# Patient Record
Sex: FEMALE | Race: WHITE | Employment: PART TIME | URBAN - METROPOLITAN AREA
[De-identification: names, ages, dates, MRNs, and addresses within clinical notes are randomized per-mention and may not be internally consistent; named-entity substitution may affect disease eponyms.]

---

## 2017-07-31 ENCOUNTER — OFFICE VISIT (OUTPATIENT)
Dept: FAMILY MEDICINE CLINIC | Facility: CLINIC | Age: 62
End: 2017-07-31

## 2017-07-31 DIAGNOSIS — Z23 NEED FOR TDAP VACCINATION: Primary | ICD-10-CM

## 2017-07-31 PROCEDURE — 90471 IMMUNIZATION ADMIN: CPT | Performed by: NURSE PRACTITIONER

## 2017-07-31 PROCEDURE — 90715 TDAP VACCINE 7 YRS/> IM: CPT | Performed by: NURSE PRACTITIONER

## 2017-07-31 NOTE — PROGRESS NOTES
Injured finger with nail today. Not sure when last Tdap was. Vaccine questionnaire reviewed, placed in left deltoid.

## 2018-12-06 ENCOUNTER — OFFICE VISIT (OUTPATIENT)
Dept: FAMILY MEDICINE CLINIC | Facility: CLINIC | Age: 63
End: 2018-12-06
Payer: COMMERCIAL

## 2018-12-06 VITALS
RESPIRATION RATE: 16 BRPM | HEART RATE: 77 BPM | OXYGEN SATURATION: 99 % | TEMPERATURE: 98 F | DIASTOLIC BLOOD PRESSURE: 80 MMHG | WEIGHT: 137 LBS | SYSTOLIC BLOOD PRESSURE: 128 MMHG

## 2018-12-06 DIAGNOSIS — H60.391 OTITIS, EXTERNA, INFECTIVE, RIGHT: Primary | ICD-10-CM

## 2018-12-06 PROCEDURE — 99213 OFFICE O/P EST LOW 20 MIN: CPT | Performed by: PHYSICIAN ASSISTANT

## 2018-12-06 RX ORDER — OFLOXACIN 3 MG/ML
5 SOLUTION AURICULAR (OTIC) DAILY
Qty: 1 BOTTLE | Refills: 0 | Status: SHIPPED | OUTPATIENT
Start: 2018-12-06 | End: 2018-12-11

## 2018-12-06 NOTE — PROGRESS NOTES
CHIEF COMPLAINT:   Patient presents with:  Ear Pain: last few days some mild discomfort in right ear, crustiness on outside area occassionally      HPI:   Simba Han is a 59year old female who presents for complaints of right ear pain.  Patient has landmarks intact. NOSE: nostrils patent, no nasal mucous, nasal mucosa pink and moist  THROAT: oral mucosa pink, moist. No visible dental caries.  Posterior pharynx with no erythema,  no exudates, tonsils 0/4  NECK: supple, non-tender  LUNGS: non labored b

## 2019-01-09 ENCOUNTER — OFFICE VISIT (OUTPATIENT)
Dept: HEMATOLOGY/ONCOLOGY | Facility: HOSPITAL | Age: 64
End: 2019-01-09
Attending: INTERNAL MEDICINE
Payer: COMMERCIAL

## 2019-01-09 ENCOUNTER — MED REC SCAN ONLY (OUTPATIENT)
Dept: HEMATOLOGY/ONCOLOGY | Facility: HOSPITAL | Age: 64
End: 2019-01-09

## 2019-01-09 VITALS
RESPIRATION RATE: 18 BRPM | DIASTOLIC BLOOD PRESSURE: 82 MMHG | TEMPERATURE: 98 F | SYSTOLIC BLOOD PRESSURE: 142 MMHG | HEART RATE: 89 BPM | BODY MASS INDEX: 20.73 KG/M2 | WEIGHT: 129 LBS | HEIGHT: 66 IN

## 2019-01-09 DIAGNOSIS — Z17.0 MALIGNANT NEOPLASM OF OVERLAPPING SITES OF RIGHT BREAST IN FEMALE, ESTROGEN RECEPTOR POSITIVE (HCC): Primary | ICD-10-CM

## 2019-01-09 DIAGNOSIS — C50.811 MALIGNANT NEOPLASM OF OVERLAPPING SITES OF RIGHT BREAST IN FEMALE, ESTROGEN RECEPTOR POSITIVE (HCC): Primary | ICD-10-CM

## 2019-01-09 PROCEDURE — 99205 OFFICE O/P NEW HI 60 MIN: CPT | Performed by: INTERNAL MEDICINE

## 2019-01-10 NOTE — CONSULTS
[facility]    PATIENT'S NAME: Dickson Michaels   CONSULTING PHYSICIAN: Kojo Canales MD   PATIENT ACCOUNT #: [de-identified] LOCATION: [address] [place] [address] RECORD #: [id number] YOB: 1955   CONSULTATION DATE: 01/09/2019       [facility] running along the axillary vein were able to be saved at that point. Her pathology from this procedure ended up demonstrating a tumor that was approximately 3 cm in maximal dimension. It was multifocal.  It was invasive lobular and grade 2.   Her margins surgeries. She has had 4 spontaneous vaginal deliveries. She has mild varicose veins. Her prior mammogram was reportedly 14 years before. MEDICATIONS:  She is on no medications. ALLERGIES:  She has no known allergies.      SOCIAL HISTORY:  She is edema.  NEUROLOGIC:  She is intact. BREASTS:  Her right chest wall has a well-healed mastectomy incision. IMPRESSION:  The patient is a 23-year-old female.   She has a T2N3M0 carcinoma of the breast.  She has had a bone scan, but has not had a CT scan need to get a port placed. We talked about the need for chest wall and axillary radiation. We will probably also arrange for her to have an occupational therapy/lymphedema prevention evaluation done as part of this assessment.   She was originally ste[place]

## 2019-01-11 ENCOUNTER — TELEPHONE (OUTPATIENT)
Dept: HEMATOLOGY/ONCOLOGY | Facility: HOSPITAL | Age: 64
End: 2019-01-11

## 2019-01-11 DIAGNOSIS — Z79.899 ENCOUNTER FOR MONITORING CARDIOTOXIC DRUG THERAPY: ICD-10-CM

## 2019-01-11 DIAGNOSIS — C50.811 CARCINOMA OF OVERLAPPING SITES OF RIGHT BREAST IN FEMALE, ESTROGEN RECEPTOR POSITIVE (HCC): Primary | ICD-10-CM

## 2019-01-11 DIAGNOSIS — Z17.0 CARCINOMA OF OVERLAPPING SITES OF RIGHT BREAST IN FEMALE, ESTROGEN RECEPTOR POSITIVE (HCC): Primary | ICD-10-CM

## 2019-01-11 DIAGNOSIS — Z51.81 ENCOUNTER FOR MONITORING CARDIOTOXIC DRUG THERAPY: ICD-10-CM

## 2019-01-15 ENCOUNTER — HOSPITAL ENCOUNTER (OUTPATIENT)
Dept: CV DIAGNOSTICS | Facility: HOSPITAL | Age: 64
Discharge: HOME | End: 2019-01-15
Attending: PHYSICIAN ASSISTANT
Payer: COMMERCIAL

## 2019-01-15 ENCOUNTER — OFFICE VISIT (OUTPATIENT)
Dept: HEMATOLOGY/ONCOLOGY | Facility: HOSPITAL | Age: 64
End: 2019-01-15
Attending: PHYSICIAN ASSISTANT
Payer: COMMERCIAL

## 2019-01-15 ENCOUNTER — LAB ENCOUNTER (OUTPATIENT)
Dept: LAB | Facility: HOSPITAL | Age: 64
End: 2019-01-15
Attending: PHYSICIAN ASSISTANT
Payer: COMMERCIAL

## 2019-01-15 ENCOUNTER — TELEPHONE (OUTPATIENT)
Dept: HEMATOLOGY/ONCOLOGY | Facility: HOSPITAL | Age: 64
End: 2019-01-15

## 2019-01-15 DIAGNOSIS — C50.811 CARCINOMA OF OVERLAPPING SITES OF RIGHT BREAST IN FEMALE, ESTROGEN RECEPTOR POSITIVE (HCC): Primary | ICD-10-CM

## 2019-01-15 DIAGNOSIS — Z17.0 CARCINOMA OF OVERLAPPING SITES OF RIGHT BREAST IN FEMALE, ESTROGEN RECEPTOR POSITIVE (HCC): ICD-10-CM

## 2019-01-15 DIAGNOSIS — Z17.0 ESTROGEN RECEPTOR POSITIVE: ICD-10-CM

## 2019-01-15 DIAGNOSIS — C50.811 CANCER OF OVERLAPPING SITES OF RIGHT BREAST (HCC): Primary | ICD-10-CM

## 2019-01-15 DIAGNOSIS — C50.811 CARCINOMA OF OVERLAPPING SITES OF RIGHT BREAST IN FEMALE, ESTROGEN RECEPTOR POSITIVE (HCC): ICD-10-CM

## 2019-01-15 DIAGNOSIS — Z17.0 CARCINOMA OF OVERLAPPING SITES OF RIGHT BREAST IN FEMALE, ESTROGEN RECEPTOR POSITIVE (HCC): Primary | ICD-10-CM

## 2019-01-15 DIAGNOSIS — Z51.81 ENCOUNTER FOR THERAPEUTIC DRUG MONITORING: ICD-10-CM

## 2019-01-15 DIAGNOSIS — Z51.81 ENCOUNTER FOR MONITORING CARDIOTOXIC DRUG THERAPY: ICD-10-CM

## 2019-01-15 DIAGNOSIS — Z79.899 ENCOUNTER FOR MONITORING CARDIOTOXIC DRUG THERAPY: ICD-10-CM

## 2019-01-15 LAB
ALBUMIN SERPL BCP-MCNC: 4.4 G/DL (ref 3.5–4.8)
ALBUMIN/GLOB SERPL: 1.4 {RATIO} (ref 1–2)
ALP SERPL-CCNC: 63 U/L (ref 32–100)
ALT SERPL-CCNC: 14 U/L (ref 14–54)
ANION GAP SERPL CALC-SCNC: 10 MMOL/L (ref 0–18)
AST SERPL-CCNC: 19 U/L (ref 15–41)
BASOPHILS # BLD: 0.1 K/UL (ref 0–0.2)
BASOPHILS NFR BLD: 1 %
BILIRUB SERPL-MCNC: 0.6 MG/DL (ref 0.3–1.2)
BUN SERPL-MCNC: 9 MG/DL (ref 8–20)
BUN/CREAT SERPL: 12.3 (ref 10–20)
CALCIUM SERPL-MCNC: 9.1 MG/DL (ref 8.5–10.5)
CHLORIDE SERPL-SCNC: 103 MMOL/L (ref 95–110)
CO2 SERPL-SCNC: 26 MMOL/L (ref 22–32)
CREAT SERPL-MCNC: 0.73 MG/DL (ref 0.5–1.5)
EOSINOPHIL # BLD: 0.3 K/UL (ref 0–0.7)
EOSINOPHIL NFR BLD: 5 %
ERYTHROCYTE [DISTWIDTH] IN BLOOD BY AUTOMATED COUNT: 12.8 % (ref 11–15)
GLOBULIN PLAS-MCNC: 3.1 G/DL (ref 2.5–3.7)
GLUCOSE SERPL-MCNC: 83 MG/DL (ref 70–99)
HCT VFR BLD AUTO: 42 % (ref 35–48)
HGB BLD-MCNC: 14.1 G/DL (ref 12–16)
LYMPHOCYTES # BLD: 2.6 K/UL (ref 1–4)
LYMPHOCYTES NFR BLD: 40 %
MCH RBC QN AUTO: 28.3 PG (ref 27–32)
MCHC RBC AUTO-ENTMCNC: 33.5 G/DL (ref 32–37)
MCV RBC AUTO: 84.5 FL (ref 80–100)
MONOCYTES # BLD: 0.7 K/UL (ref 0–1)
MONOCYTES NFR BLD: 11 %
NEUTROPHILS # BLD AUTO: 2.9 K/UL (ref 1.8–7.7)
NEUTROPHILS NFR BLD: 43 %
OSMOLALITY UR CALC.SUM OF ELEC: 286 MOSM/KG (ref 275–295)
PATIENT FASTING: NO
PLATELET # BLD AUTO: 250 K/UL (ref 140–400)
PMV BLD AUTO: 7.7 FL (ref 7.4–10.3)
POTASSIUM SERPL-SCNC: 4.3 MMOL/L (ref 3.3–5.1)
PROT SERPL-MCNC: 7.5 G/DL (ref 5.9–8.4)
RBC # BLD AUTO: 4.97 M/UL (ref 3.7–5.4)
SODIUM SERPL-SCNC: 139 MMOL/L (ref 136–144)
WBC # BLD AUTO: 6.6 K/UL (ref 4–11)

## 2019-01-15 PROCEDURE — 93010 ELECTROCARDIOGRAM REPORT: CPT | Performed by: PHYSICIAN ASSISTANT

## 2019-01-15 PROCEDURE — 99215 OFFICE O/P EST HI 40 MIN: CPT | Performed by: PHYSICIAN ASSISTANT

## 2019-01-15 PROCEDURE — 93306 TTE W/DOPPLER COMPLETE: CPT | Performed by: PHYSICIAN ASSISTANT

## 2019-01-15 PROCEDURE — 93005 ELECTROCARDIOGRAM TRACING: CPT

## 2019-01-15 PROCEDURE — 36415 COLL VENOUS BLD VENIPUNCTURE: CPT

## 2019-01-15 PROCEDURE — 80053 COMPREHEN METABOLIC PANEL: CPT

## 2019-01-15 PROCEDURE — 85025 COMPLETE CBC W/AUTO DIFF WBC: CPT

## 2019-01-15 RX ORDER — PROCHLORPERAZINE MALEATE 10 MG
10 TABLET ORAL EVERY 6 HOURS PRN
Qty: 60 TABLET | Refills: 3 | Status: SHIPPED | OUTPATIENT
Start: 2019-01-15 | End: 2019-07-24

## 2019-01-15 RX ORDER — ONDANSETRON HYDROCHLORIDE 8 MG/1
8 TABLET, FILM COATED ORAL EVERY 8 HOURS PRN
Qty: 30 TABLET | Refills: 2 | Status: SHIPPED | OUTPATIENT
Start: 2019-01-15 | End: 2019-07-24

## 2019-01-15 NOTE — TELEPHONE ENCOUNTER
Yue from cath lab asking if patient needs any labs drawn she is having port insertion tomorrow. Robbi Sacks can be reached at [phone].  may

## 2019-01-15 NOTE — PROGRESS NOTES
Medication Education Record: IV Therapy    Learner:  Patient and Family Member    Barriers / Limitations:  None    Diagnosis:   Breast cancer    IV Cancer Treatment Name(s): Adriamycin/Cyclophosphamide (AC); Paclitaxed  IV Cancer Treatment Frequency AC:  O be taken to prevent and minimize this from occurring.     Recommended Anti-nausea medications (as directed by your provider):  Prochloperazine (Compazine) 10 mg every 6 hours and Ondansetron (Zofran) 8 mg every 8 hours  Take as needed      Helpful hints dur diarrhea or constipation, please contact the triage nurse for further instructions. Skin Care  o Avoid direct sunlight.  o Wear a broad-spectrum sunscreen with an SPF of 30 or higher on any skin exposed to the sun.   Re-apply every 2 hours if in the sun an refer to the following link if you are interested in additional information about chemotherapy for yourself or family members: http://www.roman.info/. html        Safety and Handling of Chemotherapy  While you or your family member is should be used during this time. Effective birth control should be used throughout treatment to prevent pregnancy while on these medications and for several months or years after therapy.   Chemotherapy can have harmful side effects to the fetus, especiall

## 2019-01-16 ENCOUNTER — TELEPHONE (OUTPATIENT)
Dept: HEMATOLOGY/ONCOLOGY | Facility: HOSPITAL | Age: 64
End: 2019-01-16

## 2019-01-16 ENCOUNTER — HOSPITAL ENCOUNTER (OUTPATIENT)
Dept: INTERVENTIONAL RADIOLOGY/VASCULAR | Facility: HOSPITAL | Age: 64
Discharge: HOME | End: 2019-01-16
Attending: INTERNAL MEDICINE | Admitting: INTERNAL MEDICINE
Payer: COMMERCIAL

## 2019-01-16 VITALS
BODY MASS INDEX: 21 KG/M2 | HEART RATE: 81 BPM | WEIGHT: 128 LBS | RESPIRATION RATE: 19 BRPM | SYSTOLIC BLOOD PRESSURE: 116 MMHG | DIASTOLIC BLOOD PRESSURE: 82 MMHG | OXYGEN SATURATION: 99 %

## 2019-01-16 DIAGNOSIS — Z17.0 CARCINOMA OF OVERLAPPING SITES OF RIGHT BREAST IN FEMALE, ESTROGEN RECEPTOR POSITIVE (HCC): ICD-10-CM

## 2019-01-16 DIAGNOSIS — C50.811 CARCINOMA OF OVERLAPPING SITES OF RIGHT BREAST IN FEMALE, ESTROGEN RECEPTOR POSITIVE (HCC): ICD-10-CM

## 2019-01-16 PROCEDURE — 36561 INSERT TUNNELED CV CATH: CPT

## 2019-01-16 PROCEDURE — 02H633Z INSERTION OF INFUSION DEVICE INTO RIGHT ATRIUM, PERCUTANEOUS APPROACH: ICD-10-PCS | Performed by: RADIOLOGY

## 2019-01-16 PROCEDURE — 99153 MOD SED SAME PHYS/QHP EA: CPT

## 2019-01-16 PROCEDURE — 36415 COLL VENOUS BLD VENIPUNCTURE: CPT

## 2019-01-16 PROCEDURE — 96360 HYDRATION IV INFUSION INIT: CPT

## 2019-01-16 PROCEDURE — 77001 FLUOROGUIDE FOR VEIN DEVICE: CPT

## 2019-01-16 PROCEDURE — 0JH60WZ INSERTION OF TOTALLY IMPLANTABLE VASCULAR ACCESS DEVICE INTO CHEST SUBCUTANEOUS TISSUE AND FASCIA, OPEN APPROACH: ICD-10-PCS | Performed by: RADIOLOGY

## 2019-01-16 PROCEDURE — 99152 MOD SED SAME PHYS/QHP 5/>YRS: CPT

## 2019-01-16 RX ORDER — SODIUM CHLORIDE 9 MG/ML
INJECTION, SOLUTION INTRAVENOUS CONTINUOUS
Status: DISCONTINUED | OUTPATIENT
Start: 2019-01-16 | End: 2019-01-16

## 2019-01-16 RX ORDER — HEPARIN SODIUM (PORCINE) LOCK FLUSH IV SOLN 100 UNIT/ML 100 UNIT/ML
SOLUTION INTRAVENOUS
Status: COMPLETED
Start: 2019-01-16 | End: 2019-01-16

## 2019-01-16 RX ORDER — SODIUM CHLORIDE 9 MG/ML
INJECTION, SOLUTION INTRAVENOUS
Status: COMPLETED
Start: 2019-01-16 | End: 2019-01-16

## 2019-01-16 RX ORDER — MIDAZOLAM HYDROCHLORIDE 1 MG/ML
INJECTION INTRAMUSCULAR; INTRAVENOUS
Status: COMPLETED
Start: 2019-01-16 | End: 2019-01-16

## 2019-01-16 RX ORDER — CEFAZOLIN SODIUM/WATER 2 G/20 ML
SYRINGE (ML) INTRAVENOUS
Status: COMPLETED
Start: 2019-01-16 | End: 2019-01-16

## 2019-01-16 NOTE — INTERVAL H&P NOTE
The above referenced H&P was reviewed by Harvinder Beth MD on 1/16/2019, the patient was examined and no significant changes have occurred in the patient's condition since the H&P was performed.   Risks, benefits, alternative treatments and consequences

## 2019-01-16 NOTE — TELEPHONE ENCOUNTER
APHRODITE CAME IN THE OFFICE TO SPEAK WITH IBRAHIMA FOR A PRESCRIPTION FOR A CRANIAL PROSTHESIS. APHRODITE CAN BE REACHED [phone].  PLEASE ADVISE THANKS

## 2019-01-16 NOTE — PRE-SEDATION ASSESSMENT
[facility]  IR Pre-Procedure Sedation Assessment    History of snoring or sleep or apnea?    No    History of previous problems with anesthesia or sedation  No    Physical Findings:  Neck: nl ROM  CV: RRR  PULM: normal respiratory rate/effor

## 2019-01-16 NOTE — PROCEDURES
[facility]  Procedure Note    Nickbarbara Han Patient Status:  Outpatient in a Bed    1955 MRN [id number]   Location [facility] Attending Marisa Wharton, [facility] Day # 0 PCP Molly Harrington MD

## 2019-01-17 ENCOUNTER — TELEPHONE (OUTPATIENT)
Dept: HEMATOLOGY/ONCOLOGY | Facility: HOSPITAL | Age: 64
End: 2019-01-17

## 2019-01-17 NOTE — TELEPHONE ENCOUNTER
Informed patient that PT order placed for right shoulder stiffness. S/p right MRM in 12/18 - 22 LNs removed. (PT will reach out to her to coordinate an evaluation)  Lymphedema education will be included. Tentative start date of chemo will be 1/23/19.

## 2019-01-21 ENCOUNTER — OFFICE VISIT (OUTPATIENT)
Dept: PHYSICAL THERAPY | Facility: HOSPITAL | Age: 64
End: 2019-01-21
Attending: PHYSICIAN ASSISTANT
Payer: COMMERCIAL

## 2019-01-21 DIAGNOSIS — Z17.0 CARCINOMA OF OVERLAPPING SITES OF RIGHT BREAST IN FEMALE, ESTROGEN RECEPTOR POSITIVE (HCC): ICD-10-CM

## 2019-01-21 DIAGNOSIS — M25.611 DECREASED ROM OF RIGHT SHOULDER: ICD-10-CM

## 2019-01-21 DIAGNOSIS — C50.811 CARCINOMA OF OVERLAPPING SITES OF RIGHT BREAST IN FEMALE, ESTROGEN RECEPTOR POSITIVE (HCC): ICD-10-CM

## 2019-01-21 DIAGNOSIS — Z90.11 H/O RIGHT MASTECTOMY: ICD-10-CM

## 2019-01-21 PROCEDURE — 97530 THERAPEUTIC ACTIVITIES: CPT | Performed by: PHYSICAL THERAPIST

## 2019-01-21 PROCEDURE — 97161 PT EVAL LOW COMPLEX 20 MIN: CPT | Performed by: PHYSICAL THERAPIST

## 2019-01-21 PROCEDURE — 97140 MANUAL THERAPY 1/> REGIONS: CPT | Performed by: PHYSICAL THERAPIST

## 2019-01-21 NOTE — PROGRESS NOTES
PHYSICAL THERAPY UE LYMPHEDEMA EVALUATION:   Referring Physician: Dr. Syed Valdes of overlapping sites of right breast in female, estrogen receptor positive ([facility] (C50.811,Z17.0)  H/O right mastectomy (Z90.11)  Decreased ROM of right arti Hand Dominance: Right  AROM/Strength:   Shoulder AROM R/L    Flex: 150/170    Abd 160/168    IR/ER WFL   Strength: 5/5 B   R knee AROM WFL, strength testing deferred due to pain lateral knee   Tenderness right lateral knee (inf ITB)   Pt w/ decreased sta donning/doffing compression bandages/garments to be independent at self management once discharged  2) Increase shoulder R AROM to be symmetrical to L to improve ease of dressing/bathing/and reaching overhead  3) Pt to ambulate without deviation to improve

## 2019-01-22 ENCOUNTER — TELEPHONE (OUTPATIENT)
Dept: HEMATOLOGY/ONCOLOGY | Facility: HOSPITAL | Age: 64
End: 2019-01-22

## 2019-01-22 NOTE — TELEPHONE ENCOUNTER
Patient had MRI w/ contrast @ [facility], she thinks Dr. Isa Ribera was able to see them after he ordered the CT,   Will she need to to the [facility] ?  [phone]

## 2019-01-22 NOTE — TELEPHONE ENCOUNTER
Called Aphrodite per Rudi she does not need CT as previous imaging located and reviewed. Patient verbalizes understanding.

## 2019-01-23 ENCOUNTER — OFFICE VISIT (OUTPATIENT)
Dept: HEMATOLOGY/ONCOLOGY | Facility: HOSPITAL | Age: 64
End: 2019-01-23
Attending: INTERNAL MEDICINE
Payer: COMMERCIAL

## 2019-01-23 VITALS
HEART RATE: 93 BPM | BODY MASS INDEX: 20 KG/M2 | OXYGEN SATURATION: 100 % | DIASTOLIC BLOOD PRESSURE: 79 MMHG | RESPIRATION RATE: 18 BRPM | WEIGHT: 126.63 LBS | SYSTOLIC BLOOD PRESSURE: 123 MMHG | TEMPERATURE: 99 F

## 2019-01-23 DIAGNOSIS — Z45.2 ENCOUNTER FOR CARE RELATED TO PORT-A-CATH: ICD-10-CM

## 2019-01-23 DIAGNOSIS — C50.811 CARCINOMA OF OVERLAPPING SITES OF RIGHT BREAST IN FEMALE, ESTROGEN RECEPTOR POSITIVE (HCC): Primary | ICD-10-CM

## 2019-01-23 DIAGNOSIS — Z17.0 CARCINOMA OF OVERLAPPING SITES OF RIGHT BREAST IN FEMALE, ESTROGEN RECEPTOR POSITIVE (HCC): Primary | ICD-10-CM

## 2019-01-23 PROCEDURE — 96413 CHEMO IV INFUSION 1 HR: CPT

## 2019-01-23 PROCEDURE — 96375 TX/PRO/DX INJ NEW DRUG ADDON: CPT

## 2019-01-23 PROCEDURE — 96411 CHEMO IV PUSH ADDL DRUG: CPT

## 2019-01-23 RX ORDER — HEPARIN SODIUM (PORCINE) LOCK FLUSH IV SOLN 100 UNIT/ML 100 UNIT/ML
5 SOLUTION INTRAVENOUS ONCE
Status: CANCELLED | OUTPATIENT
Start: 2019-01-23

## 2019-01-23 RX ORDER — HEPARIN SODIUM (PORCINE) LOCK FLUSH IV SOLN 100 UNIT/ML 100 UNIT/ML
5 SOLUTION INTRAVENOUS ONCE
Status: COMPLETED | OUTPATIENT
Start: 2019-01-23 | End: 2019-01-23

## 2019-01-23 RX ORDER — HEPARIN SODIUM (PORCINE) LOCK FLUSH IV SOLN 100 UNIT/ML 100 UNIT/ML
SOLUTION INTRAVENOUS
Status: COMPLETED
Start: 2019-01-23 | End: 2019-01-23

## 2019-01-23 RX ORDER — SODIUM CHLORIDE 9 MG/ML
INJECTION, SOLUTION INTRAVENOUS
Status: DISPENSED
Start: 2019-01-23 | End: 2019-01-23

## 2019-01-23 RX ORDER — 0.9 % SODIUM CHLORIDE 0.9 %
VIAL (ML) INJECTION
Status: DISPENSED
Start: 2019-01-23 | End: 2019-01-23

## 2019-01-23 RX ORDER — 0.9 % SODIUM CHLORIDE 0.9 %
10 VIAL (ML) INJECTION ONCE
Status: CANCELLED | OUTPATIENT
Start: 2019-01-23

## 2019-01-23 RX ADMIN — HEPARIN SODIUM (PORCINE) LOCK FLUSH IV SOLN 100 UNIT/ML 500 UNITS: 100 SOLUTION INTRAVENOUS at 11:35:00

## 2019-01-23 NOTE — PROGRESS NOTES
Pt here for C1D1 AC. Arrives Ambulating independently, accompanied by Family member           Modifications in dose or schedule: No  Pt anxious about starting chemotherapy. Explained procedure to her and reviewed potential side effects.  Went over her sche

## 2019-01-23 NOTE — PATIENT INSTRUCTIONS
Recommended Anti-nausea medications (as directed by your provider):  Prochloperazine (Compazine) 10 mg every 6 hours and Ondansetron (Zofran) 8 mg every 8 hours  Take as needed      Helpful hints during cancer treatment:    Diet:  o Avoid greasy or spicy f further instructions. Skin Care  o Avoid direct sunlight.  o Wear a broad-spectrum sunscreen with an SPF of 30 or higher on any skin exposed to the sun. Re-apply every 2 hours if in the sun and after bathing or sweating.   o For dry skin, use an alcohol-f medication. Handling Body Waste:   1. Caregivers must wear gloves if exposed to the patient’s blood, urine, stool or vomit. Dispose of the used gloves after each use and wash hands with soap and water.   2. Any sheets or clothes soiled with the bodily you are at a time in your cycle when you could become pregnant or if you are actually pregnant.

## 2019-01-24 ENCOUNTER — NURSE ONLY (OUTPATIENT)
Dept: HEMATOLOGY/ONCOLOGY | Facility: HOSPITAL | Age: 64
End: 2019-01-24
Attending: INTERNAL MEDICINE
Payer: COMMERCIAL

## 2019-01-24 DIAGNOSIS — C50.811 CARCINOMA OF OVERLAPPING SITES OF RIGHT BREAST IN FEMALE, ESTROGEN RECEPTOR POSITIVE (HCC): Primary | ICD-10-CM

## 2019-01-24 DIAGNOSIS — Z17.0 CARCINOMA OF OVERLAPPING SITES OF RIGHT BREAST IN FEMALE, ESTROGEN RECEPTOR POSITIVE (HCC): Primary | ICD-10-CM

## 2019-01-24 PROCEDURE — 96372 THER/PROPH/DIAG INJ SC/IM: CPT

## 2019-01-24 NOTE — PROGRESS NOTES
Injection given, reviewed possible side effects. All questions answered to the best of my ability. Written information given as well. Discharged stable from lab, ambulating steady.

## 2019-01-25 ENCOUNTER — TELEPHONE (OUTPATIENT)
Dept: HEMATOLOGY/ONCOLOGY | Facility: HOSPITAL | Age: 64
End: 2019-01-25

## 2019-01-25 NOTE — TELEPHONE ENCOUNTER
Pt stts she has to take her anti nausea meds every 6 hours and only took the med one time. She would like to know if she still has to take her anti-nausea meds even though she isnt nauseous. pls call pt.  Thank you

## 2019-01-29 ENCOUNTER — APPOINTMENT (OUTPATIENT)
Dept: PHYSICAL THERAPY | Facility: HOSPITAL | Age: 64
End: 2019-01-29
Attending: PHYSICIAN ASSISTANT
Payer: COMMERCIAL

## 2019-01-31 ENCOUNTER — APPOINTMENT (OUTPATIENT)
Dept: PHYSICAL THERAPY | Facility: HOSPITAL | Age: 64
End: 2019-01-31
Attending: PHYSICIAN ASSISTANT
Payer: COMMERCIAL

## 2019-02-05 ENCOUNTER — OFFICE VISIT (OUTPATIENT)
Dept: PHYSICAL THERAPY | Facility: HOSPITAL | Age: 64
End: 2019-02-05
Attending: PHYSICIAN ASSISTANT
Payer: COMMERCIAL

## 2019-02-05 PROCEDURE — 97110 THERAPEUTIC EXERCISES: CPT | Performed by: PHYSICAL THERAPIST

## 2019-02-05 PROCEDURE — 97140 MANUAL THERAPY 1/> REGIONS: CPT | Performed by: PHYSICAL THERAPIST

## 2019-02-05 NOTE — PROGRESS NOTES
Referring Physician: Dr. Hastings Orf of overlapping sites of right breast in female, estrogen receptor positive ([facility] (C50.811,Z17.0)  H/O right mastectomy (Z90.11)  Decreased ROM of right shoulder (M25.611)  Date of Onset: 12/12/18  I donning/doffing compression bandages/garments to be independent at self management once discharged  2) Increase shoulder R AROM to be symmetrical to L to improve ease of dressing/bathing/and reaching overhead- almost met  3) Pt to ambulate without deviatio

## 2019-02-06 ENCOUNTER — OFFICE VISIT (OUTPATIENT)
Dept: HEMATOLOGY/ONCOLOGY | Facility: HOSPITAL | Age: 64
End: 2019-02-06
Attending: INTERNAL MEDICINE
Payer: COMMERCIAL

## 2019-02-06 ENCOUNTER — DIETICIAN VISIT (OUTPATIENT)
Dept: NUTRITION | Facility: HOSPITAL | Age: 64
End: 2019-02-06

## 2019-02-06 VITALS
TEMPERATURE: 98 F | HEIGHT: 66 IN | OXYGEN SATURATION: 100 % | WEIGHT: 128 LBS | RESPIRATION RATE: 18 BRPM | HEART RATE: 81 BPM | BODY MASS INDEX: 20.57 KG/M2 | SYSTOLIC BLOOD PRESSURE: 132 MMHG | DIASTOLIC BLOOD PRESSURE: 85 MMHG

## 2019-02-06 VITALS
RESPIRATION RATE: 18 BRPM | TEMPERATURE: 98 F | WEIGHT: 128 LBS | BODY MASS INDEX: 21 KG/M2 | SYSTOLIC BLOOD PRESSURE: 132 MMHG | DIASTOLIC BLOOD PRESSURE: 85 MMHG | HEART RATE: 81 BPM | OXYGEN SATURATION: 100 %

## 2019-02-06 DIAGNOSIS — C50.811 CARCINOMA OF OVERLAPPING SITES OF RIGHT BREAST IN FEMALE, ESTROGEN RECEPTOR POSITIVE (HCC): Primary | ICD-10-CM

## 2019-02-06 DIAGNOSIS — Z17.0 CARCINOMA OF OVERLAPPING SITES OF RIGHT BREAST IN FEMALE, ESTROGEN RECEPTOR POSITIVE (HCC): Primary | ICD-10-CM

## 2019-02-06 DIAGNOSIS — Z45.2 ENCOUNTER FOR CARE RELATED TO PORT-A-CATH: ICD-10-CM

## 2019-02-06 DIAGNOSIS — Z51.11 ENCOUNTER FOR ANTINEOPLASTIC CHEMOTHERAPY: ICD-10-CM

## 2019-02-06 LAB
ALBUMIN SERPL BCP-MCNC: 3.9 G/DL (ref 3.5–4.8)
ALBUMIN/GLOB SERPL: 1.2 {RATIO} (ref 1–2)
ALP SERPL-CCNC: 76 U/L (ref 32–100)
ALT SERPL-CCNC: 13 U/L (ref 14–54)
ANION GAP SERPL CALC-SCNC: 9 MMOL/L (ref 0–18)
AST SERPL-CCNC: 17 U/L (ref 15–41)
BASOPHILS # BLD: 0.09 X10(3) UL (ref 0–0.2)
BASOPHILS NFR BLD: 2 %
BILIRUB SERPL-MCNC: 0.4 MG/DL (ref 0.3–1.2)
BUN SERPL-MCNC: 9 MG/DL (ref 8–20)
BUN/CREAT SERPL: 12.5 (ref 10–20)
CALCIUM SERPL-MCNC: 8.9 MG/DL (ref 8.5–10.5)
CHLORIDE SERPL-SCNC: 106 MMOL/L (ref 95–110)
CO2 SERPL-SCNC: 25 MMOL/L (ref 22–32)
CREAT SERPL-MCNC: 0.72 MG/DL (ref 0.5–1.5)
DEPRECATED RDW RBC AUTO: 39 FL (ref 35.1–46.3)
EOSINOPHIL # BLD: 0 X10(3) UL (ref 0–0.7)
EOSINOPHIL NFR BLD: 0 %
ERYTHROCYTE [DISTWIDTH] IN BLOOD BY AUTOMATED COUNT: 12.6 % (ref 11–15)
GLOBULIN PLAS-MCNC: 3.2 G/DL (ref 2.5–3.7)
GLUCOSE SERPL-MCNC: 99 MG/DL (ref 70–99)
HCT VFR BLD AUTO: 40.6 % (ref 35–48)
HGB BLD-MCNC: 13.1 G/DL (ref 12–16)
LYMPHOCYTES NFR BLD: 1.69 X10(3) UL (ref 1–4)
LYMPHOCYTES NFR BLD: 36 %
MCH RBC QN AUTO: 27.9 PG (ref 26–34)
MCHC RBC AUTO-ENTMCNC: 32.3 G/DL (ref 31–37)
MCV RBC AUTO: 86.4 FL (ref 80–100)
MONOCYTES # BLD: 0.33 X10(3) UL (ref 0.1–1)
MONOCYTES NFR BLD: 7 %
MORPHOLOGY: NORMAL
NEUTROPHILS # BLD AUTO: 2.21 X10 (3) UL (ref 1.5–7.7)
NEUTROPHILS NFR BLD: 48 %
NEUTS BAND NFR BLD: 7 %
NEUTS HYPERSEG # BLD: 2.59 X10(3) UL (ref 1.5–7.7)
OSMOLALITY UR CALC.SUM OF ELEC: 289 MOSM/KG (ref 275–295)
PATIENT FASTING: NO
PLATELET # BLD AUTO: 276 10(3)UL (ref 150–450)
PLATELET MORPHOLOGY: NORMAL
POTASSIUM SERPL-SCNC: 4.6 MMOL/L (ref 3.3–5.1)
PROT SERPL-MCNC: 7.1 G/DL (ref 5.9–8.4)
RBC # BLD AUTO: 4.7 X10(6)UL (ref 3.8–5.3)
SODIUM SERPL-SCNC: 140 MMOL/L (ref 136–144)
TOTAL CELLS COUNTED: 100
WBC # BLD AUTO: 4.7 X10(3) UL (ref 4–11)

## 2019-02-06 PROCEDURE — 96413 CHEMO IV INFUSION 1 HR: CPT

## 2019-02-06 PROCEDURE — 85027 COMPLETE CBC AUTOMATED: CPT

## 2019-02-06 PROCEDURE — 96367 TX/PROPH/DG ADDL SEQ IV INF: CPT

## 2019-02-06 PROCEDURE — 85025 COMPLETE CBC W/AUTO DIFF WBC: CPT

## 2019-02-06 PROCEDURE — 96375 TX/PRO/DX INJ NEW DRUG ADDON: CPT

## 2019-02-06 PROCEDURE — 96411 CHEMO IV PUSH ADDL DRUG: CPT

## 2019-02-06 PROCEDURE — 99214 OFFICE O/P EST MOD 30 MIN: CPT | Performed by: INTERNAL MEDICINE

## 2019-02-06 PROCEDURE — 85007 BL SMEAR W/DIFF WBC COUNT: CPT

## 2019-02-06 PROCEDURE — 80053 COMPREHEN METABOLIC PANEL: CPT

## 2019-02-06 RX ORDER — HEPARIN SODIUM (PORCINE) LOCK FLUSH IV SOLN 100 UNIT/ML 100 UNIT/ML
5 SOLUTION INTRAVENOUS ONCE
Status: CANCELLED | OUTPATIENT
Start: 2019-02-06

## 2019-02-06 RX ORDER — LORAZEPAM 2 MG/ML
INJECTION INTRAMUSCULAR EVERY 4 HOURS PRN
Status: CANCELLED | OUTPATIENT
Start: 2019-02-06

## 2019-02-06 RX ORDER — 0.9 % SODIUM CHLORIDE 0.9 %
10 VIAL (ML) INJECTION ONCE
Status: CANCELLED | OUTPATIENT
Start: 2019-02-06

## 2019-02-06 RX ORDER — HEPARIN SODIUM (PORCINE) LOCK FLUSH IV SOLN 100 UNIT/ML 100 UNIT/ML
SOLUTION INTRAVENOUS
Status: COMPLETED
Start: 2019-02-06 | End: 2019-02-06

## 2019-02-06 RX ORDER — SODIUM CHLORIDE 9 MG/ML
INJECTION, SOLUTION INTRAVENOUS
Status: DISCONTINUED
Start: 2019-02-06 | End: 2019-02-06

## 2019-02-06 RX ORDER — ONDANSETRON 2 MG/ML
8 INJECTION INTRAMUSCULAR; INTRAVENOUS EVERY 6 HOURS PRN
Status: CANCELLED | OUTPATIENT
Start: 2019-02-06

## 2019-02-06 RX ORDER — METOCLOPRAMIDE HYDROCHLORIDE 5 MG/ML
10 INJECTION INTRAMUSCULAR; INTRAVENOUS EVERY 6 HOURS PRN
Status: CANCELLED | OUTPATIENT
Start: 2019-02-06

## 2019-02-06 RX ORDER — LORAZEPAM 1 MG/1
TABLET ORAL EVERY 4 HOURS PRN
Status: CANCELLED | OUTPATIENT
Start: 2019-02-06

## 2019-02-06 RX ORDER — PROCHLORPERAZINE MALEATE 10 MG
10 TABLET ORAL EVERY 6 HOURS PRN
Status: CANCELLED | OUTPATIENT
Start: 2019-02-06

## 2019-02-06 RX ORDER — 0.9 % SODIUM CHLORIDE 0.9 %
VIAL (ML) INJECTION
Status: DISCONTINUED
Start: 2019-02-06 | End: 2019-02-06

## 2019-02-06 RX ORDER — HEPARIN SODIUM (PORCINE) LOCK FLUSH IV SOLN 100 UNIT/ML 100 UNIT/ML
5 SOLUTION INTRAVENOUS ONCE
Status: COMPLETED | OUTPATIENT
Start: 2019-02-06 | End: 2019-02-06

## 2019-02-06 RX ADMIN — HEPARIN SODIUM (PORCINE) LOCK FLUSH IV SOLN 100 UNIT/ML 500 UNITS: 100 SOLUTION INTRAVENOUS at 12:49:00

## 2019-02-06 NOTE — PROGRESS NOTES
.Oncology Nutrition Assessment    Seeing pt per pt request.    Ht Readings from Last 1 Encounters:  02/06/19 : 167.6 cm (5' 6\")      Wt Readings from Last 1 Encounters:  02/06/19 : 58.1 kg (128 lb)    BMI Calculated:   There is no height or weight on file

## 2019-02-06 NOTE — PROGRESS NOTES
[facility]    PATIENT'S NAME: Margaret Dago   ATTENDING PHYSICIAN: Alyssa Houston MD   PATIENT ACCOUNT #: [de-identified] LOCATION: [address] [place] [address] RECORD #: [id number] YOB: 1955   DATE OF SERVICE: 02/06/2019       CANCER temperature 98. 1. HEENT:  Pink conjunctivae. Anicteric sclerae. Pharynx without lesions. LYMPHATICS:  No cervical, supraclavicular, or axillary adenopathy. CHEST:  Her right chest wall is unremarkable. Her right arm is unremarkable.   Her port is on t

## 2019-02-06 NOTE — PROGRESS NOTES
Pt here for C2D1 AC. Arrives Ambulating independently, accompanied by Spouse           Modifications in dose or schedule: No    Patient tolerated the first chemotherapy well.  Reports she didn't have any nausea but still took her medication, encouraged pat

## 2019-02-07 ENCOUNTER — NURSE ONLY (OUTPATIENT)
Dept: HEMATOLOGY/ONCOLOGY | Facility: HOSPITAL | Age: 64
End: 2019-02-07
Attending: INTERNAL MEDICINE
Payer: COMMERCIAL

## 2019-02-07 VITALS
RESPIRATION RATE: 18 BRPM | TEMPERATURE: 98 F | SYSTOLIC BLOOD PRESSURE: 121 MMHG | DIASTOLIC BLOOD PRESSURE: 84 MMHG | HEART RATE: 76 BPM

## 2019-02-07 DIAGNOSIS — Z17.0 CARCINOMA OF OVERLAPPING SITES OF RIGHT BREAST IN FEMALE, ESTROGEN RECEPTOR POSITIVE (HCC): Primary | ICD-10-CM

## 2019-02-07 DIAGNOSIS — C50.811 CARCINOMA OF OVERLAPPING SITES OF RIGHT BREAST IN FEMALE, ESTROGEN RECEPTOR POSITIVE (HCC): Primary | ICD-10-CM

## 2019-02-07 PROCEDURE — 96372 THER/PROPH/DIAG INJ SC/IM: CPT

## 2019-02-07 NOTE — PROGRESS NOTES
Injection given, pt states she is feeling well, no complaints, eating and drinking well. Discharged stable from lab, ambulating steady.

## 2019-02-08 ENCOUNTER — APPOINTMENT (OUTPATIENT)
Dept: PHYSICAL THERAPY | Facility: HOSPITAL | Age: 64
End: 2019-02-08
Attending: PHYSICIAN ASSISTANT
Payer: COMMERCIAL

## 2019-02-12 ENCOUNTER — APPOINTMENT (OUTPATIENT)
Dept: PHYSICAL THERAPY | Facility: HOSPITAL | Age: 64
End: 2019-02-12
Attending: PHYSICIAN ASSISTANT
Payer: COMMERCIAL

## 2019-02-19 ENCOUNTER — OFFICE VISIT (OUTPATIENT)
Dept: PHYSICAL THERAPY | Facility: HOSPITAL | Age: 64
End: 2019-02-19
Attending: PHYSICIAN ASSISTANT
Payer: COMMERCIAL

## 2019-02-19 PROCEDURE — 97530 THERAPEUTIC ACTIVITIES: CPT | Performed by: PHYSICAL THERAPIST

## 2019-02-19 PROCEDURE — 97140 MANUAL THERAPY 1/> REGIONS: CPT | Performed by: PHYSICAL THERAPIST

## 2019-02-19 NOTE — PROGRESS NOTES
Referring Physician: Dr. Monisha Leone of overlapping sites of right breast in female, estrogen receptor positive ([facility] (C50.811,Z17.0)  H/O right mastectomy (Z90.11)  Decreased ROM of right shoulder (M25.611)  Date of Onset: 12/12/18  I chest/axilla to maintain mobility and to improve tissue hydration. Discussed compression sleeve (proohylactic)- pt stating she is too overwhelmed to think about it now, but will keep it in mind and will contact me if she decides to get on.  Discussed lymphe

## 2019-02-20 ENCOUNTER — OFFICE VISIT (OUTPATIENT)
Dept: HEMATOLOGY/ONCOLOGY | Facility: HOSPITAL | Age: 64
End: 2019-02-20
Attending: INTERNAL MEDICINE
Payer: COMMERCIAL

## 2019-02-20 VITALS
OXYGEN SATURATION: 100 % | TEMPERATURE: 98 F | HEIGHT: 66 IN | HEART RATE: 79 BPM | SYSTOLIC BLOOD PRESSURE: 130 MMHG | DIASTOLIC BLOOD PRESSURE: 77 MMHG | WEIGHT: 129 LBS | RESPIRATION RATE: 16 BRPM | BODY MASS INDEX: 20.73 KG/M2

## 2019-02-20 VITALS
WEIGHT: 129 LBS | SYSTOLIC BLOOD PRESSURE: 130 MMHG | HEART RATE: 79 BPM | OXYGEN SATURATION: 100 % | RESPIRATION RATE: 16 BRPM | TEMPERATURE: 98 F | DIASTOLIC BLOOD PRESSURE: 77 MMHG | BODY MASS INDEX: 21 KG/M2

## 2019-02-20 DIAGNOSIS — Z45.2 ENCOUNTER FOR CARE RELATED TO PORT-A-CATH: ICD-10-CM

## 2019-02-20 DIAGNOSIS — C50.811 CARCINOMA OF OVERLAPPING SITES OF RIGHT BREAST IN FEMALE, ESTROGEN RECEPTOR POSITIVE (HCC): Primary | ICD-10-CM

## 2019-02-20 DIAGNOSIS — Z51.11 CHEMOTHERAPY MANAGEMENT, ENCOUNTER FOR: ICD-10-CM

## 2019-02-20 DIAGNOSIS — Z17.0 CARCINOMA OF OVERLAPPING SITES OF RIGHT BREAST IN FEMALE, ESTROGEN RECEPTOR POSITIVE (HCC): Primary | ICD-10-CM

## 2019-02-20 LAB
ALBUMIN SERPL-MCNC: 3.7 G/DL (ref 3.4–5)
ALBUMIN/GLOB SERPL: 0.9 {RATIO} (ref 1–2)
ALP LIVER SERPL-CCNC: 95 U/L (ref 50–130)
ALT SERPL-CCNC: 17 U/L (ref 13–56)
ANION GAP SERPL CALC-SCNC: 4 MMOL/L (ref 0–18)
AST SERPL-CCNC: 12 U/L (ref 15–37)
BASOPHILS # BLD AUTO: 0.14 X10(3) UL (ref 0–0.2)
BASOPHILS NFR BLD AUTO: 2.8 %
BILIRUB SERPL-MCNC: 0.2 MG/DL (ref 0.1–2)
BUN BLD-MCNC: 9 MG/DL (ref 7–18)
BUN/CREAT SERPL: 11.3 (ref 10–20)
CALCIUM BLD-MCNC: 9 MG/DL (ref 8.5–10.1)
CHLORIDE SERPL-SCNC: 108 MMOL/L (ref 98–107)
CO2 SERPL-SCNC: 29 MMOL/L (ref 21–32)
CREAT BLD-MCNC: 0.8 MG/DL (ref 0.55–1.02)
DEPRECATED RDW RBC AUTO: 40.3 FL (ref 35.1–46.3)
EOSINOPHIL # BLD AUTO: 0.07 X10(3) UL (ref 0–0.7)
EOSINOPHIL NFR BLD AUTO: 1.4 %
ERYTHROCYTE [DISTWIDTH] IN BLOOD BY AUTOMATED COUNT: 13.5 % (ref 11–15)
GLOBULIN PLAS-MCNC: 3.9 G/DL (ref 2.8–4.4)
GLUCOSE BLD-MCNC: 102 MG/DL (ref 70–99)
HCT VFR BLD AUTO: 38.2 % (ref 35–48)
HGB BLD-MCNC: 12.6 G/DL (ref 12–16)
IMM GRANULOCYTES # BLD AUTO: 0.22 X10(3) UL (ref 0–1)
IMM GRANULOCYTES NFR BLD: 4.4 %
LYMPHOCYTES # BLD AUTO: 1.38 X10(3) UL (ref 1–4)
LYMPHOCYTES NFR BLD AUTO: 27.5 %
M PROTEIN MFR SERPL ELPH: 7.6 G/DL (ref 6.4–8.2)
MCH RBC QN AUTO: 28.4 PG (ref 26–34)
MCHC RBC AUTO-ENTMCNC: 33 G/DL (ref 31–37)
MCV RBC AUTO: 86 FL (ref 80–100)
MONOCYTES # BLD AUTO: 0.74 X10(3) UL (ref 0.1–1)
MONOCYTES NFR BLD AUTO: 14.7 %
NEUTROPHILS # BLD AUTO: 2.47 X10 (3) UL (ref 1.5–7.7)
NEUTROPHILS # BLD AUTO: 2.47 X10(3) UL (ref 1.5–7.7)
NEUTROPHILS NFR BLD AUTO: 49.2 %
OSMOLALITY SERPL CALC.SUM OF ELEC: 291 MOSM/KG (ref 275–295)
PLATELET # BLD AUTO: 260 10(3)UL (ref 150–450)
POTASSIUM SERPL-SCNC: 4.5 MMOL/L (ref 3.5–5.1)
RBC # BLD AUTO: 4.44 X10(6)UL (ref 3.8–5.3)
SODIUM SERPL-SCNC: 141 MMOL/L (ref 136–145)
WBC # BLD AUTO: 5 X10(3) UL (ref 4–11)

## 2019-02-20 PROCEDURE — 85025 COMPLETE CBC W/AUTO DIFF WBC: CPT

## 2019-02-20 PROCEDURE — 80053 COMPREHEN METABOLIC PANEL: CPT

## 2019-02-20 PROCEDURE — 96413 CHEMO IV INFUSION 1 HR: CPT

## 2019-02-20 PROCEDURE — 96367 TX/PROPH/DG ADDL SEQ IV INF: CPT

## 2019-02-20 PROCEDURE — 99214 OFFICE O/P EST MOD 30 MIN: CPT | Performed by: PHYSICIAN ASSISTANT

## 2019-02-20 PROCEDURE — 96375 TX/PRO/DX INJ NEW DRUG ADDON: CPT

## 2019-02-20 PROCEDURE — 96411 CHEMO IV PUSH ADDL DRUG: CPT

## 2019-02-20 RX ORDER — LORAZEPAM 2 MG/ML
INJECTION INTRAMUSCULAR EVERY 4 HOURS PRN
Status: CANCELLED | OUTPATIENT
Start: 2019-02-20

## 2019-02-20 RX ORDER — HEPARIN SODIUM (PORCINE) LOCK FLUSH IV SOLN 100 UNIT/ML 100 UNIT/ML
5 SOLUTION INTRAVENOUS ONCE
Status: CANCELLED | OUTPATIENT
Start: 2019-02-20

## 2019-02-20 RX ORDER — ONDANSETRON 2 MG/ML
8 INJECTION INTRAMUSCULAR; INTRAVENOUS EVERY 6 HOURS PRN
Status: CANCELLED | OUTPATIENT
Start: 2019-02-20

## 2019-02-20 RX ORDER — 0.9 % SODIUM CHLORIDE 0.9 %
10 VIAL (ML) INJECTION ONCE
Status: CANCELLED | OUTPATIENT
Start: 2019-02-20

## 2019-02-20 RX ORDER — METOCLOPRAMIDE HYDROCHLORIDE 5 MG/ML
10 INJECTION INTRAMUSCULAR; INTRAVENOUS EVERY 6 HOURS PRN
Status: CANCELLED | OUTPATIENT
Start: 2019-02-20

## 2019-02-20 RX ORDER — LORAZEPAM 1 MG/1
TABLET ORAL EVERY 4 HOURS PRN
Status: CANCELLED | OUTPATIENT
Start: 2019-02-20

## 2019-02-20 RX ORDER — HEPARIN SODIUM (PORCINE) LOCK FLUSH IV SOLN 100 UNIT/ML 100 UNIT/ML
SOLUTION INTRAVENOUS
Status: COMPLETED
Start: 2019-02-20 | End: 2019-02-20

## 2019-02-20 RX ORDER — 0.9 % SODIUM CHLORIDE 0.9 %
VIAL (ML) INJECTION
Status: DISCONTINUED
Start: 2019-02-20 | End: 2019-02-20

## 2019-02-20 RX ORDER — HEPARIN SODIUM (PORCINE) LOCK FLUSH IV SOLN 100 UNIT/ML 100 UNIT/ML
5 SOLUTION INTRAVENOUS ONCE
Status: COMPLETED | OUTPATIENT
Start: 2019-02-20 | End: 2019-02-20

## 2019-02-20 RX ORDER — PROCHLORPERAZINE MALEATE 10 MG
10 TABLET ORAL EVERY 6 HOURS PRN
Status: CANCELLED | OUTPATIENT
Start: 2019-02-20

## 2019-02-20 RX ORDER — SODIUM CHLORIDE 9 MG/ML
INJECTION, SOLUTION INTRAVENOUS
Status: DISCONTINUED
Start: 2019-02-20 | End: 2019-02-20

## 2019-02-20 RX ADMIN — HEPARIN SODIUM (PORCINE) LOCK FLUSH IV SOLN 100 UNIT/ML 500 UNITS: 100 SOLUTION INTRAVENOUS at 11:15:00

## 2019-02-20 NOTE — PROGRESS NOTES
HPI 18-year-old female presents today for consideration of cycle 3 dose dense AC. She is tolerating chemotherapy exceptionally well and denies any side effects.   She eports adequate oral intake, denies nausea vomiting, fatigue and continues to be active file    Occupational History      Not on file    Social Needs      Financial resource strain: Not on file      Food insecurity:        Worry: Not on file        Inability: Not on file      Transportation needs:        Medical: Not on file        Non-medica Physical Exam   Constitutional: She is oriented to person, place, and time. She appears well-developed and well-nourished. No distress. PS 0   HENT:   Head: Normocephalic and atraumatic.    Mouth/Throat: Oropharynx is clear and moist.   Eyes: Conjunct Creatinine 0.80 0.55 - 1.02 mg/dL    BUN/CREA Ratio 11.3 10.0 - 20.0    Calcium, Total 9.0 8.5 - 10.1 mg/dL    Calculated Osmolality 291 275 - 295 mOsm/kg    GFR, Non- 79 >=60    GFR, -American 91 >=60    ALT 17 13 - 56 U/L    AST 12

## 2019-02-20 NOTE — PATIENT INSTRUCTIONS
1. Proceed with cycle 3    2. Call as needed    3.   Follow-up prior to cycle 4 with Dr. Radha Hanna

## 2019-02-20 NOTE — PROGRESS NOTES
Pt here for C3D1 AC. Arrives Ambulating independently, accompanied by Spouse           Modifications in dose or schedule: No    Patient reports she is feeling well, denies any complaints. States all her hair fell out, but she is coping well.  Denies any di

## 2019-02-21 ENCOUNTER — NURSE ONLY (OUTPATIENT)
Dept: HEMATOLOGY/ONCOLOGY | Facility: HOSPITAL | Age: 64
End: 2019-02-21
Attending: INTERNAL MEDICINE
Payer: COMMERCIAL

## 2019-02-21 DIAGNOSIS — Z17.0 CARCINOMA OF OVERLAPPING SITES OF RIGHT BREAST IN FEMALE, ESTROGEN RECEPTOR POSITIVE (HCC): Primary | ICD-10-CM

## 2019-02-21 DIAGNOSIS — C50.811 CARCINOMA OF OVERLAPPING SITES OF RIGHT BREAST IN FEMALE, ESTROGEN RECEPTOR POSITIVE (HCC): Primary | ICD-10-CM

## 2019-02-21 PROCEDURE — 96372 THER/PROPH/DIAG INJ SC/IM: CPT

## 2019-03-06 ENCOUNTER — OFFICE VISIT (OUTPATIENT)
Dept: HEMATOLOGY/ONCOLOGY | Facility: HOSPITAL | Age: 64
End: 2019-03-06
Attending: INTERNAL MEDICINE
Payer: COMMERCIAL

## 2019-03-06 VITALS
HEIGHT: 66 IN | RESPIRATION RATE: 18 BRPM | HEART RATE: 98 BPM | TEMPERATURE: 97 F | BODY MASS INDEX: 20.25 KG/M2 | WEIGHT: 126 LBS | SYSTOLIC BLOOD PRESSURE: 133 MMHG | DIASTOLIC BLOOD PRESSURE: 85 MMHG

## 2019-03-06 VITALS
TEMPERATURE: 97 F | SYSTOLIC BLOOD PRESSURE: 133 MMHG | DIASTOLIC BLOOD PRESSURE: 85 MMHG | BODY MASS INDEX: 20 KG/M2 | RESPIRATION RATE: 18 BRPM | HEART RATE: 98 BPM | OXYGEN SATURATION: 99 % | WEIGHT: 126 LBS

## 2019-03-06 DIAGNOSIS — Z17.0 CARCINOMA OF OVERLAPPING SITES OF RIGHT BREAST IN FEMALE, ESTROGEN RECEPTOR POSITIVE (HCC): Primary | ICD-10-CM

## 2019-03-06 DIAGNOSIS — C50.811 CARCINOMA OF OVERLAPPING SITES OF RIGHT BREAST IN FEMALE, ESTROGEN RECEPTOR POSITIVE (HCC): Primary | ICD-10-CM

## 2019-03-06 DIAGNOSIS — Z45.2 ENCOUNTER FOR CARE RELATED TO PORT-A-CATH: ICD-10-CM

## 2019-03-06 LAB
ALBUMIN SERPL-MCNC: 3.6 G/DL (ref 3.4–5)
ALBUMIN/GLOB SERPL: 0.9 {RATIO} (ref 1–2)
ALP LIVER SERPL-CCNC: 104 U/L (ref 50–130)
ALT SERPL-CCNC: 22 U/L (ref 13–56)
ANION GAP SERPL CALC-SCNC: 8 MMOL/L (ref 0–18)
AST SERPL-CCNC: 20 U/L (ref 15–37)
BASOPHILS # BLD: 0.18 X10(3) UL (ref 0–0.2)
BASOPHILS NFR BLD: 2 %
BILIRUB SERPL-MCNC: 0.2 MG/DL (ref 0.1–2)
BUN BLD-MCNC: 11 MG/DL (ref 7–18)
BUN/CREAT SERPL: 15.1 (ref 10–20)
CALCIUM BLD-MCNC: 8.8 MG/DL (ref 8.5–10.1)
CHLORIDE SERPL-SCNC: 106 MMOL/L (ref 98–107)
CO2 SERPL-SCNC: 26 MMOL/L (ref 21–32)
CREAT BLD-MCNC: 0.73 MG/DL (ref 0.55–1.02)
DEPRECATED RDW RBC AUTO: 43.7 FL (ref 35.1–46.3)
EOSINOPHIL # BLD: 0.18 X10(3) UL (ref 0–0.7)
EOSINOPHIL NFR BLD: 2 %
ERYTHROCYTE [DISTWIDTH] IN BLOOD BY AUTOMATED COUNT: 14.6 % (ref 11–15)
GLOBULIN PLAS-MCNC: 4 G/DL (ref 2.8–4.4)
GLUCOSE BLD-MCNC: 88 MG/DL (ref 70–99)
HCT VFR BLD AUTO: 36.5 % (ref 35–48)
HGB BLD-MCNC: 12.2 G/DL (ref 12–16)
LYMPHOCYTES NFR BLD: 1.08 X10(3) UL (ref 1–4)
LYMPHOCYTES NFR BLD: 12 %
M PROTEIN MFR SERPL ELPH: 7.6 G/DL (ref 6.4–8.2)
MCH RBC QN AUTO: 28.8 PG (ref 26–34)
MCHC RBC AUTO-ENTMCNC: 33.4 G/DL (ref 31–37)
MCV RBC AUTO: 86.1 FL (ref 80–100)
MONOCYTES # BLD: 1.26 X10(3) UL (ref 0.1–1)
MONOCYTES NFR BLD: 14 %
MORPHOLOGY: NORMAL
NEUTROPHILS # BLD AUTO: 5.69 X10 (3) UL (ref 1.5–7.7)
NEUTROPHILS NFR BLD: 52 %
NEUTS BAND NFR BLD: 18 %
NEUTS HYPERSEG # BLD: 6.3 X10(3) UL (ref 1.5–7.7)
OSMOLALITY SERPL CALC.SUM OF ELEC: 289 MOSM/KG (ref 275–295)
PLATELET # BLD AUTO: 252 10(3)UL (ref 150–450)
PLATELET MORPHOLOGY: NORMAL
POTASSIUM SERPL-SCNC: 3.8 MMOL/L (ref 3.5–5.1)
RBC # BLD AUTO: 4.24 X10(6)UL (ref 3.8–5.3)
SODIUM SERPL-SCNC: 140 MMOL/L (ref 136–145)
TOTAL CELLS COUNTED: 100
WBC # BLD AUTO: 9 X10(3) UL (ref 4–11)

## 2019-03-06 PROCEDURE — 96367 TX/PROPH/DG ADDL SEQ IV INF: CPT

## 2019-03-06 PROCEDURE — 99214 OFFICE O/P EST MOD 30 MIN: CPT | Performed by: INTERNAL MEDICINE

## 2019-03-06 PROCEDURE — 96413 CHEMO IV INFUSION 1 HR: CPT

## 2019-03-06 PROCEDURE — 80053 COMPREHEN METABOLIC PANEL: CPT

## 2019-03-06 PROCEDURE — 85027 COMPLETE CBC AUTOMATED: CPT

## 2019-03-06 PROCEDURE — 85007 BL SMEAR W/DIFF WBC COUNT: CPT

## 2019-03-06 PROCEDURE — 96409 CHEMO IV PUSH SNGL DRUG: CPT

## 2019-03-06 PROCEDURE — 96411 CHEMO IV PUSH ADDL DRUG: CPT

## 2019-03-06 PROCEDURE — 85025 COMPLETE CBC W/AUTO DIFF WBC: CPT

## 2019-03-06 RX ORDER — METOCLOPRAMIDE HYDROCHLORIDE 5 MG/ML
10 INJECTION INTRAMUSCULAR; INTRAVENOUS EVERY 6 HOURS PRN
Status: CANCELLED | OUTPATIENT
Start: 2019-03-06

## 2019-03-06 RX ORDER — 0.9 % SODIUM CHLORIDE 0.9 %
10 VIAL (ML) INJECTION ONCE
Status: CANCELLED | OUTPATIENT
Start: 2019-03-06

## 2019-03-06 RX ORDER — HEPARIN SODIUM (PORCINE) LOCK FLUSH IV SOLN 100 UNIT/ML 100 UNIT/ML
SOLUTION INTRAVENOUS
Status: DISCONTINUED
Start: 2019-03-06 | End: 2019-03-06

## 2019-03-06 RX ORDER — HEPARIN SODIUM (PORCINE) LOCK FLUSH IV SOLN 100 UNIT/ML 100 UNIT/ML
5 SOLUTION INTRAVENOUS ONCE
Status: CANCELLED | OUTPATIENT
Start: 2019-03-06

## 2019-03-06 RX ORDER — HEPARIN SODIUM (PORCINE) LOCK FLUSH IV SOLN 100 UNIT/ML 100 UNIT/ML
5 SOLUTION INTRAVENOUS ONCE
Status: COMPLETED | OUTPATIENT
Start: 2019-03-06 | End: 2019-03-06

## 2019-03-06 RX ORDER — ONDANSETRON 2 MG/ML
8 INJECTION INTRAMUSCULAR; INTRAVENOUS EVERY 6 HOURS PRN
Status: CANCELLED | OUTPATIENT
Start: 2019-03-06

## 2019-03-06 RX ORDER — SODIUM CHLORIDE 9 MG/ML
INJECTION, SOLUTION INTRAVENOUS
Status: DISCONTINUED
Start: 2019-03-06 | End: 2019-03-06

## 2019-03-06 RX ORDER — 0.9 % SODIUM CHLORIDE 0.9 %
10 VIAL (ML) INJECTION ONCE
Status: DISCONTINUED | OUTPATIENT
Start: 2019-03-06 | End: 2019-03-06

## 2019-03-06 RX ORDER — 0.9 % SODIUM CHLORIDE 0.9 %
VIAL (ML) INJECTION
Status: DISCONTINUED
Start: 2019-03-06 | End: 2019-03-06

## 2019-03-06 RX ORDER — PROCHLORPERAZINE MALEATE 10 MG
10 TABLET ORAL EVERY 6 HOURS PRN
Status: CANCELLED | OUTPATIENT
Start: 2019-03-06

## 2019-03-06 RX ORDER — LORAZEPAM 1 MG/1
TABLET ORAL EVERY 4 HOURS PRN
Status: CANCELLED | OUTPATIENT
Start: 2019-03-06

## 2019-03-06 RX ORDER — LORAZEPAM 2 MG/ML
INJECTION INTRAMUSCULAR EVERY 4 HOURS PRN
Status: CANCELLED | OUTPATIENT
Start: 2019-03-06

## 2019-03-06 RX ADMIN — HEPARIN SODIUM (PORCINE) LOCK FLUSH IV SOLN 100 UNIT/ML 500 UNITS: 100 SOLUTION INTRAVENOUS at 13:24:00

## 2019-03-06 NOTE — PROGRESS NOTES
Pt here for C4D1 AC. Arrives Ambulating independently, accompanied by sister     Modifications in dose or schedule: No    Pt states she is feeling well, offers no complaints.        Port previously accessed from lab draw, port flushed with positive blood r

## 2019-03-06 NOTE — PROGRESS NOTES
[facility]    PATIENT'S NAME: Leopoldo Southerly   ATTENDING PHYSICIAN: Mandi Muller MD   PATIENT ACCOUNT #: [de-identified] LOCATION: [address] [place] [address] RECORD #: [id number] YOB: 1955   DATE OF SERVICE: 03/06/2019       CANCER Resonant to percussion and clear to auscultation, with no wheezing, rales, or rhonchi. HEART:  Regular S1, S2 with no murmur or gallop. ABDOMEN:  No hepatosplenomegaly or tenderness. EXTREMITIES:  No clubbing, cyanosis, or edema.   NEUROLOGIC:  She is in

## 2019-03-07 ENCOUNTER — NURSE ONLY (OUTPATIENT)
Dept: HEMATOLOGY/ONCOLOGY | Facility: HOSPITAL | Age: 64
End: 2019-03-07
Attending: INTERNAL MEDICINE
Payer: COMMERCIAL

## 2019-03-07 VITALS
SYSTOLIC BLOOD PRESSURE: 130 MMHG | DIASTOLIC BLOOD PRESSURE: 75 MMHG | HEART RATE: 88 BPM | TEMPERATURE: 97 F | RESPIRATION RATE: 16 BRPM

## 2019-03-07 DIAGNOSIS — Z17.0 CARCINOMA OF OVERLAPPING SITES OF RIGHT BREAST IN FEMALE, ESTROGEN RECEPTOR POSITIVE (HCC): Primary | ICD-10-CM

## 2019-03-07 DIAGNOSIS — C50.811 CARCINOMA OF OVERLAPPING SITES OF RIGHT BREAST IN FEMALE, ESTROGEN RECEPTOR POSITIVE (HCC): Primary | ICD-10-CM

## 2019-03-07 PROCEDURE — 96372 THER/PROPH/DIAG INJ SC/IM: CPT

## 2019-03-07 NOTE — PROGRESS NOTES
Patient arrived ambulatory pt states she is feeling well, no complaints, eating and drinking well. Injection given in left upper abdomen    Discharged stable from lab, ambulating steady.

## 2019-03-20 ENCOUNTER — NURSE ONLY (OUTPATIENT)
Dept: HEMATOLOGY/ONCOLOGY | Facility: HOSPITAL | Age: 64
End: 2019-03-20
Attending: INTERNAL MEDICINE
Payer: COMMERCIAL

## 2019-03-20 VITALS
BODY MASS INDEX: 20.41 KG/M2 | DIASTOLIC BLOOD PRESSURE: 81 MMHG | HEART RATE: 94 BPM | RESPIRATION RATE: 16 BRPM | SYSTOLIC BLOOD PRESSURE: 135 MMHG | HEIGHT: 66 IN | WEIGHT: 127 LBS | TEMPERATURE: 97 F

## 2019-03-20 DIAGNOSIS — Z45.2 ENCOUNTER FOR CARE RELATED TO PORT-A-CATH: ICD-10-CM

## 2019-03-20 DIAGNOSIS — Z17.0 CARCINOMA OF OVERLAPPING SITES OF RIGHT BREAST IN FEMALE, ESTROGEN RECEPTOR POSITIVE (HCC): Primary | ICD-10-CM

## 2019-03-20 DIAGNOSIS — C50.811 CARCINOMA OF OVERLAPPING SITES OF RIGHT BREAST IN FEMALE, ESTROGEN RECEPTOR POSITIVE (HCC): Primary | ICD-10-CM

## 2019-03-20 LAB
ALBUMIN SERPL-MCNC: 3.7 G/DL (ref 3.4–5)
ALBUMIN/GLOB SERPL: 1.1 {RATIO} (ref 1–2)
ALP LIVER SERPL-CCNC: 97 U/L (ref 50–130)
ALT SERPL-CCNC: 22 U/L (ref 13–56)
ANION GAP SERPL CALC-SCNC: 6 MMOL/L (ref 0–18)
AST SERPL-CCNC: 12 U/L (ref 15–37)
BASOPHILS # BLD: 0.07 X10(3) UL (ref 0–0.2)
BASOPHILS NFR BLD: 1 %
BILIRUB SERPL-MCNC: 0.2 MG/DL (ref 0.1–2)
BUN BLD-MCNC: 11 MG/DL (ref 7–18)
BUN/CREAT SERPL: 16.2 (ref 10–20)
CALCIUM BLD-MCNC: 9 MG/DL (ref 8.5–10.1)
CHLORIDE SERPL-SCNC: 106 MMOL/L (ref 98–107)
CO2 SERPL-SCNC: 27 MMOL/L (ref 21–32)
CREAT BLD-MCNC: 0.68 MG/DL (ref 0.55–1.02)
DEPRECATED RDW RBC AUTO: 49 FL (ref 35.1–46.3)
EOSINOPHIL # BLD: 0.14 X10(3) UL (ref 0–0.7)
EOSINOPHIL NFR BLD: 2 %
ERYTHROCYTE [DISTWIDTH] IN BLOOD BY AUTOMATED COUNT: 15.9 % (ref 11–15)
GLOBULIN PLAS-MCNC: 3.5 G/DL (ref 2.8–4.4)
GLUCOSE BLD-MCNC: 98 MG/DL (ref 70–99)
HCT VFR BLD AUTO: 33.5 % (ref 35–48)
HGB BLD-MCNC: 11 G/DL (ref 12–16)
LYMPHOCYTES NFR BLD: 1.47 X10(3) UL (ref 1–4)
LYMPHOCYTES NFR BLD: 21 %
M PROTEIN MFR SERPL ELPH: 7.2 G/DL (ref 6.4–8.2)
MCH RBC QN AUTO: 28.6 PG (ref 26–34)
MCHC RBC AUTO-ENTMCNC: 32.8 G/DL (ref 31–37)
MCV RBC AUTO: 87.2 FL (ref 80–100)
MONOCYTES # BLD: 0.21 X10(3) UL (ref 0.1–1)
MONOCYTES NFR BLD: 3 %
MORPHOLOGY: NORMAL
NEUTROPHILS # BLD AUTO: 4.23 X10 (3) UL (ref 1.5–7.7)
NEUTROPHILS NFR BLD: 64 %
NEUTS BAND NFR BLD: 9 %
NEUTS HYPERSEG # BLD: 5.11 X10(3) UL (ref 1.5–7.7)
OSMOLALITY SERPL CALC.SUM OF ELEC: 287 MOSM/KG (ref 275–295)
PLATELET # BLD AUTO: 226 10(3)UL (ref 150–450)
PLATELET MORPHOLOGY: NORMAL
POTASSIUM SERPL-SCNC: 3.8 MMOL/L (ref 3.5–5.1)
RBC # BLD AUTO: 3.84 X10(6)UL (ref 3.8–5.3)
SODIUM SERPL-SCNC: 139 MMOL/L (ref 136–145)
TOTAL CELLS COUNTED: 100
WBC # BLD AUTO: 7 X10(3) UL (ref 4–11)

## 2019-03-20 PROCEDURE — 85025 COMPLETE CBC W/AUTO DIFF WBC: CPT

## 2019-03-20 PROCEDURE — 99214 OFFICE O/P EST MOD 30 MIN: CPT | Performed by: INTERNAL MEDICINE

## 2019-03-20 PROCEDURE — 85027 COMPLETE CBC AUTOMATED: CPT

## 2019-03-20 PROCEDURE — S0028 INJECTION, FAMOTIDINE, 20 MG: HCPCS

## 2019-03-20 PROCEDURE — 80053 COMPREHEN METABOLIC PANEL: CPT

## 2019-03-20 PROCEDURE — 85007 BL SMEAR W/DIFF WBC COUNT: CPT

## 2019-03-20 PROCEDURE — 96413 CHEMO IV INFUSION 1 HR: CPT

## 2019-03-20 PROCEDURE — 96375 TX/PRO/DX INJ NEW DRUG ADDON: CPT

## 2019-03-20 RX ORDER — LORAZEPAM 1 MG/1
TABLET ORAL EVERY 4 HOURS PRN
Status: CANCELLED | OUTPATIENT
Start: 2019-04-03

## 2019-03-20 RX ORDER — DIPHENHYDRAMINE HYDROCHLORIDE 50 MG/ML
25 INJECTION INTRAMUSCULAR; INTRAVENOUS ONCE
Status: CANCELLED | OUTPATIENT
Start: 2019-03-27

## 2019-03-20 RX ORDER — LORAZEPAM 2 MG/ML
INJECTION INTRAMUSCULAR EVERY 4 HOURS PRN
Status: CANCELLED | OUTPATIENT
Start: 2019-03-27

## 2019-03-20 RX ORDER — 0.9 % SODIUM CHLORIDE 0.9 %
VIAL (ML) INJECTION
Status: DISCONTINUED
Start: 2019-03-20 | End: 2019-03-20

## 2019-03-20 RX ORDER — FAMOTIDINE 10 MG/ML
20 INJECTION, SOLUTION INTRAVENOUS ONCE
Status: CANCELLED | OUTPATIENT
Start: 2019-03-20

## 2019-03-20 RX ORDER — LORAZEPAM 2 MG/ML
INJECTION INTRAMUSCULAR EVERY 4 HOURS PRN
Status: CANCELLED | OUTPATIENT
Start: 2019-03-20

## 2019-03-20 RX ORDER — DIPHENHYDRAMINE HYDROCHLORIDE 50 MG/ML
25 INJECTION INTRAMUSCULAR; INTRAVENOUS ONCE
Status: CANCELLED | OUTPATIENT
Start: 2019-04-03

## 2019-03-20 RX ORDER — HEPARIN SODIUM (PORCINE) LOCK FLUSH IV SOLN 100 UNIT/ML 100 UNIT/ML
SOLUTION INTRAVENOUS
Status: COMPLETED
Start: 2019-03-20 | End: 2019-03-20

## 2019-03-20 RX ORDER — PROCHLORPERAZINE MALEATE 10 MG
10 TABLET ORAL EVERY 6 HOURS PRN
Status: CANCELLED | OUTPATIENT
Start: 2019-03-27

## 2019-03-20 RX ORDER — FAMOTIDINE 10 MG/ML
20 INJECTION, SOLUTION INTRAVENOUS ONCE
Status: CANCELLED | OUTPATIENT
Start: 2019-03-27

## 2019-03-20 RX ORDER — LORAZEPAM 1 MG/1
TABLET ORAL EVERY 4 HOURS PRN
Status: CANCELLED | OUTPATIENT
Start: 2019-03-20

## 2019-03-20 RX ORDER — PROCHLORPERAZINE MALEATE 10 MG
10 TABLET ORAL EVERY 6 HOURS PRN
Status: CANCELLED | OUTPATIENT
Start: 2019-03-20

## 2019-03-20 RX ORDER — FAMOTIDINE 10 MG/ML
20 INJECTION, SOLUTION INTRAVENOUS ONCE
Status: COMPLETED | OUTPATIENT
Start: 2019-03-20 | End: 2019-03-20

## 2019-03-20 RX ORDER — METOCLOPRAMIDE HYDROCHLORIDE 5 MG/ML
10 INJECTION INTRAMUSCULAR; INTRAVENOUS EVERY 6 HOURS PRN
Status: CANCELLED | OUTPATIENT
Start: 2019-03-27

## 2019-03-20 RX ORDER — ONDANSETRON 2 MG/ML
8 INJECTION INTRAMUSCULAR; INTRAVENOUS EVERY 6 HOURS PRN
Status: CANCELLED | OUTPATIENT
Start: 2019-03-27

## 2019-03-20 RX ORDER — DIPHENHYDRAMINE HYDROCHLORIDE 50 MG/ML
25 INJECTION INTRAMUSCULAR; INTRAVENOUS ONCE
Status: CANCELLED | OUTPATIENT
Start: 2019-03-20

## 2019-03-20 RX ORDER — DIPHENHYDRAMINE HYDROCHLORIDE 50 MG/ML
25 INJECTION INTRAMUSCULAR; INTRAVENOUS ONCE
Status: COMPLETED | OUTPATIENT
Start: 2019-03-20 | End: 2019-03-20

## 2019-03-20 RX ORDER — HEPARIN SODIUM (PORCINE) LOCK FLUSH IV SOLN 100 UNIT/ML 100 UNIT/ML
5 SOLUTION INTRAVENOUS ONCE
Status: COMPLETED | OUTPATIENT
Start: 2019-03-20 | End: 2019-03-20

## 2019-03-20 RX ORDER — METOCLOPRAMIDE HYDROCHLORIDE 5 MG/ML
10 INJECTION INTRAMUSCULAR; INTRAVENOUS EVERY 6 HOURS PRN
Status: CANCELLED | OUTPATIENT
Start: 2019-04-03

## 2019-03-20 RX ORDER — 0.9 % SODIUM CHLORIDE 0.9 %
10 VIAL (ML) INJECTION ONCE
Status: CANCELLED | OUTPATIENT
Start: 2019-03-20

## 2019-03-20 RX ORDER — METOCLOPRAMIDE HYDROCHLORIDE 5 MG/ML
10 INJECTION INTRAMUSCULAR; INTRAVENOUS EVERY 6 HOURS PRN
Status: CANCELLED | OUTPATIENT
Start: 2019-03-20

## 2019-03-20 RX ORDER — SODIUM CHLORIDE 9 MG/ML
INJECTION, SOLUTION INTRAVENOUS
Status: DISCONTINUED
Start: 2019-03-20 | End: 2019-03-20

## 2019-03-20 RX ORDER — ONDANSETRON 2 MG/ML
8 INJECTION INTRAMUSCULAR; INTRAVENOUS EVERY 6 HOURS PRN
Status: CANCELLED | OUTPATIENT
Start: 2019-03-20

## 2019-03-20 RX ORDER — LORAZEPAM 1 MG/1
TABLET ORAL EVERY 4 HOURS PRN
Status: CANCELLED | OUTPATIENT
Start: 2019-03-27

## 2019-03-20 RX ORDER — ONDANSETRON 2 MG/ML
8 INJECTION INTRAMUSCULAR; INTRAVENOUS EVERY 6 HOURS PRN
Status: CANCELLED | OUTPATIENT
Start: 2019-04-03

## 2019-03-20 RX ORDER — LORAZEPAM 2 MG/ML
INJECTION INTRAMUSCULAR EVERY 4 HOURS PRN
Status: CANCELLED | OUTPATIENT
Start: 2019-04-03

## 2019-03-20 RX ORDER — FAMOTIDINE 10 MG/ML
20 INJECTION, SOLUTION INTRAVENOUS ONCE
Status: CANCELLED | OUTPATIENT
Start: 2019-04-03

## 2019-03-20 RX ORDER — HEPARIN SODIUM (PORCINE) LOCK FLUSH IV SOLN 100 UNIT/ML 100 UNIT/ML
5 SOLUTION INTRAVENOUS ONCE
Status: CANCELLED | OUTPATIENT
Start: 2019-03-20

## 2019-03-20 RX ORDER — PROCHLORPERAZINE MALEATE 10 MG
10 TABLET ORAL EVERY 6 HOURS PRN
Status: CANCELLED | OUTPATIENT
Start: 2019-04-03

## 2019-03-20 RX ORDER — FAMOTIDINE 10 MG/ML
INJECTION, SOLUTION INTRAVENOUS
Status: COMPLETED
Start: 2019-03-20 | End: 2019-03-20

## 2019-03-20 RX ORDER — DIPHENHYDRAMINE HYDROCHLORIDE 50 MG/ML
INJECTION INTRAMUSCULAR; INTRAVENOUS
Status: COMPLETED
Start: 2019-03-20 | End: 2019-03-20

## 2019-03-20 RX ADMIN — DIPHENHYDRAMINE HYDROCHLORIDE 25 MG: 50 INJECTION INTRAMUSCULAR; INTRAVENOUS at 11:44:00

## 2019-03-20 RX ADMIN — HEPARIN SODIUM (PORCINE) LOCK FLUSH IV SOLN 100 UNIT/ML 500 UNITS: 100 SOLUTION INTRAVENOUS at 14:00:00

## 2019-03-20 RX ADMIN — FAMOTIDINE 20 MG: 10 INJECTION, SOLUTION INTRAVENOUS at 11:47:00

## 2019-03-20 NOTE — PROGRESS NOTES
Pt here for C5D1 Taxol. Arrives Ambulating independently, accompanied by sister     Modifications in dose or schedule: No    Pt states she is feeling well. This will be her first Taxol and she is quite nervous because of the potential for a reaction.  Proc

## 2019-03-21 NOTE — PROGRESS NOTES
[facility]    PATIENT'S NAME: Jayne Alvarado   ATTENDING PHYSICIAN: Terra Angel MD   PATIENT ACCOUNT #: [de-identified] LOCATION: [address] [place] [address] RECORD #: [id number] YOB: 1955   DATE OF SERVICE: 03/20/2019       CANCER completely normal.    IMPRESSION:  Breast cancer. She has tolerated her 4 cycles of AC quite well. She is starting on weekly Taxol. I talked with her about the different spectrum toxicities seen with this.   She may have mucositis, though it is relativel

## 2019-03-27 ENCOUNTER — NURSE ONLY (OUTPATIENT)
Dept: HEMATOLOGY/ONCOLOGY | Facility: HOSPITAL | Age: 64
End: 2019-03-27
Attending: INTERNAL MEDICINE
Payer: COMMERCIAL

## 2019-03-27 VITALS
SYSTOLIC BLOOD PRESSURE: 124 MMHG | DIASTOLIC BLOOD PRESSURE: 75 MMHG | WEIGHT: 126.38 LBS | TEMPERATURE: 98 F | OXYGEN SATURATION: 100 % | HEART RATE: 89 BPM | HEIGHT: 66 IN | BODY MASS INDEX: 20.31 KG/M2 | RESPIRATION RATE: 16 BRPM

## 2019-03-27 DIAGNOSIS — Z45.2 ENCOUNTER FOR CARE RELATED TO PORT-A-CATH: ICD-10-CM

## 2019-03-27 DIAGNOSIS — C50.811 CARCINOMA OF OVERLAPPING SITES OF RIGHT BREAST IN FEMALE, ESTROGEN RECEPTOR POSITIVE (HCC): Primary | ICD-10-CM

## 2019-03-27 DIAGNOSIS — Z17.0 CARCINOMA OF OVERLAPPING SITES OF RIGHT BREAST IN FEMALE, ESTROGEN RECEPTOR POSITIVE (HCC): Primary | ICD-10-CM

## 2019-03-27 LAB
BASOPHILS # BLD AUTO: 0.13 X10(3) UL (ref 0–0.2)
BASOPHILS NFR BLD AUTO: 2.8 %
DEPRECATED RDW RBC AUTO: 50.6 FL (ref 35.1–46.3)
EOSINOPHIL # BLD AUTO: 0.34 X10(3) UL (ref 0–0.7)
EOSINOPHIL NFR BLD AUTO: 7.3 %
ERYTHROCYTE [DISTWIDTH] IN BLOOD BY AUTOMATED COUNT: 16 % (ref 11–15)
HCT VFR BLD AUTO: 33.3 % (ref 35–48)
HGB BLD-MCNC: 11 G/DL (ref 12–16)
IMM GRANULOCYTES # BLD AUTO: 0.04 X10(3) UL (ref 0–1)
IMM GRANULOCYTES NFR BLD: 0.9 %
LYMPHOCYTES # BLD AUTO: 0.68 X10(3) UL (ref 1–4)
LYMPHOCYTES NFR BLD AUTO: 14.6 %
MCH RBC QN AUTO: 29.1 PG (ref 26–34)
MCHC RBC AUTO-ENTMCNC: 33 G/DL (ref 31–37)
MCV RBC AUTO: 88.1 FL (ref 80–100)
MONOCYTES # BLD AUTO: 0.79 X10(3) UL (ref 0.1–1)
MONOCYTES NFR BLD AUTO: 17 %
NEUTROPHILS # BLD AUTO: 2.68 X10 (3) UL (ref 1.5–7.7)
NEUTROPHILS # BLD AUTO: 2.68 X10(3) UL (ref 1.5–7.7)
NEUTROPHILS NFR BLD AUTO: 57.4 %
PLATELET # BLD AUTO: 362 10(3)UL (ref 150–450)
RBC # BLD AUTO: 3.78 X10(6)UL (ref 3.8–5.3)
WBC # BLD AUTO: 4.7 X10(3) UL (ref 4–11)

## 2019-03-27 PROCEDURE — 85025 COMPLETE CBC W/AUTO DIFF WBC: CPT

## 2019-03-27 PROCEDURE — S0028 INJECTION, FAMOTIDINE, 20 MG: HCPCS

## 2019-03-27 PROCEDURE — 96375 TX/PRO/DX INJ NEW DRUG ADDON: CPT

## 2019-03-27 PROCEDURE — 96413 CHEMO IV INFUSION 1 HR: CPT

## 2019-03-27 RX ORDER — 0.9 % SODIUM CHLORIDE 0.9 %
10 VIAL (ML) INJECTION ONCE
Status: CANCELLED | OUTPATIENT
Start: 2019-03-27

## 2019-03-27 RX ORDER — HEPARIN SODIUM (PORCINE) LOCK FLUSH IV SOLN 100 UNIT/ML 100 UNIT/ML
5 SOLUTION INTRAVENOUS ONCE
Status: CANCELLED | OUTPATIENT
Start: 2019-03-27

## 2019-03-27 RX ORDER — FAMOTIDINE 10 MG/ML
INJECTION, SOLUTION INTRAVENOUS
Status: COMPLETED
Start: 2019-03-27 | End: 2019-03-27

## 2019-03-27 RX ORDER — 0.9 % SODIUM CHLORIDE 0.9 %
VIAL (ML) INJECTION
Status: DISCONTINUED
Start: 2019-03-27 | End: 2019-03-27

## 2019-03-27 RX ORDER — DIPHENHYDRAMINE HYDROCHLORIDE 50 MG/ML
25 INJECTION INTRAMUSCULAR; INTRAVENOUS ONCE
Status: COMPLETED | OUTPATIENT
Start: 2019-03-27 | End: 2019-03-27

## 2019-03-27 RX ORDER — HEPARIN SODIUM (PORCINE) LOCK FLUSH IV SOLN 100 UNIT/ML 100 UNIT/ML
5 SOLUTION INTRAVENOUS ONCE
Status: COMPLETED | OUTPATIENT
Start: 2019-03-27 | End: 2019-03-27

## 2019-03-27 RX ORDER — HEPARIN SODIUM (PORCINE) LOCK FLUSH IV SOLN 100 UNIT/ML 100 UNIT/ML
SOLUTION INTRAVENOUS
Status: DISCONTINUED
Start: 2019-03-27 | End: 2019-03-27

## 2019-03-27 RX ORDER — SODIUM CHLORIDE 9 MG/ML
INJECTION, SOLUTION INTRAVENOUS
Status: DISCONTINUED
Start: 2019-03-27 | End: 2019-03-27

## 2019-03-27 RX ORDER — DIPHENHYDRAMINE HYDROCHLORIDE 50 MG/ML
INJECTION INTRAMUSCULAR; INTRAVENOUS
Status: COMPLETED
Start: 2019-03-27 | End: 2019-03-27

## 2019-03-27 RX ORDER — FAMOTIDINE 10 MG/ML
20 INJECTION, SOLUTION INTRAVENOUS ONCE
Status: COMPLETED | OUTPATIENT
Start: 2019-03-27 | End: 2019-03-27

## 2019-03-27 RX ADMIN — HEPARIN SODIUM (PORCINE) LOCK FLUSH IV SOLN 100 UNIT/ML 500 UNITS: 100 SOLUTION INTRAVENOUS at 14:20:00

## 2019-03-27 RX ADMIN — DIPHENHYDRAMINE HYDROCHLORIDE 25 MG: 50 INJECTION INTRAMUSCULAR; INTRAVENOUS at 12:34:00

## 2019-03-27 RX ADMIN — FAMOTIDINE 20 MG: 10 INJECTION, SOLUTION INTRAVENOUS at 12:31:00

## 2019-03-27 NOTE — PROGRESS NOTES
Pt here for C5D8 Taxol. Arrives Ambulating independently, accompanied by sister     Modifications in dose or schedule: No    Pt states she is feeling well. Denies any complaints/concerns.   Reviewed symptom mgmt, when to call MD.  They voiced understandin

## 2019-04-03 ENCOUNTER — OFFICE VISIT (OUTPATIENT)
Dept: HEMATOLOGY/ONCOLOGY | Facility: HOSPITAL | Age: 64
End: 2019-04-03
Attending: INTERNAL MEDICINE
Payer: COMMERCIAL

## 2019-04-03 VITALS
TEMPERATURE: 99 F | BODY MASS INDEX: 20.28 KG/M2 | SYSTOLIC BLOOD PRESSURE: 129 MMHG | OXYGEN SATURATION: 99 % | HEIGHT: 66 IN | WEIGHT: 126.19 LBS | RESPIRATION RATE: 16 BRPM | HEART RATE: 95 BPM | DIASTOLIC BLOOD PRESSURE: 77 MMHG

## 2019-04-03 DIAGNOSIS — Z17.0 CARCINOMA OF OVERLAPPING SITES OF RIGHT BREAST IN FEMALE, ESTROGEN RECEPTOR POSITIVE (HCC): Primary | ICD-10-CM

## 2019-04-03 DIAGNOSIS — C50.811 CARCINOMA OF OVERLAPPING SITES OF RIGHT BREAST IN FEMALE, ESTROGEN RECEPTOR POSITIVE (HCC): Primary | ICD-10-CM

## 2019-04-03 DIAGNOSIS — Z45.2 ENCOUNTER FOR CARE RELATED TO PORT-A-CATH: ICD-10-CM

## 2019-04-03 PROCEDURE — 96375 TX/PRO/DX INJ NEW DRUG ADDON: CPT

## 2019-04-03 PROCEDURE — 85025 COMPLETE CBC W/AUTO DIFF WBC: CPT

## 2019-04-03 PROCEDURE — 96413 CHEMO IV INFUSION 1 HR: CPT

## 2019-04-03 PROCEDURE — S0028 INJECTION, FAMOTIDINE, 20 MG: HCPCS

## 2019-04-03 RX ORDER — HEPARIN SODIUM (PORCINE) LOCK FLUSH IV SOLN 100 UNIT/ML 100 UNIT/ML
SOLUTION INTRAVENOUS
Status: DISCONTINUED
Start: 2019-04-03 | End: 2019-04-03

## 2019-04-03 RX ORDER — DIPHENHYDRAMINE HYDROCHLORIDE 50 MG/ML
INJECTION INTRAMUSCULAR; INTRAVENOUS
Status: COMPLETED
Start: 2019-04-03 | End: 2019-04-03

## 2019-04-03 RX ORDER — 0.9 % SODIUM CHLORIDE 0.9 %
VIAL (ML) INJECTION
Status: DISCONTINUED
Start: 2019-04-03 | End: 2019-04-03

## 2019-04-03 RX ORDER — 0.9 % SODIUM CHLORIDE 0.9 %
10 VIAL (ML) INJECTION ONCE
Status: CANCELLED | OUTPATIENT
Start: 2019-04-03

## 2019-04-03 RX ORDER — HEPARIN SODIUM (PORCINE) LOCK FLUSH IV SOLN 100 UNIT/ML 100 UNIT/ML
5 SOLUTION INTRAVENOUS ONCE
Status: CANCELLED | OUTPATIENT
Start: 2019-04-03

## 2019-04-03 RX ORDER — HEPARIN SODIUM (PORCINE) LOCK FLUSH IV SOLN 100 UNIT/ML 100 UNIT/ML
5 SOLUTION INTRAVENOUS ONCE
Status: COMPLETED | OUTPATIENT
Start: 2019-04-03 | End: 2019-04-03

## 2019-04-03 RX ORDER — FAMOTIDINE 10 MG/ML
INJECTION, SOLUTION INTRAVENOUS
Status: COMPLETED
Start: 2019-04-03 | End: 2019-04-03

## 2019-04-03 RX ORDER — FAMOTIDINE 10 MG/ML
20 INJECTION, SOLUTION INTRAVENOUS ONCE
Status: DISCONTINUED | OUTPATIENT
Start: 2019-04-03 | End: 2019-04-03

## 2019-04-03 RX ORDER — SODIUM CHLORIDE 9 MG/ML
INJECTION, SOLUTION INTRAVENOUS
Status: DISCONTINUED
Start: 2019-04-03 | End: 2019-04-03

## 2019-04-03 RX ORDER — DIPHENHYDRAMINE HYDROCHLORIDE 50 MG/ML
25 INJECTION INTRAMUSCULAR; INTRAVENOUS ONCE
Status: DISCONTINUED | OUTPATIENT
Start: 2019-04-03 | End: 2019-04-03

## 2019-04-03 RX ADMIN — FAMOTIDINE 20 MG: 10 INJECTION, SOLUTION INTRAVENOUS at 10:10:00

## 2019-04-03 RX ADMIN — DIPHENHYDRAMINE HYDROCHLORIDE 25 MG: 50 INJECTION INTRAMUSCULAR; INTRAVENOUS at 10:10:00

## 2019-04-03 RX ADMIN — HEPARIN SODIUM (PORCINE) LOCK FLUSH IV SOLN 100 UNIT/ML 500 UNITS: 100 SOLUTION INTRAVENOUS at 11:48:00

## 2019-04-03 NOTE — PROGRESS NOTES
Pt here for [id number]. Arrives Ambulating independently, accompanied by her . CBC results appropriate for treatment today. Modifications in dose or schedule: No    Denies neuropathy to fingers/toes.  Reviewed potential for and possible feeling o

## 2019-04-09 ENCOUNTER — NURSE ONLY (OUTPATIENT)
Dept: HEMATOLOGY/ONCOLOGY | Facility: HOSPITAL | Age: 64
End: 2019-04-09
Attending: INTERNAL MEDICINE
Payer: COMMERCIAL

## 2019-04-09 DIAGNOSIS — C50.811 CARCINOMA OF OVERLAPPING SITES OF RIGHT BREAST IN FEMALE, ESTROGEN RECEPTOR POSITIVE (HCC): Primary | ICD-10-CM

## 2019-04-09 DIAGNOSIS — Z45.2 ENCOUNTER FOR CARE RELATED TO PORT-A-CATH: ICD-10-CM

## 2019-04-09 DIAGNOSIS — Z17.0 CARCINOMA OF OVERLAPPING SITES OF RIGHT BREAST IN FEMALE, ESTROGEN RECEPTOR POSITIVE (HCC): Primary | ICD-10-CM

## 2019-04-09 PROCEDURE — 80053 COMPREHEN METABOLIC PANEL: CPT

## 2019-04-09 PROCEDURE — 36591 DRAW BLOOD OFF VENOUS DEVICE: CPT

## 2019-04-09 PROCEDURE — 85025 COMPLETE CBC W/AUTO DIFF WBC: CPT

## 2019-04-09 RX ORDER — HEPARIN SODIUM (PORCINE) LOCK FLUSH IV SOLN 100 UNIT/ML 100 UNIT/ML
5 SOLUTION INTRAVENOUS ONCE
Status: COMPLETED | OUTPATIENT
Start: 2019-04-09 | End: 2019-04-09

## 2019-04-09 RX ORDER — 0.9 % SODIUM CHLORIDE 0.9 %
VIAL (ML) INJECTION
Status: DISCONTINUED
Start: 2019-04-09 | End: 2019-04-09

## 2019-04-09 RX ORDER — 0.9 % SODIUM CHLORIDE 0.9 %
10 VIAL (ML) INJECTION ONCE
Status: CANCELLED | OUTPATIENT
Start: 2019-04-09

## 2019-04-09 RX ORDER — HEPARIN SODIUM (PORCINE) LOCK FLUSH IV SOLN 100 UNIT/ML 100 UNIT/ML
5 SOLUTION INTRAVENOUS ONCE
Status: CANCELLED | OUTPATIENT
Start: 2019-04-09

## 2019-04-09 RX ADMIN — HEPARIN SODIUM (PORCINE) LOCK FLUSH IV SOLN 100 UNIT/ML 500 UNITS: 100 SOLUTION INTRAVENOUS at 09:57:00

## 2019-04-10 ENCOUNTER — OFFICE VISIT (OUTPATIENT)
Dept: HEMATOLOGY/ONCOLOGY | Facility: HOSPITAL | Age: 64
End: 2019-04-10
Attending: INTERNAL MEDICINE
Payer: COMMERCIAL

## 2019-04-10 VITALS
WEIGHT: 126 LBS | OXYGEN SATURATION: 100 % | HEIGHT: 66 IN | DIASTOLIC BLOOD PRESSURE: 74 MMHG | BODY MASS INDEX: 20.25 KG/M2 | HEART RATE: 91 BPM | TEMPERATURE: 98 F | SYSTOLIC BLOOD PRESSURE: 127 MMHG | RESPIRATION RATE: 16 BRPM

## 2019-04-10 VITALS
BODY MASS INDEX: 20.38 KG/M2 | RESPIRATION RATE: 16 BRPM | OXYGEN SATURATION: 100 % | TEMPERATURE: 98 F | DIASTOLIC BLOOD PRESSURE: 74 MMHG | HEART RATE: 91 BPM | WEIGHT: 126.81 LBS | SYSTOLIC BLOOD PRESSURE: 127 MMHG | HEIGHT: 66 IN

## 2019-04-10 DIAGNOSIS — D64.81 ANEMIA DUE TO ANTINEOPLASTIC CHEMOTHERAPY: ICD-10-CM

## 2019-04-10 DIAGNOSIS — Z45.2 ENCOUNTER FOR CARE RELATED TO PORT-A-CATH: ICD-10-CM

## 2019-04-10 DIAGNOSIS — Z51.11 CHEMOTHERAPY MANAGEMENT, ENCOUNTER FOR: ICD-10-CM

## 2019-04-10 DIAGNOSIS — Z17.0 CARCINOMA OF OVERLAPPING SITES OF RIGHT BREAST IN FEMALE, ESTROGEN RECEPTOR POSITIVE (HCC): Primary | ICD-10-CM

## 2019-04-10 DIAGNOSIS — C50.811 CARCINOMA OF OVERLAPPING SITES OF RIGHT BREAST IN FEMALE, ESTROGEN RECEPTOR POSITIVE (HCC): Primary | ICD-10-CM

## 2019-04-10 DIAGNOSIS — T45.1X5A ANEMIA DUE TO ANTINEOPLASTIC CHEMOTHERAPY: ICD-10-CM

## 2019-04-10 DIAGNOSIS — Z17.0 MALIGNANT NEOPLASM OF RIGHT BREAST IN FEMALE, ESTROGEN RECEPTOR POSITIVE, UNSPECIFIED SITE OF BREAST (HCC): Primary | ICD-10-CM

## 2019-04-10 DIAGNOSIS — C50.911 MALIGNANT NEOPLASM OF RIGHT BREAST IN FEMALE, ESTROGEN RECEPTOR POSITIVE, UNSPECIFIED SITE OF BREAST (HCC): Primary | ICD-10-CM

## 2019-04-10 PROCEDURE — 99214 OFFICE O/P EST MOD 30 MIN: CPT | Performed by: INTERNAL MEDICINE

## 2019-04-10 PROCEDURE — 96375 TX/PRO/DX INJ NEW DRUG ADDON: CPT

## 2019-04-10 PROCEDURE — 96413 CHEMO IV INFUSION 1 HR: CPT

## 2019-04-10 PROCEDURE — S0028 INJECTION, FAMOTIDINE, 20 MG: HCPCS

## 2019-04-10 RX ORDER — SODIUM CHLORIDE 9 MG/ML
INJECTION, SOLUTION INTRAVENOUS
Status: DISCONTINUED
Start: 2019-04-10 | End: 2019-04-10

## 2019-04-10 RX ORDER — PROCHLORPERAZINE MALEATE 10 MG
10 TABLET ORAL EVERY 6 HOURS PRN
Status: CANCELLED | OUTPATIENT
Start: 2019-04-17

## 2019-04-10 RX ORDER — METOCLOPRAMIDE HYDROCHLORIDE 5 MG/ML
10 INJECTION INTRAMUSCULAR; INTRAVENOUS EVERY 6 HOURS PRN
Status: CANCELLED | OUTPATIENT
Start: 2019-04-24

## 2019-04-10 RX ORDER — 0.9 % SODIUM CHLORIDE 0.9 %
10 VIAL (ML) INJECTION ONCE
Status: CANCELLED | OUTPATIENT
Start: 2019-04-10

## 2019-04-10 RX ORDER — HEPARIN SODIUM (PORCINE) LOCK FLUSH IV SOLN 100 UNIT/ML 100 UNIT/ML
SOLUTION INTRAVENOUS
Status: COMPLETED
Start: 2019-04-10 | End: 2019-04-10

## 2019-04-10 RX ORDER — DIPHENHYDRAMINE HYDROCHLORIDE 50 MG/ML
INJECTION INTRAMUSCULAR; INTRAVENOUS
Status: COMPLETED
Start: 2019-04-10 | End: 2019-04-10

## 2019-04-10 RX ORDER — DIPHENHYDRAMINE HYDROCHLORIDE 50 MG/ML
25 INJECTION INTRAMUSCULAR; INTRAVENOUS ONCE
Status: CANCELLED | OUTPATIENT
Start: 2019-04-24

## 2019-04-10 RX ORDER — DIPHENHYDRAMINE HYDROCHLORIDE 50 MG/ML
25 INJECTION INTRAMUSCULAR; INTRAVENOUS ONCE
Status: CANCELLED | OUTPATIENT
Start: 2019-04-17

## 2019-04-10 RX ORDER — LORAZEPAM 2 MG/ML
INJECTION INTRAMUSCULAR EVERY 4 HOURS PRN
Status: CANCELLED | OUTPATIENT
Start: 2019-04-17

## 2019-04-10 RX ORDER — METOCLOPRAMIDE HYDROCHLORIDE 5 MG/ML
10 INJECTION INTRAMUSCULAR; INTRAVENOUS EVERY 6 HOURS PRN
Status: CANCELLED | OUTPATIENT
Start: 2019-04-17

## 2019-04-10 RX ORDER — FAMOTIDINE 10 MG/ML
INJECTION, SOLUTION INTRAVENOUS
Status: COMPLETED
Start: 2019-04-10 | End: 2019-04-10

## 2019-04-10 RX ORDER — LORAZEPAM 2 MG/ML
INJECTION INTRAMUSCULAR EVERY 4 HOURS PRN
Status: CANCELLED | OUTPATIENT
Start: 2019-04-24

## 2019-04-10 RX ORDER — FAMOTIDINE 10 MG/ML
20 INJECTION, SOLUTION INTRAVENOUS ONCE
Status: CANCELLED | OUTPATIENT
Start: 2019-04-17

## 2019-04-10 RX ORDER — FAMOTIDINE 10 MG/ML
20 INJECTION, SOLUTION INTRAVENOUS ONCE
Status: CANCELLED | OUTPATIENT
Start: 2019-04-10

## 2019-04-10 RX ORDER — PROCHLORPERAZINE MALEATE 10 MG
10 TABLET ORAL EVERY 6 HOURS PRN
Status: CANCELLED | OUTPATIENT
Start: 2019-04-10

## 2019-04-10 RX ORDER — FAMOTIDINE 10 MG/ML
20 INJECTION, SOLUTION INTRAVENOUS ONCE
Status: COMPLETED | OUTPATIENT
Start: 2019-04-10 | End: 2019-04-10

## 2019-04-10 RX ORDER — HEPARIN SODIUM (PORCINE) LOCK FLUSH IV SOLN 100 UNIT/ML 100 UNIT/ML
5 SOLUTION INTRAVENOUS ONCE
Status: CANCELLED | OUTPATIENT
Start: 2019-04-10

## 2019-04-10 RX ORDER — ONDANSETRON 2 MG/ML
8 INJECTION INTRAMUSCULAR; INTRAVENOUS EVERY 6 HOURS PRN
Status: CANCELLED | OUTPATIENT
Start: 2019-04-24

## 2019-04-10 RX ORDER — DIPHENHYDRAMINE HYDROCHLORIDE 50 MG/ML
25 INJECTION INTRAMUSCULAR; INTRAVENOUS ONCE
Status: CANCELLED | OUTPATIENT
Start: 2019-04-10

## 2019-04-10 RX ORDER — HEPARIN SODIUM (PORCINE) LOCK FLUSH IV SOLN 100 UNIT/ML 100 UNIT/ML
5 SOLUTION INTRAVENOUS ONCE
Status: COMPLETED | OUTPATIENT
Start: 2019-04-10 | End: 2019-04-10

## 2019-04-10 RX ORDER — LORAZEPAM 1 MG/1
TABLET ORAL EVERY 4 HOURS PRN
Status: CANCELLED | OUTPATIENT
Start: 2019-04-10

## 2019-04-10 RX ORDER — LORAZEPAM 2 MG/ML
INJECTION INTRAMUSCULAR EVERY 4 HOURS PRN
Status: CANCELLED | OUTPATIENT
Start: 2019-04-10

## 2019-04-10 RX ORDER — LORAZEPAM 1 MG/1
TABLET ORAL EVERY 4 HOURS PRN
Status: CANCELLED | OUTPATIENT
Start: 2019-04-24

## 2019-04-10 RX ORDER — 0.9 % SODIUM CHLORIDE 0.9 %
VIAL (ML) INJECTION
Status: DISCONTINUED
Start: 2019-04-10 | End: 2019-04-10

## 2019-04-10 RX ORDER — METOCLOPRAMIDE HYDROCHLORIDE 5 MG/ML
10 INJECTION INTRAMUSCULAR; INTRAVENOUS EVERY 6 HOURS PRN
Status: CANCELLED | OUTPATIENT
Start: 2019-04-10

## 2019-04-10 RX ORDER — LORAZEPAM 1 MG/1
TABLET ORAL EVERY 4 HOURS PRN
Status: CANCELLED | OUTPATIENT
Start: 2019-04-17

## 2019-04-10 RX ORDER — FAMOTIDINE 10 MG/ML
20 INJECTION, SOLUTION INTRAVENOUS ONCE
Status: CANCELLED | OUTPATIENT
Start: 2019-04-24

## 2019-04-10 RX ORDER — DIPHENHYDRAMINE HYDROCHLORIDE 50 MG/ML
25 INJECTION INTRAMUSCULAR; INTRAVENOUS ONCE
Status: COMPLETED | OUTPATIENT
Start: 2019-04-10 | End: 2019-04-10

## 2019-04-10 RX ORDER — PROCHLORPERAZINE MALEATE 10 MG
10 TABLET ORAL EVERY 6 HOURS PRN
Status: CANCELLED | OUTPATIENT
Start: 2019-04-24

## 2019-04-10 RX ORDER — ONDANSETRON 2 MG/ML
8 INJECTION INTRAMUSCULAR; INTRAVENOUS EVERY 6 HOURS PRN
Status: CANCELLED | OUTPATIENT
Start: 2019-04-10

## 2019-04-10 RX ORDER — ONDANSETRON 2 MG/ML
8 INJECTION INTRAMUSCULAR; INTRAVENOUS EVERY 6 HOURS PRN
Status: CANCELLED | OUTPATIENT
Start: 2019-04-17

## 2019-04-10 RX ADMIN — FAMOTIDINE 20 MG: 10 INJECTION, SOLUTION INTRAVENOUS at 11:09:00

## 2019-04-10 RX ADMIN — HEPARIN SODIUM (PORCINE) LOCK FLUSH IV SOLN 100 UNIT/ML 500 UNITS: 100 SOLUTION INTRAVENOUS at 12:50:00

## 2019-04-10 RX ADMIN — DIPHENHYDRAMINE HYDROCHLORIDE 25 MG: 50 INJECTION INTRAMUSCULAR; INTRAVENOUS at 11:11:00

## 2019-04-10 NOTE — PROGRESS NOTES
[facility]    PATIENT'S NAME: Channing Urias   ATTENDING PHYSICIAN: Bárbara Haynes MD   PATIENT ACCOUNT #: [de-identified] LOCATION: [address] [place] [address] RECORD #: [id number] YOB: 1955   DATE OF SERVICE: 04/10/2019       CANCER and clear to auscultation, with no wheezing, rales, or rhonchi. HEART:  Regular S1 and S2, with no murmur or gallop. ABDOMEN:  No hepatosplenomegaly or tenderness. EXTREMITIES:  She has no clubbing, cyanosis, or edema. NEUROLOGIC:  She is intact.     L

## 2019-04-10 NOTE — PROGRESS NOTES
Pt here for C6D1 Taxol. Arrives Ambulating independently, accompanied by Spouse           Modifications in dose or schedule: No; pt reports feeling well overall, offers no complaints. Denies any fevers, chills or s/s of infection. No n/v/d reported.  Finn Paris

## 2019-04-17 ENCOUNTER — OFFICE VISIT (OUTPATIENT)
Dept: HEMATOLOGY/ONCOLOGY | Facility: HOSPITAL | Age: 64
End: 2019-04-17
Attending: INTERNAL MEDICINE
Payer: COMMERCIAL

## 2019-04-17 VITALS
SYSTOLIC BLOOD PRESSURE: 134 MMHG | DIASTOLIC BLOOD PRESSURE: 79 MMHG | OXYGEN SATURATION: 100 % | TEMPERATURE: 98 F | RESPIRATION RATE: 16 BRPM | HEART RATE: 80 BPM

## 2019-04-17 DIAGNOSIS — Z51.11 CHEMOTHERAPY MANAGEMENT, ENCOUNTER FOR: ICD-10-CM

## 2019-04-17 DIAGNOSIS — Z45.2 ENCOUNTER FOR CARE RELATED TO PORT-A-CATH: ICD-10-CM

## 2019-04-17 DIAGNOSIS — C50.811 CARCINOMA OF OVERLAPPING SITES OF RIGHT BREAST IN FEMALE, ESTROGEN RECEPTOR POSITIVE (HCC): Primary | ICD-10-CM

## 2019-04-17 DIAGNOSIS — Z17.0 CARCINOMA OF OVERLAPPING SITES OF RIGHT BREAST IN FEMALE, ESTROGEN RECEPTOR POSITIVE (HCC): Primary | ICD-10-CM

## 2019-04-17 PROCEDURE — 85025 COMPLETE CBC W/AUTO DIFF WBC: CPT

## 2019-04-17 PROCEDURE — 96375 TX/PRO/DX INJ NEW DRUG ADDON: CPT

## 2019-04-17 PROCEDURE — 96413 CHEMO IV INFUSION 1 HR: CPT

## 2019-04-17 PROCEDURE — S0028 INJECTION, FAMOTIDINE, 20 MG: HCPCS

## 2019-04-17 RX ORDER — 0.9 % SODIUM CHLORIDE 0.9 %
VIAL (ML) INJECTION
Status: DISCONTINUED
Start: 2019-04-17 | End: 2019-04-17

## 2019-04-17 RX ORDER — HEPARIN SODIUM (PORCINE) LOCK FLUSH IV SOLN 100 UNIT/ML 100 UNIT/ML
SOLUTION INTRAVENOUS
Status: COMPLETED
Start: 2019-04-17 | End: 2019-04-17

## 2019-04-17 RX ORDER — DIPHENHYDRAMINE HYDROCHLORIDE 50 MG/ML
25 INJECTION INTRAMUSCULAR; INTRAVENOUS ONCE
Status: COMPLETED | OUTPATIENT
Start: 2019-04-17 | End: 2019-04-17

## 2019-04-17 RX ORDER — DIPHENHYDRAMINE HYDROCHLORIDE 50 MG/ML
INJECTION INTRAMUSCULAR; INTRAVENOUS
Status: COMPLETED
Start: 2019-04-17 | End: 2019-04-17

## 2019-04-17 RX ORDER — FAMOTIDINE 10 MG/ML
20 INJECTION, SOLUTION INTRAVENOUS ONCE
Status: COMPLETED | OUTPATIENT
Start: 2019-04-17 | End: 2019-04-17

## 2019-04-17 RX ORDER — SODIUM CHLORIDE 9 MG/ML
INJECTION, SOLUTION INTRAVENOUS
Status: DISCONTINUED
Start: 2019-04-17 | End: 2019-04-17

## 2019-04-17 RX ORDER — FAMOTIDINE 10 MG/ML
INJECTION, SOLUTION INTRAVENOUS
Status: COMPLETED
Start: 2019-04-17 | End: 2019-04-17

## 2019-04-17 RX ORDER — HEPARIN SODIUM (PORCINE) LOCK FLUSH IV SOLN 100 UNIT/ML 100 UNIT/ML
5 SOLUTION INTRAVENOUS ONCE
Status: COMPLETED | OUTPATIENT
Start: 2019-04-17 | End: 2019-04-17

## 2019-04-17 RX ORDER — HEPARIN SODIUM (PORCINE) LOCK FLUSH IV SOLN 100 UNIT/ML 100 UNIT/ML
5 SOLUTION INTRAVENOUS ONCE
Status: CANCELLED | OUTPATIENT
Start: 2019-04-17

## 2019-04-17 RX ORDER — 0.9 % SODIUM CHLORIDE 0.9 %
10 VIAL (ML) INJECTION ONCE
Status: CANCELLED | OUTPATIENT
Start: 2019-04-17

## 2019-04-17 RX ADMIN — DIPHENHYDRAMINE HYDROCHLORIDE 25 MG: 50 INJECTION INTRAMUSCULAR; INTRAVENOUS at 11:31:00

## 2019-04-17 RX ADMIN — FAMOTIDINE 20 MG: 10 INJECTION, SOLUTION INTRAVENOUS at 11:35:00

## 2019-04-17 RX ADMIN — HEPARIN SODIUM (PORCINE) LOCK FLUSH IV SOLN 100 UNIT/ML 500 UNITS: 100 SOLUTION INTRAVENOUS at 13:08:00

## 2019-04-24 ENCOUNTER — NURSE ONLY (OUTPATIENT)
Dept: HEMATOLOGY/ONCOLOGY | Facility: HOSPITAL | Age: 64
End: 2019-04-24
Attending: INTERNAL MEDICINE
Payer: COMMERCIAL

## 2019-04-24 VITALS
HEART RATE: 107 BPM | RESPIRATION RATE: 16 BRPM | HEIGHT: 66 IN | DIASTOLIC BLOOD PRESSURE: 56 MMHG | SYSTOLIC BLOOD PRESSURE: 91 MMHG | TEMPERATURE: 98 F | BODY MASS INDEX: 20.57 KG/M2 | WEIGHT: 128 LBS | OXYGEN SATURATION: 100 %

## 2019-04-24 DIAGNOSIS — C50.811 CARCINOMA OF OVERLAPPING SITES OF RIGHT BREAST IN FEMALE, ESTROGEN RECEPTOR POSITIVE (HCC): Primary | ICD-10-CM

## 2019-04-24 DIAGNOSIS — Z51.11 CHEMOTHERAPY MANAGEMENT, ENCOUNTER FOR: ICD-10-CM

## 2019-04-24 DIAGNOSIS — Z17.0 CARCINOMA OF OVERLAPPING SITES OF RIGHT BREAST IN FEMALE, ESTROGEN RECEPTOR POSITIVE (HCC): Primary | ICD-10-CM

## 2019-04-24 DIAGNOSIS — Z45.2 ENCOUNTER FOR CARE RELATED TO PORT-A-CATH: ICD-10-CM

## 2019-04-24 PROCEDURE — S0028 INJECTION, FAMOTIDINE, 20 MG: HCPCS

## 2019-04-24 PROCEDURE — 96413 CHEMO IV INFUSION 1 HR: CPT

## 2019-04-24 PROCEDURE — 85025 COMPLETE CBC W/AUTO DIFF WBC: CPT

## 2019-04-24 PROCEDURE — 96375 TX/PRO/DX INJ NEW DRUG ADDON: CPT

## 2019-04-24 RX ORDER — DIPHENHYDRAMINE HYDROCHLORIDE 50 MG/ML
INJECTION INTRAMUSCULAR; INTRAVENOUS
Status: COMPLETED
Start: 2019-04-24 | End: 2019-04-24

## 2019-04-24 RX ORDER — DIPHENHYDRAMINE HYDROCHLORIDE 50 MG/ML
25 INJECTION INTRAMUSCULAR; INTRAVENOUS ONCE
Status: COMPLETED | OUTPATIENT
Start: 2019-04-24 | End: 2019-04-24

## 2019-04-24 RX ORDER — 0.9 % SODIUM CHLORIDE 0.9 %
VIAL (ML) INJECTION
Status: DISCONTINUED
Start: 2019-04-24 | End: 2019-04-24

## 2019-04-24 RX ORDER — FAMOTIDINE 10 MG/ML
INJECTION, SOLUTION INTRAVENOUS
Status: COMPLETED
Start: 2019-04-24 | End: 2019-04-24

## 2019-04-24 RX ORDER — HEPARIN SODIUM (PORCINE) LOCK FLUSH IV SOLN 100 UNIT/ML 100 UNIT/ML
SOLUTION INTRAVENOUS
Status: COMPLETED
Start: 2019-04-24 | End: 2019-04-24

## 2019-04-24 RX ORDER — SODIUM CHLORIDE 9 MG/ML
INJECTION, SOLUTION INTRAVENOUS
Status: DISCONTINUED
Start: 2019-04-24 | End: 2019-04-24

## 2019-04-24 RX ORDER — HEPARIN SODIUM (PORCINE) LOCK FLUSH IV SOLN 100 UNIT/ML 100 UNIT/ML
5 SOLUTION INTRAVENOUS ONCE
Status: CANCELLED | OUTPATIENT
Start: 2019-04-24

## 2019-04-24 RX ORDER — FAMOTIDINE 10 MG/ML
20 INJECTION, SOLUTION INTRAVENOUS ONCE
Status: COMPLETED | OUTPATIENT
Start: 2019-04-24 | End: 2019-04-24

## 2019-04-24 RX ORDER — 0.9 % SODIUM CHLORIDE 0.9 %
10 VIAL (ML) INJECTION ONCE
Status: CANCELLED | OUTPATIENT
Start: 2019-04-24

## 2019-04-24 RX ORDER — HEPARIN SODIUM (PORCINE) LOCK FLUSH IV SOLN 100 UNIT/ML 100 UNIT/ML
5 SOLUTION INTRAVENOUS ONCE
Status: COMPLETED | OUTPATIENT
Start: 2019-04-24 | End: 2019-04-24

## 2019-04-24 RX ADMIN — HEPARIN SODIUM (PORCINE) LOCK FLUSH IV SOLN 100 UNIT/ML 500 UNITS: 100 SOLUTION INTRAVENOUS at 10:00:00

## 2019-04-24 RX ADMIN — DIPHENHYDRAMINE HYDROCHLORIDE 25 MG: 50 INJECTION INTRAMUSCULAR; INTRAVENOUS at 10:11:00

## 2019-04-24 RX ADMIN — FAMOTIDINE 20 MG: 10 INJECTION, SOLUTION INTRAVENOUS at 10:12:00

## 2019-04-24 NOTE — PROGRESS NOTES
Pt here for C6D15  Taxol. Arrives Ambulating independently, accompanied by Spouse           Modifications in dose or schedule: No; pt reports feeling well overall, offers no complaints. Denies any fevers, chills or s/s of infection. No n/v/d reported.  Monryo Overall

## 2019-05-01 ENCOUNTER — OFFICE VISIT (OUTPATIENT)
Dept: HEMATOLOGY/ONCOLOGY | Facility: HOSPITAL | Age: 64
End: 2019-05-01
Attending: INTERNAL MEDICINE
Payer: COMMERCIAL

## 2019-05-01 VITALS
HEART RATE: 86 BPM | TEMPERATURE: 98 F | HEIGHT: 66 IN | SYSTOLIC BLOOD PRESSURE: 132 MMHG | WEIGHT: 125 LBS | RESPIRATION RATE: 16 BRPM | DIASTOLIC BLOOD PRESSURE: 78 MMHG | OXYGEN SATURATION: 100 % | BODY MASS INDEX: 20.09 KG/M2

## 2019-05-01 VITALS
HEART RATE: 86 BPM | DIASTOLIC BLOOD PRESSURE: 78 MMHG | SYSTOLIC BLOOD PRESSURE: 132 MMHG | OXYGEN SATURATION: 100 % | RESPIRATION RATE: 16 BRPM | WEIGHT: 125 LBS | TEMPERATURE: 98 F | BODY MASS INDEX: 20 KG/M2

## 2019-05-01 DIAGNOSIS — Z17.0 CARCINOMA OF OVERLAPPING SITES OF RIGHT BREAST IN FEMALE, ESTROGEN RECEPTOR POSITIVE (HCC): Primary | ICD-10-CM

## 2019-05-01 DIAGNOSIS — C50.811 CARCINOMA OF OVERLAPPING SITES OF RIGHT BREAST IN FEMALE, ESTROGEN RECEPTOR POSITIVE (HCC): Primary | ICD-10-CM

## 2019-05-01 DIAGNOSIS — Z45.2 ENCOUNTER FOR CARE RELATED TO PORT-A-CATH: ICD-10-CM

## 2019-05-01 PROCEDURE — 96413 CHEMO IV INFUSION 1 HR: CPT

## 2019-05-01 PROCEDURE — 85025 COMPLETE CBC W/AUTO DIFF WBC: CPT

## 2019-05-01 PROCEDURE — 96375 TX/PRO/DX INJ NEW DRUG ADDON: CPT

## 2019-05-01 PROCEDURE — 99214 OFFICE O/P EST MOD 30 MIN: CPT | Performed by: INTERNAL MEDICINE

## 2019-05-01 PROCEDURE — 80053 COMPREHEN METABOLIC PANEL: CPT

## 2019-05-01 PROCEDURE — S0028 INJECTION, FAMOTIDINE, 20 MG: HCPCS

## 2019-05-01 RX ORDER — LORAZEPAM 1 MG/1
TABLET ORAL EVERY 4 HOURS PRN
Status: CANCELLED | OUTPATIENT
Start: 2019-05-01

## 2019-05-01 RX ORDER — HEPARIN SODIUM (PORCINE) LOCK FLUSH IV SOLN 100 UNIT/ML 100 UNIT/ML
5 SOLUTION INTRAVENOUS ONCE
Status: CANCELLED | OUTPATIENT
Start: 2019-05-01

## 2019-05-01 RX ORDER — DIPHENHYDRAMINE HYDROCHLORIDE 50 MG/ML
INJECTION INTRAMUSCULAR; INTRAVENOUS
Status: COMPLETED
Start: 2019-05-01 | End: 2019-05-01

## 2019-05-01 RX ORDER — LORAZEPAM 2 MG/ML
INJECTION INTRAMUSCULAR EVERY 4 HOURS PRN
Status: CANCELLED | OUTPATIENT
Start: 2019-05-08

## 2019-05-01 RX ORDER — ONDANSETRON 2 MG/ML
8 INJECTION INTRAMUSCULAR; INTRAVENOUS EVERY 6 HOURS PRN
Status: CANCELLED | OUTPATIENT
Start: 2019-05-08

## 2019-05-01 RX ORDER — 0.9 % SODIUM CHLORIDE 0.9 %
VIAL (ML) INJECTION
Status: DISCONTINUED
Start: 2019-05-01 | End: 2019-05-01

## 2019-05-01 RX ORDER — FAMOTIDINE 10 MG/ML
20 INJECTION, SOLUTION INTRAVENOUS ONCE
Status: CANCELLED | OUTPATIENT
Start: 2019-05-01

## 2019-05-01 RX ORDER — ONDANSETRON 2 MG/ML
8 INJECTION INTRAMUSCULAR; INTRAVENOUS EVERY 6 HOURS PRN
Status: CANCELLED | OUTPATIENT
Start: 2019-05-15

## 2019-05-01 RX ORDER — DIPHENHYDRAMINE HYDROCHLORIDE 50 MG/ML
25 INJECTION INTRAMUSCULAR; INTRAVENOUS ONCE
Status: CANCELLED | OUTPATIENT
Start: 2019-05-01

## 2019-05-01 RX ORDER — LORAZEPAM 2 MG/ML
INJECTION INTRAMUSCULAR EVERY 4 HOURS PRN
Status: CANCELLED | OUTPATIENT
Start: 2019-05-01

## 2019-05-01 RX ORDER — DIPHENHYDRAMINE HYDROCHLORIDE 50 MG/ML
25 INJECTION INTRAMUSCULAR; INTRAVENOUS ONCE
Status: CANCELLED | OUTPATIENT
Start: 2019-05-08

## 2019-05-01 RX ORDER — PROCHLORPERAZINE MALEATE 10 MG
10 TABLET ORAL EVERY 6 HOURS PRN
Status: CANCELLED | OUTPATIENT
Start: 2019-05-15

## 2019-05-01 RX ORDER — HEPARIN SODIUM (PORCINE) LOCK FLUSH IV SOLN 100 UNIT/ML 100 UNIT/ML
SOLUTION INTRAVENOUS
Status: COMPLETED
Start: 2019-05-01 | End: 2019-05-01

## 2019-05-01 RX ORDER — LORAZEPAM 2 MG/ML
INJECTION INTRAMUSCULAR EVERY 4 HOURS PRN
Status: CANCELLED | OUTPATIENT
Start: 2019-05-15

## 2019-05-01 RX ORDER — DIPHENHYDRAMINE HYDROCHLORIDE 50 MG/ML
25 INJECTION INTRAMUSCULAR; INTRAVENOUS ONCE
Status: CANCELLED | OUTPATIENT
Start: 2019-05-15

## 2019-05-01 RX ORDER — DIPHENHYDRAMINE HYDROCHLORIDE 50 MG/ML
25 INJECTION INTRAMUSCULAR; INTRAVENOUS ONCE
Status: COMPLETED | OUTPATIENT
Start: 2019-05-01 | End: 2019-05-01

## 2019-05-01 RX ORDER — FAMOTIDINE 10 MG/ML
20 INJECTION, SOLUTION INTRAVENOUS ONCE
Status: CANCELLED | OUTPATIENT
Start: 2019-05-15

## 2019-05-01 RX ORDER — SODIUM CHLORIDE 9 MG/ML
INJECTION, SOLUTION INTRAVENOUS
Status: DISCONTINUED
Start: 2019-05-01 | End: 2019-05-01

## 2019-05-01 RX ORDER — LORAZEPAM 1 MG/1
TABLET ORAL EVERY 4 HOURS PRN
Status: CANCELLED | OUTPATIENT
Start: 2019-05-08

## 2019-05-01 RX ORDER — ONDANSETRON 2 MG/ML
8 INJECTION INTRAMUSCULAR; INTRAVENOUS EVERY 6 HOURS PRN
Status: CANCELLED | OUTPATIENT
Start: 2019-05-01

## 2019-05-01 RX ORDER — HEPARIN SODIUM (PORCINE) LOCK FLUSH IV SOLN 100 UNIT/ML 100 UNIT/ML
5 SOLUTION INTRAVENOUS ONCE
Status: COMPLETED | OUTPATIENT
Start: 2019-05-01 | End: 2019-05-01

## 2019-05-01 RX ORDER — LORAZEPAM 1 MG/1
TABLET ORAL EVERY 4 HOURS PRN
Status: CANCELLED | OUTPATIENT
Start: 2019-05-15

## 2019-05-01 RX ORDER — PROCHLORPERAZINE MALEATE 10 MG
10 TABLET ORAL EVERY 6 HOURS PRN
Status: CANCELLED | OUTPATIENT
Start: 2019-05-01

## 2019-05-01 RX ORDER — METOCLOPRAMIDE HYDROCHLORIDE 5 MG/ML
10 INJECTION INTRAMUSCULAR; INTRAVENOUS EVERY 6 HOURS PRN
Status: CANCELLED | OUTPATIENT
Start: 2019-05-15

## 2019-05-01 RX ORDER — FAMOTIDINE 10 MG/ML
20 INJECTION, SOLUTION INTRAVENOUS ONCE
Status: CANCELLED | OUTPATIENT
Start: 2019-05-08

## 2019-05-01 RX ORDER — PROCHLORPERAZINE MALEATE 10 MG
10 TABLET ORAL EVERY 6 HOURS PRN
Status: CANCELLED | OUTPATIENT
Start: 2019-05-08

## 2019-05-01 RX ORDER — 0.9 % SODIUM CHLORIDE 0.9 %
10 VIAL (ML) INJECTION ONCE
Status: CANCELLED | OUTPATIENT
Start: 2019-05-01

## 2019-05-01 RX ORDER — METOCLOPRAMIDE HYDROCHLORIDE 5 MG/ML
10 INJECTION INTRAMUSCULAR; INTRAVENOUS EVERY 6 HOURS PRN
Status: CANCELLED | OUTPATIENT
Start: 2019-05-08

## 2019-05-01 RX ORDER — FAMOTIDINE 10 MG/ML
20 INJECTION, SOLUTION INTRAVENOUS ONCE
Status: COMPLETED | OUTPATIENT
Start: 2019-05-01 | End: 2019-05-01

## 2019-05-01 RX ORDER — FAMOTIDINE 10 MG/ML
INJECTION, SOLUTION INTRAVENOUS
Status: COMPLETED
Start: 2019-05-01 | End: 2019-05-01

## 2019-05-01 RX ORDER — METOCLOPRAMIDE HYDROCHLORIDE 5 MG/ML
10 INJECTION INTRAMUSCULAR; INTRAVENOUS EVERY 6 HOURS PRN
Status: CANCELLED | OUTPATIENT
Start: 2019-05-01

## 2019-05-01 RX ADMIN — HEPARIN SODIUM (PORCINE) LOCK FLUSH IV SOLN 100 UNIT/ML 500 UNITS: 100 SOLUTION INTRAVENOUS at 11:45:00

## 2019-05-01 RX ADMIN — DIPHENHYDRAMINE HYDROCHLORIDE 25 MG: 50 INJECTION INTRAMUSCULAR; INTRAVENOUS at 10:07:00

## 2019-05-01 RX ADMIN — FAMOTIDINE 20 MG: 10 INJECTION, SOLUTION INTRAVENOUS at 10:05:00

## 2019-05-01 NOTE — PROGRESS NOTES
Pt here for C7D1 Taxol. Arrives Ambulating independently, accompanied by self          Modifications in dose or schedule: No; pt reports feeling well overall, offers no complaints. Denies any fevers, chills or s/s of infection. No n/v/d reported.  Denies a

## 2019-05-01 NOTE — PROGRESS NOTES
[facility]    PATIENT'S NAME: Jayne Alvarado   ATTENDING PHYSICIAN: Terra Angel MD   PATIENT ACCOUNT #: [de-identified] LOCATION: [address] [place] [address] RECORD #: [id number] YOB: 1955   DATE OF SERVICE: 05/01/2019       CANCER female in no acute distress. VITAL SIGNS:  Her performance status is 0. Her weight is 125 pounds. Blood pressure 132/78, pulse 86, respiratory rate 20, temperature 98. 3. HEENT:  Unremarkable. She has pink conjunctivae, anicteric sclerae.   Pharynx with

## 2019-05-08 ENCOUNTER — OFFICE VISIT (OUTPATIENT)
Dept: HEMATOLOGY/ONCOLOGY | Facility: HOSPITAL | Age: 64
End: 2019-05-08
Attending: INTERNAL MEDICINE
Payer: COMMERCIAL

## 2019-05-08 VITALS
RESPIRATION RATE: 16 BRPM | WEIGHT: 127 LBS | SYSTOLIC BLOOD PRESSURE: 126 MMHG | BODY MASS INDEX: 21 KG/M2 | HEART RATE: 89 BPM | TEMPERATURE: 98 F | DIASTOLIC BLOOD PRESSURE: 69 MMHG | OXYGEN SATURATION: 99 %

## 2019-05-08 DIAGNOSIS — C50.811 CARCINOMA OF OVERLAPPING SITES OF RIGHT BREAST IN FEMALE, ESTROGEN RECEPTOR POSITIVE (HCC): Primary | ICD-10-CM

## 2019-05-08 DIAGNOSIS — Z45.2 ENCOUNTER FOR CARE RELATED TO PORT-A-CATH: ICD-10-CM

## 2019-05-08 DIAGNOSIS — Z17.0 CARCINOMA OF OVERLAPPING SITES OF RIGHT BREAST IN FEMALE, ESTROGEN RECEPTOR POSITIVE (HCC): Primary | ICD-10-CM

## 2019-05-08 PROCEDURE — 96375 TX/PRO/DX INJ NEW DRUG ADDON: CPT

## 2019-05-08 PROCEDURE — 85025 COMPLETE CBC W/AUTO DIFF WBC: CPT

## 2019-05-08 PROCEDURE — S0028 INJECTION, FAMOTIDINE, 20 MG: HCPCS

## 2019-05-08 PROCEDURE — 96413 CHEMO IV INFUSION 1 HR: CPT

## 2019-05-08 RX ORDER — FAMOTIDINE 10 MG/ML
20 INJECTION, SOLUTION INTRAVENOUS ONCE
Status: COMPLETED | OUTPATIENT
Start: 2019-05-08 | End: 2019-05-08

## 2019-05-08 RX ORDER — HEPARIN SODIUM (PORCINE) LOCK FLUSH IV SOLN 100 UNIT/ML 100 UNIT/ML
5 SOLUTION INTRAVENOUS ONCE
Status: CANCELLED | OUTPATIENT
Start: 2019-05-08

## 2019-05-08 RX ORDER — 0.9 % SODIUM CHLORIDE 0.9 %
10 VIAL (ML) INJECTION ONCE
Status: CANCELLED | OUTPATIENT
Start: 2019-05-08

## 2019-05-08 RX ORDER — FAMOTIDINE 10 MG/ML
INJECTION, SOLUTION INTRAVENOUS
Status: COMPLETED
Start: 2019-05-08 | End: 2019-05-08

## 2019-05-08 RX ORDER — 0.9 % SODIUM CHLORIDE 0.9 %
VIAL (ML) INJECTION
Status: DISCONTINUED
Start: 2019-05-08 | End: 2019-05-08

## 2019-05-08 RX ORDER — DIPHENHYDRAMINE HYDROCHLORIDE 50 MG/ML
INJECTION INTRAMUSCULAR; INTRAVENOUS
Status: COMPLETED
Start: 2019-05-08 | End: 2019-05-08

## 2019-05-08 RX ORDER — SODIUM CHLORIDE 9 MG/ML
INJECTION, SOLUTION INTRAVENOUS
Status: DISCONTINUED
Start: 2019-05-08 | End: 2019-05-08

## 2019-05-08 RX ORDER — DIPHENHYDRAMINE HYDROCHLORIDE 50 MG/ML
25 INJECTION INTRAMUSCULAR; INTRAVENOUS ONCE
Status: COMPLETED | OUTPATIENT
Start: 2019-05-08 | End: 2019-05-08

## 2019-05-08 RX ORDER — HEPARIN SODIUM (PORCINE) LOCK FLUSH IV SOLN 100 UNIT/ML 100 UNIT/ML
SOLUTION INTRAVENOUS
Status: COMPLETED
Start: 2019-05-08 | End: 2019-05-08

## 2019-05-08 RX ORDER — HEPARIN SODIUM (PORCINE) LOCK FLUSH IV SOLN 100 UNIT/ML 100 UNIT/ML
5 SOLUTION INTRAVENOUS ONCE
Status: COMPLETED | OUTPATIENT
Start: 2019-05-08 | End: 2019-05-08

## 2019-05-08 RX ADMIN — HEPARIN SODIUM (PORCINE) LOCK FLUSH IV SOLN 100 UNIT/ML 500 UNITS: 100 SOLUTION INTRAVENOUS at 11:45:00

## 2019-05-08 RX ADMIN — FAMOTIDINE 20 MG: 10 INJECTION, SOLUTION INTRAVENOUS at 09:46:00

## 2019-05-08 RX ADMIN — DIPHENHYDRAMINE HYDROCHLORIDE 25 MG: 50 INJECTION INTRAMUSCULAR; INTRAVENOUS at 09:42:00

## 2019-05-08 NOTE — PROGRESS NOTES
Pt here for C7D8 Taxol. Arrives Ambulating independently, accompanied by self          Modifications in dose or schedule: No;  Pt states she has been feeling good, denies any nausea or neuropathy.                               Frequency of blood return and

## 2019-05-15 ENCOUNTER — APPOINTMENT (OUTPATIENT)
Dept: HEMATOLOGY/ONCOLOGY | Facility: HOSPITAL | Age: 64
End: 2019-05-15
Attending: INTERNAL MEDICINE
Payer: COMMERCIAL

## 2019-05-17 ENCOUNTER — NURSE ONLY (OUTPATIENT)
Dept: HEMATOLOGY/ONCOLOGY | Facility: HOSPITAL | Age: 64
End: 2019-05-17
Attending: INTERNAL MEDICINE
Payer: COMMERCIAL

## 2019-05-17 VITALS
SYSTOLIC BLOOD PRESSURE: 123 MMHG | HEART RATE: 87 BPM | DIASTOLIC BLOOD PRESSURE: 77 MMHG | OXYGEN SATURATION: 100 % | TEMPERATURE: 98 F | RESPIRATION RATE: 16 BRPM

## 2019-05-17 DIAGNOSIS — C50.811 CARCINOMA OF OVERLAPPING SITES OF RIGHT BREAST IN FEMALE, ESTROGEN RECEPTOR POSITIVE (HCC): Primary | ICD-10-CM

## 2019-05-17 DIAGNOSIS — Z45.2 ENCOUNTER FOR CARE RELATED TO PORT-A-CATH: ICD-10-CM

## 2019-05-17 DIAGNOSIS — Z17.0 CARCINOMA OF OVERLAPPING SITES OF RIGHT BREAST IN FEMALE, ESTROGEN RECEPTOR POSITIVE (HCC): Primary | ICD-10-CM

## 2019-05-17 PROCEDURE — 85025 COMPLETE CBC W/AUTO DIFF WBC: CPT

## 2019-05-17 PROCEDURE — 96375 TX/PRO/DX INJ NEW DRUG ADDON: CPT

## 2019-05-17 PROCEDURE — 96413 CHEMO IV INFUSION 1 HR: CPT

## 2019-05-17 PROCEDURE — S0028 INJECTION, FAMOTIDINE, 20 MG: HCPCS

## 2019-05-17 RX ORDER — FAMOTIDINE 10 MG/ML
20 INJECTION, SOLUTION INTRAVENOUS ONCE
Status: COMPLETED | OUTPATIENT
Start: 2019-05-17 | End: 2019-05-17

## 2019-05-17 RX ORDER — DIPHENHYDRAMINE HYDROCHLORIDE 50 MG/ML
INJECTION INTRAMUSCULAR; INTRAVENOUS
Status: COMPLETED
Start: 2019-05-17 | End: 2019-05-17

## 2019-05-17 RX ORDER — 0.9 % SODIUM CHLORIDE 0.9 %
VIAL (ML) INJECTION
Status: DISCONTINUED
Start: 2019-05-17 | End: 2019-05-17

## 2019-05-17 RX ORDER — 0.9 % SODIUM CHLORIDE 0.9 %
10 VIAL (ML) INJECTION ONCE
Status: CANCELLED | OUTPATIENT
Start: 2019-05-17

## 2019-05-17 RX ORDER — HEPARIN SODIUM (PORCINE) LOCK FLUSH IV SOLN 100 UNIT/ML 100 UNIT/ML
SOLUTION INTRAVENOUS
Status: COMPLETED
Start: 2019-05-17 | End: 2019-05-17

## 2019-05-17 RX ORDER — DIPHENHYDRAMINE HYDROCHLORIDE 50 MG/ML
25 INJECTION INTRAMUSCULAR; INTRAVENOUS ONCE
Status: COMPLETED | OUTPATIENT
Start: 2019-05-17 | End: 2019-05-17

## 2019-05-17 RX ORDER — SODIUM CHLORIDE 9 MG/ML
INJECTION, SOLUTION INTRAVENOUS
Status: DISCONTINUED
Start: 2019-05-17 | End: 2019-05-17

## 2019-05-17 RX ORDER — HEPARIN SODIUM (PORCINE) LOCK FLUSH IV SOLN 100 UNIT/ML 100 UNIT/ML
5 SOLUTION INTRAVENOUS ONCE
Status: COMPLETED | OUTPATIENT
Start: 2019-05-17 | End: 2019-05-17

## 2019-05-17 RX ORDER — HEPARIN SODIUM (PORCINE) LOCK FLUSH IV SOLN 100 UNIT/ML 100 UNIT/ML
5 SOLUTION INTRAVENOUS ONCE
Status: CANCELLED | OUTPATIENT
Start: 2019-05-17

## 2019-05-17 RX ORDER — FAMOTIDINE 10 MG/ML
INJECTION, SOLUTION INTRAVENOUS
Status: COMPLETED
Start: 2019-05-17 | End: 2019-05-17

## 2019-05-17 RX ADMIN — HEPARIN SODIUM (PORCINE) LOCK FLUSH IV SOLN 100 UNIT/ML 500 UNITS: 100 SOLUTION INTRAVENOUS at 11:10:00

## 2019-05-17 RX ADMIN — DIPHENHYDRAMINE HYDROCHLORIDE 25 MG: 50 INJECTION INTRAMUSCULAR; INTRAVENOUS at 09:24:00

## 2019-05-17 RX ADMIN — FAMOTIDINE 20 MG: 10 INJECTION, SOLUTION INTRAVENOUS at 09:24:00

## 2019-05-17 NOTE — PROGRESS NOTES
Pt here for C7D15 Taxol. Arrives Ambulating independently, accompanied by Family member           Modifications in dose or schedule: Yes- now on Fridays since patient returned to work. Aphrodite reports feeling well today.  Her port was accessed by lab

## 2019-05-22 ENCOUNTER — OFFICE VISIT (OUTPATIENT)
Dept: HEMATOLOGY/ONCOLOGY | Facility: HOSPITAL | Age: 64
End: 2019-05-22
Attending: INTERNAL MEDICINE
Payer: COMMERCIAL

## 2019-05-22 VITALS
DIASTOLIC BLOOD PRESSURE: 72 MMHG | BODY MASS INDEX: 20.09 KG/M2 | HEIGHT: 66 IN | WEIGHT: 125 LBS | SYSTOLIC BLOOD PRESSURE: 127 MMHG | OXYGEN SATURATION: 99 % | TEMPERATURE: 99 F | HEART RATE: 81 BPM | RESPIRATION RATE: 16 BRPM

## 2019-05-22 DIAGNOSIS — C50.911 INVASIVE LOBULAR CARCINOMA OF BREAST, STAGE 3, RIGHT (HCC): ICD-10-CM

## 2019-05-22 DIAGNOSIS — C50.811 CARCINOMA OF OVERLAPPING SITES OF RIGHT BREAST IN FEMALE, ESTROGEN RECEPTOR POSITIVE (HCC): Primary | ICD-10-CM

## 2019-05-22 DIAGNOSIS — Z85.3 HISTORY OF BREAST CANCER: Primary | ICD-10-CM

## 2019-05-22 DIAGNOSIS — Z45.2 ENCOUNTER FOR CARE RELATED TO PORT-A-CATH: ICD-10-CM

## 2019-05-22 DIAGNOSIS — Z17.0 CARCINOMA OF OVERLAPPING SITES OF RIGHT BREAST IN FEMALE, ESTROGEN RECEPTOR POSITIVE (HCC): Primary | ICD-10-CM

## 2019-05-22 PROCEDURE — 85025 COMPLETE CBC W/AUTO DIFF WBC: CPT

## 2019-05-22 PROCEDURE — 80053 COMPREHEN METABOLIC PANEL: CPT

## 2019-05-22 PROCEDURE — 36591 DRAW BLOOD OFF VENOUS DEVICE: CPT

## 2019-05-22 PROCEDURE — 99214 OFFICE O/P EST MOD 30 MIN: CPT | Performed by: INTERNAL MEDICINE

## 2019-05-22 RX ORDER — METOCLOPRAMIDE HYDROCHLORIDE 5 MG/ML
10 INJECTION INTRAMUSCULAR; INTRAVENOUS EVERY 6 HOURS PRN
Status: CANCELLED | OUTPATIENT
Start: 2019-06-05

## 2019-05-22 RX ORDER — LORAZEPAM 2 MG/ML
INJECTION INTRAMUSCULAR EVERY 4 HOURS PRN
Status: CANCELLED | OUTPATIENT
Start: 2019-06-05

## 2019-05-22 RX ORDER — PROCHLORPERAZINE MALEATE 10 MG
10 TABLET ORAL EVERY 6 HOURS PRN
Status: CANCELLED | OUTPATIENT
Start: 2019-05-29

## 2019-05-22 RX ORDER — ONDANSETRON 2 MG/ML
8 INJECTION INTRAMUSCULAR; INTRAVENOUS EVERY 6 HOURS PRN
Status: CANCELLED | OUTPATIENT
Start: 2019-06-05

## 2019-05-22 RX ORDER — METOCLOPRAMIDE HYDROCHLORIDE 5 MG/ML
10 INJECTION INTRAMUSCULAR; INTRAVENOUS EVERY 6 HOURS PRN
Status: CANCELLED | OUTPATIENT
Start: 2019-05-29

## 2019-05-22 RX ORDER — 0.9 % SODIUM CHLORIDE 0.9 %
10 VIAL (ML) INJECTION ONCE
Status: CANCELLED | OUTPATIENT
Start: 2019-05-22

## 2019-05-22 RX ORDER — LORAZEPAM 1 MG/1
TABLET ORAL EVERY 4 HOURS PRN
Status: CANCELLED | OUTPATIENT
Start: 2019-05-29

## 2019-05-22 RX ORDER — LORAZEPAM 1 MG/1
TABLET ORAL EVERY 4 HOURS PRN
Status: CANCELLED | OUTPATIENT
Start: 2019-05-22

## 2019-05-22 RX ORDER — DIPHENHYDRAMINE HYDROCHLORIDE 50 MG/ML
25 INJECTION INTRAMUSCULAR; INTRAVENOUS ONCE
Status: CANCELLED | OUTPATIENT
Start: 2019-06-05

## 2019-05-22 RX ORDER — HEPARIN SODIUM (PORCINE) LOCK FLUSH IV SOLN 100 UNIT/ML 100 UNIT/ML
5 SOLUTION INTRAVENOUS ONCE
Status: CANCELLED | OUTPATIENT
Start: 2019-05-22

## 2019-05-22 RX ORDER — PROCHLORPERAZINE MALEATE 10 MG
10 TABLET ORAL EVERY 6 HOURS PRN
Status: CANCELLED | OUTPATIENT
Start: 2019-06-05

## 2019-05-22 RX ORDER — ONDANSETRON 2 MG/ML
8 INJECTION INTRAMUSCULAR; INTRAVENOUS EVERY 6 HOURS PRN
Status: CANCELLED | OUTPATIENT
Start: 2019-05-22

## 2019-05-22 RX ORDER — PROCHLORPERAZINE MALEATE 10 MG
10 TABLET ORAL EVERY 6 HOURS PRN
Status: CANCELLED | OUTPATIENT
Start: 2019-05-22

## 2019-05-22 RX ORDER — DIPHENHYDRAMINE HYDROCHLORIDE 50 MG/ML
25 INJECTION INTRAMUSCULAR; INTRAVENOUS ONCE
Status: CANCELLED | OUTPATIENT
Start: 2019-05-22

## 2019-05-22 RX ORDER — 0.9 % SODIUM CHLORIDE 0.9 %
VIAL (ML) INJECTION
Status: DISCONTINUED
Start: 2019-05-22 | End: 2019-05-22

## 2019-05-22 RX ORDER — FAMOTIDINE 10 MG/ML
20 INJECTION, SOLUTION INTRAVENOUS ONCE
Status: CANCELLED | OUTPATIENT
Start: 2019-06-05

## 2019-05-22 RX ORDER — FAMOTIDINE 10 MG/ML
20 INJECTION, SOLUTION INTRAVENOUS ONCE
Status: CANCELLED | OUTPATIENT
Start: 2019-05-22

## 2019-05-22 RX ORDER — ANASTROZOLE 1 MG/1
1 TABLET ORAL DAILY
Qty: 90 TABLET | Refills: 3 | Status: SHIPPED | OUTPATIENT
Start: 2019-05-22 | End: 2020-06-02

## 2019-05-22 RX ORDER — DIPHENHYDRAMINE HYDROCHLORIDE 50 MG/ML
25 INJECTION INTRAMUSCULAR; INTRAVENOUS ONCE
Status: CANCELLED | OUTPATIENT
Start: 2019-05-29

## 2019-05-22 RX ORDER — ONDANSETRON 2 MG/ML
8 INJECTION INTRAMUSCULAR; INTRAVENOUS EVERY 6 HOURS PRN
Status: CANCELLED | OUTPATIENT
Start: 2019-05-29

## 2019-05-22 RX ORDER — METOCLOPRAMIDE HYDROCHLORIDE 5 MG/ML
10 INJECTION INTRAMUSCULAR; INTRAVENOUS EVERY 6 HOURS PRN
Status: CANCELLED | OUTPATIENT
Start: 2019-05-22

## 2019-05-22 RX ORDER — FAMOTIDINE 10 MG/ML
20 INJECTION, SOLUTION INTRAVENOUS ONCE
Status: CANCELLED | OUTPATIENT
Start: 2019-05-29

## 2019-05-22 RX ORDER — LORAZEPAM 2 MG/ML
INJECTION INTRAMUSCULAR EVERY 4 HOURS PRN
Status: CANCELLED | OUTPATIENT
Start: 2019-05-22

## 2019-05-22 RX ORDER — HEPARIN SODIUM (PORCINE) LOCK FLUSH IV SOLN 100 UNIT/ML 100 UNIT/ML
5 SOLUTION INTRAVENOUS ONCE
Status: COMPLETED | OUTPATIENT
Start: 2019-05-22 | End: 2019-05-22

## 2019-05-22 RX ORDER — LORAZEPAM 1 MG/1
TABLET ORAL EVERY 4 HOURS PRN
Status: CANCELLED | OUTPATIENT
Start: 2019-06-05

## 2019-05-22 RX ORDER — LORAZEPAM 2 MG/ML
INJECTION INTRAMUSCULAR EVERY 4 HOURS PRN
Status: CANCELLED | OUTPATIENT
Start: 2019-05-29

## 2019-05-22 RX ADMIN — HEPARIN SODIUM (PORCINE) LOCK FLUSH IV SOLN 100 UNIT/ML 500 UNITS: 100 SOLUTION INTRAVENOUS at 08:44:00

## 2019-05-23 NOTE — PROGRESS NOTES
[facility]    PATIENT'S NAME: Channing Urias   ATTENDING PHYSICIAN: Bárbara Haynes MD   PATIENT ACCOUNT #: [de-identified] LOCATION: [address] [place] [address] RECORD #: [id number] YOB: 1955   DATE OF SERVICE: 05/22/2019       CANCER cervical, supraclavicular, or axillary adenopathy. LUNGS:  Resonant to percussion and clear to auscultation. No wheezing, rales, or rhonchi. HEART:  Normal.  ABDOMEN:  No hepatosplenomegaly or tenderness.     EXTREMITIES:  She has no clubbing, cyanosis radiotherapy. Down the road, we will set her up for the bone density scan.     Dictated By Darline Silva MD  d: 05/22/2019 17:55:49  t: 05/22/2019 55:12:55  [facility] [id number]  /    cc: MD Dr. Von Dumont

## 2019-05-24 ENCOUNTER — OFFICE VISIT (OUTPATIENT)
Dept: HEMATOLOGY/ONCOLOGY | Facility: HOSPITAL | Age: 64
End: 2019-05-24
Attending: INTERNAL MEDICINE
Payer: COMMERCIAL

## 2019-05-24 VITALS
OXYGEN SATURATION: 99 % | RESPIRATION RATE: 16 BRPM | SYSTOLIC BLOOD PRESSURE: 126 MMHG | DIASTOLIC BLOOD PRESSURE: 70 MMHG | HEART RATE: 75 BPM | TEMPERATURE: 98 F

## 2019-05-24 DIAGNOSIS — C50.811 CARCINOMA OF OVERLAPPING SITES OF RIGHT BREAST IN FEMALE, ESTROGEN RECEPTOR POSITIVE (HCC): Primary | ICD-10-CM

## 2019-05-24 DIAGNOSIS — Z17.0 CARCINOMA OF OVERLAPPING SITES OF RIGHT BREAST IN FEMALE, ESTROGEN RECEPTOR POSITIVE (HCC): Primary | ICD-10-CM

## 2019-05-24 PROCEDURE — 96375 TX/PRO/DX INJ NEW DRUG ADDON: CPT

## 2019-05-24 PROCEDURE — 96367 TX/PROPH/DG ADDL SEQ IV INF: CPT

## 2019-05-24 PROCEDURE — S0028 INJECTION, FAMOTIDINE, 20 MG: HCPCS

## 2019-05-24 PROCEDURE — 96413 CHEMO IV INFUSION 1 HR: CPT

## 2019-05-24 RX ORDER — 0.9 % SODIUM CHLORIDE 0.9 %
VIAL (ML) INJECTION
Status: DISCONTINUED
Start: 2019-05-24 | End: 2019-05-24

## 2019-05-24 RX ORDER — DIPHENHYDRAMINE HYDROCHLORIDE 50 MG/ML
25 INJECTION INTRAMUSCULAR; INTRAVENOUS ONCE
Status: COMPLETED | OUTPATIENT
Start: 2019-05-24 | End: 2019-05-24

## 2019-05-24 RX ORDER — DIPHENHYDRAMINE HYDROCHLORIDE 50 MG/ML
INJECTION INTRAMUSCULAR; INTRAVENOUS
Status: COMPLETED
Start: 2019-05-24 | End: 2019-05-24

## 2019-05-24 RX ORDER — HEPARIN SODIUM (PORCINE) LOCK FLUSH IV SOLN 100 UNIT/ML 100 UNIT/ML
SOLUTION INTRAVENOUS
Status: COMPLETED
Start: 2019-05-24 | End: 2019-05-24

## 2019-05-24 RX ORDER — FAMOTIDINE 10 MG/ML
INJECTION, SOLUTION INTRAVENOUS
Status: COMPLETED
Start: 2019-05-24 | End: 2019-05-24

## 2019-05-24 RX ORDER — SODIUM CHLORIDE 9 MG/ML
INJECTION, SOLUTION INTRAVENOUS
Status: DISCONTINUED
Start: 2019-05-24 | End: 2019-05-24

## 2019-05-24 RX ORDER — FAMOTIDINE 10 MG/ML
20 INJECTION, SOLUTION INTRAVENOUS ONCE
Status: COMPLETED | OUTPATIENT
Start: 2019-05-24 | End: 2019-05-24

## 2019-05-24 RX ADMIN — HEPARIN SODIUM (PORCINE) LOCK FLUSH IV SOLN 100 UNIT/ML 500 UNITS: 100 SOLUTION INTRAVENOUS at 10:35:00

## 2019-05-24 RX ADMIN — FAMOTIDINE 20 MG: 10 INJECTION, SOLUTION INTRAVENOUS at 08:45:00

## 2019-05-24 RX ADMIN — DIPHENHYDRAMINE HYDROCHLORIDE 25 MG: 50 INJECTION INTRAMUSCULAR; INTRAVENOUS at 08:43:00

## 2019-05-24 NOTE — PROGRESS NOTES
Pt here for C8D1 Taxol. Arrives Ambulating independently, accompanied by self. Modifications in dose or schedule:  No.  Pt offers no new complaints. Continues to work in [employer]. Maintains positive attitude.     Pt tolerated treatment

## 2019-05-31 ENCOUNTER — NURSE ONLY (OUTPATIENT)
Dept: HEMATOLOGY/ONCOLOGY | Facility: HOSPITAL | Age: 64
End: 2019-05-31
Attending: INTERNAL MEDICINE
Payer: COMMERCIAL

## 2019-05-31 VITALS
RESPIRATION RATE: 16 BRPM | HEART RATE: 68 BPM | OXYGEN SATURATION: 100 % | DIASTOLIC BLOOD PRESSURE: 65 MMHG | SYSTOLIC BLOOD PRESSURE: 121 MMHG | TEMPERATURE: 98 F

## 2019-05-31 DIAGNOSIS — C50.811 CARCINOMA OF OVERLAPPING SITES OF RIGHT BREAST IN FEMALE, ESTROGEN RECEPTOR POSITIVE (HCC): Primary | ICD-10-CM

## 2019-05-31 DIAGNOSIS — Z45.2 ENCOUNTER FOR CARE RELATED TO PORT-A-CATH: ICD-10-CM

## 2019-05-31 DIAGNOSIS — Z17.0 CARCINOMA OF OVERLAPPING SITES OF RIGHT BREAST IN FEMALE, ESTROGEN RECEPTOR POSITIVE (HCC): Primary | ICD-10-CM

## 2019-05-31 PROCEDURE — S0028 INJECTION, FAMOTIDINE, 20 MG: HCPCS

## 2019-05-31 PROCEDURE — 96375 TX/PRO/DX INJ NEW DRUG ADDON: CPT

## 2019-05-31 PROCEDURE — 85025 COMPLETE CBC W/AUTO DIFF WBC: CPT

## 2019-05-31 PROCEDURE — 96413 CHEMO IV INFUSION 1 HR: CPT

## 2019-05-31 RX ORDER — 0.9 % SODIUM CHLORIDE 0.9 %
VIAL (ML) INJECTION
Status: DISCONTINUED
Start: 2019-05-31 | End: 2019-05-31

## 2019-05-31 RX ORDER — SODIUM CHLORIDE 9 MG/ML
INJECTION, SOLUTION INTRAVENOUS
Status: DISCONTINUED
Start: 2019-05-31 | End: 2019-05-31

## 2019-05-31 RX ORDER — FAMOTIDINE 10 MG/ML
20 INJECTION, SOLUTION INTRAVENOUS ONCE
Status: COMPLETED | OUTPATIENT
Start: 2019-05-31 | End: 2019-05-31

## 2019-05-31 RX ORDER — HEPARIN SODIUM (PORCINE) LOCK FLUSH IV SOLN 100 UNIT/ML 100 UNIT/ML
SOLUTION INTRAVENOUS
Status: COMPLETED
Start: 2019-05-31 | End: 2019-05-31

## 2019-05-31 RX ORDER — 0.9 % SODIUM CHLORIDE 0.9 %
10 VIAL (ML) INJECTION ONCE
Status: CANCELLED | OUTPATIENT
Start: 2019-05-31

## 2019-05-31 RX ORDER — DIPHENHYDRAMINE HYDROCHLORIDE 50 MG/ML
INJECTION INTRAMUSCULAR; INTRAVENOUS
Status: COMPLETED
Start: 2019-05-31 | End: 2019-05-31

## 2019-05-31 RX ORDER — HEPARIN SODIUM (PORCINE) LOCK FLUSH IV SOLN 100 UNIT/ML 100 UNIT/ML
5 SOLUTION INTRAVENOUS ONCE
Status: CANCELLED | OUTPATIENT
Start: 2019-05-31

## 2019-05-31 RX ORDER — FAMOTIDINE 10 MG/ML
INJECTION, SOLUTION INTRAVENOUS
Status: COMPLETED
Start: 2019-05-31 | End: 2019-05-31

## 2019-05-31 RX ORDER — HEPARIN SODIUM (PORCINE) LOCK FLUSH IV SOLN 100 UNIT/ML 100 UNIT/ML
5 SOLUTION INTRAVENOUS ONCE
Status: COMPLETED | OUTPATIENT
Start: 2019-05-31 | End: 2019-05-31

## 2019-05-31 RX ORDER — DIPHENHYDRAMINE HYDROCHLORIDE 50 MG/ML
25 INJECTION INTRAMUSCULAR; INTRAVENOUS ONCE
Status: COMPLETED | OUTPATIENT
Start: 2019-05-31 | End: 2019-05-31

## 2019-05-31 RX ADMIN — DIPHENHYDRAMINE HYDROCHLORIDE 25 MG: 50 INJECTION INTRAMUSCULAR; INTRAVENOUS at 08:11:00

## 2019-05-31 RX ADMIN — HEPARIN SODIUM (PORCINE) LOCK FLUSH IV SOLN 100 UNIT/ML 500 UNITS: 100 SOLUTION INTRAVENOUS at 09:45:00

## 2019-05-31 RX ADMIN — FAMOTIDINE 20 MG: 10 INJECTION, SOLUTION INTRAVENOUS at 08:11:00

## 2019-06-07 ENCOUNTER — NURSE ONLY (OUTPATIENT)
Dept: HEMATOLOGY/ONCOLOGY | Facility: HOSPITAL | Age: 64
End: 2019-06-07
Attending: INTERNAL MEDICINE
Payer: COMMERCIAL

## 2019-06-07 VITALS
OXYGEN SATURATION: 100 % | RESPIRATION RATE: 16 BRPM | HEART RATE: 73 BPM | DIASTOLIC BLOOD PRESSURE: 82 MMHG | SYSTOLIC BLOOD PRESSURE: 147 MMHG | TEMPERATURE: 98 F

## 2019-06-07 DIAGNOSIS — C50.811 CARCINOMA OF OVERLAPPING SITES OF RIGHT BREAST IN FEMALE, ESTROGEN RECEPTOR POSITIVE (HCC): Primary | ICD-10-CM

## 2019-06-07 DIAGNOSIS — Z17.0 CARCINOMA OF OVERLAPPING SITES OF RIGHT BREAST IN FEMALE, ESTROGEN RECEPTOR POSITIVE (HCC): Primary | ICD-10-CM

## 2019-06-07 DIAGNOSIS — Z45.2 ENCOUNTER FOR CARE RELATED TO PORT-A-CATH: ICD-10-CM

## 2019-06-07 PROCEDURE — S0028 INJECTION, FAMOTIDINE, 20 MG: HCPCS

## 2019-06-07 PROCEDURE — 85025 COMPLETE CBC W/AUTO DIFF WBC: CPT

## 2019-06-07 PROCEDURE — 96375 TX/PRO/DX INJ NEW DRUG ADDON: CPT

## 2019-06-07 PROCEDURE — 96413 CHEMO IV INFUSION 1 HR: CPT

## 2019-06-07 RX ORDER — DIPHENHYDRAMINE HYDROCHLORIDE 50 MG/ML
INJECTION INTRAMUSCULAR; INTRAVENOUS
Status: COMPLETED
Start: 2019-06-07 | End: 2019-06-07

## 2019-06-07 RX ORDER — 0.9 % SODIUM CHLORIDE 0.9 %
VIAL (ML) INJECTION
Status: DISCONTINUED
Start: 2019-06-07 | End: 2019-06-07

## 2019-06-07 RX ORDER — HEPARIN SODIUM (PORCINE) LOCK FLUSH IV SOLN 100 UNIT/ML 100 UNIT/ML
SOLUTION INTRAVENOUS
Status: COMPLETED
Start: 2019-06-07 | End: 2019-06-07

## 2019-06-07 RX ORDER — FAMOTIDINE 10 MG/ML
20 INJECTION, SOLUTION INTRAVENOUS ONCE
Status: COMPLETED | OUTPATIENT
Start: 2019-06-07 | End: 2019-06-07

## 2019-06-07 RX ORDER — FAMOTIDINE 10 MG/ML
INJECTION, SOLUTION INTRAVENOUS
Status: COMPLETED
Start: 2019-06-07 | End: 2019-06-07

## 2019-06-07 RX ORDER — DIPHENHYDRAMINE HYDROCHLORIDE 50 MG/ML
25 INJECTION INTRAMUSCULAR; INTRAVENOUS ONCE
Status: COMPLETED | OUTPATIENT
Start: 2019-06-07 | End: 2019-06-07

## 2019-06-07 RX ORDER — HEPARIN SODIUM (PORCINE) LOCK FLUSH IV SOLN 100 UNIT/ML 100 UNIT/ML
5 SOLUTION INTRAVENOUS ONCE
Status: COMPLETED | OUTPATIENT
Start: 2019-06-07 | End: 2019-06-07

## 2019-06-07 RX ORDER — SODIUM CHLORIDE 9 MG/ML
INJECTION, SOLUTION INTRAVENOUS
Status: DISCONTINUED
Start: 2019-06-07 | End: 2019-06-07

## 2019-06-07 RX ORDER — 0.9 % SODIUM CHLORIDE 0.9 %
10 VIAL (ML) INJECTION ONCE
Status: CANCELLED | OUTPATIENT
Start: 2019-06-07

## 2019-06-07 RX ORDER — HEPARIN SODIUM (PORCINE) LOCK FLUSH IV SOLN 100 UNIT/ML 100 UNIT/ML
5 SOLUTION INTRAVENOUS ONCE
Status: CANCELLED | OUTPATIENT
Start: 2019-06-07

## 2019-06-07 RX ADMIN — HEPARIN SODIUM (PORCINE) LOCK FLUSH IV SOLN 100 UNIT/ML 500 UNITS: 100 SOLUTION INTRAVENOUS at 10:15:00

## 2019-06-07 RX ADMIN — DIPHENHYDRAMINE HYDROCHLORIDE 25 MG: 50 INJECTION INTRAMUSCULAR; INTRAVENOUS at 08:33:00

## 2019-06-07 RX ADMIN — FAMOTIDINE 20 MG: 10 INJECTION, SOLUTION INTRAVENOUS at 08:37:00

## 2019-06-07 NOTE — PROGRESS NOTES
Pt here for C8D15 Taxol. Arrives Ambulating independently, by self          Modifications in dose or schedule: No     Aphrodite reports feeling well today. Her port was accessed by lab this morning and noted with positive blood return. ANC 1.46.  OK to pr

## 2019-06-10 ENCOUNTER — TELEPHONE (OUTPATIENT)
Dept: HEMATOLOGY/ONCOLOGY | Facility: HOSPITAL | Age: 64
End: 2019-06-10

## 2019-06-10 NOTE — TELEPHONE ENCOUNTER
Pt would like to double check that she can have her teeth extracted this wed. Is concerned she may not be able to have them done d/t having low wbc last Friday. pls call.  Thank you

## 2019-06-10 NOTE — TELEPHONE ENCOUNTER
I called Cris. I confirmed Dr Morris Man did clear her for her dental work this upcoming Wednesday. Cris thanked me for double checking. She stated she does not feel she needs to have a cbc drawn before her appointment.

## 2019-06-18 ENCOUNTER — TELEPHONE (OUTPATIENT)
Dept: HEMATOLOGY/ONCOLOGY | Facility: HOSPITAL | Age: 64
End: 2019-06-18

## 2019-06-18 DIAGNOSIS — C50.911 INVASIVE LOBULAR CARCINOMA OF BREAST, STAGE 3, RIGHT (HCC): Primary | ICD-10-CM

## 2019-06-18 NOTE — TELEPHONE ENCOUNTER
Cris called to say that she had just finished treatment and is looking to get her port removed and is wondering how to go about that.  Please advise

## 2019-06-21 ENCOUNTER — HOSPITAL ENCOUNTER (OUTPATIENT)
Dept: INTERVENTIONAL RADIOLOGY/VASCULAR | Facility: HOSPITAL | Age: 64
Discharge: HOME | End: 2019-06-21
Attending: RADIOLOGY | Admitting: RADIOLOGY
Payer: COMMERCIAL

## 2019-06-21 VITALS
OXYGEN SATURATION: 100 % | DIASTOLIC BLOOD PRESSURE: 86 MMHG | WEIGHT: 125 LBS | HEART RATE: 83 BPM | SYSTOLIC BLOOD PRESSURE: 122 MMHG | BODY MASS INDEX: 20 KG/M2 | RESPIRATION RATE: 25 BRPM

## 2019-06-21 DIAGNOSIS — C50.911 INVASIVE LOBULAR CARCINOMA OF BREAST, STAGE 3, RIGHT (HCC): ICD-10-CM

## 2019-06-21 PROCEDURE — 05PY33Z REMOVAL OF INFUSION DEVICE FROM UPPER VEIN, PERCUTANEOUS APPROACH: ICD-10-PCS | Performed by: RADIOLOGY

## 2019-06-21 PROCEDURE — 77001 FLUOROGUIDE FOR VEIN DEVICE: CPT

## 2019-06-21 PROCEDURE — 36590 REMOVAL TUNNELED CV CATH: CPT

## 2019-06-21 RX ORDER — SODIUM CHLORIDE 9 MG/ML
INJECTION, SOLUTION INTRAVENOUS
Status: COMPLETED
Start: 2019-06-21 | End: 2019-06-21

## 2019-06-21 RX ORDER — SODIUM CHLORIDE 9 MG/ML
INJECTION, SOLUTION INTRAVENOUS CONTINUOUS
Status: DISCONTINUED | OUTPATIENT
Start: 2019-06-21 | End: 2019-06-21

## 2019-06-21 RX ORDER — LIDOCAINE HYDROCHLORIDE 20 MG/ML
INJECTION, SOLUTION EPIDURAL; INFILTRATION; INTRACAUDAL; PERINEURAL
Status: COMPLETED
Start: 2019-06-21 | End: 2019-06-21

## 2019-06-21 RX ORDER — CEFAZOLIN SODIUM/WATER 2 G/20 ML
SYRINGE (ML) INTRAVENOUS
Status: DISCONTINUED
Start: 2019-06-21 | End: 2019-06-21 | Stop reason: WASHOUT

## 2019-06-21 RX ORDER — BACITRACIN 50000 [USP'U]/1
INJECTION, POWDER, LYOPHILIZED, FOR SOLUTION INTRAMUSCULAR
Status: COMPLETED
Start: 2019-06-21 | End: 2019-06-21

## 2019-07-24 ENCOUNTER — TELEPHONE (OUTPATIENT)
Dept: HEMATOLOGY/ONCOLOGY | Facility: HOSPITAL | Age: 64
End: 2019-07-24

## 2019-07-24 ENCOUNTER — OFFICE VISIT (OUTPATIENT)
Dept: HEMATOLOGY/ONCOLOGY | Facility: HOSPITAL | Age: 64
End: 2019-07-24
Attending: INTERNAL MEDICINE
Payer: COMMERCIAL

## 2019-07-24 VITALS
HEIGHT: 66 IN | WEIGHT: 124 LBS | DIASTOLIC BLOOD PRESSURE: 78 MMHG | BODY MASS INDEX: 19.93 KG/M2 | TEMPERATURE: 98 F | OXYGEN SATURATION: 100 % | SYSTOLIC BLOOD PRESSURE: 134 MMHG | RESPIRATION RATE: 16 BRPM | HEART RATE: 79 BPM

## 2019-07-24 DIAGNOSIS — Z78.0 ASYMPTOMATIC MENOPAUSAL STATE: Primary | ICD-10-CM

## 2019-07-24 DIAGNOSIS — C50.811 CARCINOMA OF OVERLAPPING SITES OF RIGHT BREAST IN FEMALE, ESTROGEN RECEPTOR POSITIVE (HCC): ICD-10-CM

## 2019-07-24 DIAGNOSIS — C50.919 MALIGNANT NEOPLASM OF BREAST IN FEMALE, ESTROGEN RECEPTOR POSITIVE, UNSPECIFIED LATERALITY, UNSPECIFIED SITE OF BREAST (HCC): ICD-10-CM

## 2019-07-24 DIAGNOSIS — Z17.0 CARCINOMA OF OVERLAPPING SITES OF RIGHT BREAST IN FEMALE, ESTROGEN RECEPTOR POSITIVE (HCC): ICD-10-CM

## 2019-07-24 DIAGNOSIS — Z17.0 MALIGNANT NEOPLASM OF BREAST IN FEMALE, ESTROGEN RECEPTOR POSITIVE, UNSPECIFIED LATERALITY, UNSPECIFIED SITE OF BREAST (HCC): ICD-10-CM

## 2019-07-24 LAB — 25(OH)D3 SERPL-MCNC: 25.2 NG/ML (ref 30–100)

## 2019-07-24 PROCEDURE — 99214 OFFICE O/P EST MOD 30 MIN: CPT | Performed by: INTERNAL MEDICINE

## 2019-07-24 NOTE — TELEPHONE ENCOUNTER
LMOVM stating that over-the-counter there is Vitamin D (with a smaller font \"3\" next to the D). Look for 2000 IU (not mg). Take one daily.       Call prn

## 2019-07-24 NOTE — TELEPHONE ENCOUNTER
Patient received results from her Vit D lab today and need to clarify what medication Dr. Beard Dears want her take, patient stated if she's not please leave a message, please advise.

## 2019-07-24 NOTE — PROGRESS NOTES
[facility]    PATIENT'S NAME: Channing Urias   ATTENDING PHYSICIAN: Bárbara Haynes MD   PATIENT ACCOUNT #: [de-identified] LOCATION: [address] [place] [address] RECORD #: [id number] YOB: 1955   DATE OF SERVICE: 07/24/2019       CANCER adenopathy. LUNGS:  Resonant to percussion and clear to auscultation. No wheezing, rales, or rhonchi. HEART:  Regular S1 and S2, with no murmur or gallop. ABDOMEN:  No hepatosplenomegaly or tenderness.   EXTREMITIES:  She has no clubbing, cyanosis, or e

## 2019-09-20 NOTE — PROGRESS NOTES
Pt here for C6D8  Taxol. Arrives Ambulating independently, accompanied by Spouse           Modifications in dose or schedule: No; pt reports feeling well overall, offers no complaints. Denies any fevers, chills or s/s of infection. No n/v/d reported.  Jovanna Dailey 32

## 2019-10-03 ENCOUNTER — TELEPHONE (OUTPATIENT)
Dept: HEMATOLOGY/ONCOLOGY | Facility: HOSPITAL | Age: 64
End: 2019-10-03

## 2019-10-03 NOTE — TELEPHONE ENCOUNTER
Left message asking pt to return call to R/S her current Survivorship appt on 10/22 @ 11am @ [facility] as Paullette Link has jury duty.

## 2019-10-07 ENCOUNTER — TELEPHONE (OUTPATIENT)
Dept: HEMATOLOGY/ONCOLOGY | Facility: HOSPITAL | Age: 64
End: 2019-10-07

## 2019-10-07 NOTE — TELEPHONE ENCOUNTER
Left 2nd msg on cell and  Home #'s asking pt to return call to R/S current 10/22 Survivorship appt as Herbie Man N/A now due to jury duty.

## 2019-10-22 ENCOUNTER — APPOINTMENT (OUTPATIENT)
Dept: HEMATOLOGY/ONCOLOGY | Facility: HOSPITAL | Age: 64
End: 2019-10-22
Attending: INTERNAL MEDICINE
Payer: COMMERCIAL

## 2019-11-12 ENCOUNTER — LAB ENCOUNTER (OUTPATIENT)
Dept: LAB | Facility: REFERENCE LAB | Age: 64
End: 2019-11-12
Attending: FAMILY MEDICINE
Payer: COMMERCIAL

## 2019-11-12 ENCOUNTER — OFFICE VISIT (OUTPATIENT)
Dept: FAMILY MEDICINE CLINIC | Facility: CLINIC | Age: 64
End: 2019-11-12
Payer: COMMERCIAL

## 2019-11-12 VITALS
SYSTOLIC BLOOD PRESSURE: 120 MMHG | OXYGEN SATURATION: 98 % | HEIGHT: 65.5 IN | DIASTOLIC BLOOD PRESSURE: 78 MMHG | RESPIRATION RATE: 16 BRPM | HEART RATE: 74 BPM | WEIGHT: 128 LBS | BODY MASS INDEX: 21.07 KG/M2

## 2019-11-12 DIAGNOSIS — Z23 FLU VACCINE NEED: ICD-10-CM

## 2019-11-12 DIAGNOSIS — I83.893 VARICOSE VEINS OF BILATERAL LOWER EXTREMITIES WITH OTHER COMPLICATIONS: ICD-10-CM

## 2019-11-12 DIAGNOSIS — Z23 NEED FOR STREPTOCOCCUS PNEUMONIAE VACCINATION: ICD-10-CM

## 2019-11-12 DIAGNOSIS — Z01.419 ENCOUNTER FOR GYNECOLOGICAL EXAMINATION: ICD-10-CM

## 2019-11-12 DIAGNOSIS — Z13.820 SCREENING FOR OSTEOPOROSIS: ICD-10-CM

## 2019-11-12 DIAGNOSIS — Z23 NEED FOR SHINGLES VACCINE: ICD-10-CM

## 2019-11-12 DIAGNOSIS — Z00.00 ROUTINE MEDICAL EXAM: ICD-10-CM

## 2019-11-12 DIAGNOSIS — C50.911 INVASIVE LOBULAR CARCINOMA OF BREAST, STAGE 3, RIGHT (HCC): ICD-10-CM

## 2019-11-12 DIAGNOSIS — B35.1 ONYCHOMYCOSIS: Primary | ICD-10-CM

## 2019-11-12 PROCEDURE — 82306 VITAMIN D 25 HYDROXY: CPT | Performed by: FAMILY MEDICINE

## 2019-11-12 PROCEDURE — 80050 GENERAL HEALTH PANEL: CPT | Performed by: FAMILY MEDICINE

## 2019-11-12 PROCEDURE — 90686 IIV4 VACC NO PRSV 0.5 ML IM: CPT | Performed by: FAMILY MEDICINE

## 2019-11-12 PROCEDURE — 99204 OFFICE O/P NEW MOD 45 MIN: CPT | Performed by: FAMILY MEDICINE

## 2019-11-12 PROCEDURE — 80061 LIPID PANEL: CPT | Performed by: FAMILY MEDICINE

## 2019-11-12 PROCEDURE — 90471 IMMUNIZATION ADMIN: CPT | Performed by: FAMILY MEDICINE

## 2019-11-12 PROCEDURE — 36415 COLL VENOUS BLD VENIPUNCTURE: CPT | Performed by: FAMILY MEDICINE

## 2019-11-12 RX ORDER — NICOTINE POLACRILEX 2 MG
GUM BUCCAL
COMMUNITY

## 2019-11-12 RX ORDER — CHLORAL HYDRATE 500 MG
CAPSULE ORAL
COMMUNITY

## 2019-11-14 NOTE — PROGRESS NOTES
HPI:   Patient presents with:  Establish Care  Varicose Veins  Fungus Nails  Breast Cancer      Aphrodite SOO Han is a 62year old female. She primarily presents for:   To become established    Patient has additional complaints/concerns:  · Toenail fu Oral Cap Take by mouth. • Omega-3 1000 MG Oral Cap Take by mouth. • Turmeric 1053 MG Oral Tab by Misc. (Non-Drug; Combo Route) route. • Flaxseed, Linseed, (FLAX SEEDS OR) Take by mouth.      • Ciclopirox 8 % External Solution Apply to the affecte kg)    GENERAL: well developed, well nourished, female  in no apparent distress  SKIN: skin color wnl,  no suspicious lesions,    HEENT: atraumatic, normocephalic, nose clear; throat clear; dentition good   EARS: canals clear B, TM: wnl B  EYES: PERRLA, EO vaccine  Recommend patient contact her insurance company to see if covered in office, otherwise do at pharmacy as less cost if self-pay     7. Need for Streptococcus pneumoniae vaccination  Patient declined today, will do at next office visit    8.  Routine

## 2019-11-18 DIAGNOSIS — E78.5 DYSLIPIDEMIA: Primary | ICD-10-CM

## 2019-11-18 DIAGNOSIS — E55.9 VITAMIN D DEFICIENCY: ICD-10-CM

## 2019-11-19 ENCOUNTER — APPOINTMENT (OUTPATIENT)
Dept: HEMATOLOGY/ONCOLOGY | Facility: HOSPITAL | Age: 64
End: 2019-11-19
Attending: NURSE PRACTITIONER
Payer: COMMERCIAL

## 2019-12-03 ENCOUNTER — HOSPITAL ENCOUNTER (OUTPATIENT)
Dept: BONE DENSITY | Age: 64
Discharge: HOME | End: 2019-12-03
Attending: INTERNAL MEDICINE
Payer: COMMERCIAL

## 2019-12-03 DIAGNOSIS — Z78.0 ASYMPTOMATIC MENOPAUSAL STATE: ICD-10-CM

## 2019-12-03 PROCEDURE — 77080 DXA BONE DENSITY AXIAL: CPT | Performed by: INTERNAL MEDICINE

## 2019-12-09 ENCOUNTER — OFFICE VISIT (OUTPATIENT)
Dept: INTEGRATIVE MEDICINE | Facility: CLINIC | Age: 64
End: 2019-12-09
Payer: COMMERCIAL

## 2019-12-09 VITALS
DIASTOLIC BLOOD PRESSURE: 86 MMHG | SYSTOLIC BLOOD PRESSURE: 130 MMHG | OXYGEN SATURATION: 96 % | HEIGHT: 65.5 IN | BODY MASS INDEX: 21.1 KG/M2 | WEIGHT: 128.19 LBS | TEMPERATURE: 98 F | HEART RATE: 89 BPM

## 2019-12-09 DIAGNOSIS — Z17.0 CARCINOMA OF OVERLAPPING SITES OF RIGHT BREAST IN FEMALE, ESTROGEN RECEPTOR POSITIVE (HCC): Primary | ICD-10-CM

## 2019-12-09 DIAGNOSIS — R53.82 CHRONIC FATIGUE: ICD-10-CM

## 2019-12-09 DIAGNOSIS — T45.1X5A ANEMIA DUE TO ANTINEOPLASTIC CHEMOTHERAPY: ICD-10-CM

## 2019-12-09 DIAGNOSIS — D64.81 ANEMIA DUE TO ANTINEOPLASTIC CHEMOTHERAPY: ICD-10-CM

## 2019-12-09 DIAGNOSIS — C50.811 CARCINOMA OF OVERLAPPING SITES OF RIGHT BREAST IN FEMALE, ESTROGEN RECEPTOR POSITIVE (HCC): Primary | ICD-10-CM

## 2019-12-09 PROCEDURE — 99213 OFFICE O/P EST LOW 20 MIN: CPT | Performed by: PHYSICIAN ASSISTANT

## 2019-12-09 NOTE — PROGRESS NOTES
Natanael Maxwell is a 62year old female. Patient presents with:  Establish Care: pt wants to work on nutrition and supplementing        HPI:   Has osteopenia. Diagnosed with breast cancer a year ago. Had a right masectomy. No reconstruction.  Trying t • Turmeric 1053 MG Oral Tab by Misc. (Non-Drug; Combo Route) route. • Flaxseed, Linseed, (FLAX SEEDS OR) Take by mouth.      • Ciclopirox 8 % External Solution Apply to the affected toe nails nightly until normal nails grow out 1 Bottle 3   • anastrozole Exercise: Not Asked        Seat Belt: Not Asked        Special Diet: Not Asked        Stress Concern: Not Asked        Weight Concern: Not Asked         Service: Not Asked        Blood Transfusions: Not Asked        Occupational Exposure: Not [employer] micronutrient panel ordered to evaluate for micronutrient deficiencies and imbalances. Suggested that the patient remove dairy from her diet and sugar. Increase water intake to 60-70 oz daily. Increase vitamin D to 5000 IU. Return in about 4 weeks (around 1/6/2020) for Follow Up (30 min). Patient affirmed understanding of plan and all questions were answered.      Aashish School, PA

## 2019-12-10 ENCOUNTER — NURSE ONLY (OUTPATIENT)
Dept: INTEGRATIVE MEDICINE | Facility: CLINIC | Age: 64
End: 2019-12-10
Payer: COMMERCIAL

## 2019-12-10 ENCOUNTER — APPOINTMENT (OUTPATIENT)
Dept: LAB | Age: 64
End: 2019-12-10
Attending: PHYSICIAN ASSISTANT
Payer: COMMERCIAL

## 2019-12-10 DIAGNOSIS — Z17.0 CARCINOMA OF OVERLAPPING SITES OF RIGHT BREAST IN FEMALE, ESTROGEN RECEPTOR POSITIVE (HCC): Primary | ICD-10-CM

## 2019-12-10 DIAGNOSIS — C50.811 CARCINOMA OF OVERLAPPING SITES OF RIGHT BREAST IN FEMALE, ESTROGEN RECEPTOR POSITIVE (HCC): Primary | ICD-10-CM

## 2019-12-10 NOTE — PATIENT INSTRUCTIONS
Proper Hydration with WATER is KEY  The general rule of thumb for optimal water intake is half your body weight in ounces    OR    Enough water to keep your urine very light yellow to clear EVERY time you go to the washroom    OR    At least 60-70 ounces o

## 2019-12-18 ENCOUNTER — TELEPHONE (OUTPATIENT)
Dept: INTEGRATIVE MEDICINE | Facility: CLINIC | Age: 64
End: 2019-12-18

## 2019-12-18 NOTE — TELEPHONE ENCOUNTER
Patient would like to know when blood work is received so she can make appt before end of year. Please call.

## 2020-01-03 ENCOUNTER — OFFICE VISIT (OUTPATIENT)
Dept: INTEGRATIVE MEDICINE | Facility: CLINIC | Age: 65
End: 2020-01-03
Payer: COMMERCIAL

## 2020-01-03 VITALS
TEMPERATURE: 98 F | WEIGHT: 131 LBS | SYSTOLIC BLOOD PRESSURE: 134 MMHG | HEART RATE: 78 BPM | HEIGHT: 65.5 IN | BODY MASS INDEX: 21.56 KG/M2 | OXYGEN SATURATION: 96 % | DIASTOLIC BLOOD PRESSURE: 94 MMHG

## 2020-01-03 DIAGNOSIS — C50.911 INVASIVE LOBULAR CARCINOMA OF BREAST, STAGE 3, RIGHT (HCC): Primary | ICD-10-CM

## 2020-01-03 DIAGNOSIS — E53.9 VITAMIN B DEFICIENCY: ICD-10-CM

## 2020-01-03 DIAGNOSIS — E55.9 VITAMIN D DEFICIENCY: ICD-10-CM

## 2020-01-03 PROCEDURE — 99214 OFFICE O/P EST MOD 30 MIN: CPT | Performed by: PHYSICIAN ASSISTANT

## 2020-01-03 NOTE — PROGRESS NOTES
Tory Pascual is a 62year old female. Patient presents with: Follow - Up: lab results        HPI:   Patient presents to follow up on micronutrient testing.      REVIEW OF SYSTEMS:   Review of Systems   Constitutional: Negative for chills, fever an Social Needs      Financial resource strain: Not on file      Food insecurity:        Worry: Not on file        Inability: Not on file      Transportation needs:        Medical: Not on file        Non-medical: Not on file    Tobacco Use      Smoking stat Pulse: 78   Temp: 97.9 °F (36.6 °C)   SpO2: 96%   Weight: 131 lb (59.4 kg)   Height: 65.5\"       Physical Exam   Constitutional: She is oriented to person, place, and time and well-developed, well-nourished, and in no distress.    HENT:   Head: Normocephal It can also be found in foods such as: Huber seeds, Flax seeds, and Walnuts. Vitamin D/K2 by Haley Campuzano    Directions: 12 drops in liquid daily  Where to Find: [email or web address]  Why: Vitamin D/K2 Liquid is a convenient way to supplement with both alysa

## 2020-01-03 NOTE — PATIENT INSTRUCTIONS
Avoid soy    Oat, almond, cashew, coconut, pea. Continue working on water. Omega 3 fatty acids are anti-inflammatory and important for brain and nervous system health, including memory. I recommend taking 2000 mg daily.   Some good brands are:  -

## 2020-01-29 ENCOUNTER — APPOINTMENT (OUTPATIENT)
Dept: HEMATOLOGY/ONCOLOGY | Facility: HOSPITAL | Age: 65
End: 2020-01-29
Attending: INTERNAL MEDICINE
Payer: COMMERCIAL

## 2020-02-05 ENCOUNTER — OFFICE VISIT (OUTPATIENT)
Dept: HEMATOLOGY/ONCOLOGY | Facility: HOSPITAL | Age: 65
End: 2020-02-05
Attending: INTERNAL MEDICINE
Payer: COMMERCIAL

## 2020-02-05 VITALS
HEIGHT: 66 IN | RESPIRATION RATE: 16 BRPM | DIASTOLIC BLOOD PRESSURE: 82 MMHG | BODY MASS INDEX: 20.31 KG/M2 | HEART RATE: 84 BPM | SYSTOLIC BLOOD PRESSURE: 133 MMHG | WEIGHT: 126.38 LBS | OXYGEN SATURATION: 100 % | TEMPERATURE: 99 F

## 2020-02-05 DIAGNOSIS — M81.0 AGE-RELATED OSTEOPOROSIS WITHOUT CURRENT PATHOLOGICAL FRACTURE: ICD-10-CM

## 2020-02-05 DIAGNOSIS — Z17.0 CARCINOMA OF OVERLAPPING SITES OF RIGHT BREAST IN FEMALE, ESTROGEN RECEPTOR POSITIVE (HCC): Primary | ICD-10-CM

## 2020-02-05 DIAGNOSIS — C50.811 CARCINOMA OF OVERLAPPING SITES OF RIGHT BREAST IN FEMALE, ESTROGEN RECEPTOR POSITIVE (HCC): Primary | ICD-10-CM

## 2020-02-05 PROCEDURE — 99213 OFFICE O/P EST LOW 20 MIN: CPT | Performed by: INTERNAL MEDICINE

## 2020-02-05 RX ORDER — MULTIVIT WITH MINERALS/LUTEIN
1000 TABLET ORAL DAILY
COMMUNITY

## 2020-02-05 RX ORDER — METHYLDOPA/HYDROCHLOROTHIAZIDE 250MG-15MG
TABLET ORAL DAILY
COMMUNITY

## 2020-02-05 RX ORDER — CHROMIUM PICOLINATE 200 MCG
CAPSULE ORAL DAILY
COMMUNITY

## 2020-02-05 NOTE — PROGRESS NOTES
[facility]    PATIENT'S NAME: Adin Acuña   ATTENDING PHYSICIAN: Kate Farmer MD   PATIENT ACCOUNT #: [de-identified] LOCATION: [address] [place] [address] RECORD #: [id number] YOB: 1955   DATE OF SERVICE: 02/05/2020       CANCER supplements to her. They did some micronutrient analysis. I have told her that there is some debate about the clinical utility of this, but anyway she is proceeding with following through with them. She denies any other major issues.   She denies bone pa 11:45:30  t: 02/05/2020 12:04:24  [facility] [id number]  /    cc: Dr. Bal Lehman MD

## 2020-02-12 ENCOUNTER — APPOINTMENT (OUTPATIENT)
Dept: HEMATOLOGY/ONCOLOGY | Facility: HOSPITAL | Age: 65
End: 2020-02-12
Attending: INTERNAL MEDICINE
Payer: COMMERCIAL

## 2020-03-13 ENCOUNTER — APPOINTMENT (OUTPATIENT)
Dept: LAB | Age: 65
End: 2020-03-13
Attending: FAMILY MEDICINE
Payer: COMMERCIAL

## 2020-03-13 DIAGNOSIS — E78.5 DYSLIPIDEMIA: ICD-10-CM

## 2020-03-13 DIAGNOSIS — E55.9 VITAMIN D DEFICIENCY: ICD-10-CM

## 2020-03-13 LAB
25(OH)D3 SERPL-MCNC: 44.7 NG/ML (ref 30–100)
CHOLEST SMN-MCNC: 227 MG/DL (ref ?–200)
HDLC SERPL-MCNC: 76 MG/DL (ref 40–59)
LDLC SERPL CALC-MCNC: 130 MG/DL (ref ?–100)
NONHDLC SERPL-MCNC: 151 MG/DL (ref ?–130)
PATIENT FASTING Y/N/NP: YES
TRIGL SERPL-MCNC: 106 MG/DL (ref 30–149)
VLDLC SERPL CALC-MCNC: 21 MG/DL (ref 0–30)

## 2020-03-13 PROCEDURE — 82306 VITAMIN D 25 HYDROXY: CPT

## 2020-03-13 PROCEDURE — 36415 COLL VENOUS BLD VENIPUNCTURE: CPT

## 2020-03-13 PROCEDURE — 80061 LIPID PANEL: CPT

## 2020-03-23 ENCOUNTER — TELEPHONE (OUTPATIENT)
Dept: FAMILY MEDICINE CLINIC | Facility: CLINIC | Age: 65
End: 2020-03-23

## 2020-03-23 NOTE — TELEPHONE ENCOUNTER
Prior Authorization for Ciclopirox 8 % topical solution was not approved. Can you please give the patient a new RX or possibly over the counter medication for onychomycosis. Thank you.

## 2020-03-24 NOTE — TELEPHONE ENCOUNTER
There aren't any very effective OTC medications for nail fungus, Ask patient to contact her insurance or pharmacist to see what they do cover for nail fungus (both topical and oral, although I had decided not to do oral at the time due to risk of medicatio

## 2020-03-25 NOTE — TELEPHONE ENCOUNTER
I called patient she states the fungus is almost gone and does not feel she needs the solution at this time.     colleen

## 2020-04-07 ENCOUNTER — TELEPHONE (OUTPATIENT)
Dept: FAMILY MEDICINE CLINIC | Facility: CLINIC | Age: 65
End: 2020-04-07

## 2020-04-07 NOTE — TELEPHONE ENCOUNTER
question on submitting insurance - hydroxy vitamin D for submission to insurance . She called Zina Bernheim couple of times, said if submitted as general routine check then will not be covered under her insurance.   Not related to medical D code with patholog

## 2020-06-02 RX ORDER — ANASTROZOLE 1 MG/1
1 TABLET ORAL DAILY
Qty: 90 TABLET | Refills: 1 | Status: SHIPPED | OUTPATIENT
Start: 2020-06-02 | End: 2020-11-30

## 2020-08-05 ENCOUNTER — OFFICE VISIT (OUTPATIENT)
Dept: HEMATOLOGY/ONCOLOGY | Facility: HOSPITAL | Age: 65
End: 2020-08-05
Attending: INTERNAL MEDICINE
Payer: COMMERCIAL

## 2020-08-05 VITALS
HEIGHT: 66 IN | RESPIRATION RATE: 16 BRPM | HEART RATE: 76 BPM | TEMPERATURE: 99 F | WEIGHT: 129 LBS | SYSTOLIC BLOOD PRESSURE: 140 MMHG | OXYGEN SATURATION: 100 % | BODY MASS INDEX: 20.73 KG/M2 | DIASTOLIC BLOOD PRESSURE: 78 MMHG

## 2020-08-05 DIAGNOSIS — Z17.0 CARCINOMA OF OVERLAPPING SITES OF RIGHT BREAST IN FEMALE, ESTROGEN RECEPTOR POSITIVE (HCC): Primary | ICD-10-CM

## 2020-08-05 DIAGNOSIS — C50.811 CARCINOMA OF OVERLAPPING SITES OF RIGHT BREAST IN FEMALE, ESTROGEN RECEPTOR POSITIVE (HCC): Primary | ICD-10-CM

## 2020-08-05 PROCEDURE — 99213 OFFICE O/P EST LOW 20 MIN: CPT | Performed by: INTERNAL MEDICINE

## 2020-08-06 NOTE — PROGRESS NOTES
[facility]    PATIENT'S NAME: Katina Micky   ATTENDING PHYSICIAN: Clau Nixon MD   PATIENT ACCOUNT #: [de-identified] LOCATION: [address] [place] [address] RECORD #: [id number] YOB: 1955   DATE OF SERVICE: 08/05/2020       CANCER ascorbic acid 1000 mg daily, B-complex vitamin daily, biotin 1 mg daily, vitamin D3 at 5000 units daily, chromium picolinate 200 mcg daily, flaxseed oil supplement daily, vitamin K2 at 100 mcg daily, omega-3 fatty acids 1000 mg daily, and turmeric daily. Massimo Muller MD  d: 08/05/2020 16:42:40  t: 08/05/2020 17:20:48  [facility] [id number]  /    cc: MD Dr. Daylin Nj

## 2020-10-30 ENCOUNTER — OFFICE VISIT (OUTPATIENT)
Dept: FAMILY MEDICINE CLINIC | Facility: CLINIC | Age: 65
End: 2020-10-30
Payer: COMMERCIAL

## 2020-10-30 VITALS
DIASTOLIC BLOOD PRESSURE: 86 MMHG | HEIGHT: 66 IN | BODY MASS INDEX: 21.28 KG/M2 | WEIGHT: 132.38 LBS | SYSTOLIC BLOOD PRESSURE: 130 MMHG

## 2020-10-30 DIAGNOSIS — E55.9 VITAMIN D DEFICIENCY: ICD-10-CM

## 2020-10-30 DIAGNOSIS — Z00.00 ROUTINE MEDICAL EXAM: Primary | ICD-10-CM

## 2020-10-30 DIAGNOSIS — Z85.3 HISTORY OF RIGHT BREAST CANCER: ICD-10-CM

## 2020-10-30 DIAGNOSIS — Z23 FLU VACCINE NEED: ICD-10-CM

## 2020-10-30 DIAGNOSIS — M81.0 AGE-RELATED OSTEOPOROSIS WITHOUT CURRENT PATHOLOGICAL FRACTURE: ICD-10-CM

## 2020-10-30 DIAGNOSIS — Z12.11 SCREENING FOR COLON CANCER: ICD-10-CM

## 2020-10-30 DIAGNOSIS — Z23 NEED FOR VACCINATION AGAINST STREPTOCOCCUS PNEUMONIAE: ICD-10-CM

## 2020-10-30 PROCEDURE — 3075F SYST BP GE 130 - 139MM HG: CPT | Performed by: FAMILY MEDICINE

## 2020-10-30 PROCEDURE — 90471 IMMUNIZATION ADMIN: CPT | Performed by: FAMILY MEDICINE

## 2020-10-30 PROCEDURE — 3008F BODY MASS INDEX DOCD: CPT | Performed by: FAMILY MEDICINE

## 2020-10-30 PROCEDURE — 3079F DIAST BP 80-89 MM HG: CPT | Performed by: FAMILY MEDICINE

## 2020-10-30 PROCEDURE — 90686 IIV4 VACC NO PRSV 0.5 ML IM: CPT | Performed by: FAMILY MEDICINE

## 2020-10-30 PROCEDURE — 99396 PREV VISIT EST AGE 40-64: CPT | Performed by: FAMILY MEDICINE

## 2020-10-30 NOTE — H&P
HPI:   Patient presents with:  Physical      Haider Coelho is a 60year old female who presents for a complete physical exam without pap/gyne exam.     Patient has new complaints of:  · none    She  is without complaints of CP, HAs,SOB, Dizziness, P Take by mouth daily. • Biotin 1 MG Oral Cap Take by mouth. • Omega-3 1000 MG Oral Cap Take by mouth. • Turmeric 1053 MG Oral Tab by Misc. (Non-Drug; Combo Route) route. • Flaxseed, Linseed, (FLAX SEEDS OR) Take by mouth.      • Ciclopirox 8 % 132 lb 6.4 oz (60.1 kg)   BMI 21.37 kg/m²  Estimated body mass index is 21.37 kg/m² as calculated from the following:    Height as of this encounter: 66\". Weight as of this encounter: 132 lb 6.4 oz (60.1 kg).   Wt Readings from Last 3 Encounters:  10/30 history of right breast cancer status postmastectomy  · Dexascan:  last 7/2019, due 2021  · Recommend dietary calcium and Vit D  · Fasting Lipids, CMP, TSH and CBC annually: Due November 12, 2020  · At 48 y/o Screening Colonoscopy: q 10 years/as recommende Visit:  Requested Prescriptions      No prescriptions requested or ordered in this encounter       Imaging & Consults:  FLULAVAL INFLUENZA VACCINE QUAD PRESERVATIVE FREE 0.5 ML  GASTRO - INTERNAL  DUSTIN RAMON 2D+3D DIAGNOSTIC DUSTIN LEFT (CPT=77065/76280)    Ret

## 2020-11-02 ENCOUNTER — TELEPHONE (OUTPATIENT)
Dept: FAMILY MEDICINE CLINIC | Facility: CLINIC | Age: 65
End: 2020-11-02

## 2020-11-02 NOTE — TELEPHONE ENCOUNTER
Monique Page, I saw Teresa Oliveira for her physical exam last Friday. She and I were questioning the frequency of her Pap smears, her last normal Pap smear was 10/24/2018.   I have her on a 3 to 5-year interval.  With her history of right breast cancer I felt this frequ

## 2020-11-13 ENCOUNTER — LAB ENCOUNTER (OUTPATIENT)
Dept: LAB | Facility: REFERENCE LAB | Age: 65
End: 2020-11-13
Attending: FAMILY MEDICINE
Payer: COMMERCIAL

## 2020-11-13 DIAGNOSIS — E55.9 VITAMIN D DEFICIENCY: ICD-10-CM

## 2020-11-13 DIAGNOSIS — Z00.00 ROUTINE MEDICAL EXAM: ICD-10-CM

## 2020-11-13 PROCEDURE — 85025 COMPLETE CBC W/AUTO DIFF WBC: CPT

## 2020-11-13 PROCEDURE — 80061 LIPID PANEL: CPT

## 2020-11-13 PROCEDURE — 84443 ASSAY THYROID STIM HORMONE: CPT

## 2020-11-13 PROCEDURE — 36415 COLL VENOUS BLD VENIPUNCTURE: CPT

## 2020-11-13 PROCEDURE — 82306 VITAMIN D 25 HYDROXY: CPT

## 2020-11-16 ENCOUNTER — LAB ENCOUNTER (OUTPATIENT)
Dept: LAB | Age: 65
End: 2020-11-16
Attending: FAMILY MEDICINE
Payer: COMMERCIAL

## 2020-11-16 DIAGNOSIS — Z00.00 ROUTINE MEDICAL EXAM: ICD-10-CM

## 2020-11-16 PROCEDURE — 36415 COLL VENOUS BLD VENIPUNCTURE: CPT

## 2020-11-16 PROCEDURE — 80053 COMPREHEN METABOLIC PANEL: CPT

## 2020-11-21 DIAGNOSIS — E78.00 HYPERCHOLESTEREMIA: Primary | ICD-10-CM

## 2020-11-22 NOTE — PROGRESS NOTES
Jun Han,    Below are the results of your recently performed labs/tests: All results are within NORMAL limits EXCEPT:    Your lipids: Cholesterol was moderately elevated.   I recommend watching your diet (a low fat and cholesterol, well bal

## 2020-11-22 NOTE — PROGRESS NOTES
Jun Han,    Below are the results of your recently performed labs/tests: All results are within NORMAL limits.     Take care,     Dago Bennett MD  11/21/2020    (Report(s) are attached, future lab/test orders or any referrals (ma

## 2020-11-30 RX ORDER — ANASTROZOLE 1 MG/1
1 TABLET ORAL DAILY
Qty: 90 TABLET | Refills: 0 | Status: SHIPPED | OUTPATIENT
Start: 2020-11-30 | End: 2020-12-04

## 2020-12-04 RX ORDER — ANASTROZOLE 1 MG/1
1 TABLET ORAL DAILY
Qty: 30 TABLET | Refills: 3 | Status: SHIPPED | OUTPATIENT
Start: 2020-12-04 | End: 2021-06-29

## 2020-12-18 ENCOUNTER — TELEPHONE (OUTPATIENT)
Dept: FAMILY MEDICINE CLINIC | Facility: CLINIC | Age: 65
End: 2020-12-18

## 2020-12-18 DIAGNOSIS — E78.00 HYPERCHOLESTEREMIA: Primary | ICD-10-CM

## 2020-12-18 NOTE — TELEPHONE ENCOUNTER
Pt calling, states 11/16/20 CMP was coded as Z00.0; per pt, her insurance states a routine code will not cover the lab. Pt asking if the code can be changed to another code.

## 2020-12-21 NOTE — TELEPHONE ENCOUNTER
CMP order printed for provider's signature and change of dx code. Will be sent to the proper dept for change.

## 2021-01-15 NOTE — TELEPHONE ENCOUNTER
Confirmed dx code change completed for CMP 11/16/2020; pt made aware and instructed to f/u with billing for a final bill due.

## 2021-02-10 ENCOUNTER — OFFICE VISIT (OUTPATIENT)
Dept: HEMATOLOGY/ONCOLOGY | Facility: HOSPITAL | Age: 66
End: 2021-02-10
Attending: INTERNAL MEDICINE
Payer: COMMERCIAL

## 2021-02-10 VITALS
RESPIRATION RATE: 16 BRPM | HEART RATE: 79 BPM | OXYGEN SATURATION: 100 % | WEIGHT: 134 LBS | DIASTOLIC BLOOD PRESSURE: 86 MMHG | TEMPERATURE: 98 F | BODY MASS INDEX: 21.53 KG/M2 | HEIGHT: 66 IN | SYSTOLIC BLOOD PRESSURE: 133 MMHG

## 2021-02-10 DIAGNOSIS — Z17.0 CARCINOMA OF OVERLAPPING SITES OF RIGHT BREAST IN FEMALE, ESTROGEN RECEPTOR POSITIVE (HCC): Primary | ICD-10-CM

## 2021-02-10 DIAGNOSIS — C50.811 CARCINOMA OF OVERLAPPING SITES OF RIGHT BREAST IN FEMALE, ESTROGEN RECEPTOR POSITIVE (HCC): Primary | ICD-10-CM

## 2021-02-10 PROCEDURE — 99213 OFFICE O/P EST LOW 20 MIN: CPT | Performed by: INTERNAL MEDICINE

## 2021-02-11 NOTE — PROGRESS NOTES
[facility]    PATIENT'S NAME: Lacy Vásquez   ATTENDING PHYSICIAN: Dipti Mathew MD   PATIENT ACCOUNT #: [de-identified] LOCATION: [address] [place] [address] RECORD #: [id number] YOB: 1955   DATE OF SERVICE: 02/10/2021       CANCER adenopathy. LUNGS:  Resonant to percussion and clear to auscultation, with no wheezing, rales, or rhonchi. HEART:  Normal.  ABDOMEN:  No hepatosplenomegaly or tenderness. EXTREMITIES:  She has no clubbing, cyanosis, or edema.    NEUROLOGIC:  She is int

## 2021-02-24 ENCOUNTER — PATIENT MESSAGE (OUTPATIENT)
Dept: HEMATOLOGY/ONCOLOGY | Facility: HOSPITAL | Age: 66
End: 2021-02-24

## 2021-02-25 NOTE — TELEPHONE ENCOUNTER
From: Cris Han  To: Prasad Lynne MD  Sent: 2/24/2021 4:38 PM CST  Subject: Visit Follow-up Question    Hello Doctor, I am requesting a short letter describing my past medical history. ..ie..right lobular breast cancer,with the mastectomy of t

## 2021-04-01 ENCOUNTER — TELEPHONE (OUTPATIENT)
Dept: FAMILY MEDICINE CLINIC | Facility: CLINIC | Age: 66
End: 2021-04-01

## 2021-06-29 RX ORDER — ANASTROZOLE 1 MG/1
1 TABLET ORAL DAILY
Qty: 90 TABLET | Refills: 1 | Status: SHIPPED | OUTPATIENT
Start: 2021-06-29 | End: 2021-12-03

## 2021-08-11 ENCOUNTER — OFFICE VISIT (OUTPATIENT)
Dept: HEMATOLOGY/ONCOLOGY | Facility: HOSPITAL | Age: 66
End: 2021-08-11
Attending: INTERNAL MEDICINE
Payer: COMMERCIAL

## 2021-08-11 VITALS
HEART RATE: 85 BPM | HEIGHT: 66 IN | RESPIRATION RATE: 16 BRPM | BODY MASS INDEX: 20.73 KG/M2 | TEMPERATURE: 99 F | SYSTOLIC BLOOD PRESSURE: 141 MMHG | DIASTOLIC BLOOD PRESSURE: 84 MMHG | OXYGEN SATURATION: 100 % | WEIGHT: 129 LBS

## 2021-08-11 DIAGNOSIS — Z17.0 CARCINOMA OF OVERLAPPING SITES OF RIGHT BREAST IN FEMALE, ESTROGEN RECEPTOR POSITIVE (HCC): ICD-10-CM

## 2021-08-11 DIAGNOSIS — C50.811 CARCINOMA OF OVERLAPPING SITES OF RIGHT BREAST IN FEMALE, ESTROGEN RECEPTOR POSITIVE (HCC): ICD-10-CM

## 2021-08-11 DIAGNOSIS — Z78.0 ASYMPTOMATIC MENOPAUSAL STATE: Primary | ICD-10-CM

## 2021-08-11 PROCEDURE — 99213 OFFICE O/P EST LOW 20 MIN: CPT | Performed by: INTERNAL MEDICINE

## 2021-08-12 NOTE — PROGRESS NOTES
[facility]    PATIENT'S NAME: Blessing Ty   ATTENDING PHYSICIAN: Galindo Lennon MD   PATIENT ACCOUNT #: [de-identified] LOCATION: [address] [place] [address] RECORD #: [id number] YOB: 1955   DATE OF SERVICE: 08/11/2021       CANCER Normal.  ABDOMEN:  No hepatosplenomegaly or tenderness. EXTREMITIES:  She has no clubbing, cyanosis, or edema. MUSCULOSKELETAL:  Her right chest wall is unremarkable with no palpable abnormalities. NEUROLOGIC:  She is intact.     BREAST:  Her left breast

## 2021-10-25 ENCOUNTER — TELEPHONE (OUTPATIENT)
Dept: FAMILY MEDICINE CLINIC | Facility: CLINIC | Age: 66
End: 2021-10-25

## 2021-12-03 RX ORDER — ANASTROZOLE 1 MG/1
1 TABLET ORAL DAILY
Qty: 90 TABLET | Refills: 1 | Status: SHIPPED | OUTPATIENT
Start: 2021-12-03 | End: 2022-06-27

## 2022-02-16 ENCOUNTER — OFFICE VISIT (OUTPATIENT)
Dept: HEMATOLOGY/ONCOLOGY | Facility: HOSPITAL | Age: 67
End: 2022-02-16
Attending: INTERNAL MEDICINE
Payer: COMMERCIAL

## 2022-02-16 VITALS
RESPIRATION RATE: 16 BRPM | SYSTOLIC BLOOD PRESSURE: 140 MMHG | OXYGEN SATURATION: 100 % | WEIGHT: 134 LBS | BODY MASS INDEX: 21.53 KG/M2 | HEART RATE: 79 BPM | TEMPERATURE: 99 F | HEIGHT: 66 IN | DIASTOLIC BLOOD PRESSURE: 93 MMHG

## 2022-02-16 DIAGNOSIS — C50.811 CARCINOMA OF OVERLAPPING SITES OF RIGHT BREAST IN FEMALE, ESTROGEN RECEPTOR POSITIVE (HCC): Primary | ICD-10-CM

## 2022-02-16 DIAGNOSIS — Z17.0 CARCINOMA OF OVERLAPPING SITES OF RIGHT BREAST IN FEMALE, ESTROGEN RECEPTOR POSITIVE (HCC): Primary | ICD-10-CM

## 2022-02-16 PROCEDURE — 99213 OFFICE O/P EST LOW 20 MIN: CPT | Performed by: INTERNAL MEDICINE

## 2022-02-17 NOTE — PROGRESS NOTES
[facility]    PATIENT'S NAME: [address] [facility]   ATTENDING PHYSICIAN: José Mishra MD   PATIENT ACCOUNT #: [de-identified] LOCATION: [address] [place] [address] RECORD #: [id number] YOB: 1955   DATE OF SERVICE: 02/16/2022       CANCER CENTER PROGRESS NOTE    CHIEF COMPLAINT:  Followup and treatment of breast cancer. HISTORY OF PRESENT ILLNESS:  The patient is a 41-year-old female. She had T2N3 disease diagnosed in 2018. She had 13 positive lymph nodes. She had a mastectomy and had received dose-dense ACT as adjuvant chemotherapy. She had chest wall radiation, she finished in the summer of 2019. She has been on an aromatase inhibitor since then. We tried to get her onto one of the CDK4/6 inhibitor trials, but there was none available in the area. She is tolerating the aromatase inhibitor reasonably well. She does have osteoporosis and we have been unable to treat her with a bone hardening drug because she has very poor dentition and has not yet gotten around to having her teeth dealt with. She has, in fact, just put an addition on her house and does not have any money to pay for teeth, according to her. She has several that are carious. She has lost multiple teeth and she has several that need to be pulled. She will likely need implants. She has not broken anything. She has not fallen. She has no bone pain. Overall, she actually feels quite well. She is tolerating her aromatase inhibitor without any difficulty. She has been taking this now for over 3 years. She denies any problems of new palpable lumps or bumps. She has no cough, no shortness of breath. She had a mammogram done for Dr. Clau Redman in the fall of 2021 at the [facility] and it was unremarkable. Her last bone density scan was done in December 2019. I did give her an order to do another one but she has not gotten around to it yet.   I wanted her to do it at 2 year interval.  She is due also to see  Elliot Nam for her primary care followup. CURRENT MEDICATIONS:  Her current medications include anastrozole 1 mg daily, ascorbic acid 1000 mg daily, B-complex vitamins daily, biotin 1 mg daily, flaxseed supplements daily, magnesium hydroxide p.r.n., omega-3 fatty acids 1000 mg daily, turmeric 1000 mg daily, vitamin D3 at 5000 units daily. PHYSICAL EXAMINATION:    GENERAL:  She is a well-appearing female in no acute distress. VITAL SIGNS:  Her performance status is 0. Her weight is 134 pounds. Blood pressure is 140/93, pulse 79, respiratory rate 20, temperature is 98.8. HEENT:  Unremarkable. She has pink conjunctivae, anicteric sclerae. Pharynx without lesions. She has no cervical, supraclavicular, or axillary adenopathy. LUNGS:  Resonant to percussion and clear to auscultation with no wheezing, rales, or rhonchi. HEART:  Normal.  ABDOMEN:  Reveals no hepatosplenomegaly or tenderness. EXTREMITIES:  She has no clubbing, cyanosis, or edema. LABORATORY DATA:  Her last labs were done a little over a year ago through Dr. Zhou Cabrera' office. She is overdue for her followup with her. IMPRESSION:  Breast cancer. She had T2N3 lobular carcinoma (13+ LN's). ER+/Her 2 neg. She is status post mastectomy and chest wall radiation, adjuvant dose-dense ACT chemotherapy and is now on adjuvant anastrozole, which she will be on for at least 10 years if not indefinitely. We talked about the fact that since I last saw her, we have had approval of abemaciclib in the adjuvant setting for high risk ER+ patients. She did not have a high Ki-67, but otherwise she had significant nodes and we probably could have gotten the drug for her. At the same time, this was not the case when her treatment was initiated and therefore, it is too late to start her on this. That had an improvement in progression-free survival of about 4% or 5% with this approach.   I told her I would not treat her with this at the present time. It did have some toxicities and the majority of patients were not able to complete the 2-year course. This is from the [facility] trial.  I will see her again in 6 months. She will contact me sooner should she have any other concerns. I have asked her to follow through with the bone density scan and please to try to get her teeth worked on in the meantime. We talked about vitamin D and weightbearing exercise in the meantime. We also talked about cautioning her regarding a fall.      Dictated By Sharmila Torres MD  d: 02/16/2022 14:46:00  t: 02/16/2022 15:18:57  [facility] [id number]  /    cc: MD Nito Marin MD Milly Brownie, MD

## 2022-06-27 RX ORDER — ANASTROZOLE 1 MG/1
1 TABLET ORAL DAILY
Qty: 90 TABLET | Refills: 1 | Status: SHIPPED | OUTPATIENT
Start: 2022-06-27 | End: 2022-12-23

## 2022-08-17 ENCOUNTER — OFFICE VISIT (OUTPATIENT)
Dept: HEMATOLOGY/ONCOLOGY | Facility: HOSPITAL | Age: 67
End: 2022-08-17
Attending: INTERNAL MEDICINE
Payer: COMMERCIAL

## 2022-08-17 VITALS
DIASTOLIC BLOOD PRESSURE: 76 MMHG | RESPIRATION RATE: 16 BRPM | HEART RATE: 88 BPM | TEMPERATURE: 98 F | WEIGHT: 126 LBS | SYSTOLIC BLOOD PRESSURE: 130 MMHG | HEIGHT: 66 IN | OXYGEN SATURATION: 98 % | BODY MASS INDEX: 20.25 KG/M2

## 2022-08-17 DIAGNOSIS — Z17.0 CARCINOMA OF OVERLAPPING SITES OF RIGHT BREAST IN FEMALE, ESTROGEN RECEPTOR POSITIVE (HCC): ICD-10-CM

## 2022-08-17 DIAGNOSIS — Z78.0 ASYMPTOMATIC MENOPAUSAL STATE: Primary | ICD-10-CM

## 2022-08-17 DIAGNOSIS — C50.811 CARCINOMA OF OVERLAPPING SITES OF RIGHT BREAST IN FEMALE, ESTROGEN RECEPTOR POSITIVE (HCC): ICD-10-CM

## 2022-08-17 PROCEDURE — 99213 OFFICE O/P EST LOW 20 MIN: CPT | Performed by: INTERNAL MEDICINE

## 2022-08-18 NOTE — PROGRESS NOTES
[facility]    PATIENT'S NAME: Tomas Wharton   ATTENDING PHYSICIAN: Rupa Man MD   PATIENT ACCOUNT #: [de-identified] LOCATION: [address] [place] [address] RECORD #: [id number] YOB: 1955   DATE OF SERVICE: 08/17/2022       CANCER CENTER PROGRESS NOTE    CHIEF COMPLAINT:  Adjuvant treatment of high-risk ER-positive breast cancer. HISTORY OF PRESENT ILLNESS:  The patient is a 28-year-old female. She was diagnosed with breast cancer in 2018. She had T2N3 disease. She had 13 positive lymph nodes. She had a mastectomy, and she was treated with dose-dense ACT chemotherapy. She was treated with chest wall radiation, which she finished in the summer of 2019. She is now on adjuvant anastrozole and has been taking it since then. She was not eligible for the CDK4/6 inhibitor trials. At that time, there were none available in our area. She is tolerating the aromatase inhibitor reasonably well. She has some osteoporosis. I have been trying to get her to agree to therapy for this, but she has been unwilling to do so. She had a bone density scan last done in December 2019, and she had 1 parameter that showed osteoporosis in the femoral neck. She has not fallen. She has not fractured anything. She has not followed through on the repeat bone density with the order I had previously given her. She agrees to do so now. She feels entirely well in the sense that she has no bone pain, no cough, no shortness of breath. She has no palpable abnormalities. She has not had reconstruction. She denies any new issues. She is helping to take care of a granddaughter who now lives with them in a 3-generation home. She is working. She is not having any major limitations from the anastrozole.     MEDICATIONS:  Current medications include anastrozole 1 mg daily; ascorbic acid 1000 mg daily; B-complex vitamins daily; biotin 1 mg daily; chromium picolinate, which she stopped taking; flaxseed oil supplement; magnesium hydroxide; vitamin K 200 mcg daily; omega-3 fatty acids 1000 mg daily; turmeric daily; and vitamin D3 at 5000 units daily. PHYSICAL EXAMINATION:    GENERAL:  She is a well-appearing female in no acute distress. VITAL SIGNS:  Her performance status is 0. Her weight is 126 pounds. Blood pressure 130/76, pulse 88, respiratory rate 20. She is afebrile. HEENT:  Unremarkable. She has pink conjunctivae, anicteric sclerae. Pharynx without lesions. LYMPHATICS:  She has no cervical, supraclavicular, or axillary adenopathy. LUNGS:  Resonant to percussion and clear to auscultation with no wheezing, rales, or rhonchi. HEART:  Normal.  ABDOMEN:  No hepatosplenomegaly or tenderness. EXTREMITIES:  She has no clubbing, cyanosis, or edema. NEUROLOGIC:  She is intact. LABORATORY DATA:  She has no new labs available here. I have encouraged her to go back and see Dr. Gatito Rod for physical and have her labs checked at least once a year. IMPRESSION:  High-risk breast cancer. She had T2N3 disease with 13 positive lymph nodes. She was strongly ER positive with lobular carcinoma. She has had a mastectomy and chest wall radiation. She had dose-dense ACT chemotherapy. She has been on anastrozole now for 3 years, and the plan is to treat her for a total of 10 years with this agent. She needs to get a bone density scan done, and then we need to talk again about management of the osteoporosis in her femoral neck. If she is unwilling to take oral medication, my preference would be to treat her with denosumab at 6-month intervals. It has been shown to make a difference. She also would have been a candidate for zoledronic acid, but was reluctant to take any other agents at that point. Her questions were answered. I will see her again in 6 months.   I did inform her that I am going to be retiring in the spring of 2023, and I will transition her to 1 of my colleagues who treats breast cancer at [facility].     Dictated By Sharmila Torres MD  d: 08/17/2022 16:13:14  t: 08/17/2022 16:57:25  [facility] [id number]  /    cc: MD Dr. Nito Marin Dr.

## 2023-02-15 ENCOUNTER — OFFICE VISIT (OUTPATIENT)
Dept: HEMATOLOGY/ONCOLOGY | Facility: HOSPITAL | Age: 68
End: 2023-02-15
Attending: INTERNAL MEDICINE
Payer: COMMERCIAL

## 2023-02-15 VITALS
HEIGHT: 66 IN | BODY MASS INDEX: 20.73 KG/M2 | HEART RATE: 80 BPM | DIASTOLIC BLOOD PRESSURE: 79 MMHG | SYSTOLIC BLOOD PRESSURE: 150 MMHG | WEIGHT: 129 LBS | RESPIRATION RATE: 16 BRPM | OXYGEN SATURATION: 98 % | TEMPERATURE: 99 F

## 2023-02-15 DIAGNOSIS — Z17.0 CARCINOMA OF OVERLAPPING SITES OF RIGHT BREAST IN FEMALE, ESTROGEN RECEPTOR POSITIVE (HCC): Primary | ICD-10-CM

## 2023-02-15 DIAGNOSIS — C50.811 CARCINOMA OF OVERLAPPING SITES OF RIGHT BREAST IN FEMALE, ESTROGEN RECEPTOR POSITIVE (HCC): Primary | ICD-10-CM

## 2023-02-15 PROCEDURE — 99214 OFFICE O/P EST MOD 30 MIN: CPT | Performed by: INTERNAL MEDICINE

## 2023-02-15 RX ORDER — KELP 150 MCG
TABLET ORAL DAILY
COMMUNITY

## 2023-02-16 NOTE — PROGRESS NOTES
[facility]    PATIENT'S NAME: Martin Jacob   ATTENDING PHYSICIAN: Rodrigue Falk MD   PATIENT ACCOUNT #: [de-identified] LOCATION: [address] [place] [address] RECORD #: [id number] YOB: 1955   DATE OF SERVICE: 02/15/2023       CANCER CENTER PROGRESS NOTE    CHIEF COMPLAINT:  Followup for high-risk ER-positive breast cancer. HISTORY OF PRESENT ILLNESS:  The patient is a 60-year-old female she was diagnosed with breast cancer in 2018. She had T2N3 disease with 13 positive lymph nodes. She was operated on at [facility] by Dr. Kristan Marinelli. She had a mastectomy and then we treated her with dose-dense ACT chemotherapy. She had strongly ER positive disease and a low Ki-67. She was treated with chest wall radiation, which she finished in the summer of 2019. She has been on adjuvant anastrozole, and has been taking it since then. She was not eligible for the CDK4/6 inhibitor trials and at the time none were available in her area. We also had talked about the use of a bisphosphonate; however, she has extremely bad teeth and she has been unwilling or unable to get these fixed. We have talked about dental extractions and implant placement, which is too expensive for her. We have discussed the potential for extraction and dentures. She is considering this. She has had one bone density scan in the past, which did show osteoporosis, but given her high risk breast cancer situation, we have decided to keep her on the aromatase inhibitor anyway. She is supposed to have had another one done, and she has never followed through on this. She has also not followed through on the colonoscopy I recommended. Despite all this, she actually feels quite well. She is helping take care of her 3year-old grandchild. She is physically quite active. She has no major issues.   She gets some aches and pains that come and go, but none that have persisted for longer than 2 weeks and none that are unexplained. Her weight is stable. Her appetite is good. She eats mostly a plant-based diet. She is physically very active. She is thin and not overweight. She states her appetite is fine. She has been able to maintain her weight without any difficulty. MEDICATIONS:  Her current medications include alpha lipoic acid 100 mg daily, anastrozole 1 mg daily, ascorbic acid 1000 mg daily, B-complex vitamin daily, chlorophyll supplement daily, flaxseed supplement daily, kelp supplement daily, magnesium hydroxide daily, omega-3 fatty acids daily, turmeric daily, vitamin D3 at 5000 units daily, and zinc 50 mg daily. PHYSICAL EXAMINATION:    GENERAL:  She is a well-appearing female in no acute distress. VITAL SIGNS:  Her performance status is 0. Her weight is 129 pounds. Blood pressure is 150/79, pulse 80, respiratory rate 20, temperature is 98.7. HEENT:  Shows poor dentition. LYMPHATICS:  She has no adenopathy. LUNGS:  Clear. HEART:  Normal.  ABDOMEN:  Reveals no hepatosplenomegaly or tenderness. EXTREMITIES:  She has no clubbing, cyanosis, or edema. NEUROLOGIC:  She is intact. LABORATORY DATA:  She has not had any labs done recently. IMPRESSION:  History of high-risk breast cancer. She is T2N3 with 13 positive nodes, status post mastectomy and has had adjuvant ACT chemotherapy. She is on anastrozole and had comprehensive chest wall radiation at [facility] under the care of Dr. Viet Diaz. She has no evidence clinically of recurrent disease. She has been unable to receive a bisphosphonate for osteoporosis, and she has not followed through on getting a repeat bone density. She assures me she will. She also has never done a colonoscopy and I told her this is secondary but still important.   I recommended she consider having complete dental extractions, and once her gums have healed, we could probably safely use a bisphosphonate such as zoledronic acid to treat her for the osteoporosis, which was already present in the past.  We talked about the fact that I am going to be retiring, and I am going to transition her care. I am going to ask that she be seen by Dr. Joe Cabral, who is taking over as medical director for the [facility]. He is a very experienced breast cancer oncologist.  He will probably see her in 6 months. In the meantime, I will continue to follow through on the bone density if she gets it done quickly. I have told her before that I would probably keep her on the aromatase inhibitor for longer than 10 years as long as her bones were holding up, given the fact that there is very little data on very high-risk patients such as herself with extended aromatase inhibitor therapies. The trials all stopped at 10 years, but  the majority of patients had many fewer nodes involved than she does.     Dictated By Radha Orlando MD  d: 02/15/2023 16:58:00  t: 02/15/2023 17:35:27  [facility] [id number]  /    cc: MD Dr. Renetta Sneed MD

## 2023-06-22 NOTE — LETTER
To Whom It May Concern:    Cris Han has been under our care for Breast Cancer. Prior history of chemotherapy and radiation.  Despite this, patient is    She may resume her usual activities, including work, on *** with the following restrictions:
To whom it may concern:    Cris Han has been under my care for Breast Cancer. She has a history of chemotherapy and radiation. Patient is currently on an aromatase inhibitor.  Patient's medical history does not prohibit her from receiving the
Yes

## 2023-08-04 ENCOUNTER — OFFICE VISIT (OUTPATIENT)
Dept: HEMATOLOGY/ONCOLOGY | Facility: HOSPITAL | Age: 68
End: 2023-08-04
Attending: SPECIALIST
Payer: COMMERCIAL

## 2023-08-04 VITALS
RESPIRATION RATE: 20 BRPM | DIASTOLIC BLOOD PRESSURE: 93 MMHG | BODY MASS INDEX: 20.57 KG/M2 | TEMPERATURE: 98 F | HEIGHT: 66 IN | SYSTOLIC BLOOD PRESSURE: 145 MMHG | HEART RATE: 87 BPM | WEIGHT: 128 LBS | OXYGEN SATURATION: 100 %

## 2023-08-04 DIAGNOSIS — Z79.811 LONG TERM (CURRENT) USE OF AROMATASE INHIBITORS: ICD-10-CM

## 2023-08-04 DIAGNOSIS — C50.811 CARCINOMA OF OVERLAPPING SITES OF RIGHT BREAST IN FEMALE, ESTROGEN RECEPTOR POSITIVE: Primary | ICD-10-CM

## 2023-08-04 DIAGNOSIS — M81.0 AGE-RELATED OSTEOPOROSIS WITHOUT CURRENT PATHOLOGICAL FRACTURE: ICD-10-CM

## 2023-08-04 DIAGNOSIS — Z17.0 CARCINOMA OF OVERLAPPING SITES OF RIGHT BREAST IN FEMALE, ESTROGEN RECEPTOR POSITIVE: Primary | ICD-10-CM

## 2023-08-04 PROCEDURE — 99214 OFFICE O/P EST MOD 30 MIN: CPT | Performed by: SPECIALIST

## 2023-09-21 RX ORDER — ANASTROZOLE 1 MG/1
1 TABLET ORAL DAILY
Qty: 90 TABLET | Refills: 2 | Status: SHIPPED | OUTPATIENT
Start: 2023-09-21

## 2023-11-13 ENCOUNTER — PATIENT OUTREACH (OUTPATIENT)
Dept: CASE MANAGEMENT | Age: 68
End: 2023-11-13

## 2023-11-14 NOTE — PROCEDURES
The office order for PCP removal request is Approved and finalized on November 13, 2023.     Thanks,  [facility] [employer]

## 2024-01-26 ENCOUNTER — APPOINTMENT (OUTPATIENT)
Dept: MRI IMAGING | Facility: HOSPITAL | Age: 69
End: 2024-01-26
Attending: STUDENT IN AN ORGANIZED HEALTH CARE EDUCATION/TRAINING PROGRAM
Payer: COMMERCIAL

## 2024-01-26 ENCOUNTER — HOSPITAL ENCOUNTER (INPATIENT)
Facility: HOSPITAL | Age: 69
LOS: 10 days | Discharge: HOME HEALTH | End: 2024-02-05
Attending: STUDENT IN AN ORGANIZED HEALTH CARE EDUCATION/TRAINING PROGRAM | Admitting: HOSPITALIST
Payer: COMMERCIAL

## 2024-01-26 DIAGNOSIS — Z17.0: ICD-10-CM

## 2024-01-26 DIAGNOSIS — G95.20 CORD COMPRESSION (HCC): Primary | ICD-10-CM

## 2024-01-26 DIAGNOSIS — M47.14 THORACIC MYELOPATHY: ICD-10-CM

## 2024-01-26 DIAGNOSIS — R26.9 GAIT DISTURBANCE: ICD-10-CM

## 2024-01-26 DIAGNOSIS — E87.1 HYPONATREMIA: ICD-10-CM

## 2024-01-26 DIAGNOSIS — C50.811: ICD-10-CM

## 2024-01-26 DIAGNOSIS — M54.14 THORACIC RADICULOPATHY: ICD-10-CM

## 2024-01-26 DIAGNOSIS — M54.6 ACUTE MIDLINE THORACIC BACK PAIN: ICD-10-CM

## 2024-01-26 DIAGNOSIS — C50.919 STAGE IV BREAST CANCER IN FEMALE (HCC): ICD-10-CM

## 2024-01-26 DIAGNOSIS — Z98.1 S/P FUSION OF THORACIC SPINE: ICD-10-CM

## 2024-01-26 LAB
ALBUMIN SERPL-MCNC: 4.8 G/DL (ref 3.2–4.8)
ALP LIVER SERPL-CCNC: 96 U/L
ALT SERPL-CCNC: 22 U/L
ANION GAP SERPL CALC-SCNC: 7 MMOL/L (ref 0–18)
AST SERPL-CCNC: 27 U/L (ref ?–34)
BASOPHILS # BLD AUTO: 0.04 X10(3) UL (ref 0–0.2)
BASOPHILS NFR BLD AUTO: 0.5 %
BILIRUB DIRECT SERPL-MCNC: 0.1 MG/DL (ref ?–0.3)
BILIRUB SERPL-MCNC: 0.7 MG/DL (ref 0.2–1.1)
BILIRUB UR QL: NEGATIVE
BUN BLD-MCNC: 16 MG/DL (ref 9–23)
BUN/CREAT SERPL: 20 (ref 10–20)
CALCIUM BLD-MCNC: 10 MG/DL (ref 8.7–10.4)
CHLORIDE SERPL-SCNC: 94 MMOL/L (ref 98–112)
CLARITY UR: CLEAR
CO2 SERPL-SCNC: 26 MMOL/L (ref 21–32)
CREAT BLD-MCNC: 0.8 MG/DL
DEPRECATED RDW RBC AUTO: 38 FL (ref 35.1–46.3)
EGFRCR SERPLBLD CKD-EPI 2021: 81 ML/MIN/1.73M2 (ref 60–?)
EOSINOPHIL # BLD AUTO: 0.07 X10(3) UL (ref 0–0.7)
EOSINOPHIL NFR BLD AUTO: 0.9 %
ERYTHROCYTE [DISTWIDTH] IN BLOOD BY AUTOMATED COUNT: 12.5 % (ref 11–15)
GLUCOSE BLD-MCNC: 122 MG/DL (ref 70–99)
GLUCOSE UR-MCNC: NORMAL MG/DL
HCT VFR BLD AUTO: 41.9 %
HGB BLD-MCNC: 13.8 G/DL
IMM GRANULOCYTES # BLD AUTO: 0.03 X10(3) UL (ref 0–1)
IMM GRANULOCYTES NFR BLD: 0.4 %
KETONES UR-MCNC: NEGATIVE MG/DL
LEUKOCYTE ESTERASE UR QL STRIP.AUTO: NEGATIVE
LIPASE SERPL-CCNC: 41 U/L (ref 13–75)
LYMPHOCYTES # BLD AUTO: 1.43 X10(3) UL (ref 1–4)
LYMPHOCYTES NFR BLD AUTO: 17.6 %
MCH RBC QN AUTO: 27.7 PG (ref 26–34)
MCHC RBC AUTO-ENTMCNC: 32.9 G/DL (ref 31–37)
MCV RBC AUTO: 84 FL
MONOCYTES # BLD AUTO: 0.55 X10(3) UL (ref 0.1–1)
MONOCYTES NFR BLD AUTO: 6.8 %
NEUTROPHILS # BLD AUTO: 6.02 X10 (3) UL (ref 1.5–7.7)
NEUTROPHILS # BLD AUTO: 6.02 X10(3) UL (ref 1.5–7.7)
NEUTROPHILS NFR BLD AUTO: 73.8 %
NITRITE UR QL STRIP.AUTO: NEGATIVE
OSMOLALITY SERPL CALC.SUM OF ELEC: 266 MOSM/KG (ref 275–295)
PH UR: 5.5 [PH] (ref 5–8)
PLATELET # BLD AUTO: 285 10(3)UL (ref 150–450)
POTASSIUM SERPL-SCNC: 3.7 MMOL/L (ref 3.5–5.1)
PROT SERPL-MCNC: 8.3 G/DL (ref 5.7–8.2)
PROT UR-MCNC: 20 MG/DL
RBC # BLD AUTO: 4.99 X10(6)UL
SODIUM SERPL-SCNC: 127 MMOL/L (ref 136–145)
SODIUM SERPL-SCNC: 64 MMOL/L
SP GR UR STRIP: 1.02 (ref 1–1.03)
UROBILINOGEN UR STRIP-ACNC: NORMAL
WBC # BLD AUTO: 8.1 X10(3) UL (ref 4–11)

## 2024-01-26 PROCEDURE — 72157 MRI CHEST SPINE W/O & W/DYE: CPT | Performed by: STUDENT IN AN ORGANIZED HEALTH CARE EDUCATION/TRAINING PROGRAM

## 2024-01-26 PROCEDURE — 72158 MRI LUMBAR SPINE W/O & W/DYE: CPT | Performed by: STUDENT IN AN ORGANIZED HEALTH CARE EDUCATION/TRAINING PROGRAM

## 2024-01-26 RX ORDER — MELATONIN
3 NIGHTLY PRN
Status: DISCONTINUED | OUTPATIENT
Start: 2024-01-26 | End: 2024-02-05

## 2024-01-26 RX ORDER — SENNOSIDES 8.6 MG
17.2 TABLET ORAL NIGHTLY PRN
Status: DISCONTINUED | OUTPATIENT
Start: 2024-01-26 | End: 2024-02-05

## 2024-01-26 RX ORDER — POLYETHYLENE GLYCOL 3350 17 G/17G
17 POWDER, FOR SOLUTION ORAL DAILY PRN
Status: DISCONTINUED | OUTPATIENT
Start: 2024-01-26 | End: 2024-02-01

## 2024-01-26 RX ORDER — HEPARIN SODIUM 5000 [USP'U]/ML
5000 INJECTION, SOLUTION INTRAVENOUS; SUBCUTANEOUS EVERY 8 HOURS SCHEDULED
Status: DISCONTINUED | OUTPATIENT
Start: 2024-01-27 | End: 2024-01-30

## 2024-01-26 RX ORDER — ANASTROZOLE 1 MG/1
1 TABLET ORAL DAILY
Status: DISCONTINUED | OUTPATIENT
Start: 2024-01-27 | End: 2024-02-05

## 2024-01-26 RX ORDER — BISACODYL 10 MG
10 SUPPOSITORY, RECTAL RECTAL
Status: DISCONTINUED | OUTPATIENT
Start: 2024-01-26 | End: 2024-01-31

## 2024-01-26 RX ORDER — ENEMA 19; 7 G/133ML; G/133ML
1 ENEMA RECTAL ONCE AS NEEDED
Status: COMPLETED | OUTPATIENT
Start: 2024-01-26 | End: 2024-01-28

## 2024-01-26 RX ORDER — SODIUM CHLORIDE 9 MG/ML
INJECTION, SOLUTION INTRAVENOUS CONTINUOUS
Status: DISCONTINUED | OUTPATIENT
Start: 2024-01-26 | End: 2024-01-27

## 2024-01-26 RX ORDER — ONDANSETRON 2 MG/ML
4 INJECTION INTRAMUSCULAR; INTRAVENOUS EVERY 6 HOURS PRN
Status: DISCONTINUED | OUTPATIENT
Start: 2024-01-26 | End: 2024-01-31

## 2024-01-26 RX ORDER — GADOTERATE MEGLUMINE 376.9 MG/ML
15 INJECTION INTRAVENOUS
Status: COMPLETED | OUTPATIENT
Start: 2024-01-26 | End: 2024-01-26

## 2024-01-26 RX ORDER — METOCLOPRAMIDE HYDROCHLORIDE 5 MG/ML
10 INJECTION INTRAMUSCULAR; INTRAVENOUS EVERY 8 HOURS PRN
Status: DISCONTINUED | OUTPATIENT
Start: 2024-01-26 | End: 2024-01-31

## 2024-01-26 RX ORDER — ACETAMINOPHEN 500 MG
500 TABLET ORAL EVERY 4 HOURS PRN
Status: DISCONTINUED | OUTPATIENT
Start: 2024-01-26 | End: 2024-02-01

## 2024-01-27 ENCOUNTER — APPOINTMENT (OUTPATIENT)
Dept: MRI IMAGING | Facility: HOSPITAL | Age: 69
End: 2024-01-27
Attending: STUDENT IN AN ORGANIZED HEALTH CARE EDUCATION/TRAINING PROGRAM
Payer: COMMERCIAL

## 2024-01-27 ENCOUNTER — APPOINTMENT (OUTPATIENT)
Dept: MRI IMAGING | Facility: HOSPITAL | Age: 69
End: 2024-01-27
Attending: NEUROLOGICAL SURGERY
Payer: COMMERCIAL

## 2024-01-27 ENCOUNTER — APPOINTMENT (OUTPATIENT)
Dept: CT IMAGING | Facility: HOSPITAL | Age: 69
End: 2024-01-27
Attending: STUDENT IN AN ORGANIZED HEALTH CARE EDUCATION/TRAINING PROGRAM
Payer: COMMERCIAL

## 2024-01-27 PROBLEM — G89.3 PAIN FROM BONE METASTASES (HCC): Status: ACTIVE | Noted: 2024-01-27

## 2024-01-27 PROBLEM — G95.20 CORD COMPRESSION (HCC): Status: ACTIVE | Noted: 2024-01-27

## 2024-01-27 PROBLEM — C79.51 PAIN FROM BONE METASTASES (HCC): Status: ACTIVE | Noted: 2024-01-27

## 2024-01-27 PROBLEM — Z85.3 HISTORY OF BREAST CANCER: Status: ACTIVE | Noted: 2024-01-27

## 2024-01-27 LAB
ALBUMIN SERPL-MCNC: 4.3 G/DL (ref 3.2–4.8)
ALBUMIN/GLOB SERPL: 1.5 {RATIO} (ref 1–2)
ALP LIVER SERPL-CCNC: 85 U/L
ALT SERPL-CCNC: 18 U/L
ANION GAP SERPL CALC-SCNC: 7 MMOL/L (ref 0–18)
AST SERPL-CCNC: 23 U/L (ref ?–34)
ATRIAL RATE: 93 BPM
BASOPHILS # BLD AUTO: 0.04 X10(3) UL (ref 0–0.2)
BASOPHILS NFR BLD AUTO: 0.6 %
BILIRUB SERPL-MCNC: 0.7 MG/DL (ref 0.2–1.1)
BUN BLD-MCNC: 10 MG/DL (ref 9–23)
BUN/CREAT SERPL: 13.9 (ref 10–20)
CALCIUM BLD-MCNC: 9.5 MG/DL (ref 8.7–10.4)
CHLORIDE SERPL-SCNC: 107 MMOL/L (ref 98–112)
CO2 SERPL-SCNC: 26 MMOL/L (ref 21–32)
CREAT BLD-MCNC: 0.72 MG/DL
DEPRECATED RDW RBC AUTO: 38.1 FL (ref 35.1–46.3)
EGFRCR SERPLBLD CKD-EPI 2021: 92 ML/MIN/1.73M2 (ref 60–?)
EOSINOPHIL # BLD AUTO: 0.09 X10(3) UL (ref 0–0.7)
EOSINOPHIL NFR BLD AUTO: 1.3 %
ERYTHROCYTE [DISTWIDTH] IN BLOOD BY AUTOMATED COUNT: 12.5 % (ref 11–15)
GLOBULIN PLAS-MCNC: 2.9 G/DL (ref 2.8–4.4)
GLUCOSE BLD-MCNC: 85 MG/DL (ref 70–99)
HCT VFR BLD AUTO: 40.7 %
HGB BLD-MCNC: 13.4 G/DL
IMM GRANULOCYTES # BLD AUTO: 0.01 X10(3) UL (ref 0–1)
IMM GRANULOCYTES NFR BLD: 0.1 %
LYMPHOCYTES # BLD AUTO: 2.13 X10(3) UL (ref 1–4)
LYMPHOCYTES NFR BLD AUTO: 31 %
MAGNESIUM SERPL-MCNC: 2 MG/DL (ref 1.6–2.6)
MCH RBC QN AUTO: 27.3 PG (ref 26–34)
MCHC RBC AUTO-ENTMCNC: 32.9 G/DL (ref 31–37)
MCV RBC AUTO: 83.1 FL
MONOCYTES # BLD AUTO: 0.57 X10(3) UL (ref 0.1–1)
MONOCYTES NFR BLD AUTO: 8.3 %
NEUTROPHILS # BLD AUTO: 4.04 X10 (3) UL (ref 1.5–7.7)
NEUTROPHILS # BLD AUTO: 4.04 X10(3) UL (ref 1.5–7.7)
NEUTROPHILS NFR BLD AUTO: 58.7 %
OSMOLALITY SERPL CALC.SUM OF ELEC: 288 MOSM/KG (ref 275–295)
OSMOLALITY UR: 638 MOSM/KG (ref 300–1100)
P AXIS: 68 DEGREES
P-R INTERVAL: 178 MS
PLATELET # BLD AUTO: 249 10(3)UL (ref 150–450)
POTASSIUM SERPL-SCNC: 4.3 MMOL/L (ref 3.5–5.1)
PROT SERPL-MCNC: 7.2 G/DL (ref 5.7–8.2)
Q-T INTERVAL: 346 MS
QRS DURATION: 68 MS
QTC CALCULATION (BEZET): 430 MS
R AXIS: 30 DEGREES
RBC # BLD AUTO: 4.9 X10(6)UL
SODIUM SERPL-SCNC: 140 MMOL/L (ref 136–145)
T AXIS: 49 DEGREES
VENTRICULAR RATE: 93 BPM
WBC # BLD AUTO: 6.9 X10(3) UL (ref 4–11)

## 2024-01-27 PROCEDURE — 74177 CT ABD & PELVIS W/CONTRAST: CPT | Performed by: STUDENT IN AN ORGANIZED HEALTH CARE EDUCATION/TRAINING PROGRAM

## 2024-01-27 PROCEDURE — 71260 CT THORAX DX C+: CPT | Performed by: STUDENT IN AN ORGANIZED HEALTH CARE EDUCATION/TRAINING PROGRAM

## 2024-01-27 PROCEDURE — 70553 MRI BRAIN STEM W/O & W/DYE: CPT | Performed by: NEUROLOGICAL SURGERY

## 2024-01-27 PROCEDURE — 72156 MRI NECK SPINE W/O & W/DYE: CPT | Performed by: STUDENT IN AN ORGANIZED HEALTH CARE EDUCATION/TRAINING PROGRAM

## 2024-01-27 PROCEDURE — 99223 1ST HOSP IP/OBS HIGH 75: CPT | Performed by: STUDENT IN AN ORGANIZED HEALTH CARE EDUCATION/TRAINING PROGRAM

## 2024-01-27 PROCEDURE — 99255 IP/OBS CONSLTJ NEW/EST HI 80: CPT | Performed by: NEUROLOGICAL SURGERY

## 2024-01-27 PROCEDURE — 99223 1ST HOSP IP/OBS HIGH 75: CPT | Performed by: OTHER

## 2024-01-27 RX ORDER — DEXAMETHASONE SODIUM PHOSPHATE 10 MG/ML
10 INJECTION, SOLUTION INTRAMUSCULAR; INTRAVENOUS ONCE
Status: COMPLETED | OUTPATIENT
Start: 2024-01-27 | End: 2024-01-27

## 2024-01-27 RX ORDER — DEXAMETHASONE SODIUM PHOSPHATE 4 MG/ML
4 VIAL (ML) INJECTION EVERY 6 HOURS
Status: DISCONTINUED | OUTPATIENT
Start: 2024-01-27 | End: 2024-01-30

## 2024-01-27 RX ORDER — GADOTERATE MEGLUMINE 376.9 MG/ML
15 INJECTION INTRAVENOUS
Status: COMPLETED | OUTPATIENT
Start: 2024-01-27 | End: 2024-01-27

## 2024-01-27 RX ORDER — DEXAMETHASONE 4 MG/1
4 TABLET ORAL EVERY 6 HOURS SCHEDULED
Status: DISCONTINUED | OUTPATIENT
Start: 2024-01-27 | End: 2024-01-27

## 2024-01-27 NOTE — CONSULTS
[facility] Hematology/Oncology Group  Initial Inpatient Consult Note    Cris Han Patient Status:  Inpatient    1955 MRN [id number]   Location [facility] 4W/SW/SE Attending Lawanda Hawthorne DO   Hosp Day # 1 PCP No primary care provider on file.     Subjective:   Cris Han is a 68 year old female with a hx of stage IIIA ([id number]) ER+ [place]- HER2- invasive lobular carcinoma initially diagnosed Dec 2018 treated with right mastectomy and ALND, adjuvant AC-T and PMR, currently on anastrozole who was admitted 24 with T5-T6 cord compression and concern for recurrent breast cancer.     The patient is very active at baseline and developed midthoracic back pain approximately 3 weeks ago.  Described initially as a dull ache but has worsened in intensity, radiates around her rib cage and over the last few days has developed some imbalance to her gait and paresthesias bilaterally.  Denies weakness but just feels unsteady on her feet.  Also complains of progressive constipation the last couple of weeks and lower abdominal discomfort.  She has been taking some pain medications which she thinks may have caused the constipation.  She presented to the ER, T/L MRI revealed diffuse metastatic lesions with a large marrow replacing lesion at T6 causing severe canal stenosis with cord compression.    Prior to the development of back pain, the patient was operating at her baseline status of health, compliant with anastrozole and doing well.    Review of Systems:  Hematology/Oncology ROS performed and negative except as above in HPI    History/Other:   Past Medical History:  Past Medical History:   Diagnosis Date    Breast cancer (HCC)     right breast    High blood pressure        Past Surgical History:  Past Surgical History:   Procedure Laterality Date    MASTECTOMY RIGHT         Current Medications:   lidocaine-menthol 4-1 % patch 1 patch  1 patch Transdermal Nightly    [COMPLETED] dexAMETHasone  PF (Decadron) 10 MG/ML injection 10 mg  10 mg Intravenous Once    dexamethasone (Decadron) tab 4 mg  4 mg Oral 4 times per day    [COMPLETED] sodium chloride 0.9 % IV bolus 1,000 mL  1,000 mL Intravenous Once    anastrozole (Arimidex) tab 1 mg  1 mg Oral Daily    acetaminophen (Tylenol Extra Strength) tab 500 mg  500 mg Oral Q4H PRN    melatonin tab 3 mg  3 mg Oral Nightly PRN    polyethylene glycol (PEG 3350) (Miralax) 17 g oral packet 17 g  17 g Oral Daily PRN    sennosides (Senokot) tab 17.2 mg  17.2 mg Oral Nightly PRN    bisacodyl (Dulcolax) 10 MG rectal suppository 10 mg  10 mg Rectal Daily PRN    fleet enema (Fleet) 7-19 GM/118ML rectal enema 133 mL  1 enema Rectal Once PRN    ondansetron (Zofran) 4 MG/2ML injection 4 mg  4 mg Intravenous Q6H PRN    metoclopramide (Reglan) 5 mg/mL injection 10 mg  10 mg Intravenous Q8H PRN    [Held by provider] heparin (Porcine) 5000 UNIT/ML injection 5,000 Units  5,000 Units Subcutaneous Q8H SAMRA    [COMPLETED] gadoterate meglumine (Dotarem) 7.5 MMOL/15ML injection 15 mL  15 mL Intravenous ONCE PRN       Allergies:   No Known Allergies    Family Medical History:  Family History   Problem Relation Age of Onset    Breast Cancer Sister         ductal       Social History:  Social History     Socioeconomic History    Marital status:      Spouse name: Not on file    Number of children: Not on file    Years of education: Not on file    Highest education level: Not on file   Occupational History    Not on file   Tobacco Use    Smoking status: Never    Smokeless tobacco: Never   Vaping Use    Vaping Use: Never used   Substance and Sexual Activity    Alcohol use: Yes     Alcohol/week: 1.0 standard drink of alcohol     Types: 1 Cans of beer per week    Drug use: No    Sexual activity: Not on file   Other Topics Concern    Caffeine Concern Yes     Comment: Coffee    Exercise Not Asked    Seat Belt Not Asked    Special Diet Not Asked    Stress Concern Not Asked    Weight Concern  Not Asked     Service Not Asked    Blood Transfusions Not Asked    Occupational Exposure Not Asked    Hobby Hazards Not Asked    Sleep Concern Not Asked    Back Care Not Asked    Bike Helmet Not Asked    Self-Exams Not Asked   Social History Narrative    Not on file     Social Determinants of Health     Financial Resource Strain: Not on file   Food Insecurity: No Food Insecurity (1/27/2024)    Food Insecurity     Food Insecurity: Never true   Transportation Needs: No Transportation Needs (1/27/2024)    Transportation Needs     Lack of Transportation: No   Physical Activity: Not on file   Stress: Not on file   Social Connections: Not on file   Housing Stability: Low Risk  (1/27/2024)    Housing Stability     Housing Instability: No     Housing Instability Emergency: Not on file     Objective:    BP (!) 157/99 (BP Location: Left arm)   Pulse 97   Temp 98.4 °F (36.9 °C) (Oral)   Resp 18   Wt 58.6 kg (129 lb 3.2 oz)   SpO2 99%   BMI 20.85 kg/m²   Physical Exam:  General: A&Ox3, anxious and tearful   HEENT: PERRL  CV: RRR  Pulm: normal effort  Abd: tender to lower abd  Lymph: no palpable lymphadenopathy  Extremities: no edema  Neurological: strength intact    Labs:  Lab Results   Component Value Date/Time    WBC 6.9 01/27/2024 05:51 AM    RBC 4.90 01/27/2024 05:51 AM    HGB 13.4 01/27/2024 05:51 AM    HCT 40.7 01/27/2024 05:51 AM    MCV 83.1 01/27/2024 05:51 AM    MCH 27.3 01/27/2024 05:51 AM    MCHC 32.9 01/27/2024 05:51 AM    RDW 12.5 01/27/2024 05:51 AM    NEPRELIM 4.04 01/27/2024 05:51 AM    .0 01/27/2024 05:51 AM       Lab Results   Component Value Date/Time    GLU 85 01/27/2024 05:51 AM    BUN 10 01/27/2024 05:51 AM    CREATSERUM 0.72 01/27/2024 05:51 AM    GFRNAA 74 11/16/2020 07:38 AM    CA 9.5 01/27/2024 05:51 AM    ALB 4.3 01/27/2024 05:51 AM     01/27/2024 05:51 AM    K 4.3 01/27/2024 05:51 AM     01/27/2024 05:51 AM    CO2 26.0 01/27/2024 05:51 AM    ALKPHO 85 01/27/2024 05:51  AM    AST 23 01/27/2024 05:51 AM    ALT 18 01/27/2024 05:51 AM       Imaging:  MRI SPINE LUMBAR (W+WO) (CPT=72158)    Result Date: 1/27/2024  CONCLUSION:  1. Multiple osseous metastases involving pelvic bones, L4, L1 and T12 vertebral bodies.  Right paramedian epidural soft tissue extension of a L4 vertebral body lesion is associated with mild asymmetric central narrowing. 2. Please see separate thoracic spine MRI report.    Dictated by (CST): Ahsan Samuels MD on 1/27/2024 at 6:56 AM     Finalized by (CST): Ahsan Samuels MD on 1/27/2024 at 7:03 AM          MRI SPINE THORACIC (W+WO) (CPT=72157)    Result Date: 1/27/2024  CONCLUSION:  1. Large marrow replacing metastatic lesion involving posterior elements of T6 and T6 vertebral body resulting in severe central stenosis and spinal cord compression.  Mass severely narrows right sided neural foramina at T5 and T6. 2. Other metastatic lesions in posterior elements of T5, T11 and T12 vertebral bodies, L1 vertebral body and right posterior 6th rib. 3. Please see separate MRI of lumbar spine.    Dictated by (CST): Ahsan Samuels MD on 1/27/2024 at 6:41 AM     Finalized by (CST): Ahsan Samuels MD on 1/27/2024 at 6:55 AM            Assessment & Plan:    Cris Han is a 68 year old female with a hx of stage IIIA ([id number]) ER+ [place]- HER2- invasive lobular carcinoma initially diagnosed Dec 2018 treated with right mastectomy and ALND, adjuvant AC-T and PMR, currently on anastrozole who was admitted 1/26/24 with T5-T6 cord compression and concern for recurrent breast cancer.     Extensive discussion at the bedside with the patient, her  and children.  I expressed my concern for recurrent and metastatic breast cancer.  We discussed in broad strokes the natural history of recurrent/metastatic ILC.  She has evidence of cord compression at T6.  She maintains an excellent functional status and thus aggressive care with neurosurgical intervention is certainly  appropriate.  We will complete spinal imaging with an MRI cervical spine, also obtain a CT chest abdomen pelvis.  She was started on dexamethasone 10 mg, will continue 4 mg every 6 for now.  Await neurosurgical opinion.  The patient expressed a desire to touch base with her breast surgeon at [facility] and discuss with Dr. Cid prior to any decisions to proceed with surgery here or consider another surgical opinion downtown likely at [facility].    # Bone metastases  # Cord compression  # Hx of Stage IIIA ILC currently on adjuvant Anastrozole  -clinical picture concerning for recurrent and now metastatic breast cancer, but tissue diagnosis is required  -cervical MRI, CT c/a/p, Dex 4mg q6hrs  -appreciate [facility] expertise, pt may consider another surgical opinion  -pt had multiple questions surrounding systemic treatment that were answered to the best of my abilities with the current information at hand  -pt had not started zometa in the outpatient setting so we will hold off for now and was not discussed today    High Risk    Juan Jose Koehler DO    [facility]ematology/Oncology [facility]  [facility]    This note was created using a voice-recognition transcribing system. Incorrect words or phrases may have been missed during proofreading. Please interpret accordingly.

## 2024-01-27 NOTE — CONSULTS
[facility]    Report of Consultation    Cris SOO Han Patient Status:  Inpatient    1955 MRN [id number]   Location [facility] 4W/SW/SE Attending Lawanda Hawthorne,    Hosp Day # 1 PCP No primary care provider on file.     Date of Admission:  2024  Date of Consult:  2024  Reason for Consultation:   Gait difficulty.    History of Present Illness:   Patient is a 68 year old female who was admitted to the hospital for Hyponatremia: Patient with a breast cancer history 5 years ago  Presented with a 3 weeks history of mid back pain, started developing constipation, pain in abdomen.  And had 2 or 3 days history of gait and balance changes.  Her strength did not change but she felt that she was unsteady on her feet.  Recent chiropractor and by some other manual manipulate her doctor recently, however eventually she came to the hospital.  MRI of the thoracic and lumbar spine showed severe compression of the spinal cord, possible metastatic disease.  Start developing some tingling station her lower extremities.    Past Medical History  Past Medical History:   Diagnosis Date    Breast cancer (HCC)     right breast    High blood pressure        Past Surgical History  Past Surgical History:   Procedure Laterality Date    MASTECTOMY RIGHT         Family History  Family History   Problem Relation Age of Onset    Breast Cancer Sister         ductal       Social History  Pediatric History   Patient Parents    Not on file     Other Topics Concern    Caffeine Concern Yes     Comment: Coffee    Exercise Not Asked    Seat Belt Not Asked    Special Diet Not Asked    Stress Concern Not Asked    Weight Concern Not Asked     Service Not Asked    Blood Transfusions Not Asked    Occupational Exposure Not Asked    Hobby Hazards Not Asked    Sleep Concern Not Asked    Back Care Not Asked    Bike Helmet Not Asked    Self-Exams Not Asked   Social History Narrative    Not on file           Current  Medications:  Current Facility-Administered Medications   Medication Dose Route Frequency    lidocaine-menthol 4-1 % patch 1 patch  1 patch Transdermal Nightly    anastrozole (Arimidex) tab 1 mg  1 mg Oral Daily    acetaminophen (Tylenol Extra Strength) tab 500 mg  500 mg Oral Q4H PRN    melatonin tab 3 mg  3 mg Oral Nightly PRN    polyethylene glycol (PEG 3350) (Miralax) 17 g oral packet 17 g  17 g Oral Daily PRN    sennosides (Senokot) tab 17.2 mg  17.2 mg Oral Nightly PRN    bisacodyl (Dulcolax) 10 MG rectal suppository 10 mg  10 mg Rectal Daily PRN    fleet enema (Fleet) 7-19 GM/118ML rectal enema 133 mL  1 enema Rectal Once PRN    ondansetron (Zofran) 4 MG/2ML injection 4 mg  4 mg Intravenous Q6H PRN    metoclopramide (Reglan) 5 mg/mL injection 10 mg  10 mg Intravenous Q8H PRN    heparin (Porcine) 5000 UNIT/ML injection 5,000 Units  5,000 Units Subcutaneous Q8H SAMRA     Medications Prior to Admission   Medication Sig    anastrozole 1 MG Oral Tab tab Take 1 tablet (1 mg total) by mouth daily.    Zinc 50 MG Oral Cap Take 50 mg by mouth as needed.    Kelp 0.15 MG Oral Tab Take by mouth as needed.    CHLOROPHYLL OR Take by mouth as needed.    Magnesium Hydroxide (MAGNESIA OR) Take by mouth.    Ascorbic Acid (VITAMIN C) 1000 MG Oral Tab Take 1 tablet (1,000 mg total) by mouth daily.    Vitamin D3, Cholecalciferol, 125 MCG (5000 UT) Oral Cap Take 1 capsule (5,000 Units total) by mouth daily.    B Complex Vitamins (B COMPLEX OR) Take by mouth daily.    Omega-3 1000 MG Oral Cap Take by mouth.    Turmeric 1053 MG Oral Tab Take 1,250 mg by mouth daily.    Alpha-Lipoic Acid 100 MG Oral Cap Take 1 capsule (100 mg total) by mouth daily. (Patient not taking: Reported on 1/27/2024)    Flaxseed, Linseed, (FLAX SEEDS OR) Take by mouth. (Patient not taking: Reported on 1/27/2024)       Allergies  No Known Allergies    Review of Systems:   As in HPI, the rest of the 14 system review was done and was negative    Physical Exam:      Vitals:    01/26/24 2338 01/27/24 0000 01/27/24 0039 01/27/24 0357   BP: 125/80 140/89  (!) 150/94   Pulse: 98 90 92 97   Resp: 19 20  20   Temp:  98.7 °F (37.1 °C)  98.9 °F (37.2 °C)   TempSrc:  Oral  Oral   SpO2: 98% 100%  99%   Weight:  129 lb 3.2 oz (58.6 kg)         General: No apparent distress, well nourished, well groomed.  Head- Normocephalic, atraumatic  Eyes- No redness or swelling  ENT- Hearing intake, smell preserved, normal glutition  Neck- No masses or adenopathy  Cv: pulses were palpable and normal, no cyanosis or edema     Neurological:     Mental Status- Alert and oriented x3.  Normal attention span and concentration  Thought process intact  Memory intact- recent and remote  Mood intact  Fund of knowledge appropriate for education and age    Language intact including: comprehension, naming, repetition, vocabulary    Cranial Nerves:    V. Facial sensation intact  VII. Face symmetric, no facial weakness  VIII. Hearing intact.  IX. Pallet elevates symmetrically.  XI. Shoulder shrug is intact  XII. Tongue is midline    Motor Exam:  Muscle tone normal  No atrophy or fasciculations  Strength- upper extremities 5/5 proximally and distally                  - lower  extremities 5/5 proximally and distally, however some discoordination of movements of lower extremities    Sensory Exam:  Light touch sensation-decreased in the lower extremities    Deep Tendon Reflexes:  Biceps 2+ bilateral symmetric  Triceps 2+ bilateral symmetric  Brachioradialis 2 + bilateral symmetric  Patellar 2+ bilateral symmetric  Ankle jerk 2+ bilateral symmetric  No obvious hyperreflexia    Coordination:  Finger to nose intact      Results:     Laboratory Data:  Lab Results   Component Value Date    WBC 6.9 01/27/2024    HGB 13.4 01/27/2024    HCT 40.7 01/27/2024    .0 01/27/2024    CREATSERUM 0.72 01/27/2024    BUN 10 01/27/2024     01/27/2024    K 4.3 01/27/2024     01/27/2024    CO2 26.0 01/27/2024    GLU 85  01/27/2024    CA 9.5 01/27/2024    ALB 4.3 01/27/2024    ALKPHO 85 01/27/2024    TP 7.2 01/27/2024    AST 23 01/27/2024    ALT 18 01/27/2024    TSH 1.680 11/13/2020    LIP 41 01/26/2024    MG 2.0 01/27/2024         Imaging:    MRI SPINE LUMBAR (W+WO) (CPT=72158)    Result Date: 1/27/2024  CONCLUSION:  1. Multiple osseous metastases involving pelvic bones, L4, L1 and T12 vertebral bodies.  Right paramedian epidural soft tissue extension of a L4 vertebral body lesion is associated with mild asymmetric central narrowing. 2. Please see separate thoracic spine MRI report.    Dictated by (CST): Ahsan Samuels MD on 1/27/2024 at 6:56 AM     Finalized by (CST): Ahsan Samuels MD on 1/27/2024 at 7:03 AM          MRI SPINE THORACIC (W+WO) (CPT=72157)    Result Date: 1/27/2024  CONCLUSION:  1. Large marrow replacing metastatic lesion involving posterior elements of T6 and T6 vertebral body resulting in severe central stenosis and spinal cord compression.  Mass severely narrows right sided neural foramina at T5 and T6. 2. Other metastatic lesions in posterior elements of T5, T11 and T12 vertebral bodies, L1 vertebral body and right posterior 6th rib. 3. Please see separate MRI of lumbar spine.    Dictated by (CST): Ahsan Samuels MD on 1/27/2024 at 6:41 AM     Finalized by (CST): Ahsan Samuels MD on 1/27/2024 at 6:55 AM         EKG 12 Lead    Result Date: 1/27/2024  Normal sinus rhythm Possible Left atrial enlargement Septal infarct , age undetermined Abnormal ECG No previous ECGs found in Muse     MRI of the left lower spine was independent reviewed, severe cord compression was noted, large metastatic lesion most likely.    Impression:       Patient with evidence of spinal cord compression, most likely due to metastatic lesions.  I have contacted neurosurgical team.  Most likely intervention will be required.  After that oncological evaluation most likely will be needed as well.      Thank you for allowing me to participate  in the care of your patient.    Alfonso Quinteros MD  1/27/2024

## 2024-01-27 NOTE — H&P
[facility] Hospitalist H&P       CC: back pain, lower extremity parasthesias     PCP: No primary care provider on file.    History of Present Illness:   68 year old male with history of Stage III ER+ [place]- HER2 - invasive lobular carcinoma of the breast, treated with with right mastectomy and adjuvant chemo, initially diagnosed 2018, who is here for evaluation of back pain and lower extremity parasthesias, with the pain starting 3 weeks ago. She states the last 2-3 days she noted she felt more \"wobbly\" and may could not feel as much in her feet. Felt like she was walking \"on [place]\" she states. 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 She presumed it was due to this. She even saw a chiropractor as well recently. Due to her continued symptoms she presented yesterday to the ER. Given her symptom description there was indication for MRI imaging, which unfortunately shows diffuse metastatic lesions involving T5, T6, T11, T12, L1, L4, the pelvis, and the sixth rib.  Of most concern is a marrow replacing lesion at T6 resulting in severe canal stenosis with spinal cord compression.     Review of Systems  Comprehensive ROS reviewed and negative except for what's stated above.     PMH  Breast cancer     PSH  Past Surgical History:   Procedure Laterality Date    MASTECTOMY RIGHT        ALL:  No Known Allergies     Home Medications:  Outpatient Medications Marked as Taking for the 1/26/24 encounter (Hospital Encounter)   Medication Sig Dispense Refill    anastrozole 1 MG Oral Tab tab Take 1 tablet (1 mg total) by mouth daily. 90 tablet 2    Zinc 50 MG Oral Cap Take 50 mg by mouth as needed.      Kelp 0.15 MG Oral Tab Take by mouth as needed.      CHLOROPHYLL OR Take by mouth as needed.      Magnesium Hydroxide (MAGNESIA OR) Take by mouth.      Ascorbic Acid (VITAMIN C) 1000 MG Oral Tab Take 1 tablet (1,000 mg total) by mouth daily.       Vitamin D3, Cholecalciferol, 125 MCG (5000 UT) Oral Cap Take 1 capsule (5,000 Units total) by mouth daily.      B Complex Vitamins (B COMPLEX OR) Take by mouth daily.      Omega-3 1000 MG Oral Cap Take by mouth.      Turmeric 1053 MG Oral Tab Take 1,250 mg by mouth daily.       Soc Hx  Social History     Tobacco Use    Smoking status: Never    Smokeless tobacco: Never   Substance Use Topics    Alcohol use: Yes     Alcohol/week: 1.0 standard drink of alcohol     Types: 1 Cans of beer per week      Fam Hx  Family History   Problem Relation Age of Onset    Breast Cancer Sister         ductal     OBJECTIVE:  BP (!) 157/99 (BP Location: Left arm)   Pulse 97   Temp 98.4 °F (36.9 °C) (Oral)   Resp 18   Wt 129 lb 3.2 oz (58.6 kg)   SpO2 99%   BMI 20.85 kg/m²   General: Alert, no acute distress  HEENT: atraumatic, sclera anicteric, oral mucosa normal   Neck: non tender, no adenopathy   Lungs: clear to ausculation bilaterally  Heart: Regular rate and rhythm  Abdomen: soft, non tender  Extremities: No edema  Skin: no new rash, normal color  Neuro: CN II-XII intact, 5/5 strength in bilateral extremities, normal sensation in face and extremities    Diagnostic Data:    CBC/Chem  Recent Labs   Lab 01/26/24 2003 01/27/24  0551   WBC 8.1 6.9   HGB 13.8 13.4   MCV 84.0 83.1   .0 249.0       Recent Labs   Lab 01/26/24 2003 01/27/24  0551   * 140   K 3.7 4.3   CL 94* 107   CO2 26.0 26.0   BUN 16 10   CREATSERUM 0.80 0.72   * 85   CA 10.0 9.5   MG  --  2.0       Recent Labs   Lab 01/26/24 2003 01/27/24  0551   ALT 22 18   AST 27 23   ALB 4.8 4.3       No results for input(s): \"TROP\" in the last 168 hours.      Radiology: MRI SPINE LUMBAR (W+WO) (CPT=72158)    Result Date: 1/27/2024  CONCLUSION:  1. Multiple osseous metastases involving pelvic bones, L4, L1 and T12 vertebral bodies.  Right paramedian epidural soft tissue extension of a L4 vertebral body lesion is associated with mild asymmetric central  narrowing. 2. Please see separate thoracic spine MRI report.    Dictated by (CST): Ahsan Samuels MD on 1/27/2024 at 6:56 AM     Finalized by (CST): Ahsan Samuels MD on 1/27/2024 at 7:03 AM          MRI SPINE THORACIC (W+WO) (CPT=72157)    Result Date: 1/27/2024  CONCLUSION:  1. Large marrow replacing metastatic lesion involving posterior elements of T6 and T6 vertebral body resulting in severe central stenosis and spinal cord compression.  Mass severely narrows right sided neural foramina at T5 and T6. 2. Other metastatic lesions in posterior elements of T5, T11 and T12 vertebral bodies, L1 vertebral body and right posterior 6th rib. 3. Please see separate MRI of lumbar spine.    Dictated by (CST): Ahsan Samuels MD on 1/27/2024 at 6:41 AM     Finalized by (CST): Ahsan Smauels MD on 1/27/2024 at 6:55 AM             ASSESSMENT / PLAN:   68 year old male with history of Stage III ER+ [place]- HER2 - invasive lobular carcinoma of the breast, treated with with right mastectomy and adjuvant chemo, initially diagnosed 2018, who is here for evaluation of back pain and lower extremity parasthesias, with the pain starting 3 weeks ago.    Bone metastasis  Spinal cord compression, secondary to metastatic cancer   Hx of Breast cancer   -concern for recurrence of breast cancer, however needs tissue diagnosis  -spinal findings concerning, neurosurgery was consulted, appreciate evaluation as well as from neurology   -started on steroids with decadron 4mg Q6hr  -complete imaging and add MRI cervical spine, and obtain CT c/a/p  -oncology consulted   -discussed with family and patient. Further work up and plan pending   -monitor closely for new or worsening neurologic symptoms.    Diet: general   DVT prophylaxis: hold   Code status: FULL    Asrar lauryn ALSTON  [facility] Hospitalist

## 2024-01-27 NOTE — ED INITIAL ASSESSMENT (HPI)
Patient reports seeing her pcp yesterday and was advised to have a CT, states it was scheduled in one week and was given prednisone and hydrocodone.

## 2024-01-27 NOTE — ED PROVIDER NOTES
Patient Seen in: [facility] Emergency Department      History   No chief complaint on file.    Stated Complaint: Back Pain.Leg Pain    Subjective:   HPI    68-year-old female with a remote history of breast CA status posttreatment on anastrozole, presenting for evaluation of back pain, gait disturbance.  2-week history of thoracic back pain for which she has been taking ibuprofen, Tylenol, as well as Flexeril.  Was seen by a physician yesterday, and advised to obtain a CT of her thoracic spine.  She is also been taking prednisone and hydrocodone.  Also notes that she has been constipated and has not had a bowel movement in 4 days.  No issues with her bladder.  Over the past 2 to 3 days is noted a profound change in her gait, feels unsteady and unsure on her feet, feels like she is \"walking on a trampoline\", which was associated with tingling sensation in both legs.  No loss of sensation or strength per se.  Did fall off a ladder few days ago due to the sensory disturbance.  No history of spinal surgery or instrumentation.      Objective:   No pertinent past medical history.            No pertinent past surgical history.              No pertinent social history.            Review of Systems    Positive for stated complaint: Back Pain.Leg Pain  Other systems are as noted in HPI.  Constitutional and vital signs reviewed.      All other systems reviewed and negative except as noted above.    Physical Exam     ED Triage Vitals   BP 01/26/24 1839 (!) 151/99   Pulse 01/26/24 1839 101   Resp 01/26/24 1839 19   Temp 01/26/24 1839 98.7 °F (37.1 °C)   Temp src --    SpO2 01/26/24 1839 99 %   O2 Device 01/26/24 2100 None (Room air)       Current:/82   Pulse 102   Temp 98.7 °F (37.1 °C)   Resp 19   Wt 59.9 kg   SpO2 99%   BMI 21.31 kg/m²         Physical Exam    Constitutional: awake, alert, no sig distress  HENT: mmm, no lesions,  Neck: normal range of motion, no tenderness, supple.  Eyes: PERRL, EOMI,  conjunctiva normal, no discharge. Sclera anicteric.  Cardiovascular: rr no murmur  Respiratory: Normal breath sounds, no respiratory distress, no wheezing, no chest tenderness.  GI: Bowel sounds normal, Soft, no tenderness, no masses, no pulsatile masses.  : No CVA tenderness.  Skin: Warm, dry, no erythema, no rash.  Musculoskeletal: Intact distal pulses, no edema, no tenderness, no cyanosis, no clubbing. Good range of motion in all major joints. No tenderness to palpation or major deformities noted. Back- No tenderness.  Neurologic: Alert & oriented x 3, normal motor function, normal sensory function, no focal deficits noted.  -Patellar reflexes +2, symmetric, no clonus, no sensory level, good strength in both upper and lower extremities  Psych: Calm, cooperative, nl affect    ED Course     Labs Reviewed   BASIC METABOLIC PANEL (8) - Abnormal; Notable for the following components:       Result Value    Glucose 122 (*)     Sodium 127 (*)     Chloride 94 (*)     Calculated Osmolality 266 (*)     All other components within normal limits   HEPATIC FUNCTION PANEL (7) - Abnormal; Notable for the following components:    Total Protein 8.3 (*)     All other components within normal limits   URINALYSIS WITH CULTURE REFLEX - Abnormal; Notable for the following components:    Blood Urine Trace (*)     Protein Urine 20 (*)     RBC Urine 3-5 (*)     Squamous Epi. Cells Few (*)     All other components within normal limits   LIPASE - Normal   CBC WITH DIFFERENTIAL WITH PLATELET    Narrative:     The following orders were created for panel order CBC With Differential With Platelet.  Procedure                               Abnormality         Status                     ---------                               -----------         ------                     CBC W/ DIFFERENTIAL[[id number]]                              Final result                 Please view results for these tests on the individual orders.   SODIUM, URINE, RANDOM    OSMOLALITY, URINE   CBC W/ DIFFERENTIAL     EKG    Rate, intervals and axes as noted on EKG Report.  Rate: 93  Rhythm: Sinus Rhythm  Reading: NSR, normal axis/intervals, no st or t wave changes, no stemi                             MDM      68-year-old female history as document above presenting with thoracic back pain gait disturbance paresthesia of the lower extremities.  -On arrival vitals are stable and reassuring  Admission disposition: 1/26/2024  9:47 PM       -Exam reveals intact strength sensation symmetric reflexes no clonus no sensory level.  Gait testing shows a otherwise healthy and robust individual with a ataxic and wide-based gait, with significant instability.  DDx: Demyelinating process, paraneoplastic syndrome, metabolic derangement, structural spinal cord pathology such as disc herniation or cord compression    Discussed with neurology recommends MRI T and L-spine with and without contrast.    Labs are remarkable for hyponatremia, looks hypotonic, obtain urine studies and addressed with IV fluids.  Patient's gait disturbance seems out of proportion to relatively mild albeit acute hyponatremia.  But in light of this, will admit patient for further monitoring and evaluation.      Medical Decision Making      Disposition and Plan     Clinical Impression:  1. Hyponatremia    2. Gait disturbance    3. Acute midline thoracic back pain         Disposition:  Admit  1/26/2024  9:47 pm    Follow-up:  No follow-up provider specified.  We recommend that you schedule follow up care with a primary care provider within the next three months to obtain basic health screening including reassessment of your blood pressure.      Medications Prescribed:  Current Discharge Medication List                            Hospital Problems       Present on Admission  Date Reviewed: 8/4/2023            ICD-10-CM Noted POA    Hyponatremia E87.1 1/26/2024 Unknown

## 2024-01-27 NOTE — ED NOTES
Orders for admission, patient is aware of plan and ready to go upstairs. Any questions, please call ED RN Manuel  at extension [phone].   Type of COVID test sent:n/a  COVID Suspicion level: Low    Titratable drug(s) infusin.9  Rate:BOLUS    LOC at time of transport:x4    Other pertinent information:    Normally ambulatory,     CIWA score=n/a  NIH score=n/a

## 2024-01-27 NOTE — CONSULTS
Neurosurgery Consult Note    Cris Han Patient Status:  Inpatient    1955 MRN [id number]   Location [facility] 4W/SW/SE Attending Lawanda Hawthorne,    Hosp Day # 1 PCP No primary care provider on file.     REASON FOR CONSULTATION: Spinal metastases    HISTORY OF PRESENT ILLNESS:    Cris Han is a(n) 68 year old female with a pertinent medical history of breast cancer diagnosed 5 years ago, currently on anastrozole s/p RT, who presented to the ED with complaints of thoracic back pain for the last 2 to 3 weeks, localized to approximately the level of T6 with radiation around her thorax.  She has been on multiple pain medications, which she states has led to constipation.  She has not had a bowel movement in 4 days.  She has not had any bowel incontinence or urinary dysfunction.  No saddle anesthesia.  She states that over the last 3 to 4 days, her gait has become unsteady and she has developed diffuse, bilateral lower extremity numbness/tingling.  She denies any sensation changes to her thorax or abdomen.  She underwent thoracolumbar MRIs, which demonstrated diffuse metastatic lesions involving T5, T6, T11, T12, L1, L4, the pelvis, and the sixth rib.  Of most concern is a marrow replacing lesion at T6 resulting in severe canal stenosis with spinal cord compression, for which neurosurgery was consulted.  The patient denies any anticoagulation/antiplatelet use.  Her  is at bedside.    PAST MEDICAL HISTORY:  Past Medical History:   Diagnosis Date    Breast cancer (HCC)     right breast    High blood pressure        PAST SURGICAL HISTORY:  Past Surgical History:   Procedure Laterality Date    MASTECTOMY RIGHT         FAMILY HISTORY:  family history includes Breast Cancer in her sister.    SOCIAL HISTORY:   reports that she has never smoked. She has never used smokeless tobacco. She reports current alcohol use of about 1.0 standard drink of alcohol per week. She reports that she does  not use drugs.    ALLERGIES:  No Known Allergies    MEDICATIONS:  Medications Prior to Admission   Medication Sig Dispense Refill Last Dose    anastrozole 1 MG Oral Tab tab Take 1 tablet (1 mg total) by mouth daily. 90 tablet 2 1/26/2024 at 0700    Zinc 50 MG Oral Cap Take 50 mg by mouth as needed.   Past Week    Kelp 0.15 MG Oral Tab Take by mouth as needed.   Past Week    CHLOROPHYLL OR Take by mouth as needed.   Past Week    Magnesium Hydroxide (MAGNESIA OR) Take by mouth.   Past Week    Ascorbic Acid (VITAMIN C) 1000 MG Oral Tab Take 1 tablet (1,000 mg total) by mouth daily.   Past Week    Vitamin D3, Cholecalciferol, 125 MCG (5000 UT) Oral Cap Take 1 capsule (5,000 Units total) by mouth daily.   Past Week    B Complex Vitamins (B COMPLEX OR) Take by mouth daily.   Past Week    Omega-3 1000 MG Oral Cap Take by mouth.   Past Week    Turmeric 1053 MG Oral Tab Take 1,250 mg by mouth daily.   Past Week    Alpha-Lipoic Acid 100 MG Oral Cap Take 1 capsule (100 mg total) by mouth daily. (Patient not taking: Reported on 1/27/2024)   Not Taking    Flaxseed, Linseed, (FLAX SEEDS OR) Take by mouth. (Patient not taking: Reported on 1/27/2024)   Not Taking     Current Facility-Administered Medications   Medication Dose Route Frequency    lidocaine-menthol 4-1 % patch 1 patch  1 patch Transdermal Nightly    dexAMETHasone PF (Decadron) 10 MG/ML injection 10 mg  10 mg Intravenous Once    dexamethasone (Decadron) tab 4 mg  4 mg Oral 4 times per day    anastrozole (Arimidex) tab 1 mg  1 mg Oral Daily    acetaminophen (Tylenol Extra Strength) tab 500 mg  500 mg Oral Q4H PRN    melatonin tab 3 mg  3 mg Oral Nightly PRN    polyethylene glycol (PEG 3350) (Miralax) 17 g oral packet 17 g  17 g Oral Daily PRN    sennosides (Senokot) tab 17.2 mg  17.2 mg Oral Nightly PRN    bisacodyl (Dulcolax) 10 MG rectal suppository 10 mg  10 mg Rectal Daily PRN    fleet enema (Fleet) 7-19 GM/118ML rectal enema 133 mL  1 enema Rectal Once PRN     ondansetron (Zofran) 4 MG/2ML injection 4 mg  4 mg Intravenous Q6H PRN    metoclopramide (Reglan) 5 mg/mL injection 10 mg  10 mg Intravenous Q8H PRN    [Held by provider] heparin (Porcine) 5000 UNIT/ML injection 5,000 Units  5,000 Units Subcutaneous Q8H SAMRA       REVIEW OF SYSTEMS:  Comprehensive review of systems completed and negative with the exception of aforementioned information in the HPI.     PHYSICAL EXAMINATION:    Vitals: BP (!) 150/94 (BP Location: Left arm)   Pulse 97   Temp 98.9 °F (37.2 °C) (Oral)   Resp 20   Wt 129 lb 3.2 oz (58.6 kg)   SpO2 99%   BMI 20.85 kg/m²  Temp (24hrs), Av.8 °F (37.1 °C), Min:98.7 °F (37.1 °C), Max:98.9 °F (37.2 °C)     Wt Readings from Last 6 Encounters:   24 129 lb 3.2 oz (58.6 kg)   23 128 lb (58.1 kg)   02/15/23 129 lb (58.5 kg)   22 126 lb (57.2 kg)   22 134 lb (60.8 kg)   21 129 lb (58.5 kg)        General: Well developed, well nourished, in no acute distress.     Neurological / Musculoskeletal: Awake, alert, interactive. Recent and remote memory appear intact. Attention span and concentration are appropriate. No dysarthria. Coordination and motor control grossly intact. Patient follows commands briskly and appropriately. No pronator drift.  No thoracic sensory level to light touch.    Cervical Spine:    Motor/ Extremities   Deltoid Biceps Triceps  Hand intrinsics   Sensation   R 5/5 5/5 5/5 5/5 5/5 Intact to light touch   L 5/5 5/5 5/5 5/5 5/5 Intact to light touch       Lumbar Spine:    Motor / Extremities   Hip   flexion Knee   extension Dorsiflexion EHL Plantarflexion   Sensation   R 5/5 5/5 5/5 5/5 5/5 Diffusely diminished to light touch   L 5/5 5/5 5/5 5/5 5/5 Diffusely diminished to light touch     Reflexes:  -Biceps: Right 2+, left 3+  -Triceps: 2+ and equal bilaterally  -De Leon's Sign: Negative  -Patellar: 3+ and equal bilaterally  -Ankle: 2+ and equal bilaterally  -Clonus: Negative    Cranial Nerves:  I Smell not  tested   II Visual acuity at baseline.  Visual fields intact.  Pupils equal, round, reactive to light and accommodating bilaterally.   III, VII No ptosis appreciated   III, IV, VI EOMs intact with full ROM   V Facial sensation intact to light touch   VII No facial asymmetry   VIII Hearing normal to voice   IX, X Soft palate elevation and gag reflex not tested   XI Spinal accessory muscles intact with 5/5 strength bilaterally   XII Tongue strength normal, midline, without fasciculations or deviations       I&O: I/O last 3 completed shifts:  In: 284 [I.V.:284]  Out: -        LABS:   Recent Labs   Lab 01/26/24 2003 01/27/24  0551   RBC 4.99 4.90   HGB 13.8 13.4   HCT 41.9 40.7   MCV 84.0 83.1   MCH 27.7 27.3   MCHC 32.9 32.9   RDW 12.5 12.5   NEPRELIM 6.02 4.04   WBC 8.1 6.9   .0 249.0     Recent Labs   Lab 01/26/24 2003 01/27/24  0551   * 85   BUN 16 10   CREATSERUM 0.80 0.72   EGFRCR 81 92   CA 10.0 9.5   ALB 4.8 4.3   * 140   K 3.7 4.3   CL 94* 107   CO2 26.0 26.0   ALKPHO 96 85   AST 27 23   ALT 22 18   BILT 0.7 0.7   TP 8.3* 7.2      No results for input(s): \"PT\", \"INR\", \"PTT\" in the last 168 hours.     IMAGING:  MRI SPINE LUMBAR (W+WO) (CPT=72158)    Result Date: 1/27/2024  CONCLUSION:  1. Multiple osseous metastases involving pelvic bones, L4, L1 and T12 vertebral bodies.  Right paramedian epidural soft tissue extension of a L4 vertebral body lesion is associated with mild asymmetric central narrowing. 2. Please see separate thoracic spine MRI report.    Dictated by (CST): Ahsan Samuels MD on 1/27/2024 at 6:56 AM     Finalized by (CST): Ahsan Samuels MD on 1/27/2024 at 7:03 AM          MRI SPINE THORACIC (W+WO) (CPT=72157)    Result Date: 1/27/2024  CONCLUSION:  1. Large marrow replacing metastatic lesion involving posterior elements of T6 and T6 vertebral body resulting in severe central stenosis and spinal cord compression.  Mass severely narrows right sided neural foramina at T5 and  T6. 2. Other metastatic lesions in posterior elements of T5, T11 and T12 vertebral bodies, L1 vertebral body and right posterior 6th rib. 3. Please see separate MRI of lumbar spine.    Dictated by (CST): Ahsan Samuels MD on 1/27/2024 at 6:41 AM     Finalized by (CST): Ahsan Samuels MD on 1/27/2024 at 6:55 AM           Imaging reviewed. Results relayed to patient and . They state that they were shown the MRI images prior to my arrival.     ASSESSMENT:  Patient Active Problem List   Diagnosis    Carcinoma of overlapping sites of right breast in female, estrogen receptor positive  (HCC)    Encounter for care related to Port-a-Cath    Chemotherapy management, encounter for    Anemia due to antineoplastic chemotherapy    Invasive lobular carcinoma of breast, stage 3, right (HCC)    Vitamin D deficiency    Vitamin B deficiency    Age-related osteoporosis without current pathological fracture    Hyponatremia    Gait disturbance    Acute midline thoracic back pain    Cord compression (HCC)       The patient is a pleasant 68-year-old female who was diagnosed with breast cancer 5 years ago and underwent radiation/chemo treatments and presented to the ED with complaints of thoracic back pain, gait disturbance, and lower extremity numbness/tingling bilaterally.  She was found to have diffuse thoracolumbar metastasis, most concerning at T6, where there is a marrow replacing lesion resulting in severe canal stenosis and spinal cord compression for which neurosurgery was consulted.  Thankfully, she has full strength on examination.  She will need a cervical MRI to rule out further metastatic disease contributing to her symptoms prior to consideration of surgical intervention.     PLAN:  -Stat MRI cervical spine with and without contrast  -Further neurosurgical recommendations to follow imaging results  -Heparin held for now  -Neurology on board  -Oncology on board  -Continue Decadron  -Avoid any other  anticoagulation/antiplatelet therapy  -Please alert the neurosurgery team of any new or worsening neurologic complaints      Thank you for allowing us to care for this patient.    Plan of care discussed with Dr. Rivera.    Is this a shared or split note between Advanced Practice Provider and Physician? Yes    Koby Glaser PA-C  Physician Assistant- Neurosurgery   [facility]  1/27/2024, 10:47 AM        This document was created using Dragon voice recognition software and transcription variances may occur. Please contact documenting provider for clarification of contents if needed.

## 2024-01-27 NOTE — PLAN OF CARE
Admission completed and home meds reviewed with patient. Aphrodite oriented to floor, she is alert and oriented x4, on remote tele, on RA. Has numbness and tingling to BLE. Will start on hep subcutaneous. Daily weight obtained on arrival. Voiding up to bathroom, originally 1x-assist/stand-by with walker, but d/t unsteady gait, will do rolling chair, patient agreed. Pain in back, ordered Lidocaine patch. Gave miralax and senokot prn for constipation. Zofran prn for nausea. IVF infusing with 0.9 at 60.  at bedside for support. Notified MD of MRI results. Plan pending course, safety measures in place, call light within reach.     Problem: Patient Centered Care  Goal: Patient preferences are identified and integrated in the patient's plan of care  Description: Interventions:  - What would you like us to know as we care for you? From home with   - Provide timely, complete, and accurate information to patient/family  - Incorporate patient and family knowledge, values, beliefs, and cultural backgrounds into the planning and delivery of care  - Encourage patient/family to participate in care and decision-making at the level they choose  - Honor patient and family perspectives and choices  Outcome: Progressing     Problem: Patient/Family Goals  Goal: Patient/Family Long Term Goal  Description: Patient's Long Term Goal:     Interventions:  -   - See additional Care Plan goals for specific interventions  Outcome: Progressing  Goal: Patient/Family Short Term Goal  Description: Patient's Short Term Goal:     Interventions:   -   - See additional Care Plan goals for specific interventions  Outcome: Progressing     Problem: PAIN - ADULT  Goal: Verbalizes/displays adequate comfort level or patient's stated pain goal  Description: INTERVENTIONS:  - Encourage pt to monitor pain and request assistance  - Assess pain using appropriate pain scale  - Administer analgesics based on type and severity of pain and evaluate  response  - Implement non-pharmacological measures as appropriate and evaluate response  - Consider cultural and social influences on pain and pain management  - Manage/alleviate anxiety  - Utilize distraction and/or relaxation techniques  - Monitor for opioid side effects  - Notify MD/LIP if interventions unsuccessful or patient reports new pain  - Anticipate increased pain with activity and pre-medicate as appropriate  Outcome: Progressing     Problem: RISK FOR INFECTION - ADULT  Goal: Absence of fever/infection during anticipated neutropenic period  Description: INTERVENTIONS  - Monitor WBC  - Administer growth factors as ordered  - Implement neutropenic guidelines  Outcome: Progressing     Problem: SAFETY ADULT - FALL  Goal: Free from fall injury  Description: INTERVENTIONS:  - Assess pt frequently for physical needs  - Identify cognitive and physical deficits and behaviors that affect risk of falls.  - [place] fall precautions as indicated by assessment.  - Educate pt/family on patient safety including physical limitations  - Instruct pt to call for assistance with activity based on assessment  - Modify environment to reduce risk of injury  - Provide assistive devices as appropriate  - Consider OT/PT consult to assist with strengthening/mobility  - Encourage toileting schedule  Outcome: Progressing     Problem: DISCHARGE PLANNING  Goal: Discharge to home or other facility with appropriate resources  Description: INTERVENTIONS:  - Identify barriers to discharge w/pt and caregiver  - Include patient/family/discharge partner in discharge planning  - Arrange for needed discharge resources and transportation as appropriate  - Identify discharge learning needs (meds, wound care, etc)  - Arrange for interpreters to assist at discharge as needed  - Consider post-discharge preferences of patient/family/discharge partner  - Complete POLST form as appropriate  - Assess patient's ability to be responsible for managing  their own health  - Refer to Case Management Department for coordinating discharge planning if the patient needs post-hospital services based on physician/LIP order or complex needs related to functional status, cognitive ability or social support system  Outcome: Progressing     Problem: COPING  Goal: Pt/Family able to verbalize concerns and demonstrate effective coping strategies  Description: INTERVENTIONS:  - Assist patient/family to identify coping skills, available support systems and cultural and spiritual values  - Provide emotional support, including active listening and acknowledgement of concerns of patient and caregivers  - Reduce environmental stimuli, as able  - Instruct patient/family in relaxation techniques, as appropriate  - Assess for spiritual and psychosocial needs and initiate Spiritual Care or Behavioral Health consult as needed  Outcome: Progressing     Problem: CARDIOVASCULAR - ADULT  Goal: Maintains optimal cardiac output and hemodynamic stability  Description: INTERVENTIONS:  - Monitor vital signs, rhythm, and trends  - Monitor for bleeding, hypotension and signs of decreased cardiac output  - Evaluate effectiveness of vasoactive medications to optimize hemodynamic stability  - Monitor arterial and/or venous puncture sites for bleeding and/or hematoma  - Assess quality of pulses, skin color and temperature  - Assess for signs of decreased coronary artery perfusion - ex. Angina  - Evaluate fluid balance, assess for edema, trend weights  Outcome: Progressing  Goal: Absence of cardiac arrhythmias or at baseline  Description: INTERVENTIONS:  - Continuous cardiac monitoring, monitor vital signs, obtain 12 lead EKG if indicated  - Evaluate effectiveness of antiarrhythmic and heart rate control medications as ordered  - Initiate emergency measures for life threatening arrhythmias  - Monitor electrolytes and administer replacement therapy as ordered  Outcome: Progressing     Problem:  GASTROINTESTINAL - ADULT  Goal: Minimal or absence of nausea and vomiting  Description: INTERVENTIONS:  - Maintain adequate hydration with IV or PO as ordered and tolerated  - Nasogastric tube to low intermittent suction as ordered  - Evaluate effectiveness of ordered antiemetic medications  - Provide nonpharmacologic comfort measures as appropriate  - Advance diet as tolerated, if ordered  - Obtain nutritional consult as needed  - Evaluate fluid balance  Outcome: Progressing  Goal: Maintains or returns to baseline bowel function  Description: INTERVENTIONS:  - Assess bowel function  - Maintain adequate hydration with IV or PO as ordered and tolerated  - Evaluate effectiveness of GI medications  - Encourage mobilization and activity  - Obtain nutritional consult as needed  - Establish a toileting routine/schedule  - Consider collaborating with pharmacy to review patient's medication profile  Outcome: Progressing     Problem: METABOLIC/FLUID AND ELECTROLYTES - ADULT  Goal: Electrolytes maintained within normal limits  Description: INTERVENTIONS:  - Monitor labs and rhythm and assess patient for signs and symptoms of electrolyte imbalances  - Administer electrolyte replacement as ordered  - Monitor response to electrolyte replacements, including rhythm and repeat lab results as appropriate  - Fluid restriction as ordered  - Instruct patient on fluid and nutrition restrictions as appropriate  Outcome: Progressing     Problem: HEMATOLOGIC - ADULT  Goal: Maintains hematologic stability  Description: INTERVENTIONS  - Assess for signs and symptoms of bleeding or hemorrhage  - Monitor labs and vital signs for trends  - Administer supportive blood products/factors, fluids and medications as ordered and appropriate  - Administer supportive blood products/factors as ordered and appropriate  Outcome: Progressing  Goal: Free from bleeding injury  Description: (Example usage: patient with low platelets)  INTERVENTIONS:  - Avoid  intramuscular injections, enemas and rectal medication administration  - Ensure safe mobilization of patient  - Hold pressure on venipuncture sites to achieve adequate hemostasis  - Assess for signs and symptoms of internal bleeding  - Monitor lab trends  - Patient is to report abnormal signs of bleeding to staff  - Avoid use of toothpicks and dental floss  - Use electric shaver for shaving  - Use soft bristle tooth brush  - Limit straining and forceful nose blowing  Outcome: Progressing     Problem: MUSCULOSKELETAL - ADULT  Goal: Return mobility to safest level of function  Description: INTERVENTIONS:  - Assess patient stability and activity tolerance for standing, transferring and ambulating w/ or w/o assistive devices  - Assist with transfers and ambulation using safe patient handling equipment as needed  - Ensure adequate protection for wounds/incisions during mobilization  - Obtain PT/OT consults as needed  - Advance activity as appropriate  - Communicate ordered activity level and limitations with patient/family  Outcome: Progressing  Goal: Maintain proper alignment of affected body part  Description: INTERVENTIONS:  - Support and protect limb and body alignment per provider's orders  - Instruct and reinforce with patient and family use of appropriate assistive device and precautions (e.g. spinal or hip dislocation precautions)  Outcome: Progressing

## 2024-01-27 NOTE — PHYSICAL THERAPY NOTE
Contact note:   Patient admitted to ER w/ back pain and unsteady gait, received PT order, activity status is ambulate TID.  Images revealed metastatic lesion involving T6 resulting in spinal cord compression, no H&P progress note available. Will postpone Pt eval for safety until after verifying activity status w/ MD.  Tano Muñiz, PT

## 2024-01-27 NOTE — ED INITIAL ASSESSMENT (HPI)
Patient reports mid back pain x 2 weeks, states she has been taking 12 ibuprofen and 10 extra strength tylenol every day x 1 week with muscle relaxer's. Patient reports no BM x 4 days, states she had a hard BM today. Patient also reporting \"wobbly legs\" over the last few days.

## 2024-01-27 NOTE — OCCUPATIONAL THERAPY NOTE
Orders received and chart reviewed. Patient awaiting MRI of cervical spine to rule out any neurosurgical treatment. Per thoracic spine MRI, \"Large marrow replacing metastatic lesion involving posterior elements of T6 and T6 vertebral body resulting in severe central stenosis and spinal cord compression.  Mass severely narrows right sided neural foramina at T5 and T6. \"    Will hold at this time until further medical plan is in place.    Ree Rodrigues, OTR/L  [facility]  [id number]

## 2024-01-28 PROCEDURE — 99291 CRITICAL CARE FIRST HOUR: CPT | Performed by: NEUROLOGICAL SURGERY

## 2024-01-28 PROCEDURE — 99233 SBSQ HOSP IP/OBS HIGH 50: CPT | Performed by: STUDENT IN AN ORGANIZED HEALTH CARE EDUCATION/TRAINING PROGRAM

## 2024-01-28 NOTE — PLAN OF CARE
Patient is alert and oriented x4, on room air.  Remote tele in place.  Patient continues with complaints of BLE numbness/tingling and unsteady gait.  IV Decadron given per orders.   Lidocaine patch to back.  CT A/P completed today, awaiting MRI of brain and cervical spine.      Patient transferred to PCCU for closer neurological monitoring.  Report given to FRANCES Oneal.      Problem: Patient Centered Care  Goal: Patient preferences are identified and integrated in the patient's plan of care  Description: Interventions:  - What would you like us to know as we care for you? From home with   - Provide timely, complete, and accurate information to patient/family  - Incorporate patient and family knowledge, values, beliefs, and cultural backgrounds into the planning and delivery of care  - Encourage patient/family to participate in care and decision-making at the level they choose  - Honor patient and family perspectives and choices  Outcome: Progressing     Problem: PAIN - ADULT  Goal: Verbalizes/displays adequate comfort level or patient's stated pain goal  Description: INTERVENTIONS:  - Encourage pt to monitor pain and request assistance  - Assess pain using appropriate pain scale  - Administer analgesics based on type and severity of pain and evaluate response  - Implement non-pharmacological measures as appropriate and evaluate response  - Consider cultural and social influences on pain and pain management  - Manage/alleviate anxiety  - Utilize distraction and/or relaxation techniques  - Monitor for opioid side effects  - Notify MD/LIP if interventions unsuccessful or patient reports new pain  - Anticipate increased pain with activity and pre-medicate as appropriate  Outcome: Progressing     Problem: RISK FOR INFECTION - ADULT  Goal: Absence of fever/infection during anticipated neutropenic period  Description: INTERVENTIONS  - Monitor WBC  - Administer growth factors as ordered  - Implement neutropenic  guidelines  Outcome: Progressing     Problem: SAFETY ADULT - FALL  Goal: Free from fall injury  Description: INTERVENTIONS:  - Assess pt frequently for physical needs  - Identify cognitive and physical deficits and behaviors that affect risk of falls.  - [place] fall precautions as indicated by assessment.  - Educate pt/family on patient safety including physical limitations  - Instruct pt to call for assistance with activity based on assessment  - Modify environment to reduce risk of injury  - Provide assistive devices as appropriate  - Consider OT/PT consult to assist with strengthening/mobility  - Encourage toileting schedule  Outcome: Progressing     Problem: DISCHARGE PLANNING  Goal: Discharge to home or other facility with appropriate resources  Description: INTERVENTIONS:  - Identify barriers to discharge w/pt and caregiver  - Include patient/family/discharge partner in discharge planning  - Arrange for needed discharge resources and transportation as appropriate  - Identify discharge learning needs (meds, wound care, etc)  - Arrange for interpreters to assist at discharge as needed  - Consider post-discharge preferences of patient/family/discharge partner  - Complete POLST form as appropriate  - Assess patient's ability to be responsible for managing their own health  - Refer to Case Management Department for coordinating discharge planning if the patient needs post-hospital services based on physician/LIP order or complex needs related to functional status, cognitive ability or social support system  Outcome: Progressing     Problem: COPING  Goal: Pt/Family able to verbalize concerns and demonstrate effective coping strategies  Description: INTERVENTIONS:  - Assist patient/family to identify coping skills, available support systems and cultural and spiritual values  - Provide emotional support, including active listening and acknowledgement of concerns of patient and caregivers  - Reduce environmental  stimuli, as able  - Instruct patient/family in relaxation techniques, as appropriate  - Assess for spiritual and psychosocial needs and initiate Spiritual Care or Behavioral Health consult as needed  Outcome: Progressing     Problem: CARDIOVASCULAR - ADULT  Goal: Maintains optimal cardiac output and hemodynamic stability  Description: INTERVENTIONS:  - Monitor vital signs, rhythm, and trends  - Monitor for bleeding, hypotension and signs of decreased cardiac output  - Evaluate effectiveness of vasoactive medications to optimize hemodynamic stability  - Monitor arterial and/or venous puncture sites for bleeding and/or hematoma  - Assess quality of pulses, skin color and temperature  - Assess for signs of decreased coronary artery perfusion - ex. Angina  - Evaluate fluid balance, assess for edema, trend weights  Outcome: Progressing  Goal: Absence of cardiac arrhythmias or at baseline  Description: INTERVENTIONS:  - Continuous cardiac monitoring, monitor vital signs, obtain 12 lead EKG if indicated  - Evaluate effectiveness of antiarrhythmic and heart rate control medications as ordered  - Initiate emergency measures for life threatening arrhythmias  - Monitor electrolytes and administer replacement therapy as ordered  Outcome: Progressing     Problem: GASTROINTESTINAL - ADULT  Goal: Minimal or absence of nausea and vomiting  Description: INTERVENTIONS:  - Maintain adequate hydration with IV or PO as ordered and tolerated  - Nasogastric tube to low intermittent suction as ordered  - Evaluate effectiveness of ordered antiemetic medications  - Provide nonpharmacologic comfort measures as appropriate  - Advance diet as tolerated, if ordered  - Obtain nutritional consult as needed  - Evaluate fluid balance  Outcome: Progressing  Goal: Maintains or returns to baseline bowel function  Description: INTERVENTIONS:  - Assess bowel function  - Maintain adequate hydration with IV or PO as ordered and tolerated  - Evaluate  effectiveness of GI medications  - Encourage mobilization and activity  - Obtain nutritional consult as needed  - Establish a toileting routine/schedule  - Consider collaborating with pharmacy to review patient's medication profile  Outcome: Progressing     Problem: METABOLIC/FLUID AND ELECTROLYTES - ADULT  Goal: Electrolytes maintained within normal limits  Description: INTERVENTIONS:  - Monitor labs and rhythm and assess patient for signs and symptoms of electrolyte imbalances  - Administer electrolyte replacement as ordered  - Monitor response to electrolyte replacements, including rhythm and repeat lab results as appropriate  - Fluid restriction as ordered  - Instruct patient on fluid and nutrition restrictions as appropriate  Outcome: Progressing     Problem: HEMATOLOGIC - ADULT  Goal: Maintains hematologic stability  Description: INTERVENTIONS  - Assess for signs and symptoms of bleeding or hemorrhage  - Monitor labs and vital signs for trends  - Administer supportive blood products/factors, fluids and medications as ordered and appropriate  - Administer supportive blood products/factors as ordered and appropriate  Outcome: Progressing  Goal: Free from bleeding injury  Description: (Example usage: patient with low platelets)  INTERVENTIONS:  - Avoid intramuscular injections, enemas and rectal medication administration  - Ensure safe mobilization of patient  - Hold pressure on venipuncture sites to achieve adequate hemostasis  - Assess for signs and symptoms of internal bleeding  - Monitor lab trends  - Patient is to report abnormal signs of bleeding to staff  - Avoid use of toothpicks and dental floss  - Use electric shaver for shaving  - Use soft bristle tooth brush  - Limit straining and forceful nose blowing  Outcome: Progressing     Problem: MUSCULOSKELETAL - ADULT  Goal: Return mobility to safest level of function  Description: INTERVENTIONS:  - Assess patient stability and activity tolerance for  standing, transferring and ambulating w/ or w/o assistive devices  - Assist with transfers and ambulation using safe patient handling equipment as needed  - Ensure adequate protection for wounds/incisions during mobilization  - Obtain PT/OT consults as needed  - Advance activity as appropriate  - Communicate ordered activity level and limitations with patient/family  Outcome: Progressing  Goal: Maintain proper alignment of affected body part  Description: INTERVENTIONS:  - Support and protect limb and body alignment per provider's orders  - Instruct and reinforce with patient and family use of appropriate assistive device and precautions (e.g. spinal or hip dislocation precautions)  Outcome: Progressing

## 2024-01-28 NOTE — PROGRESS NOTES
[facility] Hospitalist Progress Note     CC: Hospital Follow up    PCP: No primary care provider on file.       Assessment/Plan:   68 year old male with history of Stage III ER+ NC- HER2 - invasive lobular carcinoma of the breast, treated with with right mastectomy and adjuvant chemo, initially diagnosed 2018, who is here for evaluation of back pain and lower extremity parasthesias, with the pain starting 3 weeks ago.     Bone metastasis  Spinal cord compression, secondary to metastatic cancer   Hx of Breast cancer   -concern for recurrence of breast cancer, however needs tissue diagnosis  -spinal findings concerning, neurosurgery was consulted, appreciate evaluation as well as from neurology   -started on steroids with decadron 4mg Q6hr on 1/27/24  -complete imaging, MRI cervical spine and brain pending. CT c/a/p reviewed  -oncology consulted   -monitor closely for new or worsening neurologic symptoms, now in PCU for closer monitoring  -surgical timing per neurosurgery   -no nsaids or anticoagulation at this time      Diet: general   DVT prophylaxis: hold   Code status: FULL     Asrar Marine ALSTON  [facility] Hospitalist        Subjective:     No new symptoms. Feels much better since having BM, was very constipated. She received enema this AM.     OBJECTIVE:    Blood pressure 120/80, pulse 107, temperature 98.6 °F (37 °C), resp. rate 18, weight 129 lb 3.2 oz (58.6 kg), SpO2 99%.    Temp:  [98.4 °F (36.9 °C)-98.6 °F (37 °C)] 98.6 °F (37 °C)  Pulse:  [] 107  Resp:  [11-18] 18  BP: (116-157)/() 120/80  SpO2:  [93 %-100 %] 99 %      Intake/Output:  No intake or output data in the 24 hours ending 01/28/24 1018    Last 3 Weights   01/27/24 0000 129 lb 3.2 oz (58.6 kg)   01/26/24 2009 132 lb (59.9 kg)   08/04/23 1053 128 lb (58.1 kg)   02/15/23 1032 129 lb (58.5 kg)       /80 (BP Location: Left arm)   Pulse 107   Temp 98.6 °F (37 °C)   Resp 18   Wt 129 lb 3.2 oz (58.6 kg)   SpO2 99%    BMI 20.85 kg/m²   General: Alert, no acute distress  Lungs: clear to ausculation bilaterally  Heart: Regular rate and rhythm  Abdomen: soft, non tender  Extremities: No edema  Neuro:5/5 strength in bilateral extremities, normal sensation in extremities    Data Review:       Labs:     Recent Labs   Lab 01/26/24 2003 01/27/24  0551   RBC 4.99 4.90   HGB 13.8 13.4   HCT 41.9 40.7   MCV 84.0 83.1   MCH 27.7 27.3   MCHC 32.9 32.9   RDW 12.5 12.5   NEPRELIM 6.02 4.04   WBC 8.1 6.9   .0 249.0         Recent Labs   Lab 01/26/24 2003 01/27/24  0551   * 85   BUN 16 10   CREATSERUM 0.80 0.72   EGFRCR 81 92   CA 10.0 9.5   * 140   K 3.7 4.3   CL 94* 107   CO2 26.0 26.0       Recent Labs   Lab 01/26/24 2003 01/27/24  0551   ALT 22 18   AST 27 23   ALB 4.8 4.3         Imaging:  CT CHEST+ABDOMEN+PELVIS(ALL CNTRST ONLY)(CPT=71260/09832)    Result Date: 1/27/2024  CONCLUSION:   Widespread lytic osseous metastases as described.  Lytic destructive lesion with a soft tissue mass at T6 resulting in severe narrowing of the canal and mass effect upon the cord is grossly unchanged since 1/27/2014.  No intra-abdominal or intrathoracic metastases.  Large amount of excessive stool seen throughout the colon suggestive of constipation. No obstruction.  Diverticulosis without diverticulitis.  Multiple other incidental findings as described in the body of the report.    Dictated by (CST): Valentín Pierre MD on 1/27/2024 at 3:55 PM     Finalized by (CST): Valentín Pierre MD on 1/27/2024 at 4:03 PM          MRI SPINE LUMBAR (W+WO) (CPT=72158)    Result Date: 1/27/2024  CONCLUSION:  1. Multiple osseous metastases involving pelvic bones, L4, L1 and T12 vertebral bodies.  Right paramedian epidural soft tissue extension of a L4 vertebral body lesion is associated with mild asymmetric central narrowing. 2. Please see separate thoracic spine MRI report.    Dictated by (CST): Ahsan Samuels MD on 1/27/2024 at 6:56 AM     Finalized by  (CST): Ahsan Samuels MD on 1/27/2024 at 7:03 AM          MRI SPINE THORACIC (W+WO) (CPT=72157)    Result Date: 1/27/2024  CONCLUSION:  1. Large marrow replacing metastatic lesion involving posterior elements of T6 and T6 vertebral body resulting in severe central stenosis and spinal cord compression.  Mass severely narrows right sided neural foramina at T5 and T6. 2. Other metastatic lesions in posterior elements of T5, T11 and T12 vertebral bodies, L1 vertebral body and right posterior 6th rib. 3. Please see separate MRI of lumbar spine.    Dictated by (CST): Ahsan Samuels MD on 1/27/2024 at 6:41 AM     Finalized by (CST): Ahsan Samuels MD on 1/27/2024 at 6:55 AM             Meds:      lidocaine-menthol  1 patch Transdermal Nightly    dexamethasone  4 mg Intravenous Q6H    anastrozole  1 mg Oral Daily    [Held by provider] heparin  5,000 Units Subcutaneous Q8H SAMRA       acetaminophen, melatonin, polyethylene glycol (PEG 3350), sennosides, bisacodyl, ondansetron, metoclopramide

## 2024-01-28 NOTE — OCCUPATIONAL THERAPY NOTE
OT order received, chart reviewed. Per neurosurgery note on 1/27/24, patient pending MRI c-spine; \"further neurosurgical recommendations to follow imaging results.\" OT will f/u pending neurosurgery clearance as w/u in progress. Will appreciate updated activity restrictions.     Irish Segovia, OTR/L  [facility]  #[phone]

## 2024-01-28 NOTE — PHYSICAL THERAPY NOTE
PT orders received and chart reviewed. Per Neurosurgery note on 1/27/24, patient pending MRI C-Spine; \"further neurosurgical recommendations to follow imaging results.\" PT will follow-up pending neurosurgery clearance as medical work-up in progress. Patient will require updated activity restrictions.     Jenna Haase, PT, DPT  [facility]  Ext: [phone]

## 2024-01-28 NOTE — PLAN OF CARE
- Neuro check every 2 hours. Upper and lower extremities strong. Patient only complaint is numbness on the ball of her feet. Fleet enema given. Had a huge bowel movement which according to the patient gave her a lot of relief. MRI of brain and cervical spine done last night. Result not in yet.    Problem: Patient Centered Care  Goal: Patient preferences are identified and integrated in the patient's plan of care  Description: Interventions:  - What would you like us to know as we care for you? From home with   - Provide timely, complete, and accurate information to patient/family  - Incorporate patient and family knowledge, values, beliefs, and cultural backgrounds into the planning and delivery of care  - Encourage patient/family to participate in care and decision-making at the level they choose  - Honor patient and family perspectives and choices  Outcome: Progressing     Problem: Patient/Family Goals  Goal: Patient/Family Long Term Goal  Description: Patient's Long Term Goal: get better    Interventions:  - multidisciplinary care  - See additional Care Plan goals for specific interventions  Outcome: Progressing  Goal: Patient/Family Short Term Goal  Description: Patient's Short Term Goal: ' I want to know what's wrong with me\"      Interventions:   - MRI of brain and cervical spine  - follow medication order  -monitor labs/test  - See additional Care Plan goals for specific interventions  Outcome: Progressing     Problem: PAIN - ADULT  Goal: Verbalizes/displays adequate comfort level or patient's stated pain goal  Description: INTERVENTIONS:  - Encourage pt to monitor pain and request assistance  - Assess pain using appropriate pain scale  - Administer analgesics based on type and severity of pain and evaluate response  - Implement non-pharmacological measures as appropriate and evaluate response  - Consider cultural and social influences on pain and pain management  - Manage/alleviate anxiety  - Utilize  distraction and/or relaxation techniques  - Monitor for opioid side effects  - Notify MD/LIP if interventions unsuccessful or patient reports new pain  - Anticipate increased pain with activity and pre-medicate as appropriate  Outcome: Progressing     Problem: RISK FOR INFECTION - ADULT  Goal: Absence of fever/infection during anticipated neutropenic period  Description: INTERVENTIONS  - Monitor WBC  - Administer growth factors as ordered  - Implement neutropenic guidelines  Outcome: Progressing     Problem: SAFETY ADULT - FALL  Goal: Free from fall injury  Description: INTERVENTIONS:  - Assess pt frequently for physical needs  - Identify cognitive and physical deficits and behaviors that affect risk of falls.  - [place] fall precautions as indicated by assessment.  - Educate pt/family on patient safety including physical limitations  - Instruct pt to call for assistance with activity based on assessment  - Modify environment to reduce risk of injury  - Provide assistive devices as appropriate  - Consider OT/PT consult to assist with strengthening/mobility  - Encourage toileting schedule  Outcome: Progressing     Problem: DISCHARGE PLANNING  Goal: Discharge to home or other facility with appropriate resources  Description: INTERVENTIONS:  - Identify barriers to discharge w/pt and caregiver  - Include patient/family/discharge partner in discharge planning  - Arrange for needed discharge resources and transportation as appropriate  - Identify discharge learning needs (meds, wound care, etc)  - Arrange for interpreters to assist at discharge as needed  - Consider post-discharge preferences of patient/family/discharge partner  - Complete POLST form as appropriate  - Assess patient's ability to be responsible for managing their own health  - Refer to Case Management Department for coordinating discharge planning if the patient needs post-hospital services based on physician/LIP order or complex needs related to  functional status, cognitive ability or social support system  Outcome: Progressing     Problem: COPING  Goal: Pt/Family able to verbalize concerns and demonstrate effective coping strategies  Description: INTERVENTIONS:  - Assist patient/family to identify coping skills, available support systems and cultural and spiritual values  - Provide emotional support, including active listening and acknowledgement of concerns of patient and caregivers  - Reduce environmental stimuli, as able  - Instruct patient/family in relaxation techniques, as appropriate  - Assess for spiritual and psychosocial needs and initiate Spiritual Care or Behavioral Health consult as needed  Outcome: Progressing     Problem: CARDIOVASCULAR - ADULT  Goal: Maintains optimal cardiac output and hemodynamic stability  Description: INTERVENTIONS:  - Monitor vital signs, rhythm, and trends  - Monitor for bleeding, hypotension and signs of decreased cardiac output  - Evaluate effectiveness of vasoactive medications to optimize hemodynamic stability  - Monitor arterial and/or venous puncture sites for bleeding and/or hematoma  - Assess quality of pulses, skin color and temperature  - Assess for signs of decreased coronary artery perfusion - ex. Angina  - Evaluate fluid balance, assess for edema, trend weights  Outcome: Progressing  Goal: Absence of cardiac arrhythmias or at baseline  Description: INTERVENTIONS:  - Continuous cardiac monitoring, monitor vital signs, obtain 12 lead EKG if indicated  - Evaluate effectiveness of antiarrhythmic and heart rate control medications as ordered  - Initiate emergency measures for life threatening arrhythmias  - Monitor electrolytes and administer replacement therapy as ordered  Outcome: Progressing     Problem: GASTROINTESTINAL - ADULT  Goal: Minimal or absence of nausea and vomiting  Description: INTERVENTIONS:  - Maintain adequate hydration with IV or PO as ordered and tolerated  - Nasogastric tube to low  intermittent suction as ordered  - Evaluate effectiveness of ordered antiemetic medications  - Provide nonpharmacologic comfort measures as appropriate  - Advance diet as tolerated, if ordered  - Obtain nutritional consult as needed  - Evaluate fluid balance  Outcome: Progressing  Goal: Maintains or returns to baseline bowel function  Description: INTERVENTIONS:  - Assess bowel function  - Maintain adequate hydration with IV or PO as ordered and tolerated  - Evaluate effectiveness of GI medications  - Encourage mobilization and activity  - Obtain nutritional consult as needed  - Establish a toileting routine/schedule  - Consider collaborating with pharmacy to review patient's medication profile  Outcome: Progressing     Problem: METABOLIC/FLUID AND ELECTROLYTES - ADULT  Goal: Electrolytes maintained within normal limits  Description: INTERVENTIONS:  - Monitor labs and rhythm and assess patient for signs and symptoms of electrolyte imbalances  - Administer electrolyte replacement as ordered  - Monitor response to electrolyte replacements, including rhythm and repeat lab results as appropriate  - Fluid restriction as ordered  - Instruct patient on fluid and nutrition restrictions as appropriate  Outcome: Progressing     Problem: HEMATOLOGIC - ADULT  Goal: Maintains hematologic stability  Description: INTERVENTIONS  - Assess for signs and symptoms of bleeding or hemorrhage  - Monitor labs and vital signs for trends  - Administer supportive blood products/factors, fluids and medications as ordered and appropriate  - Administer supportive blood products/factors as ordered and appropriate  Outcome: Progressing  Goal: Free from bleeding injury  Description: (Example usage: patient with low platelets)  INTERVENTIONS:  - Avoid intramuscular injections, enemas and rectal medication administration  - Ensure safe mobilization of patient  - Hold pressure on venipuncture sites to achieve adequate hemostasis  - Assess for signs  and symptoms of internal bleeding  - Monitor lab trends  - Patient is to report abnormal signs of bleeding to staff  - Avoid use of toothpicks and dental floss  - Use electric shaver for shaving  - Use soft bristle tooth brush  - Limit straining and forceful nose blowing  Outcome: Progressing     Problem: MUSCULOSKELETAL - ADULT  Goal: Return mobility to safest level of function  Description: INTERVENTIONS:  - Assess patient stability and activity tolerance for standing, transferring and ambulating w/ or w/o assistive devices  - Assist with transfers and ambulation using safe patient handling equipment as needed  - Ensure adequate protection for wounds/incisions during mobilization  - Obtain PT/OT consults as needed  - Advance activity as appropriate  - Communicate ordered activity level and limitations with patient/family  Outcome: Progressing  Goal: Maintain proper alignment of affected body part  Description: INTERVENTIONS:  - Support and protect limb and body alignment per provider's orders  - Instruct and reinforce with patient and family use of appropriate assistive device and precautions (e.g. spinal or hip dislocation precautions)  Outcome: Progressing

## 2024-01-28 NOTE — PROGRESS NOTES
[facility]ematology/Oncology [facility]  Inpatient Progress Note    Cris Han Patient Status:  Inpatient    1955 MRN [id number]   Location [facility] 2W/SW Attending Lawanda Hawthorne,    Hosp Day # 2 PCP No primary care provider on file.     Subjective:   Cris Han is a 68 year old female os is a 68 year old female with a hx of stage IIIA (bT6H6T5) ER+ [place]- HER2- invasive lobular carcinoma initially diagnosed Dec 2018 treated with right mastectomy and ALND, adjuvant AC-T and PMR, currently on anastrozole who was admitted 24 with T5-T6 cord compression and concern for recurrent breast cancer.     Interval History:   Feeling a little better today, surgical planning for early this coming week is underway.     Review of Systems:  Hematology/Oncology ROS performed and negative except as above in HPI     lidocaine-menthol 4-1 % patch 1 patch  1 patch Transdermal Nightly    [COMPLETED] dexAMETHasone PF (Decadron) 10 MG/ML injection 10 mg  10 mg Intravenous Once    [COMPLETED] iopamidol 76% (ISOVUE-370) injection for power injector  80 mL Intravenous ONCE PRN    dexamethasone (Decadron) 4 MG/ML injection 4 mg  4 mg Intravenous Q6H    [COMPLETED] gadoterate meglumine (Dotarem) 7.5 MMOL/15ML injection 15 mL  15 mL Intravenous ONCE PRN    [COMPLETED] sodium chloride 0.9 % IV bolus 1,000 mL  1,000 mL Intravenous Once    anastrozole (Arimidex) tab 1 mg  1 mg Oral Daily    acetaminophen (Tylenol Extra Strength) tab 500 mg  500 mg Oral Q4H PRN    melatonin tab 3 mg  3 mg Oral Nightly PRN    polyethylene glycol (PEG 3350) (Miralax) 17 g oral packet 17 g  17 g Oral Daily PRN    sennosides (Senokot) tab 17.2 mg  17.2 mg Oral Nightly PRN    bisacodyl (Dulcolax) 10 MG rectal suppository 10 mg  10 mg Rectal Daily PRN    [COMPLETED] fleet enema (Fleet) 7-19 GM/118ML rectal enema 133 mL  1 enema Rectal Once PRN    ondansetron (Zofran) 4 MG/2ML injection 4 mg  4 mg Intravenous Q6H PRN     metoclopramide (Reglan) 5 mg/mL injection 10 mg  10 mg Intravenous Q8H PRN    [Held by provider] heparin (Porcine) 5000 UNIT/ML injection 5,000 Units  5,000 Units Subcutaneous Q8H SAMRA    [COMPLETED] gadoterate meglumine (Dotarem) 7.5 MMOL/15ML injection 15 mL  15 mL Intravenous ONCE PRN       Objective:    /84 (BP Location: Left arm)   Pulse 99   Temp 98.6 °F (37 °C)   Resp 19   Wt 58.6 kg (129 lb 3.2 oz)   SpO2 97%   BMI 20.85 kg/m²   Physical Exam:  General: A&Ox3  HEENT: PERRL  CV: RRR  Pulm: normal effort  Abd: tender to lower abd  Lymph: no palpable lymphadenopathy  Extremities: no edema  Neurological: strength intact    Labs:       Imaging:  MRI BRAIN (W+WO) (CPT=70553)    Result Date: 1/28/2024  CONCLUSION:  1. No acute intracranial process is identified.  2. Lesser incidental findings as above.    A preliminary report was issued by the [facility] teleradiology service. There are no major discrepancies.   elm-remote.   Dictated by (CST): Otilio Pratt MD on 1/28/2024 at 10:38 AM     Finalized by (CST): Otilio Pratt MD on 1/28/2024 at 10:42 AM          MRI SPINE CERVICAL (W+WO) (CPT=72156)    Result Date: 1/28/2024  CONCLUSION:  1. There is an enhancing lesion involving the right pedicle and facet at C4 with extraosseous extension into the right foramen transverse area and right foraminal region. There is partial encasement of the V2 segment of the right vertebral artery which otherwise appears unremarkable.  2. Asymmetric right foraminal stenosis at the C3-C4 level.   3. Additional multilevel degenerative disc disease as detailed above.  4. No abnormal intrinsic cord signal intensity or evidence of cord compression.  5. No intramedullary lesions are detected.  6. Lesser incidental findings as above.    A preliminary report was issued by the [facility] teleradiology service. There are no major discrepancies.  elm-remote.  Dictated by (CST): Otilio Pratt MD on 1/28/2024 at 10:17  AM     Finalized by (CST): Otilio Pratt MD on 1/28/2024 at 10:28 AM          CT CHEST+ABDOMEN+PELVIS(ALL CNTRST ONLY)(CPT=71260/95465)    Result Date: 1/27/2024  CONCLUSION:   Widespread lytic osseous metastases as described.  Lytic destructive lesion with a soft tissue mass at T6 resulting in severe narrowing of the canal and mass effect upon the cord is grossly unchanged since 1/27/2014.  No intra-abdominal or intrathoracic metastases.  Large amount of excessive stool seen throughout the colon suggestive of constipation. No obstruction.  Diverticulosis without diverticulitis.  Multiple other incidental findings as described in the body of the report.    Dictated by (CST): Valentín Pierre MD on 1/27/2024 at 3:55 PM     Finalized by (CST): Valentín Pierre MD on 1/27/2024 at 4:03 PM          MRI SPINE LUMBAR (W+WO) (CPT=72158)    Result Date: 1/27/2024  CONCLUSION:  1. Multiple osseous metastases involving pelvic bones, L4, L1 and T12 vertebral bodies.  Right paramedian epidural soft tissue extension of a L4 vertebral body lesion is associated with mild asymmetric central narrowing. 2. Please see separate thoracic spine MRI report.    Dictated by (CST): Ahsan Samuels MD on 1/27/2024 at 6:56 AM     Finalized by (CST): Ahsan Samuels MD on 1/27/2024 at 7:03 AM          MRI SPINE THORACIC (W+WO) (CPT=72157)    Result Date: 1/27/2024  CONCLUSION:  1. Large marrow replacing metastatic lesion involving posterior elements of T6 and T6 vertebral body resulting in severe central stenosis and spinal cord compression.  Mass severely narrows right sided neural foramina at T5 and T6. 2. Other metastatic lesions in posterior elements of T5, T11 and T12 vertebral bodies, L1 vertebral body and right posterior 6th rib. 3. Please see separate MRI of lumbar spine.    Dictated by (CST): Ahsan Samuels MD on 1/27/2024 at 6:41 AM     Finalized by (CST): Ahsan Samuels MD on 1/27/2024 at 6:55 AM           Assessment & Plan:    Pt is a 68  year old female with a hx of stage IIIA ([id number]) ER+ [place]- HER2- invasive lobular carcinoma initially diagnosed Dec 2018 treated with right mastectomy and ALND, adjuvant AC-T and PMR, currently on anastrozole who was admitted 1/26/24 with T5-T6 cord compression and concern for recurrent breast cancer.      # Bone metastases  # Cord compression  # Hx of Stage IIIA ILC currently on adjuvant Anastrozole  -clinical picture concerning for recurrent and now metastatic breast cancer, but tissue diagnosis is required. This has been discussed extensively with the patient and family at the bedside  -imaging shows only bone metastases, reviewed with patient and family today   -tentative plans for T6 neurosurgery in the coming days, continue Dex 4mg q6hrs  -tumor markers ordered for tomorrow, hold off on Zometa for now  -Dr. Cid to see tomorrow      High Risk    Juan Jose Koehler DO    [facility]  [facility]    This note was created using a voice-recognition transcribing system. Incorrect words or phrases may have been missed during proofreading. Please interpret accordingly.

## 2024-01-28 NOTE — PROGRESS NOTES
[facility]  Neurosurgery Progress Note    Aphrodite SOO Han Patient Status:  Inpatient    1955 MRN [id number]   Location [facility] 2W/SW Attending Lawanda Hawthorne,    Hosp Day # 2 PCP No primary care provider on file.     Chief Complaint:  T6 tumor with spinal cord compression    Subjective:  Thoracic radiculopathy improving on steroids. Neurologically stable, full strength. MRI brain without lesion.  MRI cervical spine with right lateral mass lesion without spinal cord compression.    Objective/Physical Exam:    Vital Signs:  Blood pressure (!) 121/94, pulse 119, temperature 98.6 °F (37 °C), resp. rate 14, weight 129 lb 3.2 oz (58.6 kg), SpO2 97%.  Respiratory:  nonlabored  CV:  well perfused  General: NAD, Speech Fluent, Mood Appropriate  Neurologic:   A&Ox3  PERRL, EOMi  Full strength x 4, no drift  Decreased sensation BLE  Brisk reflexes BLE    Labs:  Recent Labs   Lab 24  0551   RBC 4.99 4.90   HGB 13.8 13.4   HCT 41.9 40.7   MCV 84.0 83.1   MCH 27.7 27.3   MCHC 32.9 32.9   RDW 12.5 12.5   NEPRELIM 6.02 4.04   WBC 8.1 6.9   .0 249.0       Recent Labs   Lab 24  0551   * 85   BUN 16 10   CREATSERUM 0.80 0.72   EGFRCR 81 92   CA 10.0 9.5   * 140   K 3.7 4.3   CL 94* 107   CO2 26.0 26.0     Assessment:  67yo female with likely recurrent metastatic breast cancer with a T6 lesions with spinal cord compression    Spoke to the patient and family about the plan of care. They expressed understanding and agreement with the plan.     Plan:  Q2' neuro checks in ICU  Continue decadron 4q6  Plan for OR Tuesday with Dr. Liao   Please provide preoperative risk stratification.    Critical care time: 30 minutes

## 2024-01-29 ENCOUNTER — APPOINTMENT (OUTPATIENT)
Dept: CT IMAGING | Facility: HOSPITAL | Age: 69
End: 2024-01-29
Attending: STUDENT IN AN ORGANIZED HEALTH CARE EDUCATION/TRAINING PROGRAM
Payer: COMMERCIAL

## 2024-01-29 PROBLEM — M47.14 THORACIC MYELOPATHY: Status: ACTIVE | Noted: 2024-01-29

## 2024-01-29 PROBLEM — C50.911 CARCINOMA OF RIGHT BREAST METASTATIC TO BONE (HCC): Status: ACTIVE | Noted: 2024-01-29

## 2024-01-29 PROBLEM — M54.14 THORACIC RADICULOPATHY: Status: ACTIVE | Noted: 2024-01-29

## 2024-01-29 PROBLEM — C79.51 CARCINOMA OF RIGHT BREAST METASTATIC TO BONE (HCC): Status: ACTIVE | Noted: 2024-01-29

## 2024-01-29 PROBLEM — M53.2X4: Status: ACTIVE | Noted: 2024-01-29

## 2024-01-29 LAB
ANTIBODY SCREEN: NEGATIVE
APTT PPP: 25.6 SECONDS (ref 23.3–35.6)
BRCA1 C.185DELAG BLD/T QL: 12.6 U/ML (ref ?–38)
CANCER AG15-3 SERPL-ACNC: 8.7 U/ML (ref ?–32.4)
CEA SERPL-MCNC: 0.9 NG/ML (ref ?–5)
INR BLD: 0.93 (ref 0.8–1.2)
PROTHROMBIN TIME: 13 SECONDS (ref 11.6–14.8)
RH BLOOD TYPE: POSITIVE
RH BLOOD TYPE: POSITIVE
VIT D+METAB SERPL-MCNC: 78.3 NG/ML (ref 30–100)

## 2024-01-29 PROCEDURE — 99233 SBSQ HOSP IP/OBS HIGH 50: CPT | Performed by: SPECIALIST

## 2024-01-29 PROCEDURE — 99291 CRITICAL CARE FIRST HOUR: CPT | Performed by: STUDENT IN AN ORGANIZED HEALTH CARE EDUCATION/TRAINING PROGRAM

## 2024-01-29 PROCEDURE — 72128 CT CHEST SPINE W/O DYE: CPT | Performed by: STUDENT IN AN ORGANIZED HEALTH CARE EDUCATION/TRAINING PROGRAM

## 2024-01-29 RX ORDER — HYDROMORPHONE HYDROCHLORIDE 1 MG/ML
0.8 INJECTION, SOLUTION INTRAMUSCULAR; INTRAVENOUS; SUBCUTANEOUS EVERY 2 HOUR PRN
Status: DISCONTINUED | OUTPATIENT
Start: 2024-01-29 | End: 2024-01-31

## 2024-01-29 RX ORDER — HYDROMORPHONE HYDROCHLORIDE 1 MG/ML
0.2 INJECTION, SOLUTION INTRAMUSCULAR; INTRAVENOUS; SUBCUTANEOUS EVERY 2 HOUR PRN
Status: DISCONTINUED | OUTPATIENT
Start: 2024-01-29 | End: 2024-01-31

## 2024-01-29 RX ORDER — HYDROMORPHONE HYDROCHLORIDE 1 MG/ML
0.4 INJECTION, SOLUTION INTRAMUSCULAR; INTRAVENOUS; SUBCUTANEOUS EVERY 2 HOUR PRN
Status: DISCONTINUED | OUTPATIENT
Start: 2024-01-29 | End: 2024-01-31

## 2024-01-29 NOTE — PROGRESS NOTES
[facility] Hospitalist Progress Note     CC: Hospital Follow up    PCP: No primary care provider on file.       Assessment/Plan:   68 year old male with history of Stage III ER+ PA- HER2 - invasive lobular carcinoma of the breast, treated with with right mastectomy and adjuvant chemo, initially diagnosed 2018, who is here for evaluation of back pain and lower extremity parasthesias, with the pain starting 3 weeks ago.     Bone metastasis  Spinal cord compression, secondary to metastatic cancer   Hx of Breast cancer   -concern for recurrence of breast cancer, needs tissue diagnosis  -spinal findings concerning, neurosurgery was consulted, appreciate evaluation as well as from neurology   -started on steroids with decadron 4mg Q6hr on 1/27/24  -complete imaging, MRI cervical spine and brain done. CT c/a/p reviewed  -oncology consulted   -monitor closely for new or worsening neurologic symptoms, now in PCU for closer monitoring  -surgical timing per neurosurgery, possibly tomorrow to OR  -no nsaids or anticoagulation at this time     DVT prophylaxis: hold   Code status: FULL     Asrar Marine ALSTON  [facility] Hospitalist        Subjective:     No new symptoms.     OBJECTIVE:    Blood pressure 119/75, pulse 79, temperature 97.6 °F (36.4 °C), temperature source Temporal, resp. rate 14, weight 129 lb 3.2 oz (58.6 kg), SpO2 96%.    Temp:  [97.6 °F (36.4 °C)-98.8 °F (37.1 °C)] 97.6 °F (36.4 °C)  Pulse:  [] 79  Resp:  [11-27] 14  BP: (104-135)/() 119/75  SpO2:  [94 %-97 %] 96 %      Intake/Output:    Intake/Output Summary (Last 24 hours) at 1/29/2024 0952  Last data filed at 1/29/2024 0000  Gross per 24 hour   Intake 180 ml   Output 0 ml   Net 180 ml       Last 3 Weights   01/27/24 0000 129 lb 3.2 oz (58.6 kg)   01/26/24 2009 132 lb (59.9 kg)   08/04/23 1053 128 lb (58.1 kg)   02/15/23 1032 129 lb (58.5 kg)       /75 (BP Location: Right arm)   Pulse 79   Temp 97.6 °F (36.4 °C) (Temporal)    Resp 14   Wt 129 lb 3.2 oz (58.6 kg)   SpO2 96%   BMI 20.85 kg/m²   General: Alert, no acute distress  Lungs: clear to ausculation bilaterally  Heart: Regular rate and rhythm  Abdomen: soft, non tender  Extremities: No edema  Neuro:5/5 strength in bilateral extremities, normal sensation in extremities    Data Review:       Labs:     Recent Labs   Lab 01/26/24 2003 01/27/24  0551   RBC 4.99 4.90   HGB 13.8 13.4   HCT 41.9 40.7   MCV 84.0 83.1   MCH 27.7 27.3   MCHC 32.9 32.9   RDW 12.5 12.5   NEPRELIM 6.02 4.04   WBC 8.1 6.9   .0 249.0         Recent Labs   Lab 01/26/24 2003 01/27/24  0551   * 85   BUN 16 10   CREATSERUM 0.80 0.72   EGFRCR 81 92   CA 10.0 9.5   * 140   K 3.7 4.3   CL 94* 107   CO2 26.0 26.0       Recent Labs   Lab 01/26/24 2003 01/27/24  0551   ALT 22 18   AST 27 23   ALB 4.8 4.3         Imaging:  MRI BRAIN (W+WO) (CPT=70553)    Result Date: 1/28/2024  CONCLUSION:  1. No acute intracranial process is identified.  2. Lesser incidental findings as above.    A preliminary report was issued by the [facility] teleradiology service. There are no major discrepancies.   elm-remote.   Dictated by (CST): Otilio Pratt MD on 1/28/2024 at 10:38 AM     Finalized by (CST): Otilio Pratt MD on 1/28/2024 at 10:42 AM          MRI SPINE CERVICAL (W+WO) (CPT=72156)    Result Date: 1/28/2024  CONCLUSION:  1. There is an enhancing lesion involving the right pedicle and facet at C4 with extraosseous extension into the right foramen transverse area and right foraminal region. There is partial encasement of the V2 segment of the right vertebral artery which otherwise appears unremarkable.  2. Asymmetric right foraminal stenosis at the C3-C4 level.   3. Additional multilevel degenerative disc disease as detailed above.  4. No abnormal intrinsic cord signal intensity or evidence of cord compression.  5. No intramedullary lesions are detected.  6. Lesser incidental findings as above.    A  preliminary report was issued by the [facility] teleradiology service. There are no major discrepancies.  elm-remote.  Dictated by (CST): Otilio Pratt MD on 1/28/2024 at 10:17 AM     Finalized by (CST): Otilio Pratt MD on 1/28/2024 at 10:28 AM          CT CHEST+ABDOMEN+PELVIS(ALL CNTRST ONLY)(CPT=71260/69637)    Result Date: 1/27/2024  CONCLUSION:   Widespread lytic osseous metastases as described.  Lytic destructive lesion with a soft tissue mass at T6 resulting in severe narrowing of the canal and mass effect upon the cord is grossly unchanged since 1/27/2014.  No intra-abdominal or intrathoracic metastases.  Large amount of excessive stool seen throughout the colon suggestive of constipation. No obstruction.  Diverticulosis without diverticulitis.  Multiple other incidental findings as described in the body of the report.    Dictated by (CST): Valentín Pierre MD on 1/27/2024 at 3:55 PM     Finalized by (CST): Valentín Pierre MD on 1/27/2024 at 4:03 PM          MRI SPINE LUMBAR (W+WO) (CPT=72158)    Result Date: 1/27/2024  CONCLUSION:  1. Multiple osseous metastases involving pelvic bones, L4, L1 and T12 vertebral bodies.  Right paramedian epidural soft tissue extension of a L4 vertebral body lesion is associated with mild asymmetric central narrowing. 2. Please see separate thoracic spine MRI report.    Dictated by (CST): Ahsan Samuels MD on 1/27/2024 at 6:56 AM     Finalized by (CST): Ahsan Samuels MD on 1/27/2024 at 7:03 AM          MRI SPINE THORACIC (W+WO) (CPT=72157)    Result Date: 1/27/2024  CONCLUSION:  1. Large marrow replacing metastatic lesion involving posterior elements of T6 and T6 vertebral body resulting in severe central stenosis and spinal cord compression.  Mass severely narrows right sided neural foramina at T5 and T6. 2. Other metastatic lesions in posterior elements of T5, T11 and T12 vertebral bodies, L1 vertebral body and right posterior 6th rib. 3. Please see separate MRI of  lumbar spine.    Dictated by (CST): Ahsan Samuels MD on 1/27/2024 at 6:41 AM     Finalized by (CST): Ahsan Samuels MD on 1/27/2024 at 6:55 AM             Meds:      lidocaine-menthol  1 patch Transdermal Nightly    dexamethasone  4 mg Intravenous Q6H    anastrozole  1 mg Oral Daily    [Held by provider] heparin  5,000 Units Subcutaneous Q8H SAMRA       acetaminophen, melatonin, polyethylene glycol (PEG 3350), sennosides, bisacodyl, ondansetron, metoclopramide

## 2024-01-29 NOTE — PROGRESS NOTES
Neurosurgery Progress Note    Assessment:   Cris Han in room 233/233-A, is a 68 year old female, Hospital Day ( LOS: 3 days ) hospitalized for Pain from bone metastases (HCC).      The patient's other hospital problems include:   Active Hospital Problems    Cord compression (HCC)      History of breast cancer      *Pain from bone metastases (HCC)      Hyponatremia      Gait disturbance      Acute midline thoracic back pain        Plan:   -Every 2 hours neurochecks.  Please alert neurosurgery of any neurologic changes.  -Plan for OR tomorrow with Dr. Liao for T5-T7 laminectomies for decompression of spinal cord, right transpedicular T6-T7 approaches for resection of tumor and nerve root decompression, T4-T8 posterior fixation and fusion  -N.p.o. midnight  -Heparin has been held  -Appreciate medical optimization by hospitalist/critical care team in the interim  -Oncology on board  -Continue Decadron 4 mg every 6 hours  -Preoperative labs if not already completed.  -Okay to be up and ambulating as tolerated    Plan of care discussed with Dr. Liao.  Subjective:   No acute events overnight.  The patient denies any new or worsening neurologic complaints.  She has some improvement in her thoracic radiculopathy.  She continues to endorse stable lower extremity numbness bilaterally.  No weakness.  She has improvement in her abdominal discomfort following a bowel movement.    Objective:   VITALS: /71 (BP Location: Right arm)   Pulse 96   Temp 97.6 °F (36.4 °C) (Temporal)   Resp 22   Wt 129 lb 3.2 oz (58.6 kg)   SpO2 98%   BMI 20.85 kg/m²  Temp (24hrs), Av.2 °F (36.8 °C), Min:97.6 °F (36.4 °C), Max:98.8 °F (37.1 °C)       General: Well developed, well nourished, in no acute distress.     Neurological / Musculoskeletal: Awake, alert, and interactive. Recent and remote memory appear intact. Attention span and concentration are appropriate. No dysarthria. Coordination and motor control grossly  intact. Patient follows commands briskly and appropriately.     Cervical Spine:     Motor/ Extremities    Deltoid Biceps Triceps     Sensation   R 5/5 5/5 5/5 5/5 Intact to light touch   L 5/5 5/5 5/5 5/5 Intact to light touch         Lumbar Spine:     Motor / Extremities    Hip   flexion Knee   extension Dorsiflexion EHL Plantarflexion    Sensation   R 5/5 5/5 5/5 5/5 5/5 Diffusely diminished to light touch   L 5/5 5/5 5/5 5/5 5/5 Diffusely diminished to light touch      Reflexes:  -Clonus: Negative      I&O: I/O last 3 completed shifts:  In: 680 [P.O.:680]  Out: 0        Labs:   Recent Labs   Lab 01/26/24 2003 01/27/24  0551   RBC 4.99 4.90   HGB 13.8 13.4   HCT 41.9 40.7   MCV 84.0 83.1   MCH 27.7 27.3   MCHC 32.9 32.9   RDW 12.5 12.5   NEPRELIM 6.02 4.04   WBC 8.1 6.9   .0 249.0     Recent Labs   Lab 01/26/24 2003 01/27/24  0551   * 85   BUN 16 10   CREATSERUM 0.80 0.72   EGFRCR 81 92   CA 10.0 9.5   ALB 4.8 4.3   * 140   K 3.7 4.3   CL 94* 107   CO2 26.0 26.0   ALKPHO 96 85   AST 27 23   ALT 22 18   BILT 0.7 0.7   TP 8.3* 7.2      No results for input(s): \"PT\", \"INR\", \"PTT\" in the last 168 hours.     Imaging:  MRI BRAIN (W+WO) (CPT=70553)    Result Date: 1/28/2024  CONCLUSION:  1. No acute intracranial process is identified.  2. Lesser incidental findings as above.    A preliminary report was issued by the [facility] teleradiology service. There are no major discrepancies.   elm-remote.   Dictated by (CST): Otilio Pratt MD on 1/28/2024 at 10:38 AM     Finalized by (CST): Otilio Pratt MD on 1/28/2024 at 10:42 AM          MRI SPINE CERVICAL (W+WO) (CPT=72156)    Result Date: 1/28/2024  CONCLUSION:  1. There is an enhancing lesion involving the right pedicle and facet at C4 with extraosseous extension into the right foramen transverse area and right foraminal region. There is partial encasement of the V2 segment of the right vertebral artery which otherwise appears  unremarkable.  2. Asymmetric right foraminal stenosis at the C3-C4 level.   3. Additional multilevel degenerative disc disease as detailed above.  4. No abnormal intrinsic cord signal intensity or evidence of cord compression.  5. No intramedullary lesions are detected.  6. Lesser incidental findings as above.    A preliminary report was issued by the [facility] teleradiology service. There are no major discrepancies.  elm-remote.  Dictated by (CST): Otilio Pratt MD on 1/28/2024 at 10:17 AM     Finalized by (CST): Otilio Pratt MD on 1/28/2024 at 10:28 AM          CT CHEST+ABDOMEN+PELVIS(ALL CNTRST ONLY)(CPT=71260/17245)    Result Date: 1/27/2024  CONCLUSION:   Widespread lytic osseous metastases as described.  Lytic destructive lesion with a soft tissue mass at T6 resulting in severe narrowing of the canal and mass effect upon the cord is grossly unchanged since 1/27/2014.  No intra-abdominal or intrathoracic metastases.  Large amount of excessive stool seen throughout the colon suggestive of constipation. No obstruction.  Diverticulosis without diverticulitis.  Multiple other incidental findings as described in the body of the report.    Dictated by (CST): Valentín Pierre MD on 1/27/2024 at 3:55 PM     Finalized by (CST): Valentín Pierre MD on 1/27/2024 at 4:03 PM           Imaging results discussed with patient and her daughter at bedside.    Is this a shared or split note between Advanced Practice Provider and Physician?  Yes    Koby Glaser PA-C  Physician Assistant- Neurosurgery   [facility]  1/29/2024, 10:53 AM        This document was created using Dragon voice recognition software and transcription variances may occur. Please contact documenting provider for clarification of contents if needed.

## 2024-01-29 NOTE — OCCUPATIONAL THERAPY NOTE
Orders received, chart reviewed for OT evaluation. Pt admitted with T6 lesion with spinal cord compression. Per neurosurgery, plan for OR Tuesday with Dr Liao. Will complete orders and place patient on hold. Will need new orders post-op to initiate OT evaluation and activity.

## 2024-01-29 NOTE — PAYOR COMM NOTE
--------------  ADMISSION REVIEW     Payor: [employer] OUT [employer] STATE PPO  Subscriber #:  [id number]  Authorization Number: [id number]    Admit date: 1/26/24  Admit time: 11:41 PM       REVIEW DOCUMENTATION:  Patient Seen in: [facility] Emergency Department      History   No chief complaint on file.    Stated Complaint: Back Pain.Leg Pain    Subjective:   HPI    68-year-old female with a remote history of breast CA status posttreatment on anastrozole, presenting for evaluation of back pain, gait disturbance.  2-week history of thoracic back pain for which she has been taking ibuprofen, Tylenol, as well as Flexeril.  Was seen by a physician yesterday, and advised to obtain a CT of her thoracic spine.  She is also been taking prednisone and hydrocodone.  Also notes that she has been constipated and has not had a bowel movement in 4 days.  No issues with her bladder.  Over the past 2 to 3 days is noted a profound change in her gait, feels unsteady and unsure on her feet, feels like she is \"walking on a trampoline\", which was associated with tingling sensation in both legs.  No loss of sensation or strength per se.  Did fall off a ladder few days ago due to the sensory disturbance.  No history of spinal surgery or instrumentation.      Review of Systems    Positive for stated complaint: Back Pain.Leg Pain  Other systems are as noted in HPI.  Constitutional and vital signs reviewed.      All other systems reviewed and negative except as noted above.    Physical Exam     ED Triage Vitals   BP 01/26/24 1839 (!) 151/99   Pulse 01/26/24 1839 101   Resp 01/26/24 1839 19   Temp 01/26/24 1839 98.7 °F (37.1 °C)   Temp src --    SpO2 01/26/24 1839 99 %   O2 Device 01/26/24 2100 None (Room air)       Current:/82   Pulse 102   Temp 98.7 °F (37.1 °C)   Resp 19   Wt 59.9 kg   SpO2 99%   BMI 21.31 kg/m²         Physical Exam    -Patellar reflexes +2, symmetric, no clonus, no sensory level, good strength in both upper  and lower extremities  ED Course     Labs Reviewed   BASIC METABOLIC PANEL (8) - Abnormal; Notable for the following components:       Result Value    Glucose 122 (*)     Sodium 127 (*)     Chloride 94 (*)     Calculated Osmolality 266 (*)     All other components within normal limits   HEPATIC FUNCTION PANEL (7) - Abnormal; Notable for the following components:    Total Protein 8.3 (*)     All other components within normal limits   URINALYSIS WITH CULTURE REFLEX - Abnormal; Notable for the following components:    Blood Urine Trace (*)     Protein Urine 20 (*)     RBC Urine 3-5 (*)     Squamous Epi. Cells Few (*)    EKG    Rate, intervals and axes as noted on EKG Report.  Rate: 93  Rhythm: Sinus Rhythm  Reading: NSR, normal axis/intervals, no st or t wave changes, no stemi         MDM      68-year-old female history as document above presenting with thoracic back pain gait disturbance paresthesia of the lower extremities.  -On arrival vitals are stable and reassuring  Admission disposition: 1/26/2024  9:47 PM       -Exam reveals intact strength sensation symmetric reflexes no clonus no sensory level.  Gait testing shows a otherwise healthy and robust individual with a ataxic and wide-based gait, with significant instability.  DDx: Demyelinating process, paraneoplastic syndrome, metabolic derangement, structural spinal cord pathology such as disc herniation or cord compression    Discussed with neurology recommends MRI T and L-spine with and without contrast.    Labs are remarkable for hyponatremia, looks hypotonic, obtain urine studies and addressed with IV fluids.  Patient's gait disturbance seems out of proportion to relatively mild albeit acute hyponatremia.  But in light of this, will admit patient for further monitoring and evaluation.      Medical Decision Making      Disposition and Plan     Clinical Impression:  1. Hyponatremia    2. Gait disturbance    3. Acute midline thoracic back pain          Disposition:  Admit  1/26/2024  9:47 pm        [facility] Hospitalist H&P       CC: back pain, lower extremity parasthesias     PCP: No primary care provider on file.    History of Present Illness:   68 year old male with history of Stage III ER+ [place]- HER2 - invasive lobular carcinoma of the breast, treated with with right mastectomy and adjuvant chemo, initially diagnosed 2018, who is here for evaluation of back pain and lower extremity parasthesias, with the pain starting 3 weeks ago. She states the last 2-3 days she noted she felt more \"wobbly\" and may could not feel as much in her feet. Felt like she was walking \"on [employer]\" she states. 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 She presumed it was due to this. She even saw a chiropractor as well recently. Due to her continued symptoms she presented yesterday to the ER. Given her symptom description there was indication for MRI imaging, which unfortunately shows diffuse metastatic lesions involving T5, T6, T11, T12, L1, L4, the pelvis, and the sixth rib.  Of most concern is a marrow replacing lesion at T6 resulting in severe canal stenosis with spinal cord compression.     Review of Systems  Comprehensive ROS reviewed and negative except for what's stated above.     PMH  Breast cancer     PSH  Past Surgical History:   Procedure Laterality Date    MASTECTOMY RIGHT        ALL:  No Known Allergies     Home Medications:  Outpatient Medications Marked as Taking for the 1/26/24 encounter (Hospital Encounter)   Medication Sig Dispense Refill    anastrozole 1 MG Oral Tab tab Take 1 tablet (1 mg total) by mouth daily. 90 tablet 2    Zinc 50 MG Oral Cap Take 50 mg by mouth as needed.      Kelp 0.15 MG Oral Tab Take by mouth as needed.      CHLOROPHYLL OR Take by mouth as needed.      Magnesium Hydroxide (MAGNESIA OR) Take by mouth.      Ascorbic Acid (VITAMIN C) 1000 MG Oral Tab Take  1 tablet (1,000 mg total) by mouth daily.      Vitamin D3, Cholecalciferol, 125 MCG (5000 UT) Oral Cap Take 1 capsule (5,000 Units total) by mouth daily.      B Complex Vitamins (B COMPLEX OR) Take by mouth daily.      Omega-3 1000 MG Oral Cap Take by mouth.      Turmeric 1053 MG Oral Tab Take 1,250 mg by mouth daily.           Radiology: MRI SPINE LUMBAR (W+WO) (CPT=72158)    Result Date: 1/27/2024  CONCLUSION:  1. Multiple osseous metastases involving pelvic bones, L4, L1 and T12 vertebral bodies.  Right paramedian epidural soft tissue extension of a L4 vertebral body lesion is associated with mild asymmetric central narrowing. 2. Please see separate thoracic spine MRI report.    Dictated by (CST): Ahsan Samuels MD on 1/27/2024 at 6:56 AM     Finalized by (CST): Ahsan Samuels MD on 1/27/2024 at 7:03 AM          MRI SPINE THORACIC (W+WO) (CPT=72157)    Result Date: 1/27/2024  CONCLUSION:  1. Large marrow replacing metastatic lesion involving posterior elements of T6 and T6 vertebral body resulting in severe central stenosis and spinal cord compression.  Mass severely narrows right sided neural foramina at T5 and T6. 2. Other metastatic lesions in posterior elements of T5, T11 and T12 vertebral bodies, L1 vertebral body and right posterior 6th rib. 3. Please see separate MRI of lumbar spine.    Dictated by (CST): Ahsan Samuels MD on 1/27/2024 at 6:41 AM     Finalized by (CST): Ahsan Samuels MD on 1/27/2024 at 6:55 AM             ASSESSMENT / PLAN:   68 year old male with history of Stage III ER+ [place]- HER2 - invasive lobular carcinoma of the breast, treated with with right mastectomy and adjuvant chemo, initially diagnosed 2018, who is here for evaluation of back pain and lower extremity parasthesias, with the pain starting 3 weeks ago.    Bone metastasis  Spinal cord compression, secondary to metastatic cancer   Hx of Breast cancer   -concern for recurrence of breast cancer, however needs tissue  diagnosis  -spinal findings concerning, neurosurgery was consulted, appreciate evaluation as well as from neurology   -started on steroids with decadron 4mg Q6hr  -complete imaging and add MRI cervical spine, and obtain CT c/a/p  -oncology consulted   -discussed with family and patient. Further work up and plan pending   -monitor closely for new or worsening neurologic symptoms.    Diet: general   DVT prophylaxis: hold   Code status: FULL    [facility]       1/27/24 Neurology     Date of Admission:  1/26/2024  Date of Consult:  1/27/2024  Reason for Consultation:   Gait difficulty.     History of Present Illness:   Patient is a 68 year old female who was admitted to the hospital for Hyponatremia: Patient with a breast cancer history 5 years ago  Presented with a 3 weeks history of mid back pain, started developing constipation, pain in abdomen.  And had 2 or 3 days history of gait and balance changes.  Her strength did not change but she felt that she was unsteady on her feet.  Recent chiropractor and by some other manual manipulate her doctor recently, however eventually she came to the hospital.  MRI of the thoracic and lumbar spine showed severe compression of the spinal cord, possible metastatic disease.  Start developing some tingling station her lower extremities.    Patient with evidence of spinal cord compression, most likely due to metastatic lesions.  I have contacted neurosurgical team.  Most likely intervention will be required.  After that oncological evaluation most likely will be needed as well.        Thank you for allowing me to participate in the care of your patient.     Alfonso Quinteros MD  1/27/2024 1/27/24 Neurosurgery   REASON FOR CONSULTATION: Spinal metastases     HISTORY OF PRESENT ILLNESS:     Cris Han is a(n) 68 year old female with a pertinent medical history of breast cancer diagnosed 5 years ago, currently on anastrozole s/p RT,  who presented to the ED with complaints of thoracic back pain for the last 2 to 3 weeks, localized to approximately the level of T6 with radiation around her thorax.  She has been on multiple pain medications, which she states has led to constipation.  She has not had a bowel movement in 4 days.  She has not had any bowel incontinence or urinary dysfunction.  No saddle anesthesia.  She states that over the last 3 to 4 days, her gait has become unsteady and she has developed diffuse, bilateral lower extremity numbness/tingling.  She denies any sensation changes to her thorax or abdomen.  She underwent thoracolumbar MRIs, which demonstrated diffuse metastatic lesions involving T5, T6, T11, T12, L1, L4, the pelvis, and the sixth rib.  Of most concern is a marrow replacing lesion at T6 resulting in severe canal stenosis with spinal cord compression, for which neurosurgery was consulted.  The patient denies any anticoagulation/antiplatelet use.  Her  is at bedside.    ASSESSMENT:      Patient Active Problem List   Diagnosis    Carcinoma of overlapping sites of right breast in female, estrogen receptor positive  (HCC)    Encounter for care related to Port-a-Cath    Chemotherapy management, encounter for    Anemia due to antineoplastic chemotherapy    Invasive lobular carcinoma of breast, stage 3, right (HCC)    Vitamin D deficiency    Vitamin B deficiency    Age-related osteoporosis without current pathological fracture    Hyponatremia    Gait disturbance    Acute midline thoracic back pain    Cord compression (HCC)         The patient is a pleasant 68-year-old female who was diagnosed with breast cancer 5 years ago and underwent radiation/chemo treatments and presented to the ED with complaints of thoracic back pain, gait disturbance, and lower extremity numbness/tingling bilaterally.  She was found to have diffuse thoracolumbar metastasis, most concerning at T6, where there is a marrow replacing lesion resulting  in severe canal stenosis and spinal cord compression for which neurosurgery was consulted.  Thankfully, she has full strength on examination.  She will need a cervical MRI to rule out further metastatic disease contributing to her symptoms prior to consideration of surgical intervention.      PLAN:  -Stat MRI cervical spine with and without contrast  -Further neurosurgical recommendations to follow imaging results  -Heparin held for now  -Neurology on board  -Oncology on board  -Continue Decadron  -Avoid any other anticoagulation/antiplatelet therapy  -Please alert the neurosurgery team of any new or worsening neurologic complaints    atient examined and assessed. Agree with above.      3 weeks of back pain and 3 days of balance difficulty.  MRI demonstrates a T6 lesion with spinal cord compression with involvement of the pedicles.  CT CAP reviewed with osseous lesions.     Full strength in BLE with brisk reflexes. Last took NSAIDs 3 days ago.  Not on antiplatelet or anticoagulation.     On IV decadron 4q6.      Spoke to the patient and family about the current clinical situation and the plan of care. We discussed the plan includes surgery. We discussed the risks and benefits of giving her a few days of steroids prior to surgery, waiting a few more days off NSAIDs, and optimization of OR staff and resources.  We discussed that in the case of progressive neurological decline, we would plan to expedite surgery. After this discussion and answering their questions, they expressed understanding and agreement with the plan.     I discussed the case with my partners who agree with this plan.      We will transfer to ICU for close neurological monitoring. MRI brain and cervical spine pending.        1/27/24 Hematology     Aphrodite SOO Han is a 68 year old female with a hx of stage IIIA ([id number]) ER+ [place]- HER2- invasive lobular carcinoma initially diagnosed Dec 2018 treated with right mastectomy and ALND, adjuvant AC-T and  PMR, currently on anastrozole who was admitted 1/26/24 with T5-T6 cord compression and concern for recurrent breast cancer.      The patient is very active at baseline and developed midthoracic back pain approximately 3 weeks ago.  Described initially as a dull ache but has worsened in intensity, radiates around her rib cage and over the last few days has developed some imbalance to her gait and paresthesias bilaterally.  Denies weakness but just feels unsteady on her feet.  Also complains of progressive constipation the last couple of weeks and lower abdominal discomfort.  She has been taking some pain medications which she thinks may have caused the constipation.  She presented to the ER, T/L MRI revealed diffuse metastatic lesions with a large marrow replacing lesion at T6 causing severe canal stenosis with cord compression.     Prior to the development of back pain, the patient was operating at her baseline status of health, compliant with anastrozole and doing well.    nina Han is a 68 year old female with a hx of stage IIIA ([id number]) ER+ [place]- HER2- invasive lobular carcinoma initially diagnosed Dec 2018 treated with right mastectomy and ALND, adjuvant AC-T and PMR, currently on anastrozole who was admitted 1/26/24 with T5-T6 cord compression and concern for recurrent breast cancer.      Extensive discussion at the bedside with the patient, her  and children.  I expressed my concern for recurrent and metastatic breast cancer.  We discussed in broad strokes the natural history of recurrent/metastatic ILC.  She has evidence of cord compression at T6.  She maintains an excellent functional status and thus aggressive care with neurosurgical intervention is certainly appropriate.  We will complete spinal imaging with an MRI cervical spine, also obtain a CT chest abdomen pelvis.  She was started on dexamethasone 10 mg, will continue 4 mg every 6 for now.  Await neurosurgical opinion.  The patient  expressed a desire to touch base with her breast surgeon at [facility] and discuss with Dr. Cid prior to any decisions to proceed with surgery here or consider another surgical opinion downtown likely at [facility].     # Bone metastases  # Cord compression  # Hx of Stage IIIA ILC currently on adjuvant Anastrozole  -clinical picture concerning for recurrent and now metastatic breast cancer, but tissue diagnosis is required  -cervical MRI, CT c/a/p, Dex 4mg q6hrs  -appreciate [facility]US expertise, pt may consider another surgical opinion  -pt had multiple questions surrounding systemic treatment that were answered to the best of my abilities with the current information at hand  -pt had not started zometa in the outpatient setting so we will hold off for now and was not discussed today     High Risk        1/28/24 hospitalist  started on steroids with decadron 4mg Q6hr on 1/27/24  -complete imaging, MRI cervical spine and brain pending. CT c/a/p reviewed  -oncology consulted   -monitor closely for new or worsening neurologic symptoms, now in PCU for closer monitoring  -surgical timing per neurosurgery   -no nsaids or anticoagulation at this time     1/28/24 Hematology     linical picture concerning for recurrent and now metastatic breast cancer, but tissue diagnosis is required. This has been discussed extensively with the patient and family at the bedside  -imaging shows only bone metastases, reviewed with patient and family today   -tentative plans for T6 neurosurgery in the coming days, continue Dex 4mg q6hrs  -tumor markers ordered for tomorrow, hold off on Zometa for now  -Dr. Cid to see tomorrow       1/28/24 NS  Thoracic radiculopathy improving on steroids.   Plan:  Q2' neuro checks in ICU  Continue decadron 4q6  Plan for OR Tuesday with Dr. Liao   Please provide preoperative risk stratification.    1/29/24 Hospitalist  -complete imaging, MRI cervical spine and brain done. CT c/a/p reviewed  -oncology consulted    -monitor closely for new or worsening neurologic symptoms, now in PCU for closer monitoring  -surgical timing per neurosurgery, possibly tomorrow to OR    MRI BRAIN (W+WO) (CPT=70553)     Result Date: 1/28/2024  CONCLUSION:         1. No acute intracranial process is identified.  2. Lesser incidental findings as above.    A preliminary report was issued by the [facility] teleradiology service. There are no major discrepancies.   elm-remote.   Dictated by (CST): Otilio Pratt MD on 1/28/2024 at 10:38 AM     Finalized by (CST): Otilio Pratt MD on 1/28/2024 at 10:42 AM           MRI SPINE CERVICAL (W+WO) (CPT=72156)     Result Date: 1/28/2024  CONCLUSION:         1. There is an enhancing lesion involving the right pedicle and facet at C4 with extraosseous extension into the right foramen transverse area and right foraminal region. There is partial encasement of the V2 segment of the right vertebral artery which otherwise appears unremarkable.  2. Asymmetric right foraminal stenosis at the C3-C4 level.   3. Additional multilevel degenerative disc disease as detailed above.  4. No abnormal intrinsic cord signal intensity or evidence of cord compression.  5. No intramedullary lesions are detected.  6. Lesser incidental findings as above.    A preliminary report was issued by the [facility] teleradiology service. There are no major discrepancies.  elm-remote.  Dictated by (CST): Otilio Pratt MD on 1/28/2024 at 10:17 AM     Finalized by (CST): Otilio Pratt MD on 1/28/2024 at 10:28 AM      he patient's other hospital problems include:   Active Hospital Problems    Cord compression (HCC)       History of breast cancer       *Pain from bone metastases (HCC)       Hyponatremia       Gait disturbance       Acute midline thoracic back pain           Plan:   -Every 2 hours neurochecks.  Please alert neurosurgery of any neurologic changes.  -Plan for OR tomorrow with Dr. Liao for T5-T7 laminectomies for  decompression of spinal cord, right transpedicular T6-T7 approaches for resection of tumor and nerve root decompression, T4-T8 posterior fixation and fusion  -N.p.o. midnight  -Heparin has been held  -Appreciate medical optimization by hospitalist/critical care team in the interim  -Oncology on board  -Continue Decadron 4 mg every 6 hours  -Preoperative labs if not already completed.  -Okay to be up and ambulating as tolerated     Plan of care discussed with Dr. Liao.  Subjective:   No acute events overnight.  The patient denies any new or worsening neurologic complaints.  She has some improvement in her thoracic radiculopathy.  She continues to endorse stable lower extremity numbness bilaterally.  No weakness.  She has improvement in her abdominal discomfort following a bowel movement.          Latest Reference Range & Units 01/26/24 20:03 01/27/24 05:51   Glucose 70 - 99 mg/dL 122 (H) 85   Sodium 136 - 145 mmol/L 127 (L) 140   Potassium 3.5 - 5.1 mmol/L 3.7 4.3   Chloride 98 - 112 mmol/L 94 (L) 107   Carbon Dioxide, Total 21.0 - 32.0 mmol/L 26.0 26.0   BUN 9 - 23 mg/dL 16 10   CREATININE 0.55 - 1.02 mg/dL 0.80 0.72   (H): Data is abnormally high  (L): Data is abnormally low   Latest Reference Range & Units 01/26/24 20:03 01/27/24 05:51   WBC 4.0 - 11.0 x10(3) uL 8.1 6.9   Hemoglobin 12.0 - 16.0 g/dL 13.8 13.4   Hematocrit 35.0 - 48.0 % 41.9 40.7   Platelet Count 150.0 - 450.0 10(3)uL 285.0 249.0             MEDICATIONS ADMINISTERED IN LAST 1 DAY:  acetaminophen (Tylenol Extra Strength) tab 500 mg       Date Action Dose Route User    1/29/2024 1014 Given 500 mg Oral Day Ornelas RN    1/29/2024 0051 Given 500 mg Oral Reina Corrigan RN    1/28/2024 2147 Given 500 mg Oral Reina Corrigan, FRANCES          anastrozole (Arimidex) tab 1 mg       Date Action Dose Route User    1/29/2024 1004 Given 1 mg Oral Sojka, Day, RN          dexamethasone (Decadron) 4 MG/ML injection 4 mg       Date Action Dose Route User     1/29/2024 1003 Given 4 mg Intravenous Day Ornelas RN    1/29/2024 0500 Given 4 mg Intravenous Reina Corrigan RN    1/28/2024 2127 Given 4 mg Intravenous Reina Corrigan RN    1/28/2024 1610 Given 4 mg Intravenous Patience Vaughn RN          lidocaine-menthol 4-1 % patch 1 patch       Date Action Dose Route User    1/28/2024 2037 Patch Applied 1 patch Transdermal (Left Lower Back) Reina Corrigan RN          polyethylene glycol (PEG 3350) (Miralax) 17 g oral packet 17 g       Date Action Dose Route User    1/28/2024 2120 Given 17 g Oral Reina Corrigan RN            Vitals (last day)       Date/Time Temp Pulse Resp BP SpO2 Weight O2 Device O2 Flow Rate (L/min) [facility]    01/29/24 1400 -- 87 17 117/84 95 % -- None (Room air) -- [place]    01/29/24 1300 -- 95 23 135/81 95 % -- -- -- [place]    01/29/24 1200 98.9 °F (37.2 °C) 98 16 116/85 94 % -- -- -- [place]    01/29/24 1100 -- 108 20 125/78 95 % -- -- -- [place]    01/29/24 1000 -- 96 22 112/71 98 % -- -- -- [place]    01/29/24 0900 -- 110 15 125/92 99 % -- -- -- [place]    01/29/24 0800 97.6 °F (36.4 °C) 103 21 107/83 94 % -- -- -- [place]    01/29/24 0700 -- 79 14 119/75 96 % -- None (Room air) --     01/29/24 0600 -- 76 11 116/76 96 % -- None (Room air) --     01/29/24 0500 -- 76 13 113/75 95 % -- None (Room air) --     01/29/24 0400 97.6 °F (36.4 °C) 83 14 122/81 95 % -- None (Room air) --     01/29/24 0300 -- 77 14 121/70 96 % -- None (Room air) -- NA    01/29/24 0200 -- 76 12 107/85 97 % -- None (Room air) -- NA 01/29/24 0100 -- 71 19 122/74 96 % -- None (Room air) -- NA 01/29/24 0000 98.8 °F (37.1 °C) 71 13 111/84 95 % -- None (Room air) -- NA    01/28/24 2300 -- 73 13 107/67 95 % -- None (Room air) -- NA 01/28/24 2200 -- 72 15 126/84 96 % -- None (Room air) -- NA 01/28/24 2100 -- 83 14 127/85 96 % -- None (Room air) -- NA 01/28/24 2000 98.2 °F (36.8 °C) 87 24 107/78 94 % -- None (Room air) -- NA 01/28/24 1900 -- 92 18 112/75 94 % -- None (Room air) -- NA 01/28/24  1800 -- 100 22 104/71 94 % -- None (Room air) -- [place]    01/28/24 1700 -- 109 23 123/90 97 % -- None (Room air) -- [place]    01/28/24 1600 -- 100 16 127/77 95 % -- None (Room air) -- [place]    01/28/24 1500 -- 130 25 130/79 96 % -- None (Room air) -- [place]    01/28/24 1400 -- 119 14 121/94 97 % -- None (Room air) -- [place]    01/28/24 1300 -- 100 27 127/72 95 % -- None (Room air) -- [place]    01/28/24 1200 -- 99 19 124/84 97 % -- None (Room air) -- [place]    01/28/24 1100 -- 98 12 135/86 96 % -- None (Room air) -- [place]    01/28/24 1000 -- 106 19 134/117 97 % -- None (Room air) -- [place]    01/28/24 0900 -- 107 18 120/80 99 % -- None (Room air) -- MT    01/28/24 0800 -- 99 18 124/88 100 % -- None (Room air) -- MT    01/28/24 0700 -- 100 15 130/92 98 % -- None (Room air) -- MT    01/28/24 0400 -- 100 18 -- 96 % -- None (Room air) --     01/28/24 0300 -- 77 13 116/78 93 % -- None (Room air) --     01/28/24 0200 -- 88 16 128/80 98 % -- None (Room air) --     01/28/24 0100 -- 77 12 130/74 94 % -- None (Room air) --     01/28/24 0030 -- 90 15 130/88 96 % -- None (Room air) --

## 2024-01-29 NOTE — PHYSICAL THERAPY NOTE
PT orders received and chart reviewed. Patient admitted with T6 lesion with spinal cord compression. Per neurosurgery, plan for OR Tuesday with Dr. Liao. Will complete current orders and place patient on hold. Patient will need new orders and activity restrictions post-op.     Jenna Haase, PT, DPT  [facility]  Ext: [phone]

## 2024-01-29 NOTE — PROGRESS NOTES
[facility]ematology Oncology [facility] Progress Note      Patient Name: Cris Han   YOB: 1955  Medical Record Number: [id number]  Attending Physician: Mark Cid M.D.     The 21st Century Cures Act makes medical notes like these available to patients in the interest of transparency. Please be advised this is a medical document. Medical documents are intended to carry relevant information, facts as evident, and the clinical opinion of the practitioner. The medical note is intended as peer to peer communication and may appear blunt or direct. It is written in medical language and may contain abbreviations or verbiage that are unfamiliar.      Oncologic HIstory  Cris Han is a 68 year old female who on 12/12/2018 underwent right modified radical mastectomy with axillary lymph node dissection at the [facility] for a diagnosis of invasive lobular carcinoma. Pathology showed graded 2 invasive lobular carcinoma measuring 3 cm and metastasis to 13/22 lymph nodes. Immunohistochemical studies of the breast primary were as follows: estrogen receptor 95% positive, progesterone receptor 0%, Her2 1+, and Ki67 10%. Immunohistochemical studies of the axilla were as follows: estrogen receptor 95% positive, progesterone receptor 0%, Her2 not amplified by FISH, Ki67 15%. She was staged as T2N3M0 or stage IIIA.    On 01/23/2019 she began adjuvant chemotherapy with dose dense doxorubicin and cyclophosphamide followed by weekly paclitaxel. She completed therapy on 06/07/2019.     In mid 06/2019, she began adjuvant endocrine therapy with anastrozole.     From 06/18/2019 to 07/31/2019 she received adjuvant radiation therapy at [facility].     She transferred care to me in 08/2023 after the jail of Dr. Booth.    On 01/26/2024 patient presented to the ED with a 2 week history of mid back pain. She also reported a 2-3 day history of unsteadiness in her gait along with  tingling in her legs. MRI T/L spine w/wo contrast was performed which showed multiple bony metastases including a large marrow replacing metastatic lesions involving the posterior elements of T6 causing cord compression. CT chest, abdomen, pelvis on 01/27/2024 showed no visceral metastases.     History of Present Illness      Current Medications   lidocaine-menthol 4-1 % patch 1 patch  1 patch Transdermal Nightly    [COMPLETED] dexAMETHasone PF (Decadron) 10 MG/ML injection 10 mg  10 mg Intravenous Once    [COMPLETED] iopamidol 76% (ISOVUE-370) injection for power injector  80 mL Intravenous ONCE PRN    dexamethasone (Decadron) 4 MG/ML injection 4 mg  4 mg Intravenous Q6H    [COMPLETED] gadoterate meglumine (Dotarem) 7.5 MMOL/15ML injection 15 mL  15 mL Intravenous ONCE PRN    [COMPLETED] sodium chloride 0.9 % IV bolus 1,000 mL  1,000 mL Intravenous Once    anastrozole (Arimidex) tab 1 mg  1 mg Oral Daily    acetaminophen (Tylenol Extra Strength) tab 500 mg  500 mg Oral Q4H PRN    melatonin tab 3 mg  3 mg Oral Nightly PRN    polyethylene glycol (PEG 3350) (Miralax) 17 g oral packet 17 g  17 g Oral Daily PRN    sennosides (Senokot) tab 17.2 mg  17.2 mg Oral Nightly PRN    bisacodyl (Dulcolax) 10 MG rectal suppository 10 mg  10 mg Rectal Daily PRN    [COMPLETED] fleet enema (Fleet) 7-19 GM/118ML rectal enema 133 mL  1 enema Rectal Once PRN    ondansetron (Zofran) 4 MG/2ML injection 4 mg  4 mg Intravenous Q6H PRN    metoclopramide (Reglan) 5 mg/mL injection 10 mg  10 mg Intravenous Q8H PRN    [Held by provider] heparin (Porcine) 5000 UNIT/ML injection 5,000 Units  5,000 Units Subcutaneous Q8H SAMRA    [COMPLETED] gadoterate meglumine (Dotarem) 7.5 MMOL/15ML injection 15 mL  15 mL Intravenous ONCE PRN      Allergies   Ms. Han has No Known Allergies.     Vital Signs   /85   Pulse 98   Temp 97.6 °F (36.4 °C) (Temporal)   Resp 16   Wt 58.6 kg (129 lb 3.2 oz)   SpO2 94%   BMI 20.85 kg/m²     Physical  Examination   Constitutional      Well developed, well nourished. Appears close to chronological age. No apparent distress.   Head                   Normocephalic and atraumatic.  Eyes                   Conjunctiva clear; sclera anicteric.  ENMT                 External nose normal; external ears normal.  Neck                   Supple, without masses; no thyromegaly.  Hematologic/Lymphatic No cervical, supraclavicular, axillary ymphadenopathy.    Respiratory          Normal effort; no respiratory distress; lungs clear to auscultation bilaterally.  Cardiovascular     Regular rate and rhythm.  Abdomen            Non-tender; non-distended; no masses,no fluid wave; no hepatosplenomegaly.  Extremities          No lower extremity edema.  Neurologic           Motor and sensory grossly intact.  Psychiatric          Mood and affect appropriate; coherent speech; verbalizes understanding of our discussions today.    Laboratory   Recent Results (from the past 48 hour(s))   CEA    Collection Time: 01/29/24  4:20 AM   Result Value Ref Range    CEA  0.9 <=5.0 ng/mL   Cancer Antigen (Ca) 15-3    Collection Time: 01/29/24  4:20 AM   Result Value Ref Range    Cancer Ag 15-3 8.7 <=32.4 U/mL   Vitamin D, 25-Hydroxy    Collection Time: 01/29/24  4:20 AM   Result Value Ref Range    Vitamin D, 25OH, Total 78.3 30.0 - 100.0 ng/mL   Prothrombin Time (PT)    Collection Time: 01/29/24 11:23 AM   Result Value Ref Range    PT 13.0 11.6 - 14.8 seconds    INR 0.93 0.80 - 1.20   PTT, Activated    Collection Time: 01/29/24 11:23 AM   Result Value Ref Range    PTT 25.6 23.3 - 35.6 seconds   ABORH (Blood Type)    Collection Time: 01/29/24 11:23 AM   Result Value Ref Range    ABO BLOOD TYPE O     RH BLOOD TYPE Positive    Antibody Screen    Collection Time: 01/29/24 11:23 AM   Result Value Ref Range    Antibody Screen Negative      Radiology   [facility]  Department of Radiology  [address] [place] [address] [phone]       Name: Cris Han  Ord Dr: Dimas Stone MD  : 1955    Age/Sex: 68 year old Female  Pt. Phone: [phone]  Exam Date: 2024  Procedure: MRI SPINE THORACIC (W+WO) (CPT=72157)   -----------------------------------------------------------------------------------------------------------------------------------------------                  Dimas Stone MD  [address] [place] [id number]      Interpretation   PROCEDURE:  MRI SPINE THORACIC (W+WO) (CPT=72157)     COMPARISON:[facility], MRI SPINE LUMBAR (W+WO) (CPT=72158), 2024, 9:53 PM.           INDICATIONS:  Back pain and gait disturbance.  History breast cancer.     TECHNIQUE:    A variety of imaging planes and parameters were utilized for visualization of suspected pathology.  Images were performed without and with gadolinium contrast.       FINDINGS:          PARASPINAL AREA:     Normal with no visible mass.    BONES:             A marrow replacing mass involving the posterior elements of T6 and extending into the right paramedian posterior vertebral body.  Mass results in severe central narrowing with spinal cord compression.  Mass severely narrows the right-sided neural   foramina at T5-6 and T6-7.  Small lesion in the posterior elements T5.  Small lesion along the superior aspect of T11, mid T12 and left posterior T12 vertebral body near junction with pedicle.  Small lesion in the left paramedian L1 vertebral body.  A   right posterior 6th rib lesion.,  CORD:  Normal caliber, contour, and signal intensity.    DISCS: Multilevel degenerative disc disease with small right paracentral disc protrusion at T10-11 and left paracentral disc protrusion at T8-9.  Other levels with mild spondylotic disc bulges.  OTHER:             Negative.  No abnormal contrast enhancement.                =====  CONCLUSION:   1. Large marrow replacing metastatic lesion involving posterior elements of T6 and T6  vertebral body resulting in severe central stenosis and spinal cord compression.  Mass severely narrows right sided neural foramina at T5 and T6.  2. Other metastatic lesions in posterior elements of T5, T11 and T12 vertebral bodies, L1 vertebral body and right posterior 6th rib.  3. Please see separate MRI of lumbar spine.           Dictated by (CST): Ahsan Samuels MD on 1/27/2024 at 6:41 AM       Finalized by (CST): Ahsan Samuels MD on 1/27/2024 at 6:55 AM             Impression and Plan   1.   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 Will refer to radiation oncology after recovery for post-op radiation therapy.     2.   Metastatic malignancy: The imaging findings can be explained by metastatic breast cancer. Pathology from the planned surgery will confirm. ER/MI/HER by IHC should be performed on the specimen. If her disease is again hormone receptor positive and Her2 negative, the preferred systemic therapy will be fulvestrant and ribociclib. Zoledronic acid will be advised as well. Therapy will be as outpatient.     Electronically signed by:    Mark Cid M.D.  Medical Director of Oncology Services  [facility]

## 2024-01-30 ENCOUNTER — ANESTHESIA EVENT (OUTPATIENT)
Dept: SURGERY | Facility: HOSPITAL | Age: 69
End: 2024-01-30
Payer: COMMERCIAL

## 2024-01-30 ENCOUNTER — APPOINTMENT (OUTPATIENT)
Dept: GENERAL RADIOLOGY | Facility: HOSPITAL | Age: 69
End: 2024-01-30
Attending: STUDENT IN AN ORGANIZED HEALTH CARE EDUCATION/TRAINING PROGRAM
Payer: COMMERCIAL

## 2024-01-30 ENCOUNTER — ANESTHESIA (OUTPATIENT)
Dept: SURGERY | Facility: HOSPITAL | Age: 69
End: 2024-01-30
Payer: COMMERCIAL

## 2024-01-30 LAB — POCT HEMOCUE: 11.7 G/DL (ref 12–16)

## 2024-01-30 PROCEDURE — 99024 POSTOP FOLLOW-UP VISIT: CPT

## 2024-01-30 PROCEDURE — 0RG7071 FUSION OF 2 TO 7 THORACIC VERTEBRAL JOINTS WITH AUTOLOGOUS TISSUE SUBSTITUTE, POSTERIOR APPROACH, POSTERIOR COLUMN, OPEN APPROACH: ICD-10-PCS | Performed by: STUDENT IN AN ORGANIZED HEALTH CARE EDUCATION/TRAINING PROGRAM

## 2024-01-30 PROCEDURE — 00NX0ZZ RELEASE THORACIC SPINAL CORD, OPEN APPROACH: ICD-10-PCS | Performed by: STUDENT IN AN ORGANIZED HEALTH CARE EDUCATION/TRAINING PROGRAM

## 2024-01-30 PROCEDURE — 00BT0ZZ EXCISION OF SPINAL MENINGES, OPEN APPROACH: ICD-10-PCS | Performed by: STUDENT IN AN ORGANIZED HEALTH CARE EDUCATION/TRAINING PROGRAM

## 2024-01-30 PROCEDURE — 01N80ZZ RELEASE THORACIC NERVE, OPEN APPROACH: ICD-10-PCS | Performed by: STUDENT IN AN ORGANIZED HEALTH CARE EDUCATION/TRAINING PROGRAM

## 2024-01-30 PROCEDURE — 0PB40ZZ EXCISION OF THORACIC VERTEBRA, OPEN APPROACH: ICD-10-PCS | Performed by: STUDENT IN AN ORGANIZED HEALTH CARE EDUCATION/TRAINING PROGRAM

## 2024-01-30 PROCEDURE — 99024 POSTOP FOLLOW-UP VISIT: CPT | Performed by: STUDENT IN AN ORGANIZED HEALTH CARE EDUCATION/TRAINING PROGRAM

## 2024-01-30 PROCEDURE — 4A11X4G MONITORING OF PERIPHERAL NERVOUS ELECTRICAL ACTIVITY, INTRAOPERATIVE, EXTERNAL APPROACH: ICD-10-PCS | Performed by: ANESTHESIOLOGY

## 2024-01-30 PROCEDURE — 76000 FLUOROSCOPY <1 HR PHYS/QHP: CPT | Performed by: STUDENT IN AN ORGANIZED HEALTH CARE EDUCATION/TRAINING PROGRAM

## 2024-01-30 DEVICE — IMPLANTABLE DEVICE: Type: IMPLANTABLE DEVICE | Site: BACK | Status: FUNCTIONAL

## 2024-01-30 DEVICE — IMPLANTABLE DEVICE: Type: IMPLANTABLE DEVICE | Status: FUNCTIONAL

## 2024-01-30 RX ORDER — PHENYLEPHRINE HCL 10 MG/ML
VIAL (ML) INJECTION AS NEEDED
Status: DISCONTINUED | OUTPATIENT
Start: 2024-01-30 | End: 2024-01-30 | Stop reason: SURG

## 2024-01-30 RX ORDER — OXYCODONE HYDROCHLORIDE 5 MG/1
2.5 TABLET ORAL EVERY 4 HOURS PRN
Status: DISCONTINUED | OUTPATIENT
Start: 2024-01-30 | End: 2024-02-01

## 2024-01-30 RX ORDER — DIPHENHYDRAMINE HYDROCHLORIDE 50 MG/ML
25 INJECTION INTRAMUSCULAR; INTRAVENOUS EVERY 4 HOURS PRN
Status: DISCONTINUED | OUTPATIENT
Start: 2024-01-30 | End: 2024-02-05

## 2024-01-30 RX ORDER — METOCLOPRAMIDE HYDROCHLORIDE 5 MG/ML
10 INJECTION INTRAMUSCULAR; INTRAVENOUS EVERY 8 HOURS PRN
Status: DISCONTINUED | OUTPATIENT
Start: 2024-01-30 | End: 2024-02-05

## 2024-01-30 RX ORDER — ONDANSETRON 2 MG/ML
4 INJECTION INTRAMUSCULAR; INTRAVENOUS EVERY 6 HOURS PRN
Status: DISCONTINUED | OUTPATIENT
Start: 2024-01-30 | End: 2024-01-30 | Stop reason: HOSPADM

## 2024-01-30 RX ORDER — SODIUM CHLORIDE 9 MG/ML
INJECTION, SOLUTION INTRAVENOUS CONTINUOUS PRN
Status: DISCONTINUED | OUTPATIENT
Start: 2024-01-30 | End: 2024-01-30 | Stop reason: SURG

## 2024-01-30 RX ORDER — SODIUM CHLORIDE, SODIUM LACTATE, POTASSIUM CHLORIDE, CALCIUM CHLORIDE 600; 310; 30; 20 MG/100ML; MG/100ML; MG/100ML; MG/100ML
INJECTION, SOLUTION INTRAVENOUS CONTINUOUS
Status: DISCONTINUED | OUTPATIENT
Start: 2024-01-30 | End: 2024-01-30 | Stop reason: HOSPADM

## 2024-01-30 RX ORDER — HYDROMORPHONE HYDROCHLORIDE 1 MG/ML
0.6 INJECTION, SOLUTION INTRAMUSCULAR; INTRAVENOUS; SUBCUTANEOUS EVERY 5 MIN PRN
Status: DISCONTINUED | OUTPATIENT
Start: 2024-01-30 | End: 2024-01-30 | Stop reason: HOSPADM

## 2024-01-30 RX ORDER — ENEMA 19; 7 G/133ML; G/133ML
1 ENEMA RECTAL ONCE AS NEEDED
Status: DISCONTINUED | OUTPATIENT
Start: 2024-01-30 | End: 2024-02-05

## 2024-01-30 RX ORDER — LIDOCAINE HYDROCHLORIDE 20 MG/ML
INJECTION, SOLUTION EPIDURAL; INFILTRATION; INTRACAUDAL; PERINEURAL AS NEEDED
Status: DISCONTINUED | OUTPATIENT
Start: 2024-01-30 | End: 2024-01-30 | Stop reason: SURG

## 2024-01-30 RX ORDER — ROCURONIUM BROMIDE 10 MG/ML
INJECTION, SOLUTION INTRAVENOUS AS NEEDED
Status: DISCONTINUED | OUTPATIENT
Start: 2024-01-30 | End: 2024-01-30 | Stop reason: SURG

## 2024-01-30 RX ORDER — ONDANSETRON 2 MG/ML
4 INJECTION INTRAMUSCULAR; INTRAVENOUS EVERY 6 HOURS PRN
Status: DISCONTINUED | OUTPATIENT
Start: 2024-01-30 | End: 2024-02-05

## 2024-01-30 RX ORDER — BISACODYL 10 MG
10 SUPPOSITORY, RECTAL RECTAL
Status: DISCONTINUED | OUTPATIENT
Start: 2024-01-30 | End: 2024-02-05

## 2024-01-30 RX ORDER — ONDANSETRON 2 MG/ML
4 INJECTION INTRAMUSCULAR; INTRAVENOUS EVERY 6 HOURS PRN
Status: DISCONTINUED | OUTPATIENT
Start: 2024-01-30 | End: 2024-01-30

## 2024-01-30 RX ORDER — DIPHENHYDRAMINE HYDROCHLORIDE 50 MG/ML
12.5 INJECTION INTRAMUSCULAR; INTRAVENOUS EVERY 4 HOURS PRN
Status: DISCONTINUED | OUTPATIENT
Start: 2024-01-30 | End: 2024-02-02

## 2024-01-30 RX ORDER — DIPHENHYDRAMINE HCL 25 MG
25 CAPSULE ORAL EVERY 4 HOURS PRN
Status: DISCONTINUED | OUTPATIENT
Start: 2024-01-30 | End: 2024-02-05

## 2024-01-30 RX ORDER — CEFAZOLIN SODIUM/WATER 2 G/20 ML
SYRINGE (ML) INTRAVENOUS AS NEEDED
Status: DISCONTINUED | OUTPATIENT
Start: 2024-01-30 | End: 2024-01-30 | Stop reason: SURG

## 2024-01-30 RX ORDER — SODIUM CHLORIDE, SODIUM LACTATE, POTASSIUM CHLORIDE, CALCIUM CHLORIDE 600; 310; 30; 20 MG/100ML; MG/100ML; MG/100ML; MG/100ML
INJECTION, SOLUTION INTRAVENOUS CONTINUOUS PRN
Status: DISCONTINUED | OUTPATIENT
Start: 2024-01-30 | End: 2024-01-30 | Stop reason: SURG

## 2024-01-30 RX ORDER — DEXAMETHASONE SODIUM PHOSPHATE 4 MG/ML
6 VIAL (ML) INJECTION EVERY 6 HOURS
Status: DISCONTINUED | OUTPATIENT
Start: 2024-01-30 | End: 2024-01-31

## 2024-01-30 RX ORDER — HYDROMORPHONE HYDROCHLORIDE 1 MG/ML
0.2 INJECTION, SOLUTION INTRAMUSCULAR; INTRAVENOUS; SUBCUTANEOUS EVERY 5 MIN PRN
Status: DISCONTINUED | OUTPATIENT
Start: 2024-01-30 | End: 2024-01-30 | Stop reason: HOSPADM

## 2024-01-30 RX ORDER — DEXAMETHASONE SODIUM PHOSPHATE 4 MG/ML
VIAL (ML) INJECTION AS NEEDED
Status: DISCONTINUED | OUTPATIENT
Start: 2024-01-30 | End: 2024-01-30 | Stop reason: SURG

## 2024-01-30 RX ORDER — METOCLOPRAMIDE HYDROCHLORIDE 5 MG/ML
10 INJECTION INTRAMUSCULAR; INTRAVENOUS EVERY 8 HOURS PRN
Status: DISCONTINUED | OUTPATIENT
Start: 2024-01-30 | End: 2024-01-30 | Stop reason: HOSPADM

## 2024-01-30 RX ORDER — MORPHINE SULFATE 10 MG/ML
6 INJECTION, SOLUTION INTRAMUSCULAR; INTRAVENOUS EVERY 10 MIN PRN
Status: DISCONTINUED | OUTPATIENT
Start: 2024-01-30 | End: 2024-01-30 | Stop reason: HOSPADM

## 2024-01-30 RX ORDER — SENNOSIDES 8.6 MG
17.2 TABLET ORAL NIGHTLY
Status: DISCONTINUED | OUTPATIENT
Start: 2024-01-30 | End: 2024-02-05

## 2024-01-30 RX ORDER — OXYCODONE HYDROCHLORIDE 5 MG/1
5 TABLET ORAL EVERY 4 HOURS PRN
Status: DISCONTINUED | OUTPATIENT
Start: 2024-01-30 | End: 2024-02-01

## 2024-01-30 RX ORDER — METHOCARBAMOL 500 MG/1
500 TABLET, FILM COATED ORAL EVERY 6 HOURS
Status: DISCONTINUED | OUTPATIENT
Start: 2024-01-30 | End: 2024-02-05

## 2024-01-30 RX ORDER — DOCUSATE SODIUM 100 MG/1
100 CAPSULE, LIQUID FILLED ORAL 2 TIMES DAILY
Status: DISCONTINUED | OUTPATIENT
Start: 2024-01-30 | End: 2024-02-05

## 2024-01-30 RX ORDER — HYDROMORPHONE HYDROCHLORIDE 1 MG/ML
0.2 INJECTION, SOLUTION INTRAMUSCULAR; INTRAVENOUS; SUBCUTANEOUS EVERY 2 HOUR PRN
Status: DISCONTINUED | OUTPATIENT
Start: 2024-01-30 | End: 2024-02-05

## 2024-01-30 RX ORDER — MORPHINE SULFATE 4 MG/ML
2 INJECTION, SOLUTION INTRAMUSCULAR; INTRAVENOUS EVERY 10 MIN PRN
Status: DISCONTINUED | OUTPATIENT
Start: 2024-01-30 | End: 2024-01-30 | Stop reason: HOSPADM

## 2024-01-30 RX ORDER — ACETAMINOPHEN 10 MG/ML
1000 INJECTION, SOLUTION INTRAVENOUS EVERY 6 HOURS
Status: DISCONTINUED | OUTPATIENT
Start: 2024-01-30 | End: 2024-02-01

## 2024-01-30 RX ORDER — HYDROMORPHONE HYDROCHLORIDE 1 MG/ML
0.4 INJECTION, SOLUTION INTRAMUSCULAR; INTRAVENOUS; SUBCUTANEOUS EVERY 2 HOUR PRN
Status: DISCONTINUED | OUTPATIENT
Start: 2024-01-30 | End: 2024-02-05

## 2024-01-30 RX ORDER — POLYETHYLENE GLYCOL 3350 17 G/17G
17 POWDER, FOR SOLUTION ORAL DAILY PRN
Status: DISCONTINUED | OUTPATIENT
Start: 2024-01-30 | End: 2024-01-31

## 2024-01-30 RX ORDER — NALOXONE HYDROCHLORIDE 0.4 MG/ML
0.08 INJECTION, SOLUTION INTRAMUSCULAR; INTRAVENOUS; SUBCUTANEOUS
Status: DISCONTINUED | OUTPATIENT
Start: 2024-01-30 | End: 2024-02-02

## 2024-01-30 RX ORDER — VANCOMYCIN HYDROCHLORIDE 1 G/20ML
INJECTION, POWDER, LYOPHILIZED, FOR SOLUTION INTRAVENOUS AS NEEDED
Status: DISCONTINUED | OUTPATIENT
Start: 2024-01-30 | End: 2024-01-30 | Stop reason: HOSPADM

## 2024-01-30 RX ORDER — NALOXONE HYDROCHLORIDE 0.4 MG/ML
0.08 INJECTION, SOLUTION INTRAMUSCULAR; INTRAVENOUS; SUBCUTANEOUS AS NEEDED
Status: DISCONTINUED | OUTPATIENT
Start: 2024-01-30 | End: 2024-01-30 | Stop reason: HOSPADM

## 2024-01-30 RX ORDER — SCOLOPAMINE TRANSDERMAL SYSTEM 1 MG/1
PATCH, EXTENDED RELEASE TRANSDERMAL AS NEEDED
Status: DISCONTINUED | OUTPATIENT
Start: 2024-01-30 | End: 2024-01-30 | Stop reason: SURG

## 2024-01-30 RX ORDER — SODIUM CHLORIDE 9 MG/ML
INJECTION, SOLUTION INTRAVENOUS CONTINUOUS
Status: DISCONTINUED | OUTPATIENT
Start: 2024-01-30 | End: 2024-02-02

## 2024-01-30 RX ORDER — HYDROMORPHONE HYDROCHLORIDE 1 MG/ML
0.4 INJECTION, SOLUTION INTRAMUSCULAR; INTRAVENOUS; SUBCUTANEOUS EVERY 5 MIN PRN
Status: DISCONTINUED | OUTPATIENT
Start: 2024-01-30 | End: 2024-01-30 | Stop reason: HOSPADM

## 2024-01-30 RX ORDER — MORPHINE SULFATE 4 MG/ML
4 INJECTION, SOLUTION INTRAMUSCULAR; INTRAVENOUS EVERY 10 MIN PRN
Status: DISCONTINUED | OUTPATIENT
Start: 2024-01-30 | End: 2024-01-30 | Stop reason: HOSPADM

## 2024-01-30 RX ORDER — MIDAZOLAM HYDROCHLORIDE 1 MG/ML
INJECTION INTRAMUSCULAR; INTRAVENOUS AS NEEDED
Status: DISCONTINUED | OUTPATIENT
Start: 2024-01-30 | End: 2024-01-30 | Stop reason: SURG

## 2024-01-30 RX ORDER — ENOXAPARIN SODIUM 100 MG/ML
40 INJECTION SUBCUTANEOUS DAILY
Status: DISCONTINUED | OUTPATIENT
Start: 2024-01-31 | End: 2024-02-05

## 2024-01-30 RX ADMIN — LIDOCAINE HYDROCHLORIDE 80 MG: 20 INJECTION, SOLUTION EPIDURAL; INFILTRATION; INTRACAUDAL; PERINEURAL at 13:02:00

## 2024-01-30 RX ADMIN — PHENYLEPHRINE HCL 100 MCG: 10 MG/ML VIAL (ML) INJECTION at 13:22:00

## 2024-01-30 RX ADMIN — PHENYLEPHRINE HCL 100 MCG: 10 MG/ML VIAL (ML) INJECTION at 15:36:00

## 2024-01-30 RX ADMIN — PHENYLEPHRINE HCL 100 MCG: 10 MG/ML VIAL (ML) INJECTION at 17:41:00

## 2024-01-30 RX ADMIN — ROCURONIUM BROMIDE 5 MG: 10 INJECTION, SOLUTION INTRAVENOUS at 13:02:00

## 2024-01-30 RX ADMIN — CEFAZOLIN SODIUM/WATER 2 G: 2 G/20 ML SYRINGE (ML) INTRAVENOUS at 17:22:00

## 2024-01-30 RX ADMIN — PHENYLEPHRINE HCL 100 MCG: 10 MG/ML VIAL (ML) INJECTION at 14:01:00

## 2024-01-30 RX ADMIN — SODIUM CHLORIDE: 9 INJECTION, SOLUTION INTRAVENOUS at 13:08:00

## 2024-01-30 RX ADMIN — SODIUM CHLORIDE, SODIUM LACTATE, POTASSIUM CHLORIDE, CALCIUM CHLORIDE: 600; 310; 30; 20 INJECTION, SOLUTION INTRAVENOUS at 14:57:00

## 2024-01-30 RX ADMIN — MIDAZOLAM HYDROCHLORIDE 2 MG: 1 INJECTION INTRAMUSCULAR; INTRAVENOUS at 12:58:00

## 2024-01-30 RX ADMIN — PHENYLEPHRINE HCL 100 MCG: 10 MG/ML VIAL (ML) INJECTION at 15:12:00

## 2024-01-30 RX ADMIN — PHENYLEPHRINE HCL 50 MCG: 10 MG/ML VIAL (ML) INJECTION at 14:53:00

## 2024-01-30 RX ADMIN — SCOLOPAMINE TRANSDERMAL SYSTEM 1 PATCH: 1 PATCH, EXTENDED RELEASE TRANSDERMAL at 12:52:00

## 2024-01-30 RX ADMIN — SODIUM CHLORIDE: 9 INJECTION, SOLUTION INTRAVENOUS at 17:46:00

## 2024-01-30 RX ADMIN — SODIUM CHLORIDE, SODIUM LACTATE, POTASSIUM CHLORIDE, CALCIUM CHLORIDE: 600; 310; 30; 20 INJECTION, SOLUTION INTRAVENOUS at 12:50:00

## 2024-01-30 RX ADMIN — PHENYLEPHRINE HCL 100 MCG: 10 MG/ML VIAL (ML) INJECTION at 17:12:00

## 2024-01-30 RX ADMIN — SODIUM CHLORIDE: 9 INJECTION, SOLUTION INTRAVENOUS at 14:57:00

## 2024-01-30 RX ADMIN — CEFAZOLIN SODIUM/WATER 1 G: 2 G/20 ML SYRINGE (ML) INTRAVENOUS at 13:22:00

## 2024-01-30 RX ADMIN — SODIUM CHLORIDE: 9 INJECTION, SOLUTION INTRAVENOUS at 14:07:00

## 2024-01-30 RX ADMIN — PHENYLEPHRINE HCL 100 MCG: 10 MG/ML VIAL (ML) INJECTION at 17:53:00

## 2024-01-30 RX ADMIN — PHENYLEPHRINE HCL 100 MCG: 10 MG/ML VIAL (ML) INJECTION at 16:09:00

## 2024-01-30 RX ADMIN — SODIUM CHLORIDE, SODIUM LACTATE, POTASSIUM CHLORIDE, CALCIUM CHLORIDE: 600; 310; 30; 20 INJECTION, SOLUTION INTRAVENOUS at 17:44:00

## 2024-01-30 RX ADMIN — DEXAMETHASONE SODIUM PHOSPHATE 6 MG: 4 MG/ML VIAL (ML) INJECTION at 14:30:00

## 2024-01-30 NOTE — PLAN OF CARE
Patient A&O x4. On RA, saturating well. PRN Tylenol administered for pain, w/ relief. All other meds given per MAR. Neuro q2 per flowsheets. Patient up to bathroom via rolling chair. One BM this shift. Family at bedside present during rounds and very supportive. All safety precautions maintained.   Patient scheduled for procedure tomorrow w/ Dr. Liao- consent in pt's chart. NPO after midnight.   Problem: Patient Centered Care  Goal: Patient preferences are identified and integrated in the patient's plan of care  Description: Interventions:  - What would you like us to know as we care for you? From home with   - Provide timely, complete, and accurate information to patient/family  - Incorporate patient and family knowledge, values, beliefs, and cultural backgrounds into the planning and delivery of care  - Encourage patient/family to participate in care and decision-making at the level they choose  - Honor patient and family perspectives and choices  Outcome: Progressing     Problem: PAIN - ADULT  Goal: Verbalizes/displays adequate comfort level or patient's stated pain goal  Description: INTERVENTIONS:  - Encourage pt to monitor pain and request assistance  - Assess pain using appropriate pain scale  - Administer analgesics based on type and severity of pain and evaluate response  - Implement non-pharmacological measures as appropriate and evaluate response  - Consider cultural and social influences on pain and pain management  - Manage/alleviate anxiety  - Utilize distraction and/or relaxation techniques  - Monitor for opioid side effects  - Notify MD/LIP if interventions unsuccessful or patient reports new pain  - Anticipate increased pain with activity and pre-medicate as appropriate  Outcome: Progressing     Problem: CARDIOVASCULAR - ADULT  Goal: Maintains optimal cardiac output and hemodynamic stability  Description: INTERVENTIONS:  - Monitor vital signs, rhythm, and trends  - Monitor for bleeding,  hypotension and signs of decreased cardiac output  - Evaluate effectiveness of vasoactive medications to optimize hemodynamic stability  - Monitor arterial and/or venous puncture sites for bleeding and/or hematoma  - Assess quality of pulses, skin color and temperature  - Assess for signs of decreased coronary artery perfusion - ex. Angina  - Evaluate fluid balance, assess for edema, trend weights  Outcome: Progressing     Problem: METABOLIC/FLUID AND ELECTROLYTES - ADULT  Goal: Electrolytes maintained within normal limits  Description: INTERVENTIONS:  - Monitor labs and rhythm and assess patient for signs and symptoms of electrolyte imbalances  - Administer electrolyte replacement as ordered  - Monitor response to electrolyte replacements, including rhythm and repeat lab results as appropriate  - Fluid restriction as ordered  - Instruct patient on fluid and nutrition restrictions as appropriate  Outcome: Progressing

## 2024-01-30 NOTE — ANESTHESIA PROCEDURE NOTES
Airway  Date/Time: 1/30/2024 1:06 PM  Urgency: Elective      General Information and Staff    Patient location during procedure: OR  Anesthesiologist: Mercedez Murphy MD  Resident/CRNA: Lulú Forrester CRNA  Performed: CRNA   Performed by: Lulú Forrester CRNA  Authorized by: Mercedez Murphy MD      Indications and Patient Condition  Indications for airway management: anesthesia  Sedation level: deep  Preoxygenated: yes  Patient position: sniffing  MILS maintained throughout  Mask difficulty assessment: 0 - not attempted    Final Airway Details  Final airway type: endotracheal airway      Successful airway: ETT  Cuffed: yes   Successful intubation technique: Video laryngoscopy  Endotracheal tube insertion site: oral  Blade: GlideScope  Blade size: #3  ETT size (mm): 7.5    Cormack-Lehane Classification: grade I - full view of glottis  Placement verified by: capnometry   Measured from: teeth  ETT to teeth (cm): 21  Number of attempts at approach: 1    Additional Comments  Easy, atraumatic glidescope intubation with MILS, c-spine neutral throughout. Soft bite blocks to molars with lips and tongue clear of pressure.

## 2024-01-30 NOTE — ANESTHESIA PROCEDURE NOTES
Peripheral IV  Date/Time: 1/30/2024 1:08 PM  Inserted by: Mercedez Murphy MD    Placement  Needle size: 18 G  Laterality: left  Location: forearm  Local anesthetic: under anesthesia.  Site prep: alcohol  Technique: anatomical landmarks  Attempts: 1

## 2024-01-30 NOTE — PROGRESS NOTES
[facility] Progress Note     CC: Hospital Follow up    PCP: No primary care provider on file.       Assessment/Plan:   68 year old male with history of Stage III ER+ VA- HER2 - invasive lobular carcinoma of the breast, treated with with right mastectomy and adjuvant chemo, initially diagnosed 2018, who is here for evaluation of back pain and lower extremity parasthesias, with the pain starting 3 weeks ago.     Bone metastasis  Spinal cord compression, secondary to metastatic cancer, symptomatic on admission   Hx of Breast cancer, concern for recurrence   -concern for recurrence of breast cancer, needs tissue diagnosis. ER/VA/HER should be done on specimen  -started on steroids with decadron 4mg Q6hr on 1/27/24  -complete imaging, MRI cervical spine and brain done. CT c/a/p reviewed  -neurosurgery evaluation appreciated, plan for OR today   -oncology consulted. May need radiation onc referral post op  -to OR today for Laminectomy and fusion, see neurosurgery note for details  -no nsaids or anticoagulation at this time   -will follow post operatively     DVT prophylaxis: hold   Code status: FULL     Asrar Marine ALSTON  [facility] Hospitalist        Subjective:     Doing well. Family in room. She is comfortable with surgery today. Discussed post operative plans and reassured her. In good spirits.     OBJECTIVE:    Blood pressure 133/80, pulse 82, temperature 97.8 °F (36.6 °C), temperature source Temporal, resp. rate 19, weight 129 lb 3.2 oz (58.6 kg), SpO2 99%.    Temp:  [97.1 °F (36.2 °C)-98.9 °F (37.2 °C)] 97.8 °F (36.6 °C)  Pulse:  [] 82  Resp:  [13-24] 19  BP: (105-142)/(65-93) 133/80  SpO2:  [94 %-99 %] 99 %      Intake/Output:    Intake/Output Summary (Last 24 hours) at 1/30/2024 0955  Last data filed at 1/30/2024 0600  Gross per 24 hour   Intake 120 ml   Output --   Net 120 ml       Last 3 Weights   01/27/24 0000 129 lb 3.2 oz (58.6 kg)   01/26/24 2009 132 lb (59.9 kg)    08/04/23 1053 128 lb (58.1 kg)   02/15/23 1032 129 lb (58.5 kg)       /80 (BP Location: Left arm)   Pulse 82   Temp 97.8 °F (36.6 °C) (Temporal)   Resp 19   Wt 129 lb 3.2 oz (58.6 kg)   SpO2 99%   BMI 20.85 kg/m²   General: Alert, no acute distress  Lungs: clear to ausculation bilaterally  Heart: Regular rate and rhythm  Abdomen: soft, non tender  Extremities: No edema  Neuro:5/5 strength in bilateral extremities, normal sensation in extremities    Data Review:       Labs:     Recent Labs   Lab 01/26/24 2003 01/27/24  0551   RBC 4.99 4.90   HGB 13.8 13.4   HCT 41.9 40.7   MCV 84.0 83.1   MCH 27.7 27.3   MCHC 32.9 32.9   RDW 12.5 12.5   NEPRELIM 6.02 4.04   WBC 8.1 6.9   .0 249.0         Recent Labs   Lab 01/26/24 2003 01/27/24  0551   * 85   BUN 16 10   CREATSERUM 0.80 0.72   EGFRCR 81 92   CA 10.0 9.5   * 140   K 3.7 4.3   CL 94* 107   CO2 26.0 26.0       Recent Labs   Lab 01/26/24 2003 01/27/24  0551   ALT 22 18   AST 27 23   ALB 4.8 4.3         Imaging:  CT SPINE THORACIC (CPT=72128)    Result Date: 1/29/2024  CONCLUSION: Multifocal lytic thoracic spine lesions most significant at T6.    Dictated by (CST): Ministerio Pina MD on 1/29/2024 at 6:03 PM     Finalized by (CST): Ministerio Pina MD on 1/29/2024 at 6:09 PM          MRI BRAIN (W+WO) (CPT=70553)    Result Date: 1/28/2024  CONCLUSION:  1. No acute intracranial process is identified.  2. Lesser incidental findings as above.    A preliminary report was issued by the [facility] teleradiology service. There are no major discrepancies.   elm-remote.   Dictated by (CST): Otilio Pratt MD on 1/28/2024 at 10:38 AM     Finalized by (CST): Otilio Pratt MD on 1/28/2024 at 10:42 AM          MRI SPINE CERVICAL (W+WO) (CPT=72156)    Result Date: 1/28/2024  CONCLUSION:  1. There is an enhancing lesion involving the right pedicle and facet at C4 with extraosseous extension into the right foramen transverse area and right foraminal  region. There is partial encasement of the V2 segment of the right vertebral artery which otherwise appears unremarkable.  2. Asymmetric right foraminal stenosis at the C3-C4 level.   3. Additional multilevel degenerative disc disease as detailed above.  4. No abnormal intrinsic cord signal intensity or evidence of cord compression.  5. No intramedullary lesions are detected.  6. Lesser incidental findings as above.    A preliminary report was issued by the [facility] teleradiology service. There are no major discrepancies.  elm-remote.  Dictated by (CST): Otilio Pratt MD on 1/28/2024 at 10:17 AM     Finalized by (CST): Otilio Pratt MD on 1/28/2024 at 10:28 AM          CT CHEST+ABDOMEN+PELVIS(ALL CNTRST ONLY)(CPT=71260/18290)    Result Date: 1/27/2024  CONCLUSION:   Widespread lytic osseous metastases as described.  Lytic destructive lesion with a soft tissue mass at T6 resulting in severe narrowing of the canal and mass effect upon the cord is grossly unchanged since 1/27/2014.  No intra-abdominal or intrathoracic metastases.  Large amount of excessive stool seen throughout the colon suggestive of constipation. No obstruction.  Diverticulosis without diverticulitis.  Multiple other incidental findings as described in the body of the report.    Dictated by (CST): Valentín Pierre MD on 1/27/2024 at 3:55 PM     Finalized by (CST): Valentín Pierre MD on 1/27/2024 at 4:03 PM             Meds:      lidocaine-menthol  1 patch Transdermal Nightly    dexamethasone  4 mg Intravenous Q6H    anastrozole  1 mg Oral Daily    [Held by provider] heparin  5,000 Units Subcutaneous Q8H SAMRA       HYDROmorphone **OR** HYDROmorphone **OR** HYDROmorphone, acetaminophen, melatonin, polyethylene glycol (PEG 3350), sennosides, bisacodyl, ondansetron, metoclopramide

## 2024-01-30 NOTE — PLAN OF CARE
Up to the rolling commode to the bathroom. Unable to walk more than a couple steps R/T pain/weakness. NPO since midnight for surgery today. Vitals WNL throughout night.    Problem: PAIN - ADULT  Goal: Verbalizes/displays adequate comfort level or patient's stated pain goal  Description: INTERVENTIONS:  - Encourage pt to monitor pain and request assistance  - Assess pain using appropriate pain scale  - Administer analgesics based on type and severity of pain and evaluate response  - Implement non-pharmacological measures as appropriate and evaluate response  - Consider cultural and social influences on pain and pain management  - Manage/alleviate anxiety  - Utilize distraction and/or relaxation techniques  - Monitor for opioid side effects  - Notify MD/LIP if interventions unsuccessful or patient reports new pain  - Anticipate increased pain with activity and pre-medicate as appropriate  Outcome: Progressing     Problem: RISK FOR INFECTION - ADULT  Goal: Absence of fever/infection during anticipated neutropenic period  Description: INTERVENTIONS  - Monitor WBC  - Administer growth factors as ordered  - Implement neutropenic guidelines  Outcome: Progressing     Problem: SAFETY ADULT - FALL  Goal: Free from fall injury  Description: INTERVENTIONS:  - Assess pt frequently for physical needs  - Identify cognitive and physical deficits and behaviors that affect risk of falls.  - [place] fall precautions as indicated by assessment.  - Educate pt/family on patient safety including physical limitations  - Instruct pt to call for assistance with activity based on assessment  - Modify environment to reduce risk of injury  - Provide assistive devices as appropriate  - Consider OT/PT consult to assist with strengthening/mobility  - Encourage toileting schedule  Outcome: Progressing     Problem: COPING  Goal: Pt/Family able to verbalize concerns and demonstrate effective coping strategies  Description: INTERVENTIONS:  - Assist  patient/family to identify coping skills, available support systems and cultural and spiritual values  - Provide emotional support, including active listening and acknowledgement of concerns of patient and caregivers  - Reduce environmental stimuli, as able  - Instruct patient/family in relaxation techniques, as appropriate  - Assess for spiritual and psychosocial needs and initiate Spiritual Care or Behavioral Health consult as needed  Outcome: Progressing     Problem: CARDIOVASCULAR - ADULT  Goal: Maintains optimal cardiac output and hemodynamic stability  Description: INTERVENTIONS:  - Monitor vital signs, rhythm, and trends  - Monitor for bleeding, hypotension and signs of decreased cardiac output  - Evaluate effectiveness of vasoactive medications to optimize hemodynamic stability  - Monitor arterial and/or venous puncture sites for bleeding and/or hematoma  - Assess quality of pulses, skin color and temperature  - Assess for signs of decreased coronary artery perfusion - ex. Angina  - Evaluate fluid balance, assess for edema, trend weights  Outcome: Progressing  Goal: Absence of cardiac arrhythmias or at baseline  Description: INTERVENTIONS:  - Continuous cardiac monitoring, monitor vital signs, obtain 12 lead EKG if indicated  - Evaluate effectiveness of antiarrhythmic and heart rate control medications as ordered  - Initiate emergency measures for life threatening arrhythmias  - Monitor electrolytes and administer replacement therapy as ordered  Outcome: Progressing     Problem: GASTROINTESTINAL - ADULT  Goal: Minimal or absence of nausea and vomiting  Description: INTERVENTIONS:  - Maintain adequate hydration with IV or PO as ordered and tolerated  - Nasogastric tube to low intermittent suction as ordered  - Evaluate effectiveness of ordered antiemetic medications  - Provide nonpharmacologic comfort measures as appropriate  - Advance diet as tolerated, if ordered  - Obtain nutritional consult as needed  -  Evaluate fluid balance  Outcome: Progressing  Goal: Maintains or returns to baseline bowel function  Description: INTERVENTIONS:  - Assess bowel function  - Maintain adequate hydration with IV or PO as ordered and tolerated  - Evaluate effectiveness of GI medications  - Encourage mobilization and activity  - Obtain nutritional consult as needed  - Establish a toileting routine/schedule  - Consider collaborating with pharmacy to review patient's medication profile  Outcome: Progressing     Problem: METABOLIC/FLUID AND ELECTROLYTES - ADULT  Goal: Electrolytes maintained within normal limits  Description: INTERVENTIONS:  - Monitor labs and rhythm and assess patient for signs and symptoms of electrolyte imbalances  - Administer electrolyte replacement as ordered  - Monitor response to electrolyte replacements, including rhythm and repeat lab results as appropriate  - Fluid restriction as ordered  - Instruct patient on fluid and nutrition restrictions as appropriate  Outcome: Progressing     Problem: HEMATOLOGIC - ADULT  Goal: Maintains hematologic stability  Description: INTERVENTIONS  - Assess for signs and symptoms of bleeding or hemorrhage  - Monitor labs and vital signs for trends  - Administer supportive blood products/factors, fluids and medications as ordered and appropriate  - Administer supportive blood products/factors as ordered and appropriate  Outcome: Progressing  Goal: Free from bleeding injury  Description: (Example usage: patient with low platelets)  INTERVENTIONS:  - Avoid intramuscular injections, enemas and rectal medication administration  - Ensure safe mobilization of patient  - Hold pressure on venipuncture sites to achieve adequate hemostasis  - Assess for signs and symptoms of internal bleeding  - Monitor lab trends  - Patient is to report abnormal signs of bleeding to staff  - Avoid use of toothpicks and dental floss  - Use electric shaver for shaving  - Use soft bristle tooth brush  - Limit  straining and forceful nose blowing  Outcome: Progressing     Problem: MUSCULOSKELETAL - ADULT  Goal: Maintain proper alignment of affected body part  Description: INTERVENTIONS:  - Support and protect limb and body alignment per provider's orders  - Instruct and reinforce with patient and family use of appropriate assistive device and precautions (e.g. spinal or hip dislocation precautions)  Outcome: Progressing     Problem: MUSCULOSKELETAL - ADULT  Goal: Return mobility to safest level of function  Description: INTERVENTIONS:  - Assess patient stability and activity tolerance for standing, transferring and ambulating w/ or w/o assistive devices  - Assist with transfers and ambulation using safe patient handling equipment as needed  - Ensure adequate protection for wounds/incisions during mobilization  - Obtain PT/OT consults as needed  - Advance activity as appropriate  - Communicate ordered activity level and limitations with patient/family  Outcome: Not Progressing

## 2024-01-30 NOTE — PROGRESS NOTES
[facility]  Neurological Surgery Progress Note    Aphrodite SOO Han Patient Status:  Inpatient    1955 MRN [id number]   Location [facility] 2W/SW Attending Lawanda Hawthorne DO   Hosp Day # 4 PCP No primary care provider on file.     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 Feels well, has no new complaints. Family at bedside.     OBJECTIVE/PHYSICAL EXAM     VITALS:  /80 (BP Location: Left arm)   Pulse 82   Temp 97.8 °F (36.6 °C) (Temporal)   Resp 19   Wt 129 lb 3.2 oz (58.6 kg)   SpO2 99%   BMI 20.85 kg/m²     GENERAL: No acute distress, non-toxic appearing, mood appropriate    HEENT: Normocephalic, atraumatic    RESP:  Respirations non-labored, even    CV:  NSR on tele    NEURO: Alert and oriented x3.  Speech clear, fluent. Able to follow commands.  Comprehension intact. CN 2-12 grossly intact.  Sensation to light touch is intact to bilateral upper and lower extremities.  KATZ x 4. Gait deferred.     UPPER EXTREMITY STRENGTH:    Deltoid  Biceps  Triceps   W.flexion  W.extension    Finger abduction     Right 5 5 5 5  5 5 5     Left 5 5 5 5 5 5 5     LOWER EXTREMITY STRENGTH:    Iliospoas  Hamstrings  Quads  D-flexion  P-flexion EHL     Right 5 5 5 5 5 5     Left 5 5 5 5 5 5     LABS     Lab Results   Component Value Date    PTT 25.6 2024    INR 0.93 2024    PTP 13.0 2024     IMAGING     CT SPINE THORACIC (CPT=72128)    Result Date: 2024  CONCLUSION: Multifocal lytic thoracic spine lesions most significant at T6.    Dictated by (CST): Ministerio Pina MD on 2024 at 6:03 PM     Finalized by (CST): Ministerio Pina MD on 2024 at 6:09 PM           ASSESSMENT & PLAN     ASSESSMENT:  Thoracic spinal cord compression secondary to T6 epidural mass  Metastatic breast cancer     PLAN:  Proceed to OR for T5-T7  laminectomies for decompression of spinal cord; right T6-T7 tumor resection; T4-T8 posterior fixation and fusion by Dr. Liao  NPO since midnight  Pre-op labs WNL  Heparin held   Consents on chart  Continue decadron 4mg Q6H  Medical management per hospitalist and critical care team   PT/OT, OOB as tolerated    Discussed above with Dr. Liao.    SHARMILA Brown   [facility]  1/30/2024, 8:20 AM      A total of 15 minutes of visit time (exclusible of billable procedures) was administered.  >50 % of time spent counseling/coordinating care.  Is this a shared or split note between Advanced Practice Provider and Physician? No  I interviewed and examined Ms. Han and independently reviewed her imaging studies. I reviewed her clinical information with KEON Romero and agree with her history and physical examination findings, assessment, and plan as detailed above, with any exceptions detailed below.     Ms. Han is a pleasant 68-year-old woman with breast cancer diagnosed in 2018 now found to have thoracic spine involvement with compression of the thoracic spinal cord.  She has intractable pain and signs and symptoms of spinal cord compression, such as balance issues, weakness upon weightbearing despite being full strength in every muscle group when independently tested.  She remains on IV steroids and full strength on examination.  Her spinal imaging was reviewed in detail with the patient.  This reveals a mostly dorsal epidural mass involving the T6 lamina and expanding into the bilateral T6 transverse processes, right T6 pedicle, right T6 vertebral body, inferiorly extending into the right T6-7 joint and T7 pedicle as well.  This leads to spinal cord compression spanning the bottom of T5 to the top of T7.  CT scan demonstrates an osteolytic process, with erosion of the posterior elements spanning T6-T7.  Given her circumstances, I favor decompressive surgery coupled with  fixation to prevent further destabilization of the spine.  I propose T5-7 laminectomies for tumor resection and decompression of spinal cord, right T6 and T7 transpedicular approaches for resection of tumor involving the vertebral bodies and decompression of nerve roots, T4-T8 posterior fixation and fusion.     I went over the risks and associated outcomes of surgery with Ms. Han using terms she could comprehend. The risks discussed included but were not limited to neurologic injury (spinal cord or nerve root injury), infection (ranging from superficial wound infection to thoracic vertebral osteomyelitis), intraoperative bleeding from blood vessel injury (which could lead to a stroke or death from exsanguination), a postoperative hemorrhage (which could lead to neurologic compromise), a dural tear (which could lead to a pseudomeningocele or a CSF fistula despite intraoperative repair), hardware malpositioning or migration (which may require corrective surgery), non-union or pseudoarthrosis (which may necessitate a reoperation), substantial blood loss (which could require a transfusion), acute myocardial infarction, venous thromboembolism, pneumonia, urinary tract infection or death from complications related to anesthesia. Ms. Han expressed her understanding of the above and has elected to proceed with surgery which is scheduled for today, 1/30/2024.     Cleveland Liao MD  Neurological Surgery    [address], [address]  [place] [address] [phone]  Pager [phone]  1/30/2024 12:19 PM      This note was created using a voice-recognition transcribing system. Incorrect words or phrases may have been missed during proofreading. Please interpret accordingly.

## 2024-01-30 NOTE — PLAN OF CARE
NPO. Surgery today. Consents signed and on chart. Family at bedside. Updated on plan of care.   Problem: Patient Centered Care  Goal: Patient preferences are identified and integrated in the patient's plan of care  Description: Interventions:  - What would you like us to know as we care for you? From home with   - Provide timely, complete, and accurate information to patient/family  - Incorporate patient and family knowledge, values, beliefs, and cultural backgrounds into the planning and delivery of care  - Encourage patient/family to participate in care and decision-making at the level they choose  - Honor patient and family perspectives and choices  Outcome: Progressing     Problem: Patient/Family Goals  Goal: Patient/Family Long Term Goal  Description: Patient's Long Term Goal:     Interventions:  -   - See additional Care Plan goals for specific interventions  Outcome: Progressing  Goal: Patient/Family Short Term Goal  Description: Patient's Short Term Goal:     Interventions:   -   - See additional Care Plan goals for specific interventions  Outcome: Progressing     Problem: PAIN - ADULT  Goal: Verbalizes/displays adequate comfort level or patient's stated pain goal  Description: INTERVENTIONS:  - Encourage pt to monitor pain and request assistance  - Assess pain using appropriate pain scale  - Administer analgesics based on type and severity of pain and evaluate response  - Implement non-pharmacological measures as appropriate and evaluate response  - Consider cultural and social influences on pain and pain management  - Manage/alleviate anxiety  - Utilize distraction and/or relaxation techniques  - Monitor for opioid side effects  - Notify MD/LIP if interventions unsuccessful or patient reports new pain  - Anticipate increased pain with activity and pre-medicate as appropriate  Outcome: Progressing     Problem: RISK FOR INFECTION - ADULT  Goal: Absence of fever/infection during anticipated neutropenic  period  Description: INTERVENTIONS  - Monitor WBC  - Administer growth factors as ordered  - Implement neutropenic guidelines  Outcome: Progressing     Problem: SAFETY ADULT - FALL  Goal: Free from fall injury  Description: INTERVENTIONS:  - Assess pt frequently for physical needs  - Identify cognitive and physical deficits and behaviors that affect risk of falls.  - [place] fall precautions as indicated by assessment.  - Educate pt/family on patient safety including physical limitations  - Instruct pt to call for assistance with activity based on assessment  - Modify environment to reduce risk of injury  - Provide assistive devices as appropriate  - Consider OT/PT consult to assist with strengthening/mobility  - Encourage toileting schedule  Outcome: Progressing     Problem: COPING  Goal: Pt/Family able to verbalize concerns and demonstrate effective coping strategies  Description: INTERVENTIONS:  - Assist patient/family to identify coping skills, available support systems and cultural and spiritual values  - Provide emotional support, including active listening and acknowledgement of concerns of patient and caregivers  - Reduce environmental stimuli, as able  - Instruct patient/family in relaxation techniques, as appropriate  - Assess for spiritual and psychosocial needs and initiate Spiritual Care or Behavioral Health consult as needed  Outcome: Progressing     Problem: CARDIOVASCULAR - ADULT  Goal: Maintains optimal cardiac output and hemodynamic stability  Description: INTERVENTIONS:  - Monitor vital signs, rhythm, and trends  - Monitor for bleeding, hypotension and signs of decreased cardiac output  - Evaluate effectiveness of vasoactive medications to optimize hemodynamic stability  - Monitor arterial and/or venous puncture sites for bleeding and/or hematoma  - Assess quality of pulses, skin color and temperature  - Assess for signs of decreased coronary artery perfusion - ex. Angina  - Evaluate fluid  balance, assess for edema, trend weights  Outcome: Progressing  Goal: Absence of cardiac arrhythmias or at baseline  Description: INTERVENTIONS:  - Continuous cardiac monitoring, monitor vital signs, obtain 12 lead EKG if indicated  - Evaluate effectiveness of antiarrhythmic and heart rate control medications as ordered  - Initiate emergency measures for life threatening arrhythmias  - Monitor electrolytes and administer replacement therapy as ordered  Outcome: Progressing     Problem: GASTROINTESTINAL - ADULT  Goal: Minimal or absence of nausea and vomiting  Description: INTERVENTIONS:  - Maintain adequate hydration with IV or PO as ordered and tolerated  - Nasogastric tube to low intermittent suction as ordered  - Evaluate effectiveness of ordered antiemetic medications  - Provide nonpharmacologic comfort measures as appropriate  - Advance diet as tolerated, if ordered  - Obtain nutritional consult as needed  - Evaluate fluid balance  Outcome: Progressing  Goal: Maintains or returns to baseline bowel function  Description: INTERVENTIONS:  - Assess bowel function  - Maintain adequate hydration with IV or PO as ordered and tolerated  - Evaluate effectiveness of GI medications  - Encourage mobilization and activity  - Obtain nutritional consult as needed  - Establish a toileting routine/schedule  - Consider collaborating with pharmacy to review patient's medication profile  Outcome: Progressing     Problem: METABOLIC/FLUID AND ELECTROLYTES - ADULT  Goal: Electrolytes maintained within normal limits  Description: INTERVENTIONS:  - Monitor labs and rhythm and assess patient for signs and symptoms of electrolyte imbalances  - Administer electrolyte replacement as ordered  - Monitor response to electrolyte replacements, including rhythm and repeat lab results as appropriate  - Fluid restriction as ordered  - Instruct patient on fluid and nutrition restrictions as appropriate  Outcome: Progressing     Problem: HEMATOLOGIC  - ADULT  Goal: Maintains hematologic stability  Description: INTERVENTIONS  - Assess for signs and symptoms of bleeding or hemorrhage  - Monitor labs and vital signs for trends  - Administer supportive blood products/factors, fluids and medications as ordered and appropriate  - Administer supportive blood products/factors as ordered and appropriate  Outcome: Progressing  Goal: Free from bleeding injury  Description: (Example usage: patient with low platelets)  INTERVENTIONS:  - Avoid intramuscular injections, enemas and rectal medication administration  - Ensure safe mobilization of patient  - Hold pressure on venipuncture sites to achieve adequate hemostasis  - Assess for signs and symptoms of internal bleeding  - Monitor lab trends  - Patient is to report abnormal signs of bleeding to staff  - Avoid use of toothpicks and dental floss  - Use electric shaver for shaving  - Use soft bristle tooth brush  - Limit straining and forceful nose blowing  Outcome: Progressing     Problem: MUSCULOSKELETAL - ADULT  Goal: Return mobility to safest level of function  Description: INTERVENTIONS:  - Assess patient stability and activity tolerance for standing, transferring and ambulating w/ or w/o assistive devices  - Assist with transfers and ambulation using safe patient handling equipment as needed  - Ensure adequate protection for wounds/incisions during mobilization  - Obtain PT/OT consults as needed  - Advance activity as appropriate  - Communicate ordered activity level and limitations with patient/family  Outcome: Progressing  Goal: Maintain proper alignment of affected body part  Description: INTERVENTIONS:  - Support and protect limb and body alignment per provider's orders  - Instruct and reinforce with patient and family use of appropriate assistive device and precautions (e.g. spinal or hip dislocation precautions)  Outcome: Progressing

## 2024-01-30 NOTE — ANESTHESIA PREPROCEDURE EVALUATION
Anesthesia PreOp Note    HPI:     Cris Han is a 68 year old female who presents for preoperative consultation requested by: Cleveland Liao MD    Date of Surgery: 1/30/2024    Procedure(s):  T5 - T7 laminectomy, resection of tumor, T5 - T8 posterior fixation and fusion  THORACIC SPINAL FUSION 3 LEVEL  SPINAL THORACIC TUMOR REMOVAL  Indication: Spinal cord compression, metastatic carcinoma    Relevant Problems   No relevant active problems       NPO:                         History Review:  Patient Active Problem List    Diagnosis Date Noted    Carcinoma of right breast metastatic to bone (HCC) 01/29/2024    Thoracic radiculopathy 01/29/2024    Spinal instability of thoracic region 01/29/2024    Thoracic myelopathy 01/29/2024    Cord compression (HCC) 01/27/2024    History of breast cancer 01/27/2024    Pain from bone metastases (HCC) 01/27/2024    Hyponatremia 01/26/2024    Gait disturbance 01/26/2024    Acute midline thoracic back pain 01/26/2024    Age-related osteoporosis without current pathological fracture 02/05/2020    Vitamin D deficiency 01/03/2020    Vitamin B deficiency 01/03/2020    Invasive lobular carcinoma of breast, stage 3, right (HCC) 05/16/2019    Anemia due to antineoplastic chemotherapy 04/10/2019    Chemotherapy management, encounter for 02/20/2019    Encounter for care related to Port-a-Cath 01/23/2019    Carcinoma of overlapping sites of right breast in female, estrogen receptor positive  (HCC) 01/09/2019       Past Medical History:   Diagnosis Date    Breast cancer (HCC)     right breast    High blood pressure        Past Surgical History:   Procedure Laterality Date    MASTECTOMY RIGHT         Medications Prior to Admission   Medication Sig Dispense Refill Last Dose    anastrozole 1 MG Oral Tab tab Take 1 tablet (1 mg total) by mouth daily. 90 tablet 2 1/26/2024 at 0700    Zinc 50 MG Oral Cap Take 50 mg by mouth as needed.   Past Week    Kelp 0.15 MG Oral Tab Take by mouth as  needed.   Past Week    CHLOROPHYLL OR Take by mouth as needed.   Past Week    Magnesium Hydroxide (MAGNESIA OR) Take by mouth.   Past Week    Ascorbic Acid (VITAMIN C) 1000 MG Oral Tab Take 1 tablet (1,000 mg total) by mouth daily.   Past Week    Vitamin D3, Cholecalciferol, 125 MCG (5000 UT) Oral Cap Take 1 capsule (5,000 Units total) by mouth daily.   Past Week    B Complex Vitamins (B COMPLEX OR) Take by mouth daily.   Past Week    Omega-3 1000 MG Oral Cap Take by mouth.   Past Week    Turmeric 1053 MG Oral Tab Take 1,250 mg by mouth daily.   Past Week    Alpha-Lipoic Acid 100 MG Oral Cap Take 1 capsule (100 mg total) by mouth daily. (Patient not taking: Reported on 1/27/2024)   Not Taking    Flaxseed, Linseed, (FLAX SEEDS OR) Take by mouth. (Patient not taking: Reported on 1/27/2024)   Not Taking     Current Facility-Administered Medications Ordered in Epic   Medication Dose Route Frequency Provider Last Rate Last Admin    HYDROmorphone (Dilaudid) 1 MG/ML injection 0.2 mg  0.2 mg Intravenous Q2H PRN Cleveland Liao MD        Or    HYDROmorphone (Dilaudid) 1 MG/ML injection 0.4 mg  0.4 mg Intravenous Q2H PRN Cleveland Liao MD        Or    HYDROmorphone (Dilaudid) 1 MG/ML injection 0.8 mg  0.8 mg Intravenous Q2H PRN Cleveland Liao MD        lidocaine-menthol 4-1 % patch 1 patch  1 patch Transdermal Nightly Lily Ramos MD   1 patch at 01/29/24 2158    dexamethasone (Decadron) 4 MG/ML injection 4 mg  4 mg Intravenous Q6H Vincent Rivera MD   4 mg at 01/30/24 0436    anastrozole (Arimidex) tab 1 mg  1 mg Oral Daily Lily Ramos MD   1 mg at 01/29/24 1004    acetaminophen (Tylenol Extra Strength) tab 500 mg  500 mg Oral Q4H PRN Lily Ramos MD   500 mg at 01/29/24 1755    melatonin tab 3 mg  3 mg Oral Nightly PRN Lily Ramos MD        polyethylene glycol (PEG 3350) (Miralax) 17 g oral packet 17 g  17 g Oral Daily PRN iLly Ramos MD   17 g  at 01/28/24 2120    sennosides (Senokot) tab 17.2 mg  17.2 mg Oral Nightly PRN Lily Ramos MD   17.2 mg at 01/27/24 0123    bisacodyl (Dulcolax) 10 MG rectal suppository 10 mg  10 mg Rectal Daily PRN Lily Ramos MD        ondansetron (Zofran) 4 MG/2ML injection 4 mg  4 mg Intravenous Q6H PRN Lily Ramos MD   4 mg at 01/27/24 1119    metoclopramide (Reglan) 5 mg/mL injection 10 mg  10 mg Intravenous Q8H PRN Lily Ramos MD        [Held by provider] heparin (Porcine) 5000 UNIT/ML injection 5,000 Units  5,000 Units Subcutaneous Q8H [facility] Lily Ramos MD   5,000 Units at 01/27/24 0614     No current [facility]-ordered outpatient medications on file.       No Known Allergies    Family History   Problem Relation Age of Onset    Breast Cancer Sister         ductal     Social History     Socioeconomic History    Marital status:    Tobacco Use    Smoking status: Never    Smokeless tobacco: Never   Vaping Use    Vaping Use: Never used   Substance and Sexual Activity    Alcohol use: Yes     Alcohol/week: 1.0 standard drink of alcohol     Types: 1 Cans of beer per week    Drug use: No   Other Topics Concern    Caffeine Concern Yes     Comment: Coffee       Available pre-op labs reviewed.  Lab Results   Component Value Date    WBC 6.9 01/27/2024    RBC 4.90 01/27/2024    HGB 13.4 01/27/2024    HCT 40.7 01/27/2024    MCV 83.1 01/27/2024    MCH 27.3 01/27/2024    MCHC 32.9 01/27/2024    RDW 12.5 01/27/2024    .0 01/27/2024     Lab Results   Component Value Date     01/27/2024    K 4.3 01/27/2024     01/27/2024    CO2 26.0 01/27/2024    BUN 10 01/27/2024    CREATSERUM 0.72 01/27/2024    GLU 85 01/27/2024    CA 9.5 01/27/2024     Lab Results   Component Value Date    INR 0.93 01/29/2024       Vital Signs:  Body mass index is 20.85 kg/m².   weight is 58.6 kg (129 lb 3.2 oz). Her temporal temperature is 97.8 °F (36.6 °C). Her blood pressure is 133/80  and her pulse is 82. Her respiration is 19 and oxygen saturation is 99%.   Vitals:    01/30/24 0400 01/30/24 0600 01/30/24 0700 01/30/24 0800   BP: 123/78 129/88  133/80   Pulse: 83 83 80 82   Resp: 15 14 16 19   Temp: 97.8 °F (36.6 °C)      TempSrc: Temporal      SpO2: 95% 96% 99% 99%   Weight:            Anesthesia Evaluation      Airway   Mallampati: I  TM distance: >3 FB  Neck ROM: full  Dental      Pulmonary - normal exam   Cardiovascular - normal exam  (+) hypertension    Neuro/Psych    (+)  neuromuscular disease,        GI/Hepatic/Renal      Endo/Other    Abdominal  - normal exam                 Anesthesia Plan:   ASA:  3  Plan:   General  Monitors and Lines:   A-line and Additonal IV  Airway:  ETT  Informed Consent Plan and Risks Discussed With:  Patient      I have informed Aphrodite N Pantos and/or legal guardian or family member of the nature of the anesthetic plan, benefits, risks including possible dental damage if relevant, major complications, and any alternative forms of anesthetic management.   All of the patient's questions were answered to the best of my ability. The patient desires the anesthetic management as planned.  BERTRAND GARCIA MD  1/30/2024 9:40 AM  Present on Admission:  **None**

## 2024-01-30 NOTE — ANESTHESIA PROCEDURE NOTES
Arterial Line    Date/Time: 1/30/2024 1:10 AM    Performed by: Lulú Forrester CRNA  Authorized by: Mercedez Murphy MD    General Information and Staff    Procedure Start:  1/30/2024 1:10 AM  Procedure End:  1/30/2024 1:22 AM  Anesthesiologist:  Mercedez Murphy MD  Performed By:  Anesthesiologist  Patient Location:  OR  Indication: continuous blood pressure monitoring and blood sampling needed    Site Identification: surface landmarks    Preanesthetic Checklist: 2 patient identifiers, IV checked, risks and benefits discussed, monitors and equipment checked, pre-op evaluation, timeout performed, anesthesia consent and sterile technique used    Procedure Details    Catheter Size:  20 G  Catheter Length:  1 and 3/4 inch  Catheter Type:  Arrow  Seldinger Technique?: Yes    Site:  Radial artery  Site Prep: chlorhexidine    Line Secured:  Wrist Brace, tape and Tegaderm    Assessment    Events: patient tolerated procedure well with no complications      Medications  1/30/2024 1:10 AM      Additional Comments    Dr. Liao inserted beverley to right radial, atraumatic. Perfusion wnl.

## 2024-01-31 ENCOUNTER — APPOINTMENT (OUTPATIENT)
Dept: GENERAL RADIOLOGY | Facility: HOSPITAL | Age: 69
End: 2024-01-31
Attending: STUDENT IN AN ORGANIZED HEALTH CARE EDUCATION/TRAINING PROGRAM
Payer: COMMERCIAL

## 2024-01-31 PROBLEM — C50.919 STAGE IV BREAST CANCER IN FEMALE (HCC): Status: ACTIVE | Noted: 2024-01-31

## 2024-01-31 LAB
ANION GAP SERPL CALC-SCNC: 8 MMOL/L (ref 0–18)
BUN BLD-MCNC: 20 MG/DL (ref 9–23)
BUN/CREAT SERPL: 37 (ref 10–20)
CALCIUM BLD-MCNC: 8 MG/DL (ref 8.7–10.4)
CHLORIDE SERPL-SCNC: 110 MMOL/L (ref 98–112)
CO2 SERPL-SCNC: 22 MMOL/L (ref 21–32)
CREAT BLD-MCNC: 0.54 MG/DL
EGFRCR SERPLBLD CKD-EPI 2021: 101 ML/MIN/1.73M2 (ref 60–?)
GLUCOSE BLD-MCNC: 145 MG/DL (ref 70–99)
HCT VFR BLD AUTO: 25 %
HGB BLD-MCNC: 8.4 G/DL
OSMOLALITY SERPL CALC.SUM OF ELEC: 295 MOSM/KG (ref 275–295)
POTASSIUM SERPL-SCNC: 3.2 MMOL/L (ref 3.5–5.1)
SODIUM SERPL-SCNC: 140 MMOL/L (ref 136–145)

## 2024-01-31 RX ORDER — DEXAMETHASONE SODIUM PHOSPHATE 4 MG/ML
6 VIAL (ML) INJECTION EVERY 6 HOURS
Status: COMPLETED | OUTPATIENT
Start: 2024-01-31 | End: 2024-01-31

## 2024-01-31 NOTE — PROGRESS NOTES
Neurosurgery Progress Note    Assessment:   Cris Han in room 233/233-A, is a 68 year old female, Hospital Day ( LOS: 5 days ) hospitalized for Pain from bone metastases (HCC).      1 Day Post-Op s/p Procedure(s):  T5 - T7 laminectomy, resection of tumor, T5 - T8 posterior fixation and fusion      The patient's other hospital problems include:   Active Hospital Problems    Carcinoma of right breast metastatic to bone (HCC)      Thoracic radiculopathy      Spinal instability of thoracic region      Thoracic myelopathy      Cord compression (HCC)      History of breast cancer      *Pain from bone metastases (HCC)      Hyponatremia      Gait disturbance      Acute midline thoracic back pain        Plan:   -Continue q2h neurochecks   -Anticipate downgrade to medical floor tomorrow  -Medical management per hospitalist/critical care team   -Continue pain control regimen. Wean PCA to PO pain medications as tolerated   -Continue drains and output documentation  -PT/OT  -Oncology on board. Appreciate recommendations. Would recommend holding off on RT until at least 1 month to allow surgical incision to heal.   -Upright thoracic xrays for baseline imaging  -Decadron wean  -Ongoing discharge planning       Plan of care discussed with Dr. Liao.  Subjective:   No acute events overnight.  Patient denies any new neurologic complaints.  She has not been up and out of bed yet today.    Objective:   VITALS: BP 99/58 (BP Location: Left arm)   Pulse 65   Temp 97.3 °F (36.3 °C) (Temporal)   Resp 10   Wt 129 lb 3.2 oz (58.6 kg)   SpO2 100%   BMI 20.85 kg/m²  Temp (24hrs), Av.5 °F (36.4 °C), Min:97.1 °F (36.2 °C), Max:97.8 °F (36.6 °C)       General: Well developed, well nourished, in no acute distress.     Incision: Surgical dressing in place.    Drain:   Output by Drain (mL) 24 0700 - 24 1859 24 1900 - 24 0659 24 07 - 24 1859 24 1900 - 24 0659 24 0700 -  01/31/24 0742   Closed Drain 1 Superior Back Accordion 10 Fr.    160    Closed Drain 2 Inferior Back Accordion 10 Fr.    230         Neurological / Musculoskeletal: Awake, alert, and interactive. Recent and remote memory appear intact. Attention span and concentration are appropriate. No dysarthria. Coordination and motor control grossly intact. Patient follows commands briskly and appropriately.     Lumbar Spine:    Motor / Extremities   Hip   flexion Knee   extension Dorsiflexion EHL Plantarflexion   Sensation   R 5/5 5/5 5/5 5/5 5/5 Intact to light touch   L 5/5 5/5 5/5 5/5 5/5 Intact to light touch     Examined by Dr. Liao with me as scribe  I&O: I/O last 3 completed shifts:  In: 4464.9 [P.O.:300; I.V.:4134.9; IV PIGGYBACK:30]  Out: 3125 [Urine:2285; Drains:390; Blood:450]       Labs:   Recent Labs   Lab 01/26/24 2003 01/27/24  0551   RBC 4.99 4.90   HGB 13.8 13.4   HCT 41.9 40.7   MCV 84.0 83.1   MCH 27.7 27.3   MCHC 32.9 32.9   RDW 12.5 12.5   NEPRELIM 6.02 4.04   WBC 8.1 6.9   .0 249.0     Recent Labs   Lab 01/26/24 2003 01/27/24  0551   * 85   BUN 16 10   CREATSERUM 0.80 0.72   EGFRCR 81 92   CA 10.0 9.5   ALB 4.8 4.3   * 140   K 3.7 4.3   CL 94* 107   CO2 26.0 26.0   ALKPHO 96 85   AST 27 23   ALT 22 18   BILT 0.7 0.7   TP 8.3* 7.2      Recent Labs   Lab 01/29/24  1123   INR 0.93   PTT 25.6        Imaging:  No new neurosurgical imaging to review at this time.     Is this a shared or split note between Advanced Practice Provider and Physician? Yes    Koby Glaser PA-C  Physician Assistant- Neurosurgery   [facility]  1/31/2024, 7:42 AM        This document was created using Dragon voice recognition software and transcription variances may occur. Please contact documenting provider for clarification of contents if needed.

## 2024-01-31 NOTE — PROGRESS NOTES
[facility] Hospitalist Progress Note     CC: Hospital Follow up    PCP: No primary care provider on file.       Assessment/Plan:   68 year old male with history of Stage III ER+ IL- HER2 - invasive lobular carcinoma of the breast, treated with with right mastectomy and adjuvant chemo, initially diagnosed 2018, who is here for evaluation of back pain and lower extremity parasthesias, with the pain starting 3 weeks ago.     Bone metastasis  Spinal cord compression, secondary to metastatic cancer, symptomatic on admission   Hx of Breast cancer, concern for recurrence   -concern for recurrence of breast cancer, needs tissue diagnosis. Path pending from OR  -now POD # 1 from Thoracic Laminectomies for decompression of spinal cord, see op note from 1/30/24 for details  -started on steroids with decadron 4mg Q6hr on 1/27/24, continue today and wean per neurosurgery, 3 more doses  -complete imaging, MRI cervical spine and brain done. CT c/a/p reviewed  -oncology consulted. Radiation onc referral will be needed, whether it's here vs outpatient.   -continue neurochecks Q2hr  -wean PCA to oral pain meds as tolerated  -medical floor possibly tomorrow   -ok for PT/OT    DVT prophylaxis: SCD  Code status: FULL     Asrar Marine ALSTON  [facility] Hospitalist        Subjective:     Doing well post op. Pain controlled currently.     OBJECTIVE:    Blood pressure 110/75, pulse 85, temperature 98.9 °F (37.2 °C), resp. rate 13, weight 129 lb 3.2 oz (58.6 kg), SpO2 100%.    Temp:  [97.1 °F (36.2 °C)-98.9 °F (37.2 °C)] 98.9 °F (37.2 °C)  Pulse:  [64-99] 85  Resp:  [8-22] 13  BP: ()/(55-80) 110/75  SpO2:  [96 %-100 %] 100 %  AO: ()/(52-68) 121/68      Intake/Output:    Intake/Output Summary (Last 24 hours) at 1/31/2024 1010  Last data filed at 1/31/2024 0721  Gross per 24 hour   Intake 4349.9 ml   Output 3125 ml   Net 1224.9 ml       Last 3 Weights   01/27/24 0000 129 lb 3.2 oz (58.6 kg)   01/26/24 2009 132 lb  (59.9 kg)   08/04/23 1053 128 lb (58.1 kg)   02/15/23 1032 129 lb (58.5 kg)       /75 (BP Location: Left arm)   Pulse 85   Temp 98.9 °F (37.2 °C)   Resp 13   Wt 129 lb 3.2 oz (58.6 kg)   SpO2 100%   BMI 20.85 kg/m²   General: Alert, no acute distress  Lungs: clear to ausculation bilaterally  Heart: Regular rate and rhythm  Abdomen: soft, non tender  Extremities: No edema  Neuro:5/5 strength in bilateral extremities, normal sensation in extremities    Data Review:       Labs:     Recent Labs   Lab 01/26/24 2003 01/27/24  0551 01/31/24  0919   RBC 4.99 4.90  --    HGB 13.8 13.4 8.4*   HCT 41.9 40.7 25.0*   MCV 84.0 83.1  --    MCH 27.7 27.3  --    MCHC 32.9 32.9  --    RDW 12.5 12.5  --    NEPRELIM 6.02 4.04  --    WBC 8.1 6.9  --    .0 249.0  --          Recent Labs   Lab 01/26/24 2003 01/27/24  0551   * 85   BUN 16 10   CREATSERUM 0.80 0.72   EGFRCR 81 92   CA 10.0 9.5   * 140   K 3.7 4.3   CL 94* 107   CO2 26.0 26.0       Recent Labs   Lab 01/26/24 2003 01/27/24  0551   ALT 22 18   AST 27 23   ALB 4.8 4.3         Imaging:  XR FLUOROSCOPY C-ARM TIME LESS THAN 1 HOUR (CPT=76000)    Result Date: 1/30/2024  CONCLUSION: Operative fluoroscopy    Dictated by (CST): Ministerio Pina MD on 1/30/2024 at 6:19 PM     Finalized by (CST): Ministerio Pina MD on 1/30/2024 at 6:20 PM          CT SPINE THORACIC (CPT=72128)    Result Date: 1/29/2024  CONCLUSION: Multifocal lytic thoracic spine lesions most significant at T6.    Dictated by (CST): Ministerio Pina MD on 1/29/2024 at 6:03 PM     Finalized by (CST): Ministerio Pina MD on 1/29/2024 at 6:09 PM             Meds:      dexamethasone  6 mg Intravenous Q6H    sennosides  17.2 mg Oral Nightly    docusate sodium  100 mg Oral BID    enoxaparin  40 mg Subcutaneous Daily    methocarbamol  500 mg Oral q6h    acetaminophen  1,000 mg Intravenous Q6H    lidocaine-menthol  1 patch Transdermal Nightly    anastrozole  1 mg Oral Daily      sodium chloride 100 mL/hr  at 01/31/24 0403    sodium chloride      HYDROmorphone in sodium chloride 0.9%       sodium chloride, polyethylene glycol (PEG 3350), magnesium hydroxide, bisacodyl, fleet enema, ondansetron, metoclopramide, diphenhydrAMINE **OR** diphenhydrAMINE, benzocaine-menthol, oxyCODONE **OR** oxyCODONE, HYDROmorphone **OR** HYDROmorphone, HYDROmorphone, naloxone, diphenhydrAMINE, HYDROmorphone **OR** HYDROmorphone **OR** HYDROmorphone, acetaminophen, melatonin, polyethylene glycol (PEG 3350), sennosides, bisacodyl, ondansetron, metoclopramide

## 2024-01-31 NOTE — OCCUPATIONAL THERAPY NOTE
OCCUPATIONAL THERAPY EVALUATION - INPATIENT     Room Number: 233/233-A  Evaluation Date: 1/31/2024  Type of Evaluation: Initial       Physician Order: IP Consult to Occupational Therapy  Reason for Therapy: ADL/IADL Dysfunction and Discharge Planning    OCCUPATIONAL THERAPY ASSESSMENT   Patient is a 68 year old female admitted 1/26/2024 for pain from bone mets; s/p T5 - T7 laminectomy, resection of tumor, T5 - T8 posterior fixation and fusion     Patient cleared for participation by RN/neurosurgery; Care coordinated with PT    Alert x2 hemovac drains, Art Line, spine precautions    In this OT evaluation patient presents with the following impairments: pain, activity tolerance, coordination, motor control.  These deficits manifest functionally while performing ADL.   The patient is below baseline and would benefit from skilled inpatient OT to address the above deficits, maximizing patient's ability to return to prior level of function.    Skilled Therapy Provided:    Therapeutic Activities: Patient rcvd in bed ; Prior to initiating dynamic activities, provided education on spine precautions and log roll for OOB activities. Patient's vitals were monitored closely with position changes to ensure appropriate response to activities. Patient was able to complete bed mobility with log roll technique with Min  Ax2 assist and cues to sequence and for hand placement; Patient tolerated ~5 minutes  at EOB with CGA/SBA; Demonstrated functional static sitting balance. Educated on STS sequencing and hand placement; transitions completed with Min A x2. Patient c/o dizziness but vitals were stable; deferred further activities and was able to take steps to chair with Min AX 2.     ADL Retraining/Engagment:  Max A for LE dressing and toileting tasks    Education Provided: Role of OT, POC, Safety, DC recs, Activity promotion, Activity pacing, Energy Conservation Strategies, Return to Home Safety.     The patient's Approx Degree of  Impairment: 56.46% has been calculated based on documentation in the [facility] '6 clicks' Inpatient Daily Activity Short Form.  Research supports that patients with this level of impairment may benefit from Acute Rehab, pt is from home very independent with excellent family support; pt would benefit from intensive approach to rehabilitation in prep for return home.    DISCHARGE RECOMMENDATIONS  OT Discharge Recommendations: Acute rehabilitation  OT Device Recommendations: TBD    PLAN  OT Treatment Plan: Balance activities;Energy conservation/work simplification techniques;ADL training;Functional transfer training;Endurance training;Patient/Family education;Patient/Family training;Compensatory technique education       OCCUPATIONAL THERAPY MEDICAL/SOCIAL HISTORY   Problem List   Principal Problem:    Pain from bone metastases (HCC)  Active Problems:    Hyponatremia    Gait disturbance    Acute midline thoracic back pain    Cord compression (HCC)    History of breast cancer    Carcinoma of right breast metastatic to bone (HCC)    Thoracic radiculopathy    Spinal instability of thoracic region    Thoracic myelopathy    HOME SITUATION  Type of Home: House  Home Layout: Two level  Lives With: Family  Toilet and Equipment: Standard height toilet  Shower/Tub and Equipment: Tub-shower combo  Drives: Yes  Patient Regularly Uses: None    Prior Level of [place]: Independent    SUBJECTIVE  Wait..can you tell me that again?        OCCUPATIONAL THERAPY EXAMINATION   OBJECTIVE  Precautions: Spine  Fall Risk: High fall risk    WEIGHT BEARING RESTRICTION     PAIN ASSESSMENT  Ratin (pt reports no pain; on PCA pump and rec muscle relaxer prior to therapy)    ACTIVITY TOLERANCE  Pulse: 88        BP: 99/72  BP Location: Left arm  BP Method: Automatic  Patient Position: Sitting    O2 SATURATIONS  Oxygen Therapy  SPO2% on Room Air at Rest: 97    COGNITION  Overall Cognitive Status:  WFL - within functional limits    RANGE OF MOTION    Upper extremity ROM is within functional limits     STRENGTH ASSESSMENT  Upper extremity strength is within functional limits     ACTIVITIES OF DAILY LIVING ASSESSMENT  AM-PAC ‘6-Clicks’ Inpatient Daily Activity Short Form  How much help from another person does the patient currently need…  -   Putting on and taking off regular lower body clothing?: A Lot  -   Bathing (including washing, rinsing, drying)?: A Lot  -   Toileting, which includes using toilet, bedpan or urinal? : A Lot  -   Putting on and taking off regular upper body clothing?: A Little  -   Taking care of personal grooming such as brushing teeth?: A Little  -   Eating meals?: A Little    AM-PAC Score:  Score: 15  Approx Degree of Impairment: 56.46%  Standardized Score (AM-PAC Scale): 34.69  CMS Modifier (G-Code): CK     FUNCTIONAL ADL ASSESSMENT  Eating: Supervision  Grooming Seated: Minimal Assist  Bathing Seated: Moderate Assist  UB Dressing Seated: Minimal Assist  LB Dressing Seated: Moderate Assist  Toileting Seated: Moderate Assist      EDUCATION PROVIDED  Patient : Role of Occupational Therapy; Plan of Care; Discharge Recommendations; Functional Transfer Techniques; Surgical Precautions; Posture/Positioning; Compensatory ADL Techniques; Proper Body Mechanics  Patient's Response to Education: Verbalized Understanding; Requires Further Education    Patient End of Session: Up in chair;With  staff;Needs met;Call light within reach;RN aware of session/findings;All patient questions and concerns addressed    OT Goals  Patient self-stated goal is: to get stronger      Patient will complete LE dressing with SBA   Comment:     Patient will complete toilet transfer with SBA   Comment:     Patient will complete self care task at sink level with SBA    Comment:    Patient will independently recall spine precautions  Comment:         Goals  on:   Frequency:3-5x week     Patient Evaluation Complexity Level:   Occupational Profile/Medical History  HIGH - Extensive review of history including review of medical or therapy records    Specific performance deficits impacting engagement in ADL/IADL HIGH  5+ performance deficits    Client Assessment/Performance Deficits HIGH - Comorbidities and significant modifications of tasks    Clinical Decision Making HIGH - Analysis of occupational profile, comprehensive assessments, multiple treatment options    Overall Complexity HIGH     OT Session Time: 25 minutes  Self-Care Home Management: 10 minutes  Therapeutic Activity: 15 minutes    Rob [facility], Occupational Therapist, OTR/L ext [phone]

## 2024-01-31 NOTE — OPERATIVE REPORT
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  Thoracic spinal stenosis.  Thoracic nerve root compression with radiculopathy.  Thoracic spinal instability.  Midline thoracic back pain.     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  Thoracic spinal stenosis.  Thoracic nerve root compression with radiculopathy.  Thoracic spinal instability.  Midline thoracic back pain.     Procedure performed:  T5, T6, and T7 laminectomies for decompression of spinal cord.  Right T5-6, T6-7 facetectomies for spinal cord and nerve root decompression.  Left T6-7 facetectomy for spinal cord and nerve root decompression.  Right T6 transpedicular approach for partial anterior column resection and ventral spinal cord and nerve root decompression.  Resection of extra-dural spinal metastatic tumor spanning T5-7.  Dorsal internal transpedicular screw fixation bilaterally at T4, T5, T7, and T8.  Dorsal internal transpedicular screw fixation unilaterally at T6 on the left.  T4-5 posterior arthrodesis.  T5-6 posterior arthrodesis.  T6-7 posterior arthrodesis.  T7-8 posterior arthrodesis.  Use of commercially available morselized allograft material.  Use of intraoperative fluoroscopy including interpretation of x-rays.  Use of  intraoperative stereotactic navigation using Cytox system.  Use of intraoperative neurophysiologic monitoring.  Use of intraoperative ultrasound including interpretation of images.     Indications for procedure:  Ms. Han is a 68 year old woman with history of breast cancer diagnosed 5 years ago, on anastrozole s/p RT, who presented to the ED with complaints of mid thoracic back pain for 2 to 3 weeks, localized to approximately at the level of T6 with radiation around her thoracic cage. She developed gait instability over the the last 3-4 days, with associated bilateral lower extremity numbness/tingling. She was found to be neurologically intact on exam and denied bowerl/bladder incontinence, or saddle anesthesia. Extensive work up with advanced spinal imaging demonstrated diffuse metastatic disease to the spine with an epidural mass at T6 resulting in severe canal stenosis and spinal cord compression. CT demonstrated advanced bony destruction to consider her pathology unstable. Given her functional status prior to presentation to the hospital, I felt that separation surgery was indicated to decompress the spinal cord and stabilize the spine prior to further treatments. Prior to surgery and during preoperative counseling, the risks, benefits, and potential outcomes of the surgical intervention were outlined to Ms. Han using language she could comprehend. She expressed her understanding and elected to proceed.       Procedure in detail:  The patient was identified and taken to the operating room. She was placed under general endotracheal anesthesia without complications. After the appropriate lines were obtained, including invasive arterial and peripheral venous access, neurophysiologic monitoring was set up and baseline pre-flip motor and sensory readings were obtained. She was then positioned prone on an open Moe table. Her arms were tucked to her sides. All pressure points were appropriately padded.  Stability of neurophysiologic recordings was confirmed. She was firmly secured to the operating table. She received the appropriate preoperative antibiotic prophylaxis. Sequential compression devices were maintained on her lower extremities for DVT prophylaxis. The mid thoracic region was identified. It was prepped and draped in sterile fashion. An appropriate time out was performed following standard protocol. Under fluoroscopic guidance, a spinal needle was used to localize the vertebral bodies of interest and accordingly plan the surgical incision.     A midline incision was made extending from the level of the T3 spinous process extending down to the T7 pedicle. Unipolar electrocautery was then employed, performing a soft tissue dissection exposing the dorsal elements including the spinous processes, lamina, facet joints, and transverse processes bilaterally spanning from the T4 pedicle entry point to the T8 pedicle entry point, carefully preserving the T3-4 and T8-9 joints, superiorly and inferiorly respectively. Care was also taken to not plunge into the spinal canal in the areas around T6 that had been destroyed by the tumor. After exposure, a reference clamp was attached to the T3 spinous process and an intraoperative spin was obtained using the [facility] 3D c-arm system. This was then co-registered and confirmed to be accurate. The correct levels were confirmed visually and with the use of intraoperative stereotaxy. There was visible tumor extruding out of the lamina of T6. Under stereotactic guidance, we then performed transpedicular screw fixation bilaterally at T4, T5, T7, and T8. At T6 we skipped the both sides, as there was tumor involvement on imaging and a transpedicular approach had been planned on the right side. This was performed using modular screws.     We then used a high-speed drill to perform a near complete laminectomy at T5, full laminectomy at T6, and near full laminectomy of T7. We first  started by finding normal anatomy above and below tumor at T6 and connecting the laminectomy while carefully resecting and peeling tumor off the spinal cord until this was noted to be nicely decompressed visually. We then took the bony resection laterally, resecting the right T5-6 facet, right T6 pedicle until flush with the posterior surface of the T6 vertebral body, and the right T6-7 facet, and right T6 transverse process. This allowed us to gain access to the dorsal aspect of the T6 vertebral body. Copious amount of tumor were resected off the spinal cord and out of the cavity within the left vertebral body of T6. Tissue was collected and sent for pathologic examination. We then extended the bony resection laterally again, now to perform a left T6-7 facetectomy to resect tumor involving the bone and transverse process. Adequate ventral and lateral decompression of the spinal cord was visually confirmed. This was also confirmed with the use of intraoperative ultrasound. The spinal cord was found to be pulsatile with circumferential CSF signal. At the end of the resection, the spinal cord and bilateral T6 nerve roots were adequately decompressed. Neuro monitoring was confirmed to be stable through these maneuvers. Since we completely resected the T6 pedicle and the majority of the T6 vertebral body on that side we skipped hardware on that side, but we noted the ability to add fixation on the left. This was done using the navigation system. Satisfactory position of the hardware was confirmed on post-procedure intraoperative CT imaging.    We then connected the internal fixation screws to their corresponding screw heads, and subsequently on either side to the mayra that had been contoured to preserve the patient's natural thoracic curve across the surgical site and this was locked in place. We then achieved adequate hemostasis and irrigated the surgical field. We decorticated the dorsal elements from T4-T8, including  the remaining lamina and facet joints as well as the transverse processes where applicable. Over the decorticated areas, we placed commercially available morselized allograft material. We again achieved adequate hemostasis. We placed two subfascial drains in the surgical field, which we tunneled percutaneously out into a region immediately superolateral and inferolateral to the surgical incision.  We coated the dorsal thoracic paraspinous musculature with vancomycin powder and closed the surgical incision in multiple layers, reapproximating the skin with 2-0 nylon in a running locked fashion. The patient was then gently turned back onto the supine position, allowed to emerge from general anesthesia, and was subsequently extubated. She was taken to the intensive care unit for further convalescence in stable neurological condition. The surgical aspects of this procedure were performed by myself and the aforementioned assistants who assisted throughout the entirety of the case, including the surgical approach, maintenance of necessary hemostasis, exposure of landmarks, removal of disc and bone, protection of vital structures, and multi-layer closure was integral to the safe and efficient completion of the required procedures.     Anesthesia: General endotracheal.    Estimated blood loss: 300 mL.    Counts: All needle, sponge, and cottonoid counts were reported correct at the end of the operation.     Complications: None.     Drains: Hemovac drains x2.     Implants:   Implant Name Type Inv. Item Serial No.  Lot No. LRB No. Used Action   TITAN NANOLOCK SHANK OSTEOGRIP THREADFORM 45mm x 35mm   N/A [employer]onic [id number] N/A 4 Implanted   TITAN NANOLOCK SHANK OSTEOGRIP THREADFORM 4.5mm x 45mm   N/A [employer] [id number] N/A 2 Implanted   TITAN NANOLOCK SHANK OSTEOGRIP THREADFORM 4.5mm x 40mm   N/A [employer] [id number] N/A 3 Implanted   SET SCR SPNL 4 W/ 4 MAS HD PK CDH MODULEX 5.5 - SN/A  SET SCR SPNL 4 W/ 4 MAS HD  PK CDH MODULEX 5.5 N/A [employer]onic Inc  [id number] N/A 1 Implanted   SET SCR SPNL 4 W/ 4 MAS HD PK CDH MODULEX 5.5 - SN/A  SET SCR SPNL 4 W/ 4 MAS HD PK CDH MODULEX 5.5 N/A [employer] Inc  [id number] N/A 1 Implanted   MAS HEAD AND SET SCREW FOR 5.5 ASTON   N/A [employer]onic [id number] N/A 2 Implanted   GRAFT BNE SUB LG 15CC FD GRFT ORTHOBLEND - [id number]  GRAFT BNE SUB LG 15CC FD GRFT ORTHOBLEND [id number] [employer]onic Inc  N/A N/A 1 Implanted   GRFT BONE SUB STRP 1X20CM ALLOGRAFT DBM FRZ DRIED UNA OSTEOTECH - [id number]  GRFT BONE SUB STRP 1X20CM ALLOGRAFT DBM FRZ DRIED UNA OSTEOTECH [id number] [employer]onic Inc  N/A N/A 1 Implanted   ASTON SPNL L120MM DIA5.5MM TI ANT POST - SN/A  ASTON SPNL L120MM DIA5.5MM TI ANT POST N/A Medtronic Inc WD N/A N/A 2 Implanted     Specimen:   ID Type Source Tests Collected by Time Destination   1 : 1. SPINAL TUMOR Tissue Bone, thoracic spine SURGICAL PATHOLOGY TISSUE Cleveland Liao MD 1/30/2024  4:14 PM    2 : 2. SPINAL TUMOR Tissue Tissue SURGICAL PATHOLOGY TISSUE Cleveland Liao MD 1/30/2024  4:17 PM       Wound classification: 1, clean.    Disposition: Intensive care admission.     Condition: Stable, neurologically at baseline.    Cleveland Liao MD  Neurological Surgery    [facility] [place]  [address] [place] [address], [address] [place] [id number]  [phone]  Pager [phone]  1/30/2024 6:38 PM      This note was created using a voice-recognition transcribing system. Incorrect words or phrases may have been missed during proofreading. Please interpret accordingly.

## 2024-01-31 NOTE — ANESTHESIA POSTPROCEDURE EVALUATION
Patient: Cris Han    Procedure Summary       Date: 01/30/24 Room / Location: [facility] MAIN OR 04 / [facility] MAIN OR    Anesthesia Start: 1250 Anesthesia Stop: 1849    Procedures:       T5 - T7 laminectomy, resection of tumor, T5 - T8 posterior fixation and fusion (Spine Thoracic)      THORACIC SPINAL FUSION 3 LEVEL (Spine Thoracic)      SPINAL THORACIC TUMOR REMOVAL (Spine Thoracic) Diagnosis: (Spinal cord compression, metastatic carcinoma)    Surgeons: Cleveland Liao MD Anesthesiologist: Abdi Kay MD    Anesthesia Type: general ASA Status: 3            Anesthesia Type: general    Vitals Value Taken Time   /80 01/30/24 1846   Temp 97.7 01/30/24 1849   Pulse 101 01/30/24 1848   Resp 13 01/30/24 1848   SpO2 98 % 01/30/24 1848   Vitals shown include unfiled device data.    [facility] AN Post Evaluation:   Patient Evaluated in PACU  Patient Participation: complete - patient participated  Level of Consciousness: awake  Pain Score: 0  Pain Management: adequate  Airway Patency:patent  Yes    Cardiovascular Status: acceptable  Respiratory Status: acceptable  Postoperative Hydration acceptable      Arely Thomas, CRNA  1/30/2024 6:49 PM

## 2024-01-31 NOTE — PLAN OF CARE
Rec'd from PACU approx 2000. Tolerated well and rec'd on Dilaudid PCA. Orders acknowledged. Using PCA appropriately & pain controlled well overnight. Tolerating Clear Liquids. Noted that A-line is poorly correlating this morning. Vitals WNL throughout night. 2L NC.     Problem: PAIN - ADULT  Goal: Verbalizes/displays adequate comfort level or patient's stated pain goal  Description: INTERVENTIONS:  - Encourage pt to monitor pain and request assistance  - Assess pain using appropriate pain scale  - Administer analgesics based on type and severity of pain and evaluate response  - Implement non-pharmacological measures as appropriate and evaluate response  - Consider cultural and social influences on pain and pain management  - Manage/alleviate anxiety  - Utilize distraction and/or relaxation techniques  - Monitor for opioid side effects  - Notify MD/LIP if interventions unsuccessful or patient reports new pain  - Anticipate increased pain with activity and pre-medicate as appropriate  Outcome: Progressing     Problem: RISK FOR INFECTION - ADULT  Goal: Absence of fever/infection during anticipated neutropenic period  Description: INTERVENTIONS  - Monitor WBC  - Administer growth factors as ordered  - Implement neutropenic guidelines  Outcome: Progressing     Problem: SAFETY ADULT - FALL  Goal: Free from fall injury  Description: INTERVENTIONS:  - Assess pt frequently for physical needs  - Identify cognitive and physical deficits and behaviors that affect risk of falls.  - [place] fall precautions as indicated by assessment.  - Educate pt/family on patient safety including physical limitations  - Instruct pt to call for assistance with activity based on assessment  - Modify environment to reduce risk of injury  - Provide assistive devices as appropriate  - Consider OT/PT consult to assist with strengthening/mobility  - Encourage toileting schedule  Outcome: Progressing     Problem: COPING  Goal: Pt/Family able to  verbalize concerns and demonstrate effective coping strategies  Description: INTERVENTIONS:  - Assist patient/family to identify coping skills, available support systems and cultural and spiritual values  - Provide emotional support, including active listening and acknowledgement of concerns of patient and caregivers  - Reduce environmental stimuli, as able  - Instruct patient/family in relaxation techniques, as appropriate  - Assess for spiritual and psychosocial needs and initiate Spiritual Care or Behavioral Health consult as needed  Outcome: Progressing     Problem: CARDIOVASCULAR - ADULT  Goal: Maintains optimal cardiac output and hemodynamic stability  Description: INTERVENTIONS:  - Monitor vital signs, rhythm, and trends  - Monitor for bleeding, hypotension and signs of decreased cardiac output  - Evaluate effectiveness of vasoactive medications to optimize hemodynamic stability  - Monitor arterial and/or venous puncture sites for bleeding and/or hematoma  - Assess quality of pulses, skin color and temperature  - Assess for signs of decreased coronary artery perfusion - ex. Angina  - Evaluate fluid balance, assess for edema, trend weights  Outcome: Progressing  Goal: Absence of cardiac arrhythmias or at baseline  Description: INTERVENTIONS:  - Continuous cardiac monitoring, monitor vital signs, obtain 12 lead EKG if indicated  - Evaluate effectiveness of antiarrhythmic and heart rate control medications as ordered  - Initiate emergency measures for life threatening arrhythmias  - Monitor electrolytes and administer replacement therapy as ordered  Outcome: Progressing     Problem: GASTROINTESTINAL - ADULT  Goal: Minimal or absence of nausea and vomiting  Description: INTERVENTIONS:  - Maintain adequate hydration with IV or PO as ordered and tolerated  - Nasogastric tube to low intermittent suction as ordered  - Evaluate effectiveness of ordered antiemetic medications  - Provide nonpharmacologic comfort  measures as appropriate  - Advance diet as tolerated, if ordered  - Obtain nutritional consult as needed  - Evaluate fluid balance  Outcome: Progressing  Goal: Maintains or returns to baseline bowel function  Description: INTERVENTIONS:  - Assess bowel function  - Maintain adequate hydration with IV or PO as ordered and tolerated  - Evaluate effectiveness of GI medications  - Encourage mobilization and activity  - Obtain nutritional consult as needed  - Establish a toileting routine/schedule  - Consider collaborating with pharmacy to review patient's medication profile  Outcome: Progressing     Problem: METABOLIC/FLUID AND ELECTROLYTES - ADULT  Goal: Electrolytes maintained within normal limits  Description: INTERVENTIONS:  - Monitor labs and rhythm and assess patient for signs and symptoms of electrolyte imbalances  - Administer electrolyte replacement as ordered  - Monitor response to electrolyte replacements, including rhythm and repeat lab results as appropriate  - Fluid restriction as ordered  - Instruct patient on fluid and nutrition restrictions as appropriate  Outcome: Progressing     Problem: HEMATOLOGIC - ADULT  Goal: Maintains hematologic stability  Description: INTERVENTIONS  - Assess for signs and symptoms of bleeding or hemorrhage  - Monitor labs and vital signs for trends  - Administer supportive blood products/factors, fluids and medications as ordered and appropriate  - Administer supportive blood products/factors as ordered and appropriate  Outcome: Progressing  Goal: Free from bleeding injury  Description: (Example usage: patient with low platelets)  INTERVENTIONS:  - Avoid intramuscular injections, enemas and rectal medication administration  - Ensure safe mobilization of patient  - Hold pressure on venipuncture sites to achieve adequate hemostasis  - Assess for signs and symptoms of internal bleeding  - Monitor lab trends  - Patient is to report abnormal signs of bleeding to staff  - Avoid use  of toothpicks and dental floss  - Use electric shaver for shaving  - Use soft bristle tooth brush  - Limit straining and forceful nose blowing  Outcome: Progressing     Problem: MUSCULOSKELETAL - ADULT  Goal: Return mobility to safest level of function  Description: INTERVENTIONS:  - Assess patient stability and activity tolerance for standing, transferring and ambulating w/ or w/o assistive devices  - Assist with transfers and ambulation using safe patient handling equipment as needed  - Ensure adequate protection for wounds/incisions during mobilization  - Obtain PT/OT consults as needed  - Advance activity as appropriate  - Communicate ordered activity level and limitations with patient/family  Outcome: Progressing  Goal: Maintain proper alignment of affected body part  Description: INTERVENTIONS:  - Support and protect limb and body alignment per provider's orders  - Instruct and reinforce with patient and family use of appropriate assistive device and precautions (e.g. spinal or hip dislocation precautions)  Outcome: Progressing

## 2024-01-31 NOTE — PHYSICAL THERAPY NOTE
PHYSICAL THERAPY EVALUATION - INPATIENT     Room Number: 233/233-A  Evaluation Date: 1/31/2024  Type of Evaluation: Initial   Physician Order: PT Eval and Treat    Presenting Problem: T5 - T7 laminectomy, resection of tumor, T5 - T8 posterior fixation and fusion  Reason for Therapy: Mobility Dysfunction and Discharge Planning    PHYSICAL THERAPY ASSESSMENT   Orders received and chart reviewed. FRANCES Maldonado approved participation in physical therapy. Session coordinated with OT, gait belt donned. PPE worn by therapist: mask and gloves. Patient was not wearing a mask during session. Patient is a 68 year old female admitted 1/26/2024 for Presenting Problem: T5 - T7 laminectomy, resection of tumor, T5 - T8 posterior fixation and fusion. Patient presented in bed with 0/10 pain. Education provided on Spine precautions, Physical therapy plan of care, and physiological benefits of out of bed mobility. Patient with good carryover. Patient on room air. Patient currently with min A x 1-2 for mobility throughout the session. Patient with impaired motor control, nearly kicked therapist when asked to lift legs for socks. Patient also with ataxia noted while taking steps to bedside chair with rolling walker. Patient with dizziness when standing, blood pressure 102 systolic and then increased to 112 systolic. Further ambulation deferred due to feeling dizzy. Prior to admission, patient independent in ADL's and ambulation with no assistive device. Patient is currently functioning below baseline with bed mobility, transfers, gait, and stair negotiation; requiring min A x 2 as a result of the following impairments: impaired motor planning and medical status. Patient would benefit from continued skilled therapy services to facilitate return to independent PLOF as patient demonstrates high motivation with excellent tolerance and potential benefiting from an intensive rehab program. Final discharge placement decision is directed by the  inter-disciplinary team.    Patient history and/or personal factors that may impact the plan of care include home accessibility concerns. Based on the physical therapy examination of the noted systems and functional activity/participation limitations, the patient presentation is evolving given the patient presents with surgical precautions/limitations and is experiencing fluctuating levels of dizziness.      Bed mobility: Min assist  Transfers: Min assist  Gait Assistance: Minimum assistance (assist of 2)  Distance (ft): steps to bedside chair  Assistive Device: Rolling walker             Patient was left in bedside chair at end of session with all needs in reach. Patient's current functional deficits include bed mobility, transfers, gait and stair navigation. Patient does not have the physical skills to return to prior living environment upon D/C from the hospital. Anticipate patient will benefit from continued skilled physical therapy while in the hospital and upon D/C from the hospital at an acute rehab facility. The patient's Approx Degree of Impairment: 50.57% has been calculated based on documentation in the [facility] '6 clicks' Inpatient Basic Mobility Short Form.  Research supports that patients with this level of impairment may benefit from Acute Rehab. RN aware of patient status post session.    Patient will benefit from continued IP PT services to address these deficits in preparation for discharge.    DISCHARGE RECOMMENDATIONS  PT Discharge Recommendations: Acute rehabilitation    PLAN  PT Treatment Plan: Bed mobility;Body mechanics;Patient education;Gait training;Stair training;Transfer training;Balance training  Rehab Potential : Good  Frequency (Obs): Daily       PHYSICAL THERAPY MEDICAL/SOCIAL HISTORY   Problem List  Principal Problem:    Pain from bone metastases (HCC)  Active Problems:    Hyponatremia    Gait disturbance    Acute midline thoracic back pain    Cord compression (HCC)    History of breast  cancer    Carcinoma of right breast metastatic to bone (HCC)    Thoracic radiculopathy    Spinal instability of thoracic region    Thoracic myelopathy    Past Medical History  Past Medical History:   Diagnosis Date    Breast cancer (HCC)     right breast    High blood pressure      Past Surgical History  Past Surgical History:   Procedure Laterality Date    MASTECTOMY RIGHT       HOME SITUATION  Type of Home: House   Home Layout: Two level;Able to live on main level  Stairs to Enter : 4  Railing: Yes  Stairs to Bedroom: 0  Railing: No  Lives With: Spouse;Daughter  Drives: Yes  Patient Owned Equipment: None  Patient Regularly Uses: None    Prior Level of [place]: Patient reports being independent in ADL's and ambulation with no assistive device prior to admission to the hospital.     SUBJECTIVE  \"I have to do what?\"    PHYSICAL THERAPY EXAMINATION     OBJECTIVE  Precautions: Spine;Drain(s)  Fall Risk: High fall risk    WEIGHT BEARING RESTRICTION  Weight Bearing Restriction: None                PAIN ASSESSMENT  Ratin          COGNITION  Overall Cognitive Status:  WFL - within functional limits    RANGE OF MOTION AND STRENGTH ASSESSMENT    Lower extremity ROM is within functional limits  BLE WNL    Lower extremity strength is within functional limits  BLE WNL    BALANCE  Static Sitting: Good  Dynamic Sitting: Fair +  Static Standing: Fair  Dynamic Standing: Fair -    AM-PAC '6-Clicks' INPATIENT SHORT FORM - BASIC MOBILITY  How much difficulty does the patient currently have...  Patient Difficulty: Turning over in bed (including adjusting bedclothes, sheets and blankets)?: A Little   Patient Difficulty: Sitting down on and standing up from a chair with arms (e.g., wheelchair, bedside commode, etc.): A Little   Patient Difficulty: Moving from lying on back to sitting on the side of the bed?: A Little   How much help from another person does the patient currently need...   Help from Another: Moving to and from a  bed to a chair (including a wheelchair)?: A Little   Help from Another: Need to walk in hospital room?: A Little   Help from Another: Climbing 3-5 steps with a railing?: A Lot     AM-PAC Score:  Raw Score: 17   Approx Degree of Impairment: 50.57%   Standardized Score (AM-PAC Scale): 42.13   CMS Modifier (G-Code): CK    Exercise/Education Provided:  Bed mobility  Body mechanics  Transfer training    Patient End of Session: Up in chair;Needs met;Call light within reach;RN aware of session/findings;All patient questions and concerns addressed;Family present    CURRENT GOALS  Goals to be met by: 2/10/24  Patient Goal Patient's self-stated goal is: to go home   Goal #1 Patient is able to demonstrate supine - sit EOB @ level: supervision     Goal #1   Current Status    Goal #2 Patient is able to demonstrate transfers Sit to/from Stand at assistance level: supervision with walker - rolling     Goal #2  Current Status    Goal #3 Patient is able to ambulate 100 feet with assist device: walker - rolling at assistance level: supervision   Goal #3   Current Status    Goal #4 Patient will negotiate 4 stairs/one curb w/ assistive device and supervision   Goal #4   Current Status    Goal #5 Patient to demonstrate independence with home activity/exercise instructions provided to patient in preparation for discharge.   Goal #5   Current Status    Goal #6    Goal #6  Current Status     Patient Evaluation Complexity Level:  History Moderate - 1 or 2 personal factors and/or co-morbidities   Examination of body systems Moderate - addressing a total of 3 or more elements   Clinical Presentation Moderate - Evolving   Clinical Decision Making Moderate Complexity     Therapeutic Activity: 15 minutes  Therapeutic Exercise: 15 minutes

## 2024-01-31 NOTE — BRIEF OP NOTE
Pre-Operative Diagnosis: Spinal cord compression, metastatic carcinoma     Post-Operative Diagnosis: Spinal cord compression, metastatic carcinoma      Procedure Performed:   Thoracic 5-7 laminectomies for decompression of spinal cord, right transpedicular thoracic 6 and 7 approaches for resection of tumor nerve root decompression, thoracic 4-8 posterior fixation and fusion    Surgeon(s) and Role:     * Cleveland Liao MD - Primary    Assistant(s):  Surgical Assistant.: Moon Hwang  PA: Koby Glaser PA-C     Surgical Findings: As expected preop     Specimen: Frozen and permanent specimens sent for pathologic examination.      Estimated Blood Loss: Blood Output: 300 mL (1/30/2024  6:27 PM)    Cleveland Liao MD  Neurological Surgery    [address], [place] [id number]  [phone]  Pager [phone]  1/30/2024 6:32 PM      This note was created using a voice-recognition transcribing system. Incorrect words or phrases may have been missed during proofreading. Please interpret accordingly.

## 2024-01-31 NOTE — PAYOR COMM NOTE
--------------  CONTINUED STAY REVIEW    Payor: [facility] OUT OF STATE PPO  Subscriber #:  [id number]  Authorization Number: [id number]    Admit date: 1/26/24  Admit time: 11:41 PM    Admitting Physician: Lawanda Hawthorne DO  Attending Physician:  Lawanda Hawthorne DO  Primary Care Physician: No primary care provider on file.    REVIEW DOCUMENTATION:    1/29/24    Hospitalist    8 year old male with history of Stage III ER+ DC- HER2 - invasive lobular carcinoma of the breast, treated with with right mastectomy and adjuvant chemo, initially diagnosed 2018, who is here for evaluation of back pain and lower extremity parasthesias, with the pain starting 3 weeks ago.     Bone metastasis  Spinal cord compression, secondary to metastatic cancer   Hx of Breast cancer   -concern for recurrence of breast cancer, needs tissue diagnosis  -spinal findings concerning, neurosurgery was consulted, appreciate evaluation as well as from neurology   -started on steroids with decadron 4mg Q6hr on 1/27/24  -complete imaging, MRI cervical spine and brain done. CT c/a/p reviewed  -oncology consulted   -monitor closely for new or worsening neurologic symptoms, now in PCU for closer monitoring  -surgical timing per neurosurgery, possibly tomorrow to OR  -no nsaids or anticoagulation at this time         Hematology     Impression and Plan   1.   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 Will refer to radiation oncology after recovery for post-op radiation therapy.      2.   Metastatic malignancy: The imaging findings can be explained by metastatic breast cancer. Pathology from the planned surgery will confirm. ER/DC/HER by IHC should be performed on the specimen. If her disease is again hormone receptor positive and Her2 negative, the preferred systemic therapy will be fulvestrant and ribociclib. Zoledronic acid will be advised as well. Therapy will be as outpatient.       Electronically signed by:     Mark Cid M.D.  Medical Director of Oncology Services        Neurosurgery 1/29/24    ssessment:   Cris Han in room 233/233-A, is a 68 year old female, Hospital Day ( LOS: 3 days ) hospitalized for Pain from bone metastases (HCC).      The patient's other hospital problems include:   Active Hospital Problems    Cord compression (HCC)       History of breast cancer       *Pain from bone metastases (HCC)       Hyponatremia       Gait disturbance       Acute midline thoracic back pain      Plan:   -Every 2 hours neurochecks.  Please alert neurosurgery of any neurologic changes.  -Plan for OR tomorrow with Dr. Liao for T5-T7 laminectomies for decompression of spinal cord, right transpedicular T6-T7 approaches for resection of tumor and nerve root decompression, T4-T8 posterior fixation and fusion  -N.p.o. midnight  -Heparin has been held  -Appreciate medical optimization by hospitalist/critical care team in the interim  -Oncology on board  -Continue Decadron 4 mg every 6 hours  -Preoperative labs if not already completed.  -Okay to be up and ambulating as tolerated     Plan of care discussed with Dr. Liao.  Subjective:   No acute events overnight.  The patient denies any new or worsening neurologic complaints.  She has some improvement in her thoracic radiculopathy.  She continues to endorse stable lower extremity numbness bilaterally.  No weakness.  She has improvement in her abdominal discomfort following a bowel movement.    Cervical Spine:     Motor/ Extremities    Deltoid Biceps Triceps     Sensation   R 5/5 5/5 5/5 5/5 Intact to light touch   L 5/5 5/5 5/5 5/5 Intact to light touch         Lumbar Spine:     Motor / Extremities    Hip   flexion Knee   extension Dorsiflexion EHL Plantarflexion    Sensation   R 5/5 5/5 5/5 5/5 5/5 Diffusely diminished to light touch   L 5/5 5/5 5/5 5/5 5/5 Diffusely diminished to light touch         1/30/24 Nursing   Unable  to walk more than a couple steps R/T pain/weakness. NPO since midnight for surgery today. V             1/30/24 Hospitalist    Bone metastasis  Spinal cord compression, secondary to metastatic cancer, symptomatic on admission   Hx of Breast cancer, concern for recurrence   -concern for recurrence of breast cancer, needs tissue diagnosis. ER/MO/HER should be done on specimen  -started on steroids with decadron 4mg Q6hr on 1/27/24  -complete imaging, MRI cervical spine and brain done. CT c/a/p reviewed  -neurosurgery evaluation appreciated, plan for OR today   -oncology consulted. 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 Liao  NPO since midnight  Pre-op labs WNL  Heparin held   Consents on chart  Continue decadron 4mg Q6H  Medical management per hospitalist and critical care team   PT/OT, OOB as tolerated     Discussed above with Dr. Liao.     SHARMILA Brown   [facility]  1/30/2024, 8:20 AM       A total of 15 minutes of visit time (exclusible of billable procedures) was administered.  >50 % of time spent counseling/coordinating care.  Is this a shared or split note between Advanced Practice Provider and Physician? No  I interviewed and examined Ms. Han and independently reviewed her imaging studies. I reviewed her clinical information with KEON Romero and agree with her history and physical examination findings, assessment, and plan as detailed above, with any exceptions detailed below.     Ms. Han is a pleasant 68-year-old woman  with breast cancer diagnosed in 2018 now found to have thoracic spine involvement with compression of the thoracic spinal cord.  She has intractable pain and signs and symptoms of spinal cord compression, such as balance issues, weakness upon weightbearing despite being full strength in every muscle group when independently tested.  She remains on IV steroids and full strength on examination.  Her spinal imaging was reviewed in detail with the patient.  This reveals a mostly dorsal epidural mass involving the T6 lamina and expanding into the bilateral T6 transverse processes, right T6 pedicle, right T6 vertebral body, inferiorly extending into the right T6-7 joint and T7 pedicle as well.  This leads to spinal cord compression spanning the bottom of T5 to the top of T7.  CT scan demonstrates an osteolytic process, with erosion of the posterior elements spanning T6-T7.  Given her circumstances, I favor decompressive surgery coupled with fixation to prevent further destabilization of the spine.  I propose T5-7 laminectomies for tumor resection and decompression of spinal cord, right T6 and T7 transpedicular approaches for resection of tumor involving the vertebral bodies and decompression of nerve roots, T4-T8 posterior fixation and fusion.      I went over the risks and associated outcomes of surgery with Ms. Han using terms she could comprehend. The risks discussed included but were not limited to neurologic injury (spinal cord or nerve root injury), infection (ranging from superficial wound infection to thoracic vertebral osteomyelitis), intraoperative bleeding from blood vessel injury (which could lead to a stroke or death from exsanguination), a postoperative hemorrhage (which could lead to neurologic compromise), a dural tear (which could lead to a pseudomeningocele or a CSF fistula despite intraoperative repair), hardware malpositioning or migration (which may require corrective surgery), non-union or  pseudoarthrosis (which may necessitate a reoperation), substantial blood loss (which could require a transfusion), acute myocardial infarction, venous thromboembolism, pneumonia, urinary tract infection or death from complications related to anesthesia. Ms. Han expressed her understanding of the above and has elected to proceed with surgery which is scheduled for today, 1/30/2024.      Cleveland Liao MD  Neurological Surgery       1/30/24  surgery     Pre-Operative Diagnosis: Spinal cord compression, metastatic carcinoma     Post-Operative Diagnosis: Spinal cord compression, metastatic carcinoma      Procedure Performed:   Thoracic 5-7 laminectomies for decompression of spinal cord, right transpedicular thoracic 6 and 7 approaches for resection of tumor nerve root decompression, thoracic 4-8 posterior fixation and fusion     Surgeon(s) and Role:     * Cleveland Liao MD - Primary     Assistant(s):  Surgical Assistant.: Moon Hwang  PA: Koby Glaser PA-C     Surgical Findings: As expected preop     Specimen: Frozen and permanent specimens sent for pathologic examination.      Estimated Blood Loss: Blood Output: 300 mL (1/30/2024  6:27 PM)     Cleveland Liao MD  Neurological Surgery       Procedure Summary         Date: 01/30/24 Room / Location: [facility] MAIN OR  / [facility] MAIN OR     Anesthesia Start: 1250 Anesthesia Stop: 1849     Procedures:       T5 - T7 laminectomy, resection of tumor, T5 - T8 posterior fixation and fusion (Spine Thoracic)      THORACIC SPINAL FUSION 3 LEVEL (Spine Thoracic)      SPINAL THORACIC TUMOR REMOVAL (Spine Thoracic) Diagnosis: (Spinal cord compression, metastatic carcinoma)     Surgeons: Cleveland Liao MD Anesthesiologist: Abdi Kay MD     Anesthesia Type: general ASA Status: 3         1/31/24 Neurosurgery       Show:Clear all  [x]Written[x]Templated[]Copied    Added by:  [x]Koby Glaser PA-C    []Luisa for details  Neurosurgery Progress Note     Assessment:   Aphrodite  SOO Han in room 233/233-A, is a 68 year old female, Hospital Day ( LOS: 5 days ) hospitalized for Pain from bone metastases (HCC).      1 Day Post-Op s/p Procedure(s):  T5 - T7 laminectomy, resection of tumor, T5 - T8 posterior fixation and fusion        The patient's other hospital problems include:   Active Hospital Problems    Carcinoma of right breast metastatic to bone (HCC)       Thoracic radiculopathy       Spinal instability of thoracic region       Thoracic myelopathy       Cord compression (HCC)       History of breast cancer       *Pain from bone metastases (HCC)       Hyponatremia       Gait disturbance       Acute midline thoracic back pain           Plan:   -Continue q2h neurochecks   -Anticipate downgrade to medical floor tomorrow  -Medical management per hospitalist/critical care team   -Continue pain control regimen. Wean PCA to PO pain medications as tolerated   -Continue drains and output documentation  -PT/OT  -Oncology on board. Appreciate recommendations. Would recommend holding off on RT until at least 1 month to allow surgical incision to heal.   -Upright thoracic xrays for baseline imaging  -Decadron wean  -Ongoing discharge planning         Plan of care discussed with Dr. Liao.  Subjective:   No acute events overnight.  Patient denies any new neurologic complaints.  She has not been up and out of bed yet today.          Recent Labs   Lab 01/29/24  1123   INR 0.93   PTT 25.6         Imaging:  No new neurosurgical imaging to review at this time.      Is this a shared or split note between Advanced Practice Provider and Physician? Yes     Koby Glaser PA-C  Physician Assistant- Neurosurgery           MEDICATIONS ADMINISTERED IN LAST 1 DAY:  acetaminophen (Ofirmev) 10 mg/mL infusion premix 1,000 mg       Date Action Dose Route User    1/31/2024 0858 New Bag 1,000 mg Intravenous Perico Queen RN lic pend    1/31/2024 0202 New Bag 1,000 mg Intravenous Tomas Dalton RN     1/30/2024 2035 New Bag 1,000 mg Intravenous Tomas Dalton RN          anastrozole (Arimidex) tab 1 mg       Date Action Dose Route User    1/30/2024 1000 Given 1 mg Oral Arely Forde RN          ceFAZolin (Ancef) 2 g in 20mL IV syringe premix       Date Action Dose Route User    1/30/2024 1722 Given 2 g Intravenous Abdi Kay MD    1/30/2024 1322 Given 1 g Intravenous Lulú Forrester CRNA          dexamethasone (Decadron) 4 MG/ML injection 4 mg       Date Action Dose Route User    1/30/2024 1000 Given 4 mg Intravenous Arely Forde RN          dexamethasone (Decadron) 4 MG/ML injection       Date Action Dose Route User    1/30/2024 1430 Given 6 mg Intravenous Lulú Forrester CRNA          docusate sodium (Colace) cap 100 mg       Date Action Dose Route User    1/31/2024 0858 Given 100 mg Oral Perico Queen RN lic pend          enoxaparin (Lovenox) 40 MG/0.4ML SUBQ injection 40 mg       Date Action Dose Route User    1/31/2024 0858 Given 40 mg Subcutaneous (Right Lower Abdomen) Perico Queen RN lic pend          fentaNYL (Sublimaze) 50 mcg/mL injection       Date Action Dose Route User    1/30/2024 1832 Given 100 mcg Intravenous Abdi Kay MD    1/30/2024 1258 Given 100 mcg Intravenous Lulú Forrester CRNA          fentaNYL (Sublimaze) 50 mcg/mL injection 25 mcg       Date Action Dose Route User    1/30/2024 1934 Given 25 mcg Intravenous Mansi Figueredo RN    1/30/2024 1919 Given 25 mcg Intravenous Mansi Figueredo RN    1/30/2024 1909 Given 25 mcg Intravenous Mansi Figueredo RN    1/30/2024 1904 Given 25 mcg Intravenous Mansi Figueredo RN          gelatin adsorbable (Gelfoam) 100 external spone       Date Action Dose Route User    1/30/2024 1433 Given 1 each Topical (Back) Cleveland Liao MD          HYDROmorphone in sodium chloride 0.9% (Dilaudid) 20 mg/100mL PCA premix       Date Action Dose Route User    1/30/2024 1905 New Bag 20 mg Intravenous Mansi Figueredo, RN          lactated  ringers infusion       Date Action Dose Route User    1/30/2024 1250 New Bag (none) Intravenous Lulú Forrester CRNA          lidocaine PF (Xylocaine-MPF) 2% injection       Date Action Dose Route User    1/30/2024 1302 Given 80 mg Intravenous Lulú Forrester CRNA          lidocaine 1%-EPINEPHrine 1:200,000 (Xylocaine-Epinephrine) injection       Date Action Dose Route User    1/30/2024 1813 Given 15 mL Infiltration Cleveland Liao MD          methocarbamol (Robaxin) tab 500 mg       Date Action Dose Route User    1/31/2024 0403 Given 500 mg Oral Tomas Dalton RN    1/31/2024 0302 Given 500 mg Oral Tomas Dalton RN    1/30/2024 2205 Given 500 mg Oral Tomas Dalton RN          midazolam (Versed) 2 MG/2ML injection       Date Action Dose Route User    1/30/2024 1258 Given 2 mg Intravenous Lulú Forrestre CRNA          remifentanil (Ultiva) 1,000 mcg in sodium chloride 0.9% 40 mL infusion (OR ANE mixture)       Date Action Dose Route User    1/30/2024 1450 Rate/Dose Change 0.2 mcg/kg/min × 58.6 kg Intravenous Lulú Forrester CRNA    1/30/2024 1438 Rate/Dose Change 0.25 mcg/kg/min × 58.6 kg Intravenous Lulú Forrester CRNA    1/30/2024 1308 New Bag 0.2 mcg/kg/min × 58.6 kg Intravenous Lulú Forrester CRNA          phenylephrine (Austin-Synephrine) 10 MG/ML injection       Date Action Dose Route User    1/30/2024 1753 Given 100 mcg Intravenous Abdi Kay MD    1/30/2024 1741 Given 100 mcg Intravenous Abdi Kay MD    1/30/2024 1712 Given 100 mcg Intravenous Abdi Kay MD    1/30/2024 1609 Given 100 mcg Intravenous Abdi Kay MD    1/30/2024 1536 Given 100 mcg Intravenous Abdi Kay MD    1/30/2024 1512 Given 100 mcg Intravenous Abdi Kay MD    1/30/2024 1453 Given 50 mcg Intravenous Lulú Forrester CRNA    1/30/2024 1401 Given 100 mcg Intravenous Lulú Forrester, CRNA    1/30/2024 1322 Given 100 mcg Intravenous Lulú Forrester, CRNA           povidone-iodine (Betadine) 10 % 17.5 mL in sodium chloride 0.9 % 500 mL irrigation       Date Action Dose Route User    1/30/2024 1729 Given (none) Irrigation (Back) Cleveland Liao MD          propofol (Diprivan) 10 MG/ML injection       Date Action Dose Route User    1/30/2024 1303 Given 150 mg Intravenous Lulú Forrester CRNA          propofol (Diprivan) 10 mg/mL infusion premix       Date Action Dose Route User    1/30/2024 1308 New Bag 100 mcg/kg/min × 58.6 kg Intravenous Lulú Forrester CRNA          rocuronium (Zemuron) 50 mg/5mL injection       Date Action Dose Route User    1/30/2024 1302 Given 5 mg Intravenous Lulú Forrester CRNA          scopolamine (Transderm-Scop) 1 MG/3DAYS patch       Date Action Dose Route User    1/30/2024 1252 Given 1 patch Transdermal Lulú Forrester CRNA          sodium chloride 0.9% infusion       Date Action Dose Route User    1/31/2024 0403 New Bag (none) Intravenous Tomas Dalton, RN    1/30/2024 1915 New Bag (none) Intravenous Mansi Figueredo RN          sodium chloride 0.9% infusion       Date Action Dose Route User    1/30/2024 1308 New Bag (none) Intravenous Lulú Forrester CRNA          succinylcholine (Anectine) 20 MG/ML injection       Date Action Dose Route User    1/30/2024 1304 Given 100 mg Intravenous Lulú Forrester CRNA          thrombin (Thrombin-JMI) 5000 units topical solution       Date Action Dose Route User    1/30/2024 1433 Given 20,000 Units Topical (Back) Cleveland Liao MD          vancomycin (Vancocin) 1 g injection       Date Action Dose Route User    1/30/2024 1728 Given 2 g Topical (Back) Cleveland Liao MD            Vitals (last day)       Date/Time Temp Pulse Resp BP SpO2 Weight O2 Device O2 Flow Rate (L/min) [facility]    01/31/24 0600 -- 65 10 99/58 100 % -- Nasal cannula 2 L/min AW    01/31/24 0500 97.3 °F (36.3 °C) 66 9 101/65 100 % -- Nasal cannula 2 L/min AW    01/31/24 0400 -- 64 10 93/59 100 % -- Nasal cannula 2  L/min AW    01/31/24 0300 -- 64 10 91/55 100 % -- Nasal cannula 2 L/min AW    01/31/24 0200 -- 65 18 98/66 100 % -- Nasal cannula 2 L/min AW    01/31/24 0100 -- 69 9 92/64 100 % -- -- -- AW    01/31/24 0000 97.1 °F (36.2 °C) 72 9 100/69 99 % -- Nasal cannula 2 L/min AW    01/30/24 2300 -- 69 19 100/65 100 % -- Nasal cannula 2 L/min AW    01/30/24 2200 -- 82 17 98/70 98 % -- Nasal cannula 2 L/min AW    01/30/24 2100 -- 74 15 98/63 99 % -- Nasal cannula 2 L/min AW    01/30/24 2040 -- 73 15 97/65 99 % -- Nasal cannula 2 L/min     01/30/24 2006 97.6 °F (36.4 °C) 81 10 106/73 99 % -- Nasal cannula --     01/30/24 1956 -- 78 16 101/72 99 % -- Nasal cannula --     01/30/24 1946 -- 89 11 104/80 99 % -- Nasal cannula --     01/30/24 1936 -- 82 11 109/76 100 % -- Nasal cannula --     01/30/24 1926 -- 84 10 110/75 100 % -- Nasal cannula --     01/30/24 1916 -- 84 10 109/73 99 % -- Nasal cannula 3 L/min     01/30/24 1906 -- 87 12 111/77 99 % -- Nasal cannula --     01/30/24 1856 -- 93 14 106/76 98 % -- Nasal cannula --     01/30/24 1846 97.7 °F (36.5 °C) 99 12 109/80 96 % -- Nasal cannula 4 L/min     01/30/24 1200 -- 90 22 133/80 100 % -- None (Room air) --     01/30/24 1100 -- 86 14 -- 98 % -- None (Room air) --     01/30/24 1000 -- 97 17 -- 98 % -- None (Room air) --     01/30/24 0900 -- 92 14 -- 96 % -- None (Room air) -- DR    01/30/24 0800 97.8 °F (36.6 °C) 82 19 133/80 99 % -- None (Room air) -- DR    01/30/24 0700 -- 80 16 -- 99 % -- None (Room air) -- DR    01/30/24 0600 -- 83 14 129/88 96 % -- None (Room air) -- AW    01/30/24 0400 97.8 °F (36.6 °C) 83 15 123/78 95 % -- None (Room air) -- AW    01/30/24 0200 -- 74 13 115/74 96 % -- None (Room air) -- AW    01/30/24 0000 -- 90 19 132/78 95 % -- None (Room air) -- AW

## 2024-02-01 LAB
ANION GAP SERPL CALC-SCNC: 6 MMOL/L (ref 0–18)
BASOPHILS # BLD AUTO: 0.01 X10(3) UL (ref 0–0.2)
BASOPHILS NFR BLD AUTO: 0.1 %
BUN BLD-MCNC: 12 MG/DL (ref 9–23)
BUN/CREAT SERPL: 24.5 (ref 10–20)
CALCIUM BLD-MCNC: 8.2 MG/DL (ref 8.7–10.4)
CHLORIDE SERPL-SCNC: 108 MMOL/L (ref 98–112)
CO2 SERPL-SCNC: 26 MMOL/L (ref 21–32)
CREAT BLD-MCNC: 0.49 MG/DL
DEPRECATED RDW RBC AUTO: 37.2 FL (ref 35.1–46.3)
EGFRCR SERPLBLD CKD-EPI 2021: 103 ML/MIN/1.73M2 (ref 60–?)
EOSINOPHIL # BLD AUTO: 0 X10(3) UL (ref 0–0.7)
EOSINOPHIL NFR BLD AUTO: 0 %
ERYTHROCYTE [DISTWIDTH] IN BLOOD BY AUTOMATED COUNT: 12.3 % (ref 11–15)
GLUCOSE BLD-MCNC: 111 MG/DL (ref 70–99)
HCT VFR BLD AUTO: 24.9 %
HGB BLD-MCNC: 8.3 G/DL
IMM GRANULOCYTES # BLD AUTO: 0.08 X10(3) UL (ref 0–1)
IMM GRANULOCYTES NFR BLD: 0.7 %
LYMPHOCYTES # BLD AUTO: 1.85 X10(3) UL (ref 1–4)
LYMPHOCYTES NFR BLD AUTO: 16.1 %
MCH RBC QN AUTO: 27.9 PG (ref 26–34)
MCHC RBC AUTO-ENTMCNC: 33.3 G/DL (ref 31–37)
MCV RBC AUTO: 83.8 FL
MONOCYTES # BLD AUTO: 1.1 X10(3) UL (ref 0.1–1)
MONOCYTES NFR BLD AUTO: 9.6 %
NEUTROPHILS # BLD AUTO: 8.43 X10 (3) UL (ref 1.5–7.7)
NEUTROPHILS # BLD AUTO: 8.43 X10(3) UL (ref 1.5–7.7)
NEUTROPHILS NFR BLD AUTO: 73.5 %
OSMOLALITY SERPL CALC.SUM OF ELEC: 290 MOSM/KG (ref 275–295)
PLATELET # BLD AUTO: 221 10(3)UL (ref 150–450)
POTASSIUM SERPL-SCNC: 4.1 MMOL/L (ref 3.5–5.1)
RBC # BLD AUTO: 2.97 X10(6)UL
SODIUM SERPL-SCNC: 140 MMOL/L (ref 136–145)
WBC # BLD AUTO: 11.5 X10(3) UL (ref 4–11)

## 2024-02-01 RX ORDER — ACETAMINOPHEN 500 MG
500 TABLET ORAL EVERY 4 HOURS PRN
Status: DISCONTINUED | OUTPATIENT
Start: 2024-02-01 | End: 2024-02-05

## 2024-02-01 RX ORDER — HYDROCODONE BITARTRATE AND ACETAMINOPHEN 10; 325 MG/1; MG/1
1 TABLET ORAL EVERY 4 HOURS PRN
Status: DISCONTINUED | OUTPATIENT
Start: 2024-02-01 | End: 2024-02-05

## 2024-02-01 RX ORDER — DEXAMETHASONE SODIUM PHOSPHATE 4 MG/ML
4 VIAL (ML) INJECTION 3 TIMES DAILY
Status: COMPLETED | OUTPATIENT
Start: 2024-02-01 | End: 2024-02-01

## 2024-02-01 RX ORDER — DEXAMETHASONE SODIUM PHOSPHATE 4 MG/ML
4 VIAL (ML) INJECTION ONCE
Status: COMPLETED | OUTPATIENT
Start: 2024-02-03 | End: 2024-02-03

## 2024-02-01 RX ORDER — HYDROCODONE BITARTRATE AND ACETAMINOPHEN 5; 325 MG/1; MG/1
1 TABLET ORAL EVERY 4 HOURS PRN
Status: DISCONTINUED | OUTPATIENT
Start: 2024-02-01 | End: 2024-02-05

## 2024-02-01 RX ORDER — POLYETHYLENE GLYCOL 3350 17 G/17G
17 POWDER, FOR SOLUTION ORAL DAILY
Status: DISCONTINUED | OUTPATIENT
Start: 2024-02-01 | End: 2024-02-05

## 2024-02-01 RX ORDER — DEXAMETHASONE SODIUM PHOSPHATE 4 MG/ML
4 VIAL (ML) INJECTION 2 TIMES DAILY WITH MEALS
Status: COMPLETED | OUTPATIENT
Start: 2024-02-02 | End: 2024-02-02

## 2024-02-01 RX ORDER — DEXAMETHASONE SODIUM PHOSPHATE 4 MG/ML
2 VIAL (ML) INJECTION ONCE
Status: COMPLETED | OUTPATIENT
Start: 2024-02-04 | End: 2024-02-04

## 2024-02-01 RX ORDER — HYDROCODONE BITARTRATE AND ACETAMINOPHEN 5; 325 MG/1; MG/1
1 TABLET ORAL EVERY 6 HOURS PRN
Status: CANCELLED | OUTPATIENT
Start: 2024-02-01

## 2024-02-01 NOTE — DISCHARGE INSTRUCTIONS
Thoracic Fusion Discharge Instructions     Activity  Restrictions  No twisting, pulling, pushing or lifting > 10 lbs.  No lifting anything over your head.  No bending at the waist  - you can bend at the knees but keep your back straight.    Sitting  Sit with your knees at about the same level as your hips; use a footstool if needed.  Do not sit in soft or overstuffed chairs. Firm chairs with straight backs give better support.   Recliners are OK but may need to add pillows in order to not sink into the chair.  Use a raised toilet seat if needed.  Change position every 20-30 minutes when sitting (example: after sitting, stand, then you can sit again for another 20-30 minutes).    Walking is your best exercise.  Listen to your body.  Walk several times a day  Smaller distances are better.  Your goal is to increase the distance you walk each day.  Remember to listen to your body    Sex  After 2 weeks, when comfortable and approved by your surgeon.  Stop if causing pain.    Driving  Usually allowed after 1-2 weeks;  check with physician.  Do Not drive while taking narcotics or muscle relaxants.  Adhere to sitting restrictions.    Stairs  Climb stairs as needed    Incision site care and dressing  Changes as directed by your surgeon    May leave open to air or apply and change dressing once a day using dry bandage. May prevent clothing from rubbing incision.   Watch incision for any redness, drainage, increased warmth or opening of the incision.   Call surgeon if you notice any of these.  No lotions or ointments on or near incision.     Always wash hands before and after dressing changes.  Suture will need to be removed               Bathing  No tub baths, pools, or saunas for 6 weeks and until cleared by surgeon.  When able to shower, you may remove gauze dressing beforehand.  After showering, pat incision dry and may apply new dry dressing. Leave the white strips on that are beneath the bandage. They will slowly fall  off on their own. If they are still in place at your post operative visit, Dr. Sharpe will remove the remaining strips.   Do not apply any lotions or ointments to incision.       Return to work  When cleared by surgeon. Discuss specific work activities with your surgeon at follow up visit.  Light to sedentary work may be possible 2-3 weeks post op  Heavy work may be possible 6 weeks post op.    Sleeping  Use a firm mattress.  Lay on side or back, not stomach.  May use pillow under knees, between legs or behind back if lying on your side.  Log roll to turn.    Diet and constipation prevention  Drink six to eight glasses liquid (water, juice) per day.  Eat a high fiber diet (bran, vegetables, fruit).  Use an over the counter stool softener such as Colace or senakot while taking narcotics to prevent constipation; use laxatives such as Miralax or Milk of Magnesia as needed.  An enema or suppository may be necessary if above measures do not work.        No smoking  Smoking will inhibit healing.  Even one cigarette a day will cause problems.  Chewing tobacco, nicotine gum or patches will also inhibit healing.      Pain Management  Relaxation techniques  A way to focus your attention other than on your pain. Your brain makes endorphins that are a natural body chemical that can decrease pain. Some examples of relaxation techniques are deep breathing, listening to music or meditating.  May use Cold therapy for 20 minutes multiple times per day.  Be sure there is a cloth barrier between skin and cold therapy.  Medication  No NSAIDS until OK with your surgeon. At least 6 months.   NSAIDS (non-steroidal anti- inflammatory) include: Motrin, ibuprofen, Advil, Aleve, Naprosyn, Indocin, Nuprin, Vioxx, Celebrex, Bextra.(COMPLETE LIST IN GUIDEBOOK APPENDIX)  Tylenol (acetaminophen) is okay. Caution if your pain med contains Tylenol (acetaminophen); maximum 3 gms of Tylenol (acetaminophen) in 24 hours.  A single aspirin for the heart  is ok if ordered by your physician.  Take muscle relaxants and pain medications as prescribed allowing 30-45 minutes to take effect.  Do NOT take alcohol while on pain medication.  Monitor need for pain medication closely  Call pharmacy or physician office at least five days before out of pain medication. If calling physician office after 3 pm, it will be handled on the next business day.    Post op office visit  Scheduled 3 weeks after surgery as with surgeon as directed at discharge  Schedule one week follow up with Primary Care Physician. Review all medications.      When to contact your surgeon  Temp > 101F; Take your temperature twice a day  Increased pain, swelling, redness, or any drainage to incision.  Separation of incision.  Sudden reappearance of pain that won’t go away with pain medication.  New numbness or weakness to arms or legs.  Difficulty urinating or having bowel movements  Headaches that worsen when standing and resolve when laying flat.    Go directly to the ER or CALL 911 if you:  become short of breath  have chest pain  cough up blood  have unexplained anxiety with breathing     DAY REHAB & HOME HEALTH:  Referrals have been sent for Day Rehab (intensive outpatient therapy) to the following providers:  [facility]   Ph: [phone], Scheduling  Outpatient : [phone]  Fax: [phone]     Delores Christopher  Ph: [phone]  Fax:[phone]     Jose Angel Christopher  Ph: [phone]  Fax: [phone]     Aurora Christopher  Ph: [phone]  Fax: [phone]       The above will contact you directly if you qualify for services. While you wait for responses from these providers, you have been arranged / [facility] services.    Sometimes managing your health at home requires assistance.  The [facility] team has recognized your preference to use [facility].  They can be reached by phone at [phone].  The fax number for your  reference is [phone].  A representative from the home health agency will contact you or your family to schedule your first visit.        Hold Tumeric for 1 week

## 2024-02-01 NOTE — PLAN OF CARE
Pt Aox4. Pt numbness and tingling in lower extremities remain decreased from time of surgery. Pt given pain and nausea medicine per protocol. Spinal precautions kept and reinforced education regarding precautions. Pt taken to chair by PT/OT. Repositioned again later in the shift with nurse.  Problem: PAIN - ADULT  Goal: Verbalizes/displays adequate comfort level or patient's stated pain goal  Description: INTERVENTIONS:  - Encourage pt to monitor pain and request assistance  - Assess pain using appropriate pain scale  - Administer analgesics based on type and severity of pain and evaluate response  - Implement non-pharmacological measures as appropriate and evaluate response  - Consider cultural and social influences on pain and pain management  - Manage/alleviate anxiety  - Utilize distraction and/or relaxation techniques  - Monitor for opioid side effects  - Notify MD/LIP if interventions unsuccessful or patient reports new pain  - Anticipate increased pain with activity and pre-medicate as appropriate  Outcome: Progressing     Problem: RISK FOR INFECTION - ADULT  Goal: Absence of fever/infection during anticipated neutropenic period  Description: INTERVENTIONS  - Monitor WBC  - Administer growth factors as ordered  - Implement neutropenic guidelines  Outcome: Progressing     Problem: SAFETY ADULT - FALL  Goal: Free from fall injury  Description: INTERVENTIONS:  - Assess pt frequently for physical needs  - Identify cognitive and physical deficits and behaviors that affect risk of falls.  - [place] fall precautions as indicated by assessment.  - Educate pt/family on patient safety including physical limitations  - Instruct pt to call for assistance with activity based on assessment  - Modify environment to reduce risk of injury  - Provide assistive devices as appropriate  - Consider OT/PT consult to assist with strengthening/mobility  - Encourage toileting schedule  Outcome: Progressing     Problem:  CARDIOVASCULAR - ADULT  Goal: Maintains optimal cardiac output and hemodynamic stability  Description: INTERVENTIONS:  - Monitor vital signs, rhythm, and trends  - Monitor for bleeding, hypotension and signs of decreased cardiac output  - Evaluate effectiveness of vasoactive medications to optimize hemodynamic stability  - Monitor arterial and/or venous puncture sites for bleeding and/or hematoma  - Assess quality of pulses, skin color and temperature  - Assess for signs of decreased coronary artery perfusion - ex. Angina  - Evaluate fluid balance, assess for edema, trend weights  Outcome: Progressing  Goal: Absence of cardiac arrhythmias or at baseline  Description: INTERVENTIONS:  - Continuous cardiac monitoring, monitor vital signs, obtain 12 lead EKG if indicated  - Evaluate effectiveness of antiarrhythmic and heart rate control medications as ordered  - Initiate emergency measures for life threatening arrhythmias  - Monitor electrolytes and administer replacement therapy as ordered  Outcome: Progressing     Problem: GASTROINTESTINAL - ADULT  Goal: Minimal or absence of nausea and vomiting  Description: INTERVENTIONS:  - Maintain adequate hydration with IV or PO as ordered and tolerated  - Nasogastric tube to low intermittent suction as ordered  - Evaluate effectiveness of ordered antiemetic medications  - Provide nonpharmacologic comfort measures as appropriate  - Advance diet as tolerated, if ordered  - Obtain nutritional consult as needed  - Evaluate fluid balance  Outcome: Progressing     Problem: HEMATOLOGIC - ADULT  Goal: Maintains hematologic stability  Description: INTERVENTIONS  - Assess for signs and symptoms of bleeding or hemorrhage  - Monitor labs and vital signs for trends  - Administer supportive blood products/factors, fluids and medications as ordered and appropriate  - Administer supportive blood products/factors as ordered and appropriate  Outcome: Progressing  Goal: Free from bleeding  injury  Description: (Example usage: patient with low platelets)  INTERVENTIONS:  - Avoid intramuscular injections, enemas and rectal medication administration  - Ensure safe mobilization of patient  - Hold pressure on venipuncture sites to achieve adequate hemostasis  - Assess for signs and symptoms of internal bleeding  - Monitor lab trends  - Patient is to report abnormal signs of bleeding to staff  - Avoid use of toothpicks and dental floss  - Use electric shaver for shaving  - Use soft bristle tooth brush  - Limit straining and forceful nose blowing  Outcome: Progressing     Problem: MUSCULOSKELETAL - ADULT  Goal: Return mobility to safest level of function  Description: INTERVENTIONS:  - Assess patient stability and activity tolerance for standing, transferring and ambulating w/ or w/o assistive devices  - Assist with transfers and ambulation using safe patient handling equipment as needed  - Ensure adequate protection for wounds/incisions during mobilization  - Obtain PT/OT consults as needed  - Advance activity as appropriate  - Communicate ordered activity level and limitations with patient/family  Outcome: Progressing  Goal: Maintain proper alignment of affected body part  Description: INTERVENTIONS:  - Support and protect limb and body alignment per provider's orders  - Instruct and reinforce with patient and family use of appropriate assistive device and precautions (e.g. spinal or hip dislocation precautions)  Outcome: Progressing

## 2024-02-01 NOTE — PLAN OF CARE
Ambulated x1 assist with walker, good tolerance. Drain #1 and #2 draining well. Jara draining well. Pain is well controlled. Numbness and tingling is now decreasing per patient.    ([id number])  Drain #1: 12cc  Drain #2: 80cc    Problem: Patient Centered Care  Goal: Patient preferences are identified and integrated in the patient's plan of care  Description: Interventions:  - What would you like us to know as we care for you? From home with   - Provide timely, complete, and accurate information to patient/family  - Incorporate patient and family knowledge, values, beliefs, and cultural backgrounds into the planning and delivery of care  - Encourage patient/family to participate in care and decision-making at the level they choose  - Honor patient and family perspectives and choices  Outcome: Progressing     Problem: Patient/Family Goals  Goal: Patient/Family Long Term Goal  Description: Patient's Long Term Goal: Discharge to home    Interventions:  - Follow MD orders  - PT/OT  - See additional Care Plan goals for specific interventions  Outcome: Progressing  Goal: Patient/Family Short Term Goal  Description: Patient's Short Term Goal: Remove drains, wean off PCA    Interventions:   - Per MD orders  - Use PRNs as needed  - See additional Care Plan goals for specific interventions  Outcome: Progressing     Problem: PAIN - ADULT  Goal: Verbalizes/displays adequate comfort level or patient's stated pain goal  Description: INTERVENTIONS:  - Encourage pt to monitor pain and request assistance  - Assess pain using appropriate pain scale  - Administer analgesics based on type and severity of pain and evaluate response  - Implement non-pharmacological measures as appropriate and evaluate response  - Consider cultural and social influences on pain and pain management  - Manage/alleviate anxiety  - Utilize distraction and/or relaxation techniques  - Monitor for opioid side effects  - Notify MD/LIP if interventions  unsuccessful or patient reports new pain  - Anticipate increased pain with activity and pre-medicate as appropriate  Outcome: Progressing     Problem: RISK FOR INFECTION - ADULT  Goal: Absence of fever/infection during anticipated neutropenic period  Description: INTERVENTIONS  - Monitor WBC  - Administer growth factors as ordered  - Implement neutropenic guidelines  Outcome: Progressing     Problem: SAFETY ADULT - FALL  Goal: Free from fall injury  Description: INTERVENTIONS:  - Assess pt frequently for physical needs  - Identify cognitive and physical deficits and behaviors that affect risk of falls.  - [place] fall precautions as indicated by assessment.  - Educate pt/family on patient safety including physical limitations  - Instruct pt to call for assistance with activity based on assessment  - Modify environment to reduce risk of injury  - Provide assistive devices as appropriate  - Consider OT/PT consult to assist with strengthening/mobility  - Encourage toileting schedule  Outcome: Progressing     Problem: DISCHARGE PLANNING  Goal: Discharge to home or other facility with appropriate resources  Description: INTERVENTIONS:  - Identify barriers to discharge w/pt and caregiver  - Include patient/family/discharge partner in discharge planning  - Arrange for needed discharge resources and transportation as appropriate  - Identify discharge learning needs (meds, wound care, etc)  - Arrange for interpreters to assist at discharge as needed  - Consider post-discharge preferences of patient/family/discharge partner  - Complete POLST form as appropriate  - Assess patient's ability to be responsible for managing their own health  - Refer to Case Management Department for coordinating discharge planning if the patient needs post-hospital services based on physician/LIP order or complex needs related to functional status, cognitive ability or social support system  Outcome: Progressing     Problem: COPING  Goal:  Pt/Family able to verbalize concerns and demonstrate effective coping strategies  Description: INTERVENTIONS:  - Assist patient/family to identify coping skills, available support systems and cultural and spiritual values  - Provide emotional support, including active listening and acknowledgement of concerns of patient and caregivers  - Reduce environmental stimuli, as able  - Instruct patient/family in relaxation techniques, as appropriate  - Assess for spiritual and psychosocial needs and initiate Spiritual Care or Behavioral Health consult as needed  Outcome: Progressing     Problem: CARDIOVASCULAR - ADULT  Goal: Maintains optimal cardiac output and hemodynamic stability  Description: INTERVENTIONS:  - Monitor vital signs, rhythm, and trends  - Monitor for bleeding, hypotension and signs of decreased cardiac output  - Evaluate effectiveness of vasoactive medications to optimize hemodynamic stability  - Monitor arterial and/or venous puncture sites for bleeding and/or hematoma  - Assess quality of pulses, skin color and temperature  - Assess for signs of decreased coronary artery perfusion - ex. Angina  - Evaluate fluid balance, assess for edema, trend weights  Outcome: Progressing  Goal: Absence of cardiac arrhythmias or at baseline  Description: INTERVENTIONS:  - Continuous cardiac monitoring, monitor vital signs, obtain 12 lead EKG if indicated  - Evaluate effectiveness of antiarrhythmic and heart rate control medications as ordered  - Initiate emergency measures for life threatening arrhythmias  - Monitor electrolytes and administer replacement therapy as ordered  Outcome: Progressing     Problem: GASTROINTESTINAL - ADULT  Goal: Minimal or absence of nausea and vomiting  Description: INTERVENTIONS:  - Maintain adequate hydration with IV or PO as ordered and tolerated  - Nasogastric tube to low intermittent suction as ordered  - Evaluate effectiveness of ordered antiemetic medications  - Provide  nonpharmacologic comfort measures as appropriate  - Advance diet as tolerated, if ordered  - Obtain nutritional consult as needed  - Evaluate fluid balance  Outcome: Progressing  Goal: Maintains or returns to baseline bowel function  Description: INTERVENTIONS:  - Assess bowel function  - Maintain adequate hydration with IV or PO as ordered and tolerated  - Evaluate effectiveness of GI medications  - Encourage mobilization and activity  - Obtain nutritional consult as needed  - Establish a toileting routine/schedule  - Consider collaborating with pharmacy to review patient's medication profile  Outcome: Progressing     Problem: METABOLIC/FLUID AND ELECTROLYTES - ADULT  Goal: Electrolytes maintained within normal limits  Description: INTERVENTIONS:  - Monitor labs and rhythm and assess patient for signs and symptoms of electrolyte imbalances  - Administer electrolyte replacement as ordered  - Monitor response to electrolyte replacements, including rhythm and repeat lab results as appropriate  - Fluid restriction as ordered  - Instruct patient on fluid and nutrition restrictions as appropriate  Outcome: Progressing     Problem: HEMATOLOGIC - ADULT  Goal: Maintains hematologic stability  Description: INTERVENTIONS  - Assess for signs and symptoms of bleeding or hemorrhage  - Monitor labs and vital signs for trends  - Administer supportive blood products/factors, fluids and medications as ordered and appropriate  - Administer supportive blood products/factors as ordered and appropriate  Outcome: Progressing  Goal: Free from bleeding injury  Description: (Example usage: patient with low platelets)  INTERVENTIONS:  - Avoid intramuscular injections, enemas and rectal medication administration  - Ensure safe mobilization of patient  - Hold pressure on venipuncture sites to achieve adequate hemostasis  - Assess for signs and symptoms of internal bleeding  - Monitor lab trends  - Patient is to report abnormal signs of  bleeding to staff  - Avoid use of toothpicks and dental floss  - Use electric shaver for shaving  - Use soft bristle tooth brush  - Limit straining and forceful nose blowing  Outcome: Progressing     Problem: MUSCULOSKELETAL - ADULT  Goal: Return mobility to safest level of function  Description: INTERVENTIONS:  - Assess patient stability and activity tolerance for standing, transferring and ambulating w/ or w/o assistive devices  - Assist with transfers and ambulation using safe patient handling equipment as needed  - Ensure adequate protection for wounds/incisions during mobilization  - Obtain PT/OT consults as needed  - Advance activity as appropriate  - Communicate ordered activity level and limitations with patient/family  Outcome: Progressing  Goal: Maintain proper alignment of affected body part  Description: INTERVENTIONS:  - Support and protect limb and body alignment per provider's orders  - Instruct and reinforce with patient and family use of appropriate assistive device and precautions (e.g. spinal or hip dislocation precautions)  Outcome: Progressing

## 2024-02-01 NOTE — PROGRESS NOTES
Neurosurgery Progress Note    Assessment:   Cris Han in room 233/233-A, is a 68 year old female, Hospital Day ( LOS: 6 days ) hospitalized for Pain from bone metastases (HCC).      2 Days Post-Op s/p Procedure(s):  T5 - T7 laminectomy, resection of tumor, T4 - T8 posterior fixation and fusion      The patient's other hospital problems include:   Active Hospital Problems    Carcinoma of right breast metastatic to bone (HCC)      Thoracic radiculopathy      Spinal instability of thoracic region      Thoracic myelopathy      Cord compression (HCC)      History of breast cancer      *Pain from bone metastases (HCC)      Hyponatremia      Gait disturbance      Acute midline thoracic back pain        Plan:   -Okay to downgrade  -q4h neurochecks  -Medical management per hospitalist/critical care team   -Continue pain control regimen. Wean PCA to PO pain medications as tolerated   -Continue drains and output documentation  -PT/OT  -Oncology on board. Appreciate recommendations. Would recommend holding off on RT until at least 1 month to allow surgical incision to heal.   -Upright thoracic xrays for baseline imaging prior to discharge and when safe to stand  -Decadron wean  -Ongoing discharge planning        Plan of care discussed with Dr. Liao.  Subjective:   Patient reports that she is doing well overall. She reports not tolerating the oxycodone yesterday with brain fog and nausea, but is comfortable on her current regimen. She reports expected postoperative back pain. Improved thoracic radiculopathy. Her lower extremity sensation is slightly improved as well.     Objective:   VITALS: /64 (BP Location: Left arm)   Pulse 81   Temp 97.6 °F (36.4 °C) (Temporal)   Resp 11   Wt 131 lb 6.3 oz (59.6 kg)   SpO2 96%   BMI 21.21 kg/m²  Temp (24hrs), Av.3 °F (36.8 °C), Min:97.6 °F (36.4 °C), Max:98.9 °F (37.2 °C)       General: Well developed, well nourished, in no acute distress.     Incision: Dressing  removed. Incision clean, dry, and intact. No signs of gross drainage, warmth, erythema, or hematoma formation. Nylon sutures intact. New tegaderm and biopatchx2 placed.     Drain:   Output by Drain (mL) 01/30/24 0700 - 01/30/24 1859 01/30/24 1900 - 01/31/24 0659 01/31/24 0700 - 01/31/24 1859 01/31/24 1900 - 02/01/24 0659 02/01/24 0700 - 02/01/24 0704   Closed Drain 1 Superior Back Accordion 10 Fr.  160 65 12    Closed Drain 2 Inferior Back Accordion 10 Fr.  230 100 80         Neurological / Musculoskeletal: Awake, alert, and interactive. Recent and remote memory appear intact. Attention span and concentration are appropriate. No dysarthria. Coordination and motor control grossly intact. Patient follows commands briskly and appropriately.     Cervical Spine:    Motor/ Extremities   Deltoid Biceps Triceps    R 5/5 5/5 5/5 5/5   L 5/5 5/5 5/5 5/5       Lumbar Spine:    Motor / Extremities   Hip   flexion Knee   extension Dorsiflexion EHL Plantarflexion   R 5/5 5/5 5/5 5/5 5/5   L 5/5 5/5 5/5 5/5 5/5     I&O: I/O last 3 completed shifts:  In: 4907.9 [P.O.:640; I.V.:3867.9; IV PIGGYBACK:400]  Out: 4697 [Urine:4050; Drains:647]       Labs:   Recent Labs   Lab 01/26/24 2003 01/27/24 0551 01/31/24  0919   RBC 4.99 4.90  --    HGB 13.8 13.4 8.4*   HCT 41.9 40.7 25.0*   MCV 84.0 83.1  --    MCH 27.7 27.3  --    MCHC 32.9 32.9  --    RDW 12.5 12.5  --    NEPRELIM 6.02 4.04  --    WBC 8.1 6.9  --    .0 249.0  --      Recent Labs   Lab 01/26/24 2003 01/27/24 0551 01/31/24  1119   * 85 145*   BUN 16 10 20   CREATSERUM 0.80 0.72 0.54*   EGFRCR 81 92 101   CA 10.0 9.5 8.0*   ALB 4.8 4.3  --    * 140 140   K 3.7 4.3 3.2*   CL 94* 107 110   CO2 26.0 26.0 22.0   ALKPHO 96 85  --    AST 27 23  --    ALT 22 18  --    BILT 0.7 0.7  --    TP 8.3* 7.2  --       Recent Labs   Lab 01/29/24  1123   INR 0.93   PTT 25.6        Imaging:  No new neurosurgical imaging to review at this time     Is this a shared or split  note between Advanced Practice Provider and Physician? Yes    Koby Glaser PA-C  Physician Assistant- Neurosurgery   [facility]  2/1/2024, 7:04 AM        This document was created using Dragon voice recognition software and transcription variances may occur. Please contact documenting provider for clarification of contents if needed.

## 2024-02-01 NOTE — PROGRESS NOTES
Double RN skin check done prior to transfer off Unit. Skin check performed by this RN and FRANCES River.     Wounds are as follows: surgical incision to back     Will remain available for any further questions or concerns.

## 2024-02-01 NOTE — PAYOR COMM NOTE
--------------  CONTINUED STAY REVIEW    Payor: [employer] OUT OF STATE PPO  Subscriber #:  [id number]  Authorization Number: [id number]    Admit date: 1/26/24  Admit time: 11:41 PM    Admitting Physician: Lawanda Hawthorne DO  Attending Physician:  Angelica Yarbrough MD  Primary Care Physician: Deanna Morris MD        REVIEW DOCUMENTATION:    Neurosurgery  2/1/24    Aphrodmary kate Han in room 233/233-A, is a 68 year old female, Hospital Day ( LOS: 6 days ) hospitalized for Pain from bone metastases (HCC).      2 Days Post-Op s/p Procedure(s):  T5 - T7 laminectomy, resection of tumor, T4 - T8 posterior fixation and fusion        The patient's other hospital problems include:   Active Hospital Problems    Carcinoma of right breast metastatic to bone (HCC)       Thoracic radiculopathy       Spinal instability of thoracic region       Thoracic myelopathy       Cord compression (HCC)       History of breast cancer       *Pain from bone metastases (HCC)       Hyponatremia       Gait disturbance       Acute midline thoracic back pain           Plan:   -Okay to downgrade  -q4h neurochecks  -Medical management per hospitalist/critical care team   -Continue pain control regimen. Wean PCA to PO pain medications as tolerated   -Continue drains and output documentation  -PT/OT  -Oncology on board. Appreciate recommendations. Would recommend holding off on RT until at least 1 month to allow surgical incision to heal.   -Upright thoracic xrays for baseline imaging prior to discharge and when safe to stand  -Decadron wean  -Ongoing discharge planning         Plan of care discussed with Dr. Liao.  Subjective:   Patient reports that she is doing well overall. She reports not tolerating the oxycodone yesterday with brain fog and nausea, but is comfortable on her current regimen. She reports expected postoperative back pain. Improved thoracic radiculopathy. Her lower extremity sensation is slightly improved as well.      Objective:    VITALS: /64 (BP Location: Left arm)   Pulse 81   Temp 97.6 °F (36.4 °C) (Temporal)   Resp 11   Wt 131 lb 6.3 oz (59.6 kg)   SpO2 96%   BMI 21.21 kg/m²  Temp (24hrs), Av.3 °F (36.8 °C), Min:97.6 °F (36.4 °C), Max:98.9 °F (37.2 °C)        General: Well developed, well nourished, in no acute distress.      Incision: Dressing removed. Incision clean, dry, and intact. No signs of gross drainage, warmth, erythema, or hematoma formation. Nylon sutures intact. New tegaderm and biopatchx2 placed.      Drain:   Output by Drain (mL) 24 0700 - 24 18524 1900 - 24 0659 24 07 - 24 1859 24 1900 - 24 0659 24 07 - 24 0704   Closed Drain 1 Superior Back Accordion 10 Fr.   160 65 12     Closed Drain 2 Inferior Back Accordion 10 Fr.   230 100 80           Neurological / Musculoskeletal: Awake, alert, and interactive. Recent and remote memory appear intact. Attention span and concentration are appropriate. No dysarthria. Coordination and motor control grossly intact. Patient follows commands briskly and appropriately.      Cervical Spine:     Motor/ Extremities    Deltoid Biceps Triceps    R 5/5 5/5 5/5 5/5   L 5/5 5/5 5/5 5/5         Lumbar Spine:     Motor / Extremities    Hip   flexion Knee   extension Dorsiflexion EHL Plantarflexion   R 5/5 5/5 5/5 5/5 5/5   L 5/5 5/5 5/5 5/5 5/5      I&O: I/O last 3 completed shifts:  In: 4907.9 [P.O.:640; I.V.:3867.9; IV PIGGYBACK:400]  Out: 4697 [Urine:4050; Drains:647]          Koby Glaser PA-C  Physician Assistant- Neurosurgery   [facility]      24 Hospitalist    Pain ok with PCA   wean PCA to oral pain meds as tolerated  - transfer to floor today  -ok for PT/OT     DVT prophylaxis: SCD  Code status: FULL     Angelica Yarbrough MD    Recent Labs   Lab 24  0551 24  0919 24  0724   RBC 4.99 4.90  --  2.97*   HGB 13.8 13.4 8.4* 8.3*   HCT 41.9 40.7 25.0*  24.9*   MCV 84.0 83.1  --  83.8   MCH 27.7 27.3  --  27.9   MCHC 32.9 32.9  --  33.3   RDW 12.5 12.5  --  12.3   NEPRELIM 6.02 4.04  --  8.43*   WBC 8.1 6.9  --  11.5*   .0 249.0  --  221.0                  Recent Labs   Lab 01/27/24  0551 01/31/24  1119 02/01/24  0724   GLU 85 145* 111*   BUN 10 20 12   CREATSERUM 0.72 0.54* 0.49*   EGFRCR 92 101 103   CA 9.5 8.0* 8.2*    140 140   K 4.3 3.2* 4.1    110 108   CO2 26.0 22.0 26.0           MEDICATIONS ADMINISTERED IN LAST 1 DAY:  acetaminophen (Ofirmev) 10 mg/mL infusion premix 1,000 mg       Date Action Dose Route User    2/1/2024 0634 New Bag 1,000 mg Intravenous Devin Wilson RN    2/1/2024 0023 New Bag 1,000 mg Intravenous Devin Wilson RN    1/31/2024 1921 New Bag 1,000 mg Intravenous Hannah Tan RN          anastrozole (Arimidex) tab 1 mg       Date Action Dose Route User    2/1/2024 0835 Given 1 mg Oral Jorje Prieto RN          dexamethasone (Decadron) 4 MG/ML injection 6 mg       Date Action Dose Route User    1/31/2024 2300 Given 6 mg Intravenous Devin Wilson RN    1/31/2024 1826 Given 6 mg Intravenous Perico Queen RN lic pend          dexamethasone (Decadron) 4 MG/ML injection 4 mg       Date Action Dose Route User    2/1/2024 0835 Given 4 mg Intravenous Jorje Prieto RN          docusate sodium (Colace) cap 100 mg       Date Action Dose Route User    2/1/2024 0835 Given 100 mg Oral Jorje Prieto RN    1/31/2024 2042 Given 100 mg Oral Devin Wilson RN          enoxaparin (Lovenox) 40 MG/0.4ML SUBQ injection 40 mg       Date Action Dose Route User    2/1/2024 0835 Given 40 mg Subcutaneous (Left Lower Abdomen) Bareikaite, Ieva, RN          lidocaine-menthol 4-1 % patch 1 patch       Date Action Dose Route User    1/31/2024 2041 Patch Applied 1 patch Transdermal (Left Upper Back) Devin Wilson, RN          methocarbamol (Robaxin) tab 500 mg       Date Action Dose Route User    2/1/2024 1349 Given 500 mg Oral Domalik,  FRANCES River lic pend    2/1/2024 0634 Given 500 mg Oral Devin Wilson RN    2/1/2024 0023 Given 500 mg Oral Devin Wilson RN    1/31/2024 1921 Given 500 mg Oral Hannah Tan RN          ondansetron (Zofran) 4 MG/2ML injection 4 mg       Date Action Dose Route User    1/31/2024 1835 Given 4 mg Intravenous Perico Queen RN lic pend          oxyCODONE immediate release tab 5 mg       Date Action Dose Route User    1/31/2024 1513 Given 5 mg Oral Hannah Tan RN          polyethylene glycol (PEG 3350) (Miralax) 17 g oral packet 17 g       Date Action Dose Route User    2/1/2024 1008 Given 17 g Oral BareikaiteJorje RN          sennosides (Senokot) tab 17.2 mg       Date Action Dose Route User    1/31/2024 2043 Given 17.2 mg Oral Devin Wilson RN          sodium chloride 0.9% infusion       Date Action Dose Route User    2/1/2024 0033 New Bag (none) Intravenous Devin Wilson RN    1/31/2024 1509 New Bag (none) Intravenous Hannah Tan RN            Vitals (last day)       Date/Time Temp Pulse Resp BP SpO2 Weight O2 Device O2 Flow Rate (L/min) [facility]    02/01/24 1354 -- 99 -- 137/71 -- -- -- -- AA    02/01/24 1105 -- 98 20 126/88 -- -- -- -- IB    02/01/24 1000 -- 85 18 106/64 96 % -- None (Room air) --     02/01/24 0800 97.6 °F (36.4 °C) 94 19 105/62 99 % -- None (Room air) --     02/01/24 0600 -- 81 11 112/64 96 % 131 lb 6.3 oz None (Room air) -- MT    02/01/24 0400 97.6 °F (36.4 °C) 75 12 100/66 98 % -- None (Room air) -- MT    02/01/24 0200 -- 75 10 99/59 97 % -- None (Room air) -- MT    02/01/24 0000 98.6 °F (37 °C) 77 13 103/67 96 % -- None (Room air) -- MT    01/31/24 2200 -- 86 13 119/65 93 % -- None (Room air) -- MT    01/31/24 2000 98.8 °F (37.1 °C) 76 10 99/60 97 % -- None (Room air) -- MT    01/31/24 1800 -- 96 19 107/71 98 % -- Nasal cannula 2 L/min MD    01/31/24 1600 97.8 °F (36.6 °C) 88 16 100/60 97 % -- Nasal cannula 2 L/min MD    01/31/24 1400 -- 97 16 102/61 83 % -- Nasal cannula 2 L/min  MD    01/31/24 1345 -- 88 -- 99/72 -- -- -- -- AA    01/31/24 1200 98.1 °F (36.7 °C) 106 18 94/69 98 % -- Nasal cannula 2 L/min MD    01/31/24 1000 -- 94 19 105/63 100 % -- Nasal cannula 2 L/min MD    01/31/24 0900 -- 85 13 110/75 100 % -- Nasal cannula 2 L/min MD    01/31/24 0800 98.9 °F (37.2 °C) 82 15 102/70 100 % -- Nasal cannula 2 L/min MD    01/31/24 0700 -- 70 8 102/60 100 % -- Nasal cannula 2 L/min MD    01/31/24 0600 -- 65 10 99/58 100 % -- Nasal cannula 2 L/min AW    01/31/24 0500 97.3 °F (36.3 °C) 66 9 101/65 100 % -- Nasal cannula 2 L/min AW    01/31/24 0400 -- 64 10 93/59 100 % -- Nasal cannula 2 L/min AW    01/31/24 0300 -- 64 10 91/55 100 % -- Nasal cannula 2 L/min AW    01/31/24 0200 -- 65 18 98/66 100 % -- Nasal cannula 2 L/min AW    01/31/24 0100 -- 69 9 92/64 100 % -- -- -- AW    01/31/24 0000 97.1 °F (36.2 °C) 72 9 100/69 99 % -- Nasal cannula 2 L/min AW

## 2024-02-01 NOTE — PHYSICAL THERAPY NOTE
PHYSICAL THERAPY TREATMENT NOTE - INPATIENT     Room Number: 233/233-A       Presenting Problem: T5 - T7 laminectomy, resection of tumor, T5 - T8 posterior fixation and fusion    Problem List  Principal Problem:    Pain from bone metastases (HCC)  Active Problems:    Hyponatremia    Gait disturbance    Acute midline thoracic back pain    Cord compression (HCC)    History of breast cancer    Carcinoma of right breast metastatic to bone (HCC)    Thoracic radiculopathy    Spinal instability of thoracic region    Thoracic myelopathy      PHYSICAL THERAPY ASSESSMENT   Chart reviewed. FRANCES Recinos approved participation in physical therapy. Session coordinated with OT, gait belt donned. PPE worn by therapist: mask and gloves. Patient was not wearing a mask during session. Patient presented in bedside chair with 0/10 pain. Patient with good  progress towards goals during this session. Education provided on Spine precautions, Physical therapy plan of care, and physiological benefits of out of bed mobility. Patient with good carryover. Patient on room air. Patient currently with min A x 1 for mobility during the session. Patient with continued impaired motor control, suggest that family bring in gym shoes for patient to wear while ambulating. Patient with narrow base of support and ataxia noted while ambulating. Improved from last session but motor control remains impaired. Patient assisted to bedside chair with min A x 1. Discussed D/C planning, able to return home with support and home health, then will go to outpatient should patient need it. In basket message to outpatient therapist and [employer] message to MD for script so that patient is able to make an appointment. Patient and family agreeable to plan. Patient continues to function below baseline with bed mobility, transfers, gait, and stair negotiation. Contributing factors to remaining limitations include impaired motor planning. Patient continues to benefit from continued  skilled therapy services at discharge for fall prevention, aerobic conditioning, and promoting functional independence in the home. Anticipate patient will return home with home-health therapy. Final discharge placement decision is directed by the inter-disciplinary team.    Patient demonstrated good  progress this session, goals remain in progress. Patient with good carryover during session. Will continue to follow patient for duration of hospital stay per plan of care, next session anticipate to progress bed mobility, transfers, gait, and stair negotiation. Anticipated therapy needs remain appropriate based on the patient's performance, personal factors, and remaining functional impairments.      Bed mobility:  not tested   Transfers: Min assist  Gait Assistance: Minimum assistance  Distance (ft): 100ft  Assistive Device: Rolling walker             Patient was left in bedside chair at end of session with all needs in reach. Patient does have the physical skills to return to prior living environment upon D/C from the hospital. Anticipate patient will benefit from continued skilled phsical therapy while in the hospital and upon D/C from the hospital with home health and then outpatient physical therapy. The patient's Approx Degree of Impairment: 50.57% has been calculated based on documentation in the [facility] '6 clicks' Inpatient Basic Mobility Short Form.  Research supports that patients with this level of impairment may benefit from Subacute Rehab. However, patient would benefit from home health and then outpatient physical therapy. RN aware of patient status post session.    DISCHARGE RECOMMENDATIONS  PT Discharge Recommendations: 24 hour care/supervision;Home with home health PT (OPPT when okay with MD)     PLAN  PT Treatment Plan: Bed mobility;Body mechanics;Patient education;Gait training;Stair training;Balance training;Transfer training;Strengthening    SUBJECTIVE  \"Thank you both so  much\"    OBJECTIVE  Precautions: Spine    WEIGHT BEARING RESTRICTION  Weight Bearing Restriction: None                PAIN ASSESSMENT   Ratin          BALANCE                                                                                                                       Static Sitting: Good  Dynamic Sitting: Fair +           Static Standing: Fair  Dynamic Standing: Fair -    AM-PAC '6-Clicks' INPATIENT SHORT FORM - BASIC MOBILITY  How much difficulty does the patient currently have...  Patient Difficulty: Turning over in bed (including adjusting bedclothes, sheets and blankets)?: A Little   Patient Difficulty: Sitting down on and standing up from a chair with arms (e.g., wheelchair, bedside commode, etc.): A Little   Patient Difficulty: Moving from lying on back to sitting on the side of the bed?: A Little   How much help from another person does the patient currently need...   Help from Another: Moving to and from a bed to a chair (including a wheelchair)?: A Little   Help from Another: Need to walk in hospital room?: A Little   Help from Another: Climbing 3-5 steps with a railing?: A Lot     AM-PAC Score:  Raw Score: 17   Approx Degree of Impairment: 50.57%   Standardized Score (AM-PAC Scale): 42.13   CMS Modifier (G-Code): CK    Patient End of Session: Up in chair;Needs met;Call light within reach;RN aware of session/findings;All patient questions and concerns addressed;Family present    CURRENT GOALS   Goals to be met by: 2/10/24  Patient Goal Patient's self-stated goal is: to go home   Goal #1 Patient is able to demonstrate supine - sit EOB @ level: supervision      Goal #1   Current Status Not tested    Goal #2 Patient is able to demonstrate transfers Sit to/from Stand at assistance level: supervision with walker - rolling      Goal #2  Current Status Min A x 1    Goal #3 Patient is able to ambulate 100 feet with assist device: walker - rolling at assistance level: supervision   Goal #3   Current  Status 100ft with rolling walker min A x 1   Goal #4 Patient will negotiate 4 stairs/one curb w/ assistive device and supervision   Goal #4   Current Status Not tested    Goal #5 Patient to demonstrate independence with home activity/exercise instructions provided to patient in preparation for discharge.   Goal #5   Current Status In progress   Goal #6     Goal #6  Current Status        Gait Training: 10 minutes  Therapeutic Activity: 14 minutes

## 2024-02-01 NOTE — OCCUPATIONAL THERAPY NOTE
OCCUPATIONAL THERAPY TREATMENT NOTE - INPATIENT    Room Number: 444/444-A           Problem List  Principal Problem:    Pain from bone metastases (HCC)  Active Problems:    Hyponatremia    Gait disturbance    Acute midline thoracic back pain    Cord compression (HCC)    History of breast cancer    Carcinoma of right breast metastatic to bone (HCC)    Thoracic radiculopathy    Spinal instability of thoracic region    Thoracic myelopathy      OCCUPATIONAL THERAPY ASSESSMENT   RN provided consent to proceed with treatment; care coordinated with PT; pt up in chair, motivated to participate; reinforced spine precautions and discussed compensations and modifications to address spine precautions and manage daily activities; pt and family both verbalized understanding; pt requiring assist for LE dressing and toileting tasks; she is up with Min A x2 for safety; ambulatory >household distances with Min aX2; pt overstepping and has difficulty calibrating distances and foot placement; very narrow base of support noted and decreased motor control. Pt is overwhelmed with multiple cues and benefits from simple and concise cues and visual demonstration; pt returned to room and reported filling.  The patient's Approx Degree of Impairment: 56.46% has been calculated based on documentation in the [facility] '6 clicks' Inpatient Daily Activity Short Form.  Research supports that patients with this level of impairment may benefit from rehab, however, pt has improved from initial evaluation and is on track to d/c home with family support and  services; Continue skilled occupational therapy while IP to maximize patient function and increase patient participation, safety, and independence with basic ADL and everyday activities.   .    DISCHARGE RECOMMENDATIONS  OT Discharge Recommendations: Home with home health PT/OT  OT Device Recommendations: TBD    PLAN  OT Treatment Plan: Balance activities;Energy conservation/work simplification  techniques;ADL training;Functional transfer training;Endurance training;Patient/Family education;Patient/Family training;Compensatory technique education    SUBJECTIVE  Did I do good     OBJECTIVE  Precautions: Spine    WEIGHT BEARING RESTRICTION     PAIN ASSESSMENT  Ratin (pt reports no pain; on PCA pump and rec muscle relaxer prior to therapy)    ACTIVITY TOLERANCE  Pulse: 99        BP: 137/71  BP Location: Left arm  BP Method: Automatic  Patient Position: Standing    O2 SATURATIONS  Oxygen Therapy  SPO2% on Room Air at Rest: 97    ACTIVITIES OF DAILY LIVING ASSESSMENT  AM-PAC ‘6-Clicks’ Inpatient Daily Activity Short Form  How much help from another person does the patient currently need…  -   Putting on and taking off regular lower body clothing?: A Lot  -   Bathing (including washing, rinsing, drying)?: A Lot  -   Toileting, which includes using toilet, bedpan or urinal? : A Lot  -   Putting on and taking off regular upper body clothing?: A Little  -   Taking care of personal grooming such as brushing teeth?: A Little  -   Eating meals?: A Little    AM-PAC Score:  Score: 15  Approx Degree of Impairment: 56.46%  Standardized Score (AM-PAC Scale): 34.69  CMS Modifier (G-Code): CK    FUNCTIONAL TRANSFER ASSESSMENT  Sit to Stand: Chair  Chair: Minimal Assist (x2)    BED MOBILITY     BALANCE ASSESSMENT     FUNCTIONAL ADL ASSESSMENT  Eating: Independent  Grooming Seated: Minimal Assist  Bathing Seated: Moderate Assist  UB Dressing Seated: Minimal Assist  LB Dressing Seated: Moderate Assist  Toileting Seated: Moderate Assist    EDUCATION PROVIDED  Patient : Role of Occupational Therapy; Plan of Care; Discharge Recommendations; Functional Transfer Techniques; Surgical Precautions; Posture/Positioning; Compensatory ADL Techniques; Proper Body Mechanics  Patient's Response to Education: Verbalized Understanding; Requires Further Education    Patient End of Session: Up in chair    OT Goals:      OT Goals  Patient  self-stated goal is: to get stronger      Patient will complete LE dressing with SBA   Comment: ongoing    Patient will complete toilet transfer with SBA   Comment: ongoing    Patient will complete self care task at sink level with SBA    Comment:ongoing    Patient will independently recall spine precautions  Comment:ongoing         Goals  on:   Frequency:3-5x week     OT Session Time: 25 minutes  Theraeputic Activities

## 2024-02-01 NOTE — PLAN OF CARE
Ambulating in [place] today, mobility greatly improved. Pain controlled with PCA. Jara removed. Cleared for transfer, transferred to 444 with family at bedside.    Problem: Patient Centered Care  Goal: Patient preferences are identified and integrated in the patient's plan of care  Description: Interventions:  - What would you like us to know as we care for you? From home with   - Provide timely, complete, and accurate information to patient/family  - Incorporate patient and family knowledge, values, beliefs, and cultural backgrounds into the planning and delivery of care  - Encourage patient/family to participate in care and decision-making at the level they choose  - Honor patient and family perspectives and choices  Outcome: Progressing    Problem: PAIN - ADULT  Goal: Verbalizes/displays adequate comfort level or patient's stated pain goal  Description: INTERVENTIONS:  - Encourage pt to monitor pain and request assistance  - Assess pain using appropriate pain scale  - Administer analgesics based on type and severity of pain and evaluate response  - Implement non-pharmacological measures as appropriate and evaluate response  - Consider cultural and social influences on pain and pain management  - Manage/alleviate anxiety  - Utilize distraction and/or relaxation techniques  - Monitor for opioid side effects  - Notify MD/LIP if interventions unsuccessful or patient reports new pain  - Anticipate increased pain with activity and pre-medicate as appropriate  Outcome: Progressing     Problem: RISK FOR INFECTION - ADULT  Goal: Absence of fever/infection during anticipated neutropenic period  Description: INTERVENTIONS  - Monitor WBC  - Administer growth factors as ordered  - Implement neutropenic guidelines  Outcome: Progressing     Problem: SAFETY ADULT - FALL  Goal: Free from fall injury  Description: INTERVENTIONS:  - Assess pt frequently for physical needs  - Identify cognitive and physical deficits and  behaviors that affect risk of falls.  - [place] fall precautions as indicated by assessment.  - Educate pt/family on patient safety including physical limitations  - Instruct pt to call for assistance with activity based on assessment  - Modify environment to reduce risk of injury  - Provide assistive devices as appropriate  - Consider OT/PT consult to assist with strengthening/mobility  - Encourage toileting schedule  Outcome: Progressing     Problem: DISCHARGE PLANNING  Goal: Discharge to home or other facility with appropriate resources  Description: INTERVENTIONS:  - Identify barriers to discharge w/pt and caregiver  - Include patient/family/discharge partner in discharge planning  - Arrange for needed discharge resources and transportation as appropriate  - Identify discharge learning needs (meds, wound care, etc)  - Arrange for interpreters to assist at discharge as needed  - Consider post-discharge preferences of patient/family/discharge partner  - Complete POLST form as appropriate  - Assess patient's ability to be responsible for managing their own health  - Refer to Case Management Department for coordinating discharge planning if the patient needs post-hospital services based on physician/LIP order or complex needs related to functional status, cognitive ability or social support system  Outcome: Progressing     Problem: COPING  Goal: Pt/Family able to verbalize concerns and demonstrate effective coping strategies  Description: INTERVENTIONS:  - Assist patient/family to identify coping skills, available support systems and cultural and spiritual values  - Provide emotional support, including active listening and acknowledgement of concerns of patient and caregivers  - Reduce environmental stimuli, as able  - Instruct patient/family in relaxation techniques, as appropriate  - Assess for spiritual and psychosocial needs and initiate Spiritual Care or Behavioral Health consult as needed  Outcome:  Progressing     Problem: CARDIOVASCULAR - ADULT  Goal: Maintains optimal cardiac output and hemodynamic stability  Description: INTERVENTIONS:  - Monitor vital signs, rhythm, and trends  - Monitor for bleeding, hypotension and signs of decreased cardiac output  - Evaluate effectiveness of vasoactive medications to optimize hemodynamic stability  - Monitor arterial and/or venous puncture sites for bleeding and/or hematoma  - Assess quality of pulses, skin color and temperature  - Assess for signs of decreased coronary artery perfusion - ex. Angina  - Evaluate fluid balance, assess for edema, trend weights  Outcome: Progressing  Goal: Absence of cardiac arrhythmias or at baseline  Description: INTERVENTIONS:  - Continuous cardiac monitoring, monitor vital signs, obtain 12 lead EKG if indicated  - Evaluate effectiveness of antiarrhythmic and heart rate control medications as ordered  - Initiate emergency measures for life threatening arrhythmias  - Monitor electrolytes and administer replacement therapy as ordered  Outcome: Progressing     Problem: GASTROINTESTINAL - ADULT  Goal: Minimal or absence of nausea and vomiting  Description: INTERVENTIONS:  - Maintain adequate hydration with IV or PO as ordered and tolerated  - Nasogastric tube to low intermittent suction as ordered  - Evaluate effectiveness of ordered antiemetic medications  - Provide nonpharmacologic comfort measures as appropriate  - Advance diet as tolerated, if ordered  - Obtain nutritional consult as needed  - Evaluate fluid balance  Outcome: Progressing  Goal: Maintains or returns to baseline bowel function  Description: INTERVENTIONS:  - Assess bowel function  - Maintain adequate hydration with IV or PO as ordered and tolerated  - Evaluate effectiveness of GI medications  - Encourage mobilization and activity  - Obtain nutritional consult as needed  - Establish a toileting routine/schedule  - Consider collaborating with pharmacy to review patient's  medication profile  Outcome: Progressing     Problem: METABOLIC/FLUID AND ELECTROLYTES - ADULT  Goal: Electrolytes maintained within normal limits  Description: INTERVENTIONS:  - Monitor labs and rhythm and assess patient for signs and symptoms of electrolyte imbalances  - Administer electrolyte replacement as ordered  - Monitor response to electrolyte replacements, including rhythm and repeat lab results as appropriate  - Fluid restriction as ordered  - Instruct patient on fluid and nutrition restrictions as appropriate  Outcome: Progressing     Problem: HEMATOLOGIC - ADULT  Goal: Maintains hematologic stability  Description: INTERVENTIONS  - Assess for signs and symptoms of bleeding or hemorrhage  - Monitor labs and vital signs for trends  - Administer supportive blood products/factors, fluids and medications as ordered and appropriate  - Administer supportive blood products/factors as ordered and appropriate  Outcome: Progressing  Goal: Free from bleeding injury  Description: (Example usage: patient with low platelets)  INTERVENTIONS:  - Avoid intramuscular injections, enemas and rectal medication administration  - Ensure safe mobilization of patient  - Hold pressure on venipuncture sites to achieve adequate hemostasis  - Assess for signs and symptoms of internal bleeding  - Monitor lab trends  - Patient is to report abnormal signs of bleeding to staff  - Avoid use of toothpicks and dental floss  - Use electric shaver for shaving  - Use soft bristle tooth brush  - Limit straining and forceful nose blowing  Outcome: Progressing     Problem: MUSCULOSKELETAL - ADULT  Goal: Return mobility to safest level of function  Description: INTERVENTIONS:  - Assess patient stability and activity tolerance for standing, transferring and ambulating w/ or w/o assistive devices  - Assist with transfers and ambulation using safe patient handling equipment as needed  - Ensure adequate protection for wounds/incisions during  mobilization  - Obtain PT/OT consults as needed  - Advance activity as appropriate  - Communicate ordered activity level and limitations with patient/family  Outcome: Progressing  Goal: Maintain proper alignment of affected body part  Description: INTERVENTIONS:  - Support and protect limb and body alignment per provider's orders  - Instruct and reinforce with patient and family use of appropriate assistive device and precautions (e.g. spinal or hip dislocation precautions)  Outcome: Progressing

## 2024-02-01 NOTE — CONSULTS
Seen and examined.  Full note to follow.  Functionally doing well, should be able to progress to the point where she can go home. Has good support. Will follow up.

## 2024-02-01 NOTE — DIETARY NOTE
BRIEF DIETITIAN NOTE     Pt re-screened for LOS (length of stay). Found to be at no nutrition risk at this time. PO intakes not adequately recorded during admission, % x 3 meals documented. Weight relatively stable, no significant changes. Pt with newly found metastatic lesions to spine/bone. POD #2 s/p thoracic laminectomy. Please consult RD if further nutrition intervention is needed.     Percent Meals Eaten (last 6 days)       Date/Time Percent Meals Eaten (%)    01/28/24 0900 50 %    01/31/24 1400 80 %    02/01/24 0900 100 %             Patient Weight(s) for the past 336 hrs:   Weight   02/01/24 0600 59.6 kg (131 lb 6.3 oz)   01/27/24 0000 58.6 kg (129 lb 3.2 oz)   01/26/24 2009 59.9 kg (132 lb)        Wt Readings from Last 6 Encounters:   02/01/24 59.6 kg (131 lb 6.3 oz)   08/04/23 58.1 kg (128 lb)   02/15/23 58.5 kg (129 lb)   08/17/22 57.2 kg (126 lb)   02/16/22 60.8 kg (134 lb)   08/11/21 58.5 kg (129 lb)        F/u per protocol or as appropriate.       Mimi Maurice RD, LDN  Clinical Dietitian  P: [phone]

## 2024-02-01 NOTE — PROGRESS NOTES
[facility] Hospitalist Progress Note     CC: Hospital Follow up    PCP: BILL PARSONS MD       Assessment/Plan:     Ms. Han is a 68 year old male with history of Stage III ER+ [place]- HER2 - invasive lobular carcinoma of the breast, treated with with right mastectomy and adjuvant chemo, initially diagnosed 2018, who is here for evaluation of back pain and lower extremity parasthesias, with the pain starting 3 weeks ago.     Bone metastasis  Spinal cord compression, secondary to metastatic cancer, symptomatic on admission   Hx of Breast cancer, concern for recurrence   -concern for recurrence of breast cancer, needs tissue diagnosis. Path pending from OR  -now POD # 1 from Thoracic Laminectomies for decompression of spinal cord, see op note from 1/30/24 for details  -started on steroids with decadron 4mg Q6hr on 1/27/24, continue today and wean per neurosurgery, 3 more doses  -complete imaging, MRI cervical spine and brain done. CT c/a/p reviewed  -oncology consulted. Radiation onc referral will be needed, whether it's here vs outpatient.   -continue neurochecks Q2hr  -wean PCA to oral pain meds as tolerated  - transfer to floor today  -ok for PT/OT    DVT prophylaxis: SCD  Code status: FULL    Angelica Yarbrough MD  [facility] Hospitalist        Subjective:     Pain ok with PCA    OBJECTIVE:    Blood pressure 126/88, pulse 98, temperature 97.6 °F (36.4 °C), temperature source Temporal, resp. rate 20, weight 131 lb 6.3 oz (59.6 kg), SpO2 96%.    Temp:  [97.6 °F (36.4 °C)-98.8 °F (37.1 °C)] 97.6 °F (36.4 °C)  Pulse:  [] 98  Resp:  [10-20] 20  BP: ()/(59-88) 126/88  SpO2:  [83 %-99 %] 96 %  AO: ()/(55-79) 94/55      Intake/Output:    Intake/Output Summary (Last 24 hours) at 2/1/2024 1133  Last data filed at 2/1/2024 1100  Gross per 24 hour   Intake 3656 ml   Output 3572 ml   Net 84 ml       Last 3 Weights   02/01/24 0600 131 lb 6.3 oz (59.6 kg)   01/27/24 0000 129 lb 3.2 oz (58.6  kg)   01/26/24 2009 132 lb (59.9 kg)   08/04/23 1053 128 lb (58.1 kg)   02/15/23 1032 129 lb (58.5 kg)       /88 (BP Location: Left arm)   Pulse 98   Temp 97.6 °F (36.4 °C) (Temporal)   Resp 20   Wt 131 lb 6.3 oz (59.6 kg)   SpO2 96%   BMI 21.21 kg/m²       GEN: NAD  HEENT: EOMI  Pulm: CTAB, no crackles or wheezes  CV: RRR, no murmurs  ABD: Soft, non-tender, non-distended, +BS  SKIN: warm, dry  EXT: no edema      Data Review:       Labs:     Recent Labs   Lab 01/26/24 2003 01/27/24 0551 01/31/24  0919 02/01/24  0724   RBC 4.99 4.90  --  2.97*   HGB 13.8 13.4 8.4* 8.3*   HCT 41.9 40.7 25.0* 24.9*   MCV 84.0 83.1  --  83.8   MCH 27.7 27.3  --  27.9   MCHC 32.9 32.9  --  33.3   RDW 12.5 12.5  --  12.3   NEPRELIM 6.02 4.04  --  8.43*   WBC 8.1 6.9  --  11.5*   .0 249.0  --  221.0         Recent Labs   Lab 01/27/24 0551 01/31/24  1119 02/01/24  0724   GLU 85 145* 111*   BUN 10 20 12   CREATSERUM 0.72 0.54* 0.49*   EGFRCR 92 101 103   CA 9.5 8.0* 8.2*    140 140   K 4.3 3.2* 4.1    110 108   CO2 26.0 22.0 26.0       Recent Labs   Lab 01/26/24 2003 01/27/24  0551   ALT 22 18   AST 27 23   ALB 4.8 4.3         Imaging:  XR FLUOROSCOPY C-ARM TIME LESS THAN 1 HOUR (CPT=76000)    Result Date: 1/30/2024  CONCLUSION: Operative fluoroscopy    Dictated by (CST): Ministerio Pina MD on 1/30/2024 at 6:19 PM     Finalized by (CST): Ministerio Pina MD on 1/30/2024 at 6:20 PM          CT SPINE THORACIC (CPT=72128)    Result Date: 1/29/2024  CONCLUSION: Multifocal lytic thoracic spine lesions most significant at T6.    Dictated by (CST): Ministerio Pina MD on 1/29/2024 at 6:03 PM     Finalized by (CST): Ministerio Pina MD on 1/29/2024 at 6:09 PM             Meds:      dexamethasone  4 mg Intravenous TID    [START ON 2/2/2024] dexamethasone  4 mg Intravenous BID with meals    [START ON 2/3/2024] dexamethasone  4 mg Intravenous Once    [START ON 2/4/2024] dexamethasone  2 mg Intravenous Once    polyethylene glycol  (PEG 3350)  17 g Oral Daily    sennosides  17.2 mg Oral Nightly    docusate sodium  100 mg Oral BID    enoxaparin  40 mg Subcutaneous Daily    methocarbamol  500 mg Oral q6h    acetaminophen  1,000 mg Intravenous Q6H    lidocaine-menthol  1 patch Transdermal Nightly    anastrozole  1 mg Oral Daily      sodium chloride 100 mL/hr at 02/01/24 0033    sodium chloride      HYDROmorphone in sodium chloride 0.9%       magnesium hydroxide, bisacodyl, fleet enema, ondansetron, metoclopramide, diphenhydrAMINE **OR** diphenhydrAMINE, benzocaine-menthol, oxyCODONE **OR** oxyCODONE, HYDROmorphone **OR** HYDROmorphone, HYDROmorphone, naloxone, diphenhydrAMINE, acetaminophen, melatonin, sennosides

## 2024-02-02 LAB
ANION GAP SERPL CALC-SCNC: 7 MMOL/L (ref 0–18)
BLOOD TYPE BARCODE: 5100
BUN BLD-MCNC: 12 MG/DL (ref 9–23)
BUN/CREAT SERPL: 25 (ref 10–20)
CALCIUM BLD-MCNC: 8.6 MG/DL (ref 8.7–10.4)
CHLORIDE SERPL-SCNC: 106 MMOL/L (ref 98–112)
CO2 SERPL-SCNC: 28 MMOL/L (ref 21–32)
CREAT BLD-MCNC: 0.48 MG/DL
DEPRECATED RDW RBC AUTO: 38.1 FL (ref 35.1–46.3)
EGFRCR SERPLBLD CKD-EPI 2021: 104 ML/MIN/1.73M2 (ref 60–?)
ERYTHROCYTE [DISTWIDTH] IN BLOOD BY AUTOMATED COUNT: 12.5 % (ref 11–15)
GLUCOSE BLD-MCNC: 99 MG/DL (ref 70–99)
HCT VFR BLD AUTO: 24.9 %
HGB BLD-MCNC: 8.6 G/DL
MCH RBC QN AUTO: 28.6 PG (ref 26–34)
MCHC RBC AUTO-ENTMCNC: 34.5 G/DL (ref 31–37)
MCV RBC AUTO: 82.7 FL
OSMOLALITY SERPL CALC.SUM OF ELEC: 292 MOSM/KG (ref 275–295)
PLATELET # BLD AUTO: 243 10(3)UL (ref 150–450)
POTASSIUM SERPL-SCNC: 3.9 MMOL/L (ref 3.5–5.1)
RBC # BLD AUTO: 3.01 X10(6)UL
SODIUM SERPL-SCNC: 141 MMOL/L (ref 136–145)
UNIT VOLUME: 350 ML
WBC # BLD AUTO: 14.3 X10(3) UL (ref 4–11)

## 2024-02-02 PROCEDURE — 99232 SBSQ HOSP IP/OBS MODERATE 35: CPT | Performed by: SPECIALIST

## 2024-02-02 RX ORDER — HYDROCODONE BITARTRATE AND ACETAMINOPHEN 5; 325 MG/1; MG/1
1 TABLET ORAL EVERY 4 HOURS PRN
Qty: 30 TABLET | Refills: 0 | Status: SHIPPED | OUTPATIENT
Start: 2024-02-02

## 2024-02-02 RX ORDER — METHOCARBAMOL 500 MG/1
500 TABLET, FILM COATED ORAL 3 TIMES DAILY PRN
Qty: 60 TABLET | Refills: 0 | Status: SHIPPED | OUTPATIENT
Start: 2024-02-02

## 2024-02-02 NOTE — PAYOR COMM NOTE
--------------  CONTINUED STAY REVIEW    Payor: [facility] OUT OF STATE PPO  Subscriber #:  [id number]  Authorization Number: [id number]    Admit date: 1/26/24  Admit time: 11:41 PM    Admitting Physician: Lawanda Hawthorne DO  Attending Physician:  Angelica Yarbrough MD  Primary Care Physician: Deanna Morris MD    REVIEW DOCUMENTATION:    2/2/24    Hematology     Impression and Plan   1.   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 She will need post-op radiation therapy as outpatient; I will discuss case with Dr. Leon. Steroid taper as per neurosurgery.     2.   Metastatic breast cancer: Pathology shows metastatic adenocarcinoma consistent with breast primary. Will ask pathology to stain for ER, LA, Her2. Systemic therapy as outpatient.      Oncology will follow peripherally over the weekend. If patient discharged, we will contact her for follow up.     Electronically signed by:     Mark Cid M.D.    Neurosurgery     3 Days Post-Op s/p Procedure(s):  T5 - T7 laminectomy, resection of tumor, T4 - T8 posterior fixation and fusion  THORACIC SPINAL FUSION 3 LEVEL  SPINAL THORACIC TUMOR REMOVAL       The patient's other hospital problems include:   Active Hospital Problems    Carcinoma of right breast metastatic to bone (HCC)       Thoracic radiculopathy       Spinal instability of thoracic region       Thoracic myelopathy       Cord compression (HCC)       History of breast cancer       *Pain from bone metastases (HCC)       Hyponatremia       Gait disturbance       Acute midline thoracic back pain        Superior Hemovac drain removed without complication.  Drain was intact.  Patient tolerated removal well.  Drain site covered with 2 x 2 gauze and Band-Aid.    The patient endorses pain at the site of her superior drain, but otherwise, feels that her pain is well-controlled from surgery. No new neurologic complaints. PMR evaluation yesterday deemed patient functionally  able to go home with family support.     Attestation signed by Ronen Sharpe MD at 2/2/2024 11:48 AM     Patient examined and assessed. Agree with above.     Doing well.  Ambulating.  No complaints.  Excellent strength on exam.     Drain output still 100 cc over 24 hours.  Continue drain.  Patient is stable for discharge once drain can be removed.        Hospitalist 2/2/24    continue neurochecks Q4hr  - will discontinue PCA, continue PO pain meds  -ok for PT/OT     DVT prophylaxis: SCD  Code status: FULL     Dispo: eventual plan for home in 2-3 days, pending drain removal and clearance from PT       Latest Reference Range & Units 02/02/24 05:57   Glucose 70 - 99 mg/dL 99   Sodium 136 - 145 mmol/L 141   Potassium 3.5 - 5.1 mmol/L 3.9   Chloride 98 - 112 mmol/L 106   Carbon Dioxide, Total 21.0 - 32.0 mmol/L 28.0   BUN 9 - 23 mg/dL 12   CREATININE 0.55 - 1.02 mg/dL 0.48 (L)   (L): Data is abnormally low     Latest Reference Range & Units 02/01/24 07:24 02/02/24 05:56   WBC 4.0 - 11.0 x10(3) uL 11.5 (H) 14.3 (H)   Hemoglobin 12.0 - 16.0 g/dL 8.3 (L) 8.6 (L)   Hematocrit 35.0 - 48.0 % 24.9 (L) 24.9 (L)   Platelet Count 150.0 - 450.0 10(3)uL 221.0 243.0   (H): Data is abnormally high  (L): Data is abnormally low      MEDICATIONS ADMINISTERED IN LAST 1 DAY:  anastrozole (Arimidex) tab 1 mg       Date Action Dose Route User    2/2/2024 1037 Given 1 mg Oral Jamar Richardson RN          dexamethasone (Decadron) 4 MG/ML injection 4 mg       Date Action Dose Route User    2/1/2024 2101 Given 4 mg Intravenous Bonny Rodriguez RN    2/1/2024 1514 Given 4 mg Intravenous Jamar Richardson RN          dexamethasone (Decadron) 4 MG/ML injection 4 mg       Date Action Dose Route User    2/2/2024 1035 Given 4 mg Intravenous Jamar Richardson RN          docusate sodium (Colace) cap 100 mg       Date Action Dose Route User    2/2/2024 1036 Given 100 mg Oral Jamar Richardson RN          enoxaparin (Lovenox) 40 MG/0.4ML SUBQ  injection 40 mg       Date Action Dose Route User    2/2/2024 1037 Given 40 mg Subcutaneous (Right Lower Abdomen) Jamar Richardson RN          HYDROcodone-acetaminophen (Norco) 5-325 MG per tab 1 tablet       Date Action Dose Route User    2/1/2024 1714 Given 1 tablet Oral Jamar Richardson RN          lidocaine-menthol 4-1 % patch 1 patch       Date Action Dose Route User    2/1/2024 2101 Patch Applied 1 patch Transdermal (Left Upper Back) Bonny Rodriguez RN          methocarbamol (Robaxin) tab 500 mg       Date Action Dose Route User    2/2/2024 1322 Given 500 mg Oral Kailyn Moore RN    2/2/2024 0558 Given 500 mg Oral Bonny Rodriguez RN    2/2/2024 0147 Given 500 mg Oral Bonny Rodriguez RN    2/1/2024 1854 Given 500 mg Oral Jamar Richardson RN          ondansetron (Zofran) 4 MG/2ML injection 4 mg       Date Action Dose Route User    2/1/2024 1714 Given 4 mg Intravenous Jamar Richardson RN          polyethylene glycol (PEG 3350) (Miralax) 17 g oral packet 17 g       Date Action Dose Route User    2/2/2024 1036 Given 17 g Oral Jamar Richardson RN          sodium chloride 0.9% infusion       Date Action Dose Route User    2/2/2024 0603 New Bag (none) Intravenous Bonny Rodriguez RN    2/1/2024 2023 New Bag (none) Intravenous Bonny Rodriguez RN            Vitals (last day)       Date/Time Temp Pulse Resp BP SpO2 Weight O2 Device O2 Flow Rate (L/min) [facility]    02/02/24 0610 97.9 °F (36.6 °C) -- 18 130/71 99 % -- None (Room air) -- HS    02/01/24 2009 97.7 °F (36.5 °C) 95 18 118/63 96 % -- None (Room air) -- CB    02/01/24 1900 -- 100 -- -- -- -- -- -- MO    02/01/24 1825 -- 100 -- -- -- -- -- -- ST    02/01/24 1508 98.3 °F (36.8 °C) 90 16 124/73 97 % -- None (Room air) -- LG    02/01/24 1400 97.5 °F (36.4 °C) 88 13 103/60 -- -- -- -- IB    02/01/24 1354 -- 99 -- 137/71 -- -- -- -- AA    02/01/24 1105 -- 98 20 126/88 -- -- -- -- IB    02/01/24 1000 -- 85 18 106/64 96 % -- None (Room air) -- IB     02/01/24 0800 97.6 °F (36.4 °C) 94 19 105/62 99 % -- None (Room air) -- IB    02/01/24 0600 -- 81 11 112/64 96 % 131 lb 6.3 oz None (Room air) -- MT    02/01/24 0400 97.6 °F (36.4 °C) 75 12 100/66 98 % -- None (Room air) -- MT    02/01/24 0200 -- 75 10 99/59 97 % -- None (Room air) -- MT    02/01/24 0000 98.6 °F (37 °C) 77 13 103/67 96 % -- None (Room air) -- MT

## 2024-02-02 NOTE — PLAN OF CARE
Patient received from CCU. Alert and oriented, family at bedside. PCA dilaudid, avoiding use, norco & robaxan given. Drain x2, no output on top. Voiding s/p ceron removal. Tele, no calls. Up with 2, rolling chair/short distances. Call light within reach, using appropriately, fall precautions in place.   Problem: PAIN - ADULT  Goal: Verbalizes/displays adequate comfort level or patient's stated pain goal  Description: INTERVENTIONS:  - Encourage pt to monitor pain and request assistance  - Assess pain using appropriate pain scale  - Administer analgesics based on type and severity of pain and evaluate response  - Implement non-pharmacological measures as appropriate and evaluate response  - Consider cultural and social influences on pain and pain management  - Manage/alleviate anxiety  - Utilize distraction and/or relaxation techniques  - Monitor for opioid side effects  - Notify MD/LIP if interventions unsuccessful or patient reports new pain  - Anticipate increased pain with activity and pre-medicate as appropriate  Outcome: Progressing     Problem: RISK FOR INFECTION - ADULT  Goal: Absence of fever/infection during anticipated neutropenic period  Description: INTERVENTIONS  - Monitor WBC  - Administer growth factors as ordered  - Implement neutropenic guidelines  Outcome: Progressing     Problem: SAFETY ADULT - FALL  Goal: Free from fall injury  Description: INTERVENTIONS:  - Assess pt frequently for physical needs  - Identify cognitive and physical deficits and behaviors that affect risk of falls.  - [place] fall precautions as indicated by assessment.  - Educate pt/family on patient safety including physical limitations  - Instruct pt to call for assistance with activity based on assessment  - Modify environment to reduce risk of injury  - Provide assistive devices as appropriate  - Consider OT/PT consult to assist with strengthening/mobility  - Encourage toileting schedule  Outcome: Progressing     Problem:  DISCHARGE PLANNING  Goal: Discharge to home or other facility with appropriate resources  Description: INTERVENTIONS:  - Identify barriers to discharge w/pt and caregiver  - Include patient/family/discharge partner in discharge planning  - Arrange for needed discharge resources and transportation as appropriate  - Identify discharge learning needs (meds, wound care, etc)  - Arrange for interpreters to assist at discharge as needed  - Consider post-discharge preferences of patient/family/discharge partner  - Complete POLST form as appropriate  - Assess patient's ability to be responsible for managing their own health  - Refer to Case Management Department for coordinating discharge planning if the patient needs post-hospital services based on physician/LIP order or complex needs related to functional status, cognitive ability or social support system  Outcome: Progressing     Problem: COPING  Goal: Pt/Family able to verbalize concerns and demonstrate effective coping strategies  Description: INTERVENTIONS:  - Assist patient/family to identify coping skills, available support systems and cultural and spiritual values  - Provide emotional support, including active listening and acknowledgement of concerns of patient and caregivers  - Reduce environmental stimuli, as able  - Instruct patient/family in relaxation techniques, as appropriate  - Assess for spiritual and psychosocial needs and initiate Spiritual Care or Behavioral Health consult as needed  Outcome: Progressing     Problem: CARDIOVASCULAR - ADULT  Goal: Maintains optimal cardiac output and hemodynamic stability  Description: INTERVENTIONS:  - Monitor vital signs, rhythm, and trends  - Monitor for bleeding, hypotension and signs of decreased cardiac output  - Evaluate effectiveness of vasoactive medications to optimize hemodynamic stability  - Monitor arterial and/or venous puncture sites for bleeding and/or hematoma  - Assess quality of pulses, skin color and  temperature  - Assess for signs of decreased coronary artery perfusion - ex. Angina  - Evaluate fluid balance, assess for edema, trend weights  Outcome: Progressing  Goal: Absence of cardiac arrhythmias or at baseline  Description: INTERVENTIONS:  - Continuous cardiac monitoring, monitor vital signs, obtain 12 lead EKG if indicated  - Evaluate effectiveness of antiarrhythmic and heart rate control medications as ordered  - Initiate emergency measures for life threatening arrhythmias  - Monitor electrolytes and administer replacement therapy as ordered  Outcome: Progressing     Problem: GASTROINTESTINAL - ADULT  Goal: Minimal or absence of nausea and vomiting  Description: INTERVENTIONS:  - Maintain adequate hydration with IV or PO as ordered and tolerated  - Nasogastric tube to low intermittent suction as ordered  - Evaluate effectiveness of ordered antiemetic medications  - Provide nonpharmacologic comfort measures as appropriate  - Advance diet as tolerated, if ordered  - Obtain nutritional consult as needed  - Evaluate fluid balance  Outcome: Progressing     Problem: METABOLIC/FLUID AND ELECTROLYTES - ADULT  Goal: Electrolytes maintained within normal limits  Description: INTERVENTIONS:  - Monitor labs and rhythm and assess patient for signs and symptoms of electrolyte imbalances  - Administer electrolyte replacement as ordered  - Monitor response to electrolyte replacements, including rhythm and repeat lab results as appropriate  - Fluid restriction as ordered  - Instruct patient on fluid and nutrition restrictions as appropriate  Outcome: Progressing     Problem: HEMATOLOGIC - ADULT  Goal: Maintains hematologic stability  Description: INTERVENTIONS  - Assess for signs and symptoms of bleeding or hemorrhage  - Monitor labs and vital signs for trends  - Administer supportive blood products/factors, fluids and medications as ordered and appropriate  - Administer supportive blood products/factors as ordered and  appropriate  Outcome: Progressing  Goal: Free from bleeding injury  Description: (Example usage: patient with low platelets)  INTERVENTIONS:  - Avoid intramuscular injections, enemas and rectal medication administration  - Ensure safe mobilization of patient  - Hold pressure on venipuncture sites to achieve adequate hemostasis  - Assess for signs and symptoms of internal bleeding  - Monitor lab trends  - Patient is to report abnormal signs of bleeding to staff  - Avoid use of toothpicks and dental floss  - Use electric shaver for shaving  - Use soft bristle tooth brush  - Limit straining and forceful nose blowing  Outcome: Progressing     Problem: MUSCULOSKELETAL - ADULT  Goal: Return mobility to safest level of function  Description: INTERVENTIONS:  - Assess patient stability and activity tolerance for standing, transferring and ambulating w/ or w/o assistive devices  - Assist with transfers and ambulation using safe patient handling equipment as needed  - Ensure adequate protection for wounds/incisions during mobilization  - Obtain PT/OT consults as needed  - Advance activity as appropriate  - Communicate ordered activity level and limitations with patient/family  Outcome: Progressing

## 2024-02-02 NOTE — PROGRESS NOTES
Neurosurgery Progress Note    Assessment:   Cris Han in room 444/444-A, is a 68 year old female, Hospital Day ( LOS: 7 days ) hospitalized for Pain from bone metastases (HCC).      3 Days Post-Op s/p Procedure(s):  T5 - T7 laminectomy, resection of tumor, T4 - T8 posterior fixation and fusion  THORACIC SPINAL FUSION 3 LEVEL  SPINAL THORACIC TUMOR REMOVAL      The patient's other hospital problems include:   Active Hospital Problems    Carcinoma of right breast metastatic to bone (HCC)      Thoracic radiculopathy      Spinal instability of thoracic region      Thoracic myelopathy      Cord compression (HCC)      History of breast cancer      *Pain from bone metastases (HCC)      Hyponatremia      Gait disturbance      Acute midline thoracic back pain      Superior Hemovac drain removed without complication.  Drain was intact.  Patient tolerated removal well.  Drain site covered with 2 x 2 gauze and Band-Aid.  Plan:   -q4h neurochecks  -Medical management per hospitalist team   -Continue pain control regimen. Wean PCA to PO pain medications as tolerated   -Continue drain and output documentation  -PT/OT  -Oncology on board. Appreciate recommendations. Would recommend holding off on RT until at least 1 month to allow surgical incision to heal.   -Upright thoracic xrays for baseline imaging prior to discharge and when safe to stand  -Decadron wean  -Recommended for home upon discharge   -Outpatient neurosurgery follow up and discharge instructions documented        Plan of care discussed with Dr. Sharpe.  Subjective:   The patient endorses pain at the site of her superior drain, but otherwise, feels that her pain is well-controlled from surgery.  No new neurologic complaints.  PMR evaluation yesterday deemed patient functionally able to go home with family support.    Objective:   VITALS: /71 (BP Location: Left arm)   Pulse 95   Temp 97.9 °F (36.6 °C) (Oral)   Resp 18   Wt 131 lb 6.3 oz (59.6 kg)    SpO2 99%   BMI 21.21 kg/m²  Temp (24hrs), Av.9 °F (36.6 °C), Min:97.5 °F (36.4 °C), Max:98.3 °F (36.8 °C)       General: Well developed, well nourished, in no acute distress.     Incision: Open to air.  Incision clean, dry, and intact. No signs of gross drainage, warmth, erythema, or hematoma formation.  Nylon sutures intact.    Drain:   Output by Drain (mL) 24 - 24 - 24 0659 24 07 - 24 18524 - 24 0659 24 - 24 0939   Closed Drain 1 Superior Back Accordion 10 Fr. 65 12 0 0    Closed Drain 2 Inferior Back Accordion 10 Fr. 100 80 40 100         Neurological / Musculoskeletal: Awake, alert, and interactive. Recent and remote memory appear intact. Attention span and concentration are appropriate. No dysarthria. Coordination and motor control grossly intact. Patient follows commands briskly and appropriately.    Cervical Spine:    Motor/ Extremities   Deltoid Biceps Triceps    R 5/5 5/5 5/5 5/5   L 5/5 5/5 5/5 5/5       Lumbar Spine:    Motor / Extremities   Hip   flexion Knee   extension Dorsiflexion EHL Plantarflexion   R 5/5 5/5 5/5 5/5 5/5   L 5/5 5/5 5/5 5/5 5/5         I&O: I/O last 3 completed shifts:  In: 3935 [P.O.:1120; I.V.:2615; IV PIGGYBACK:200]  Out: 5207 [Urine:4975; Drains:232]       Labs:   Recent Labs   Lab 2451 24  0919 24  0724  0556   RBC 4.99 4.90  --  2.97* 3.01*   HGB 13.8 13.4 8.4* 8.3* 8.6*   HCT 41.9 40.7 25.0* 24.9* 24.9*   MCV 84.0 83.1  --  83.8 82.7   MCH 27.7 27.3  --  27.9 28.6   MCHC 32.9 32.9  --  33.3 34.5   RDW 12.5 12.5  --  12.3 12.5   NEPRELIM 6.02 4.04  --  8.43*  --    WBC 8.1 6.9  --  11.5* 14.3*   .0 249.0  --  221.0 243.0     Recent Labs   Lab 24  0551 24  1119 24  0724 24  0557   * 85 145* 111* 99   BUN 16 10 20 12 12   CREATSERUM 0.80 0.72 0.54* 0.49* 0.48*   EGFRCR 81 92 101 103  104   CA 10.0 9.5 8.0* 8.2* 8.6*   ALB 4.8 4.3  --   --   --    * 140 140 140 141   K 3.7 4.3 3.2* 4.1 3.9   CL 94* 107 110 108 106   CO2 26.0 26.0 22.0 26.0 28.0   ALKPHO 96 85  --   --   --    AST 27 23  --   --   --    ALT 22 18  --   --   --    BILT 0.7 0.7  --   --   --    TP 8.3* 7.2  --   --   --       Recent Labs   Lab 01/29/24  1123   INR 0.93   PTT 25.6        Imaging:  No new neurosurgical imaging to review at this time    Is this a shared or split note between Advanced Practice Provider and Physician?  Yes    Koby Glaser PA-C  Physician Assistant- Neurosurgery   [facility]  2/2/2024, 9:39 AM        This document was created using Dragon voice recognition software and transcription variances may occur. Please contact documenting provider for clarification of contents if needed.

## 2024-02-02 NOTE — PLAN OF CARE
Patient alert and oriented, family at bedside. Dilaudid PCA & IVF, discontinued. Discussed pain control, robaxan in use, discussed norco v tylenol. Upper drain removed, lower drain with serosang output. Voiding. Tolerating diet, denies nausea. Tele, no calls. Up with assist, ambulated in hallway. Call light within reach, using appropriately, fall precautions in place.     Problem: PAIN - ADULT  Goal: Verbalizes/displays adequate comfort level or patient's stated pain goal  Description: INTERVENTIONS:  - Encourage pt to monitor pain and request assistance  - Assess pain using appropriate pain scale  - Administer analgesics based on type and severity of pain and evaluate response  - Implement non-pharmacological measures as appropriate and evaluate response  - Consider cultural and social influences on pain and pain management  - Manage/alleviate anxiety  - Utilize distraction and/or relaxation techniques  - Monitor for opioid side effects  - Notify MD/LIP if interventions unsuccessful or patient reports new pain  - Anticipate increased pain with activity and pre-medicate as appropriate  Outcome: Progressing     Problem: RISK FOR INFECTION - ADULT  Goal: Absence of fever/infection during anticipated neutropenic period  Description: INTERVENTIONS  - Monitor WBC  - Administer growth factors as ordered  - Implement neutropenic guidelines  Outcome: Progressing     Problem: SAFETY ADULT - FALL  Goal: Free from fall injury  Description: INTERVENTIONS:  - Assess pt frequently for physical needs  - Identify cognitive and physical deficits and behaviors that affect risk of falls.  - [place] fall precautions as indicated by assessment.  - Educate pt/family on patient safety including physical limitations  - Instruct pt to call for assistance with activity based on assessment  - Modify environment to reduce risk of injury  - Provide assistive devices as appropriate  - Consider OT/PT consult to assist with  strengthening/mobility  - Encourage toileting schedule  Outcome: Progressing     Problem: DISCHARGE PLANNING  Goal: Discharge to home or other facility with appropriate resources  Description: INTERVENTIONS:  - Identify barriers to discharge w/pt and caregiver  - Include patient/family/discharge partner in discharge planning  - Arrange for needed discharge resources and transportation as appropriate  - Identify discharge learning needs (meds, wound care, etc)  - Arrange for interpreters to assist at discharge as needed  - Consider post-discharge preferences of patient/family/discharge partner  - Complete POLST form as appropriate  - Assess patient's ability to be responsible for managing their own health  - Refer to Case Management Department for coordinating discharge planning if the patient needs post-hospital services based on physician/LIP order or complex needs related to functional status, cognitive ability or social support system  Outcome: Progressing     Problem: COPING  Goal: Pt/Family able to verbalize concerns and demonstrate effective coping strategies  Description: INTERVENTIONS:  - Assist patient/family to identify coping skills, available support systems and cultural and spiritual values  - Provide emotional support, including active listening and acknowledgement of concerns of patient and caregivers  - Reduce environmental stimuli, as able  - Instruct patient/family in relaxation techniques, as appropriate  - Assess for spiritual and psychosocial needs and initiate Spiritual Care or Behavioral Health consult as needed  Outcome: Progressing     Problem: CARDIOVASCULAR - ADULT  Goal: Maintains optimal cardiac output and hemodynamic stability  Description: INTERVENTIONS:  - Monitor vital signs, rhythm, and trends  - Monitor for bleeding, hypotension and signs of decreased cardiac output  - Evaluate effectiveness of vasoactive medications to optimize hemodynamic stability  - Monitor arterial and/or  venous puncture sites for bleeding and/or hematoma  - Assess quality of pulses, skin color and temperature  - Assess for signs of decreased coronary artery perfusion - ex. Angina  - Evaluate fluid balance, assess for edema, trend weights  Outcome: Progressing  Goal: Absence of cardiac arrhythmias or at baseline  Description: INTERVENTIONS:  - Continuous cardiac monitoring, monitor vital signs, obtain 12 lead EKG if indicated  - Evaluate effectiveness of antiarrhythmic and heart rate control medications as ordered  - Initiate emergency measures for life threatening arrhythmias  - Monitor electrolytes and administer replacement therapy as ordered  Outcome: Progressing     Problem: GASTROINTESTINAL - ADULT  Goal: Minimal or absence of nausea and vomiting  Description: INTERVENTIONS:  - Maintain adequate hydration with IV or PO as ordered and tolerated  - Nasogastric tube to low intermittent suction as ordered  - Evaluate effectiveness of ordered antiemetic medications  - Provide nonpharmacologic comfort measures as appropriate  - Advance diet as tolerated, if ordered  - Obtain nutritional consult as needed  - Evaluate fluid balance  Outcome: Progressing  Goal: Maintains or returns to baseline bowel function  Description: INTERVENTIONS:  - Assess bowel function  - Maintain adequate hydration with IV or PO as ordered and tolerated  - Evaluate effectiveness of GI medications  - Encourage mobilization and activity  - Obtain nutritional consult as needed  - Establish a toileting routine/schedule  - Consider collaborating with pharmacy to review patient's medication profile  Outcome: Progressing     Problem: METABOLIC/FLUID AND ELECTROLYTES - ADULT  Goal: Electrolytes maintained within normal limits  Description: INTERVENTIONS:  - Monitor labs and rhythm and assess patient for signs and symptoms of electrolyte imbalances  - Administer electrolyte replacement as ordered  - Monitor response to electrolyte replacements,  including rhythm and repeat lab results as appropriate  - Fluid restriction as ordered  - Instruct patient on fluid and nutrition restrictions as appropriate  Outcome: Progressing     Problem: HEMATOLOGIC - ADULT  Goal: Maintains hematologic stability  Description: INTERVENTIONS  - Assess for signs and symptoms of bleeding or hemorrhage  - Monitor labs and vital signs for trends  - Administer supportive blood products/factors, fluids and medications as ordered and appropriate  - Administer supportive blood products/factors as ordered and appropriate  Outcome: Progressing  Goal: Free from bleeding injury  Description: (Example usage: patient with low platelets)  INTERVENTIONS:  - Avoid intramuscular injections, enemas and rectal medication administration  - Ensure safe mobilization of patient  - Hold pressure on venipuncture sites to achieve adequate hemostasis  - Assess for signs and symptoms of internal bleeding  - Monitor lab trends  - Patient is to report abnormal signs of bleeding to staff  - Avoid use of toothpicks and dental floss  - Use electric shaver for shaving  - Use soft bristle tooth brush  - Limit straining and forceful nose blowing  Outcome: Progressing     Problem: MUSCULOSKELETAL - ADULT  Goal: Return mobility to safest level of function  Description: INTERVENTIONS:  - Assess patient stability and activity tolerance for standing, transferring and ambulating w/ or w/o assistive devices  - Assist with transfers and ambulation using safe patient handling equipment as needed  - Ensure adequate protection for wounds/incisions during mobilization  - Obtain PT/OT consults as needed  - Advance activity as appropriate  - Communicate ordered activity level and limitations with patient/family  Outcome: Progressing  Goal: Maintain proper alignment of affected body part  Description: INTERVENTIONS:  - Support and protect limb and body alignment per provider's orders  - Instruct and reinforce with patient and  family use of appropriate assistive device and precautions (e.g. spinal or hip dislocation precautions)  Outcome: Progressing

## 2024-02-02 NOTE — PHYSICAL THERAPY NOTE
PHYSICAL THERAPY TREATMENT NOTE - INPATIENT     Room Number: 444/444-A       Presenting Problem: T5 - T7 laminectomy, resection of tumor, T5 - T8 posterior fixation and fusion    Problem List  Principal Problem:    Pain from bone metastases (HCC)  Active Problems:    Hyponatremia    Gait disturbance    Acute midline thoracic back pain    Cord compression (HCC)    History of breast cancer    Carcinoma of right breast metastatic to bone (HCC)    Thoracic radiculopathy    Spinal instability of thoracic region    Thoracic myelopathy      PHYSICAL THERAPY ASSESSMENT   Chart reviewed. FRANCES Roldan approved participation in physical therapy. Gait belt donned. PPE worn by therapist: mask and gloves. Patient was not wearing a mask during session. Patient presented in bed with 0/10 pain. Patient with good  progress towards goals during this session. Education provided on Spine precautions, Physical therapy plan of care, and physiological benefits of out of bed mobility. Patient with good carryover. Patient on room air. Patient currently with CGA to min A x 1 for mobility during the session with rolling walker. Patient still with narrow base of support, and ataxia, though was taking more controlled steps in room and became more impaired with motor control when out in hallway. Patient encouraged to give herself hailey and to change her mindset to celebrate small victories. Patient agreeable. Patient's daughter in room to assist, able to give cues as needed. Patient with two standing rest breaks when ambulating for about 30 seconds each. Patient continues to function below baseline with bed mobility, transfers, gait, and stair negotiation. Contributing factors to remaining limitations include decreased functional strength, impaired motor planning, and medical status. Patient continues to benefit from continued skilled therapy services at discharge for fall prevention, aerobic conditioning, and promoting functional independence in the  home. Anticipate patient will return home with home-health therapy. Final discharge placement decision is directed by the inter-disciplinary team.    Patient demonstrated good  progress this session, goals remain in progress. Patient with good carryover during session. Will continue to follow patient for duration of hospital stay per plan of care, next session anticipate to progress bed mobility, transfers, gait, and stair negotiation. Anticipated therapy needs remain appropriate based on the patient's performance, personal factors, and remaining functional impairments.      Bed mobility: Supervision verbal cues to use log roll   Transfers: Contact guard assist  Gait Assistance: Minimum assistance  Distance (ft): 150ft  Assistive Device: Rolling walker             Patient was left in bed assist at legs to get back to bed, at end of session with all needs in reach. Patient doesThe patient's Approx Degree of Impairment: 50.57% has been calculated based on documentation in the [facility] '6 clicks' Inpatient Basic Mobility Short Form.  Research supports that patients with this level of impairment may benefit from Acute Rehab. However, patient will be able to return home health and outpatient physical therapy. Patient's family encouraged to call outpatient today and make an appointment for 4 weeks out with Reina Allred. Phone number provided to patient and Epic message with MD confirms order in chart. Patient may also need [facility] to get into her home, will continue to assess. RN aware of patient status post session.    DISCHARGE RECOMMENDATIONS  PT Discharge Recommendations: 24 hour care/supervision;Home with home health PT (OPPT when okay with MD)     PLAN  PT Treatment Plan: Body mechanics;Bed mobility;Patient education;Gait training;Transfer training;Balance training;Stair training    SUBJECTIVE  \"When can I do the stairs?\"    OBJECTIVE  Precautions: Spine    WEIGHT BEARING RESTRICTION  Weight Bearing Restriction:  None                PAIN ASSESSMENT   Ratin          BALANCE                                                                                                                       Static Sitting: Good  Dynamic Sitting: Fair +           Static Standing: Fair  Dynamic Standing: Fair -    AM-PAC '6-Clicks' INPATIENT SHORT FORM - BASIC MOBILITY  How much difficulty does the patient currently have...  Patient Difficulty: Turning over in bed (including adjusting bedclothes, sheets and blankets)?: A Little   Patient Difficulty: Sitting down on and standing up from a chair with arms (e.g., wheelchair, bedside commode, etc.): A Little   Patient Difficulty: Moving from lying on back to sitting on the side of the bed?: A Little   How much help from another person does the patient currently need...   Help from Another: Moving to and from a bed to a chair (including a wheelchair)?: A Little   Help from Another: Need to walk in hospital room?: A Little   Help from Another: Climbing 3-5 steps with a railing?: A Lot     AM-PAC Score:  Raw Score: 17   Approx Degree of Impairment: 50.57%   Standardized Score (AM-PAC Scale): 42.13   CMS Modifier (G-Code): CK    Patient End of Session: In bed;Needs met;Call light within reach;RN aware of session/findings;All patient questions and concerns addressed;Family present    CURRENT GOALS   Goals to be met by: 2/10/24  Patient Goal Patient's self-stated goal is: to go home   Goal #1 Patient is able to demonstrate supine - sit EOB @ level: supervision      Goal #1   Current Status Goal met    Goal #2 Patient is able to demonstrate transfers Sit to/from Stand at assistance level: supervision with walker - rolling      Goal #2  Current Status CGA with rolling walker    Goal #3 Patient is able to ambulate 100 feet with assist device: walker - rolling at assistance level: supervision   Goal #3   Current Status 150ft with rolling walker min A x 1   Goal #4 Patient will negotiate 4 stairs/one curb  w/ assistive device and supervision   Goal #4   Current Status Not tested    Goal #5 Patient to demonstrate independence with home activity/exercise instructions provided to patient in preparation for discharge.   Goal #5   Current Status In progress   Goal #6     Goal #6  Current Status        Gait Training: 15 minutes  Therapeutic Activity: 23 minutes

## 2024-02-02 NOTE — PROGRESS NOTES
[facility] Hematology Oncology Group Progress Note      Patient Name: Cris Han   YOB: 1955  Medical Record Number: [id number]  Attending Physician: Mark Cid M.D.     The 21st Century Cures Act makes medical notes like these available to patients in the interest of transparency. Please be advised this is a medical document. Medical documents are intended to carry relevant information, facts as evident, and the clinical opinion of the practitioner. The medical note is intended as peer to peer communication and may appear blunt or direct. It is written in medical language and may contain abbreviations or verbiage that are unfamiliar.      Oncologic HIstory  Cris Han is a 68 year old female who on 12/12/2018 underwent right modified radical mastectomy with axillary lymph node dissection at the [facility] for a diagnosis of invasive lobular carcinoma. Pathology showed graded 2 invasive lobular carcinoma measuring 3 cm and metastasis to 13/22 lymph nodes. Immunohistochemical studies of the breast primary were as follows: estrogen receptor 95% positive, progesterone receptor 0%, Her2 1+, and Ki67 10%. Immunohistochemical studies of the axilla were as follows: estrogen receptor 95% positive, progesterone receptor 0%, Her2 not amplified by FISH, Ki67 15%. She was staged as T2N3M0 or stage IIIA.    On 01/23/2019 she began adjuvant chemotherapy with dose dense doxorubicin and cyclophosphamide followed by weekly paclitaxel. She completed therapy on 06/07/2019.     In mid 06/2019, she began adjuvant endocrine therapy with anastrozole.     From 06/18/2019 to 07/31/2019 she received adjuvant radiation therapy at [facility].     She transferred care to me in 08/2023 after the group home of Dr. Booth.    On 01/26/2024 patient presented to the ED with a 2 week history of mid back pain. She also reported a 2-3 day history of unsteadiness in her gait along with  tingling in her legs. MRI T/L spine w/wo contrast was performed which showed multiple bony metastases including a large marrow replacing metastatic lesions involving the posterior elements of T6 causing cord compression. CT chest, abdomen, pelvis on 2024 showed no visceral metastases.     On 2024 patient underwent T5-7 laminectomy and T4-8 fixation and fusion. Pathology showed adenocarcinoma consistent with breast primary.    History of Present Illness  Patient has no specific complaints this morning. She ambulated yesterday. Pain well managed.     Current Medications   [COMPLETED] dexamethasone (Decadron) 4 MG/ML injection 4 mg  4 mg Intravenous TID    dexamethasone (Decadron) 4 MG/ML injection 4 mg  4 mg Intravenous BID with meals    [START ON 2/3/2024] dexamethasone (Decadron) 4 MG/ML injection 4 mg  4 mg Intravenous Once    [START ON 2024] dexamethasone (Decadron) 4 MG/ML injection 2 mg  2 mg Intravenous Once    polyethylene glycol (PEG 3350) (Miralax) 17 g oral packet 17 g  17 g Oral Daily    HYDROcodone-acetaminophen (Norco) 5-325 MG per tab 1 tablet  1 tablet Oral Q4H PRN    Or    HYDROcodone-acetaminophen (Norco)  MG per tab 1 tablet  1 tablet Oral Q4H PRN    acetaminophen (Tylenol Extra Strength) tab 500 mg  500 mg Oral Q4H PRN    [COMPLETED] dexamethasone (Decadron) 4 MG/ML injection 6 mg  6 mg Intravenous Q6H    [] sodium chloride 0.9 % IV bolus 500 mL  500 mL Intravenous Once PRN    sennosides (Senokot) tab 17.2 mg  17.2 mg Oral Nightly    docusate sodium (Colace) cap 100 mg  100 mg Oral BID    magnesium hydroxide (Milk of Magnesia) 400 MG/5ML oral suspension 30 mL  30 mL Oral Daily PRN    bisacodyl (Dulcolax) 10 MG rectal suppository 10 mg  10 mg Rectal Daily PRN    fleet enema (Fleet) 7-19 GM/118ML rectal enema 133 mL  1 enema Rectal Once PRN    ondansetron (Zofran) 4 MG/2ML injection 4 mg  4 mg Intravenous Q6H PRN    metoclopramide (Reglan) 5 mg/mL injection 10 mg  10 mg  Intravenous Q8H PRN    diphenhydrAMINE (Benadryl) cap/tab 25 mg  25 mg Oral Q4H PRN    Or    diphenhydrAMINE (Benadryl) 50 mg/mL  injection 25 mg  25 mg Intravenous Q4H PRN    benzocaine-menthol (Cepacol) lozenge 1 lozenge  1 lozenge Buccal Q15 Min PRN    sodium chloride 0.9% infusion   Intravenous Continuous    enoxaparin (Lovenox) 40 MG/0.4ML SUBQ injection 40 mg  40 mg Subcutaneous Daily    HYDROmorphone (Dilaudid) 1 MG/ML injection 0.2 mg  0.2 mg Intravenous Q2H PRN    Or    HYDROmorphone (Dilaudid) 1 MG/ML injection 0.4 mg  0.4 mg Intravenous Q2H PRN    methocarbamol (Robaxin) tab 500 mg  500 mg Oral q6h    sodium chloride 0.9% infusion   Intravenous Continuous    HYDROmorphone in sodium chloride 0.9% (Dilaudid) 20 mg/100mL PCA premix   Intravenous Continuous    HYDROmorphone 0.4 mg BOLUS FROM PCA  0.4 mg Intravenous Q30 Min PRN    naloxone (Narcan) 0.4 MG/ML injection 0.08 mg  0.08 mg Intravenous Q5 Min PRN    diphenhydrAMINE (Benadryl) 50 mg/mL  injection 12.5 mg  12.5 mg Intravenous Q4H PRN    lidocaine-menthol 4-1 % patch 1 patch  1 patch Transdermal Nightly    [COMPLETED] dexAMETHasone PF (Decadron) 10 MG/ML injection 10 mg  10 mg Intravenous Once    [COMPLETED] iopamidol 76% (ISOVUE-370) injection for power injector  80 mL Intravenous ONCE PRN    [COMPLETED] gadoterate meglumine (Dotarem) 7.5 MMOL/15ML injection 15 mL  15 mL Intravenous ONCE PRN    [COMPLETED] sodium chloride 0.9 % IV bolus 1,000 mL  1,000 mL Intravenous Once    anastrozole (Arimidex) tab 1 mg  1 mg Oral Daily    melatonin tab 3 mg  3 mg Oral Nightly PRN    sennosides (Senokot) tab 17.2 mg  17.2 mg Oral Nightly PRN    [COMPLETED] fleet enema (Fleet) 7-19 GM/118ML rectal enema 133 mL  1 enema Rectal Once PRN    [COMPLETED] gadoterate meglumine (Dotarem) 7.5 MMOL/15ML injection 15 mL  15 mL Intravenous ONCE PRN      Allergies   Ms. Han has No Known Allergies.     Vital Signs   /63 (BP Location: Right arm)   Pulse 95   Temp 97.7  °F (36.5 °C) (Oral)   Resp 18   Wt 59.6 kg (131 lb 6.3 oz)   SpO2 96%   BMI 21.21 kg/m²     Physical Examination   Constitutional      Well developed, well nourished. Appears close to chronological age. No apparent distress.   Head                   Normocephalic and atraumatic.  Eyes                   Conjunctiva clear; sclera anicteric.  ENMT                 External nose normal; external ears normal.  Neck                   Supple, without masses; no thyromegaly.  Hematologic/Lymphatic No cervical, supraclavicular, axillary ymphadenopathy.    Respiratory          Normal effort; no respiratory distress; lungs clear to auscultation bilaterally.  Cardiovascular     Regular rate and rhythm.  Abdomen            Non-tender; non-distended; no masses,no fluid wave; no hepatosplenomegaly.  Extremities          No lower extremity edema.  Neurologic           Motor and sensory grossly intact.  Psychiatric          Mood and affect appropriate; coherent speech; verbalizes understanding of our discussions today.    Laboratory   Recent Results (from the past 48 hour(s))   Hemoglobin & Hematocrit    Collection Time: 01/31/24  9:19 AM   Result Value Ref Range    HGB 8.4 (L) 12.0 - 16.0 g/dL    HCT 25.0 (L) 35.0 - 48.0 %   Basic Metabolic Panel (8)    Collection Time: 01/31/24 11:19 AM   Result Value Ref Range    Glucose 145 (H) 70 - 99 mg/dL    Sodium 140 136 - 145 mmol/L    Potassium 3.2 (L) 3.5 - 5.1 mmol/L    Chloride 110 98 - 112 mmol/L    CO2 22.0 21.0 - 32.0 mmol/L    Anion Gap 8 0 - 18 mmol/L    BUN 20 9 - 23 mg/dL    Creatinine 0.54 (L) 0.55 - 1.02 mg/dL    BUN/CREA Ratio 37.0 (H) 10.0 - 20.0    Calcium, Total 8.0 (L) 8.7 - 10.4 mg/dL    Calculated Osmolality 295 275 - 295 mOsm/kg    eGFR-Cr 101 >=60 mL/min/1.73m2   Basic Metabolic Panel (8)    Collection Time: 02/01/24  7:24 AM   Result Value Ref Range    Glucose 111 (H) 70 - 99 mg/dL    Sodium 140 136 - 145 mmol/L    Potassium 4.1 3.5 - 5.1 mmol/L    Chloride 108 98  - 112 mmol/L    CO2 26.0 21.0 - 32.0 mmol/L    Anion Gap 6 0 - 18 mmol/L    BUN 12 9 - 23 mg/dL    Creatinine 0.49 (L) 0.55 - 1.02 mg/dL    BUN/CREA Ratio 24.5 (H) 10.0 - 20.0    Calcium, Total 8.2 (L) 8.7 - 10.4 mg/dL    Calculated Osmolality 290 275 - 295 mOsm/kg    eGFR-Cr 103 >=60 mL/min/1.73m2   CBC W/ DIFFERENTIAL    Collection Time: 02/01/24  7:24 AM   Result Value Ref Range    WBC 11.5 (H) 4.0 - 11.0 x10(3) uL    RBC 2.97 (L) 3.80 - 5.30 x10(6)uL    HGB 8.3 (L) 12.0 - 16.0 g/dL    HCT 24.9 (L) 35.0 - 48.0 %    MCV 83.8 80.0 - 100.0 fL    MCH 27.9 26.0 - 34.0 pg    MCHC 33.3 31.0 - 37.0 g/dL    RDW-SD 37.2 35.1 - 46.3 fL    RDW 12.3 11.0 - 15.0 %    .0 150.0 - 450.0 10(3)uL    Neutrophil Absolute Prelim 8.43 (H) 1.50 - 7.70 x10 (3) uL    Neutrophil Absolute 8.43 (H) 1.50 - 7.70 x10(3) uL    Lymphocyte Absolute 1.85 1.00 - 4.00 x10(3) uL    Monocyte Absolute 1.10 (H) 0.10 - 1.00 x10(3) uL    Eosinophil Absolute 0.00 0.00 - 0.70 x10(3) uL    Basophil Absolute 0.01 0.00 - 0.20 x10(3) uL    Immature Granulocyte Absolute 0.08 0.00 - 1.00 x10(3) uL    Neutrophil % 73.5 %    Lymphocyte % 16.1 %    Monocyte % 9.6 %    Eosinophil % 0.0 %    Basophil % 0.1 %    Immature Granulocyte % 0.7 %     Impression and Plan   1.   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 She will need post-op radiation therapy as outpatient; I will discuss case with Dr. Leon. Steroid taper as per neurosurgery.    2.   Metastatic breast cancer: Pathology shows metastatic adenocarcinoma consistent with breast primary. Will ask pathology to stain for ER, GA, Her2. Systemic therapy as outpatient.     Oncology will follow peripherally over the weekend. If patient discharged, we will contact her for follow up.    Electronically signed by:    Mark Cid M.D.  System Medical Director of Oncology Services  [facility]

## 2024-02-02 NOTE — PLAN OF CARE
Patient is alert and orientated x4.  PCA dilaudid for pain. Tele with no calls. Two assist in a rolling chair. Voiding. Call light with in reach. All safety measures in place.  Problem: Patient Centered Care  Goal: Patient preferences are identified and integrated in the patient's plan of care  Description: Interventions:  - What would you like us to know as we care for you? From home with   - Provide timely, complete, and accurate information to patient/family  - Incorporate patient and family knowledge, values, beliefs, and cultural backgrounds into the planning and delivery of care  - Encourage patient/family to participate in care and decision-making at the level they choose  - Honor patient and family perspectives and choices  Outcome: Progressing     PProblem: PAIN - ADULT  Goal: Verbalizes/displays adequate comfort level or patient's stated pain goal  Description: INTERVENTIONS:  - Encourage pt to monitor pain and request assistance  - Assess pain using appropriate pain scale  - Administer analgesics based on type and severity of pain and evaluate response  - Implement non-pharmacological measures as appropriate and evaluate response  - Consider cultural and social influences on pain and pain management  - Manage/alleviate anxiety  - Utilize distraction and/or relaxation techniques  - Monitor for opioid side effects  - Notify MD/LIP if interventions unsuccessful or patient reports new pain  - Anticipate increased pain with activity and pre-medicate as appropriate  Outcome: Progressing     Problem: RISK FOR INFECTION - ADULT  Goal: Absence of fever/infection during anticipated neutropenic period  Description: INTERVENTIONS  - Monitor WBC  - Administer growth factors as ordered  - Implement neutropenic guidelines  Outcome: Progressing     Problem: SAFETY ADULT - FALL  Goal: Free from fall injury  Description: INTERVENTIONS:  - Assess pt frequently for physical needs  - Identify cognitive and physical  deficits and behaviors that affect risk of falls.  - [place] fall precautions as indicated by assessment.  - Educate pt/family on patient safety including physical limitations  - Instruct pt to call for assistance with activity based on assessment  - Modify environment to reduce risk of injury  - Provide assistive devices as appropriate  - Consider OT/PT consult to assist with strengthening/mobility  - Encourage toileting schedule  Outcome: Progressing     Problem: DISCHARGE PLANNING  Goal: Discharge to home or other facility with appropriate resources  Description: INTERVENTIONS:  - Identify barriers to discharge w/pt and caregiver  - Include patient/family/discharge partner in discharge planning  - Arrange for needed discharge resources and transportation as appropriate  - Identify discharge learning needs (meds, wound care, etc)  - Arrange for interpreters to assist at discharge as needed  - Consider post-discharge preferences of patient/family/discharge partner  - Complete POLST form as appropriate  - Assess patient's ability to be responsible for managing their own health  - Refer to Case Management Department for coordinating discharge planning if the patient needs post-hospital services based on physician/LIP order or complex needs related to functional status, cognitive ability or social support system  Outcome: Progressing     Problem: COPING  Goal: Pt/Family able to verbalize concerns and demonstrate effective coping strategies  Description: INTERVENTIONS:  - Assist patient/family to identify coping skills, available support systems and cultural and spiritual values  - Provide emotional support, including active listening and acknowledgement of concerns of patient and caregivers  - Reduce environmental stimuli, as able  - Instruct patient/family in relaxation techniques, as appropriate  - Assess for spiritual and psychosocial needs and initiate Spiritual Care or Behavioral Health consult as  needed  Outcome: Progressing     Problem: CARDIOVASCULAR - ADULT  Goal: Maintains optimal cardiac output and hemodynamic stability  Description: INTERVENTIONS:  - Monitor vital signs, rhythm, and trends  - Monitor for bleeding, hypotension and signs of decreased cardiac output  - Evaluate effectiveness of vasoactive medications to optimize hemodynamic stability  - Monitor arterial and/or venous puncture sites for bleeding and/or hematoma  - Assess quality of pulses, skin color and temperature  - Assess for signs of decreased coronary artery perfusion - ex. Angina  - Evaluate fluid balance, assess for edema, trend weights  Outcome: Progressing  Goal: Absence of cardiac arrhythmias or at baseline  Description: INTERVENTIONS:  - Continuous cardiac monitoring, monitor vital signs, obtain 12 lead EKG if indicated  - Evaluate effectiveness of antiarrhythmic and heart rate control medications as ordered  - Initiate emergency measures for life threatening arrhythmias  - Monitor electrolytes and administer replacement therapy as ordered  Outcome: Progressing     Problem: GASTROINTESTINAL - ADULT  Goal: Minimal or absence of nausea and vomiting  Description: INTERVENTIONS:  - Maintain adequate hydration with IV or PO as ordered and tolerated  - Nasogastric tube to low intermittent suction as ordered  - Evaluate effectiveness of ordered antiemetic medications  - Provide nonpharmacologic comfort measures as appropriate  - Advance diet as tolerated, if ordered  - Obtain nutritional consult as needed  - Evaluate fluid balance  Outcome: Progressing  Goal: Maintains or returns to baseline bowel function  Description: INTERVENTIONS:  - Assess bowel function  - Maintain adequate hydration with IV or PO as ordered and tolerated  - Evaluate effectiveness of GI medications  - Encourage mobilization and activity  - Obtain nutritional consult as needed  - Establish a toileting routine/schedule  - Consider collaborating with pharmacy to  review patient's medication profile  Outcome: Progressing     Problem: METABOLIC/FLUID AND ELECTROLYTES - ADULT  Goal: Electrolytes maintained within normal limits  Description: INTERVENTIONS:  - Monitor labs and rhythm and assess patient for signs and symptoms of electrolyte imbalances  - Administer electrolyte replacement as ordered  - Monitor response to electrolyte replacements, including rhythm and repeat lab results as appropriate  - Fluid restriction as ordered  - Instruct patient on fluid and nutrition restrictions as appropriate  Outcome: Progressing     Problem: HEMATOLOGIC - ADULT  Goal: Maintains hematologic stability  Description: INTERVENTIONS  - Assess for signs and symptoms of bleeding or hemorrhage  - Monitor labs and vital signs for trends  - Administer supportive blood products/factors, fluids and medications as ordered and appropriate  - Administer supportive blood products/factors as ordered and appropriate  Outcome: Progressing  Goal: Free from bleeding injury  Description: (Example usage: patient with low platelets)  INTERVENTIONS:  - Avoid intramuscular injections, enemas and rectal medication administration  - Ensure safe mobilization of patient  - Hold pressure on venipuncture sites to achieve adequate hemostasis  - Assess for signs and symptoms of internal bleeding  - Monitor lab trends  - Patient is to report abnormal signs of bleeding to staff  - Avoid use of toothpicks and dental floss  - Use electric shaver for shaving  - Use soft bristle tooth brush  - Limit straining and forceful nose blowing  Outcome: Progressing     Problem: MUSCULOSKELETAL - ADULT  Goal: Return mobility to safest level of function  Description: INTERVENTIONS:  - Assess patient stability and activity tolerance for standing, transferring and ambulating w/ or w/o assistive devices  - Assist with transfers and ambulation using safe patient handling equipment as needed  - Ensure adequate protection for wounds/incisions  during mobilization  - Obtain PT/OT consults as needed  - Advance activity as appropriate  - Communicate ordered activity level and limitations with patient/family  Outcome: Progressing  Goal: Maintain proper alignment of affected body part  Description: INTERVENTIONS:  - Support and protect limb and body alignment per provider's orders  - Instruct and reinforce with patient and family use of appropriate assistive device and precautions (e.g. spinal or hip dislocation precautions)  Outcome: Progressing

## 2024-02-02 NOTE — PROGRESS NOTES
[facility] Hospitalist Progress Note     CC: Hospital Follow up    PCP: BILL PARSONS MD       Assessment/Plan:     Ms. Han is a 68 year old male with history of Stage III ER+ [place]- HER2 - invasive lobular carcinoma of the breast, treated with with right mastectomy and adjuvant chemo, initially diagnosed 2018, who is here for evaluation of back pain and lower extremity parasthesias, with the pain starting 3 weeks ago.     Bone metastasis  Spinal cord compression, secondary to metastatic cancer, symptomatic on admission   Hx of Breast cancer, concern for recurrence   -concern for recurrence of breast cancer, needs tissue diagnosis. Path pending from OR  -now POD # 1 from Thoracic Laminectomies for decompression of spinal cord, see op note from 1/30/24 for details  -started on steroids with decadron 4mg Q6hr on 1/27/24, continue today and wean per neurosurgery, 3 more doses  -complete imaging, MRI cervical spine and brain done. CT c/a/p reviewed  -oncology consulted. Radiation onc referral will be needed, whether it's here vs outpatient.   -continue neurochecks Q4hr  - will discontinue PCA, continue PO pain meds  -ok for PT/OT    DVT prophylaxis: SCD  Code status: FULL    Dispo: eventual plan for home in 2-3 days, pending drain removal and clearance from PT    Angelica Yarbrough MD  [facility] Hospitalist        Subjective:     Doing well, walked with PT, did not need much pain meds, not using PCA much    OBJECTIVE:    Blood pressure 130/71, pulse 95, temperature 97.9 °F (36.6 °C), temperature source Oral, resp. rate 18, weight 131 lb 6.3 oz (59.6 kg), SpO2 99%.    Temp:  [97.5 °F (36.4 °C)-98.3 °F (36.8 °C)] 97.9 °F (36.6 °C)  Pulse:  [] 95  Resp:  [13-18] 18  BP: (103-137)/(60-73) 130/71  SpO2:  [96 %-99 %] 99 %      Intake/Output:    Intake/Output Summary (Last 24 hours) at 2/2/2024 1141  Last data filed at 2/2/2024 1000  Gross per 24 hour   Intake 1954 ml   Output 3175 ml   Net -1221  ml       Last 3 Weights   02/01/24 0600 131 lb 6.3 oz (59.6 kg)   01/27/24 0000 129 lb 3.2 oz (58.6 kg)   01/26/24 2009 132 lb (59.9 kg)   08/04/23 1053 128 lb (58.1 kg)   02/15/23 1032 129 lb (58.5 kg)       /71 (BP Location: Left arm)   Pulse 95   Temp 97.9 °F (36.6 °C) (Oral)   Resp 18   Wt 131 lb 6.3 oz (59.6 kg)   SpO2 99%   BMI 21.21 kg/m²       GEN: NAD  HEENT: EOMI  Pulm: CTAB, no crackles or wheezes  CV: RRR, no murmurs  ABD: Soft, non-tender, non-distended, +BS  SKIN: warm, dry  EXT: no edema      Data Review:       Labs:     Recent Labs   Lab 01/26/24 2003 01/27/24  0551 01/31/24  0919 02/01/24  0724 02/02/24  0556   RBC 4.99 4.90  --  2.97* 3.01*   HGB 13.8 13.4 8.4* 8.3* 8.6*   HCT 41.9 40.7 25.0* 24.9* 24.9*   MCV 84.0 83.1  --  83.8 82.7   MCH 27.7 27.3  --  27.9 28.6   MCHC 32.9 32.9  --  33.3 34.5   RDW 12.5 12.5  --  12.3 12.5   NEPRELIM 6.02 4.04  --  8.43*  --    WBC 8.1 6.9  --  11.5* 14.3*   .0 249.0  --  221.0 243.0         Recent Labs   Lab 01/31/24  1119 02/01/24  0724 02/02/24  0557   * 111* 99   BUN 20 12 12   CREATSERUM 0.54* 0.49* 0.48*   EGFRCR 101 103 104   CA 8.0* 8.2* 8.6*    140 141   K 3.2* 4.1 3.9    108 106   CO2 22.0 26.0 28.0       Recent Labs   Lab 01/26/24 2003 01/27/24  0551   ALT 22 18   AST 27 23   ALB 4.8 4.3         Imaging:  XR FLUOROSCOPY C-ARM TIME LESS THAN 1 HOUR (CPT=76000)    Result Date: 1/30/2024  CONCLUSION: Operative fluoroscopy    Dictated by (CST): Ministerio Pina MD on 1/30/2024 at 6:19 PM     Finalized by (CST): Ministerio Pina MD on 1/30/2024 at 6:20 PM             Meds:      dexamethasone  4 mg Intravenous BID with meals    [START ON 2/3/2024] dexamethasone  4 mg Intravenous Once    [START ON 2/4/2024] dexamethasone  2 mg Intravenous Once    polyethylene glycol (PEG 3350)  17 g Oral Daily    sennosides  17.2 mg Oral Nightly    docusate sodium  100 mg Oral BID    enoxaparin  40 mg Subcutaneous Daily    methocarbamol   500 mg Oral q6h    lidocaine-menthol  1 patch Transdermal Nightly    anastrozole  1 mg Oral Daily      sodium chloride 100 mL/hr at 02/02/24 0603    sodium chloride      HYDROmorphone in sodium chloride 0.9%       HYDROcodone-acetaminophen **OR** HYDROcodone-acetaminophen, acetaminophen, magnesium hydroxide, bisacodyl, fleet enema, ondansetron, metoclopramide, diphenhydrAMINE **OR** diphenhydrAMINE, benzocaine-menthol, HYDROmorphone **OR** HYDROmorphone, HYDROmorphone, naloxone, diphenhydrAMINE, melatonin, sennosides

## 2024-02-03 LAB
ANION GAP SERPL CALC-SCNC: 5 MMOL/L (ref 0–18)
BUN BLD-MCNC: 10 MG/DL (ref 9–23)
BUN/CREAT SERPL: 19.6 (ref 10–20)
CALCIUM BLD-MCNC: 8.6 MG/DL (ref 8.7–10.4)
CHLORIDE SERPL-SCNC: 105 MMOL/L (ref 98–112)
CO2 SERPL-SCNC: 30 MMOL/L (ref 21–32)
CREAT BLD-MCNC: 0.51 MG/DL
DEPRECATED RDW RBC AUTO: 39.1 FL (ref 35.1–46.3)
EGFRCR SERPLBLD CKD-EPI 2021: 102 ML/MIN/1.73M2 (ref 60–?)
ERYTHROCYTE [DISTWIDTH] IN BLOOD BY AUTOMATED COUNT: 12.9 % (ref 11–15)
GLUCOSE BLD-MCNC: 84 MG/DL (ref 70–99)
HCT VFR BLD AUTO: 24 %
HGB BLD-MCNC: 8.1 G/DL
MAGNESIUM SERPL-MCNC: 2 MG/DL (ref 1.6–2.6)
MCH RBC QN AUTO: 28 PG (ref 26–34)
MCHC RBC AUTO-ENTMCNC: 33.8 G/DL (ref 31–37)
MCV RBC AUTO: 83 FL
OSMOLALITY SERPL CALC.SUM OF ELEC: 288 MOSM/KG (ref 275–295)
PLATELET # BLD AUTO: 253 10(3)UL (ref 150–450)
POTASSIUM SERPL-SCNC: 4.2 MMOL/L (ref 3.5–5.1)
RBC # BLD AUTO: 2.89 X10(6)UL
SODIUM SERPL-SCNC: 140 MMOL/L (ref 136–145)
WBC # BLD AUTO: 12.6 X10(3) UL (ref 4–11)

## 2024-02-03 NOTE — PHYSICAL THERAPY NOTE
PHYSICAL THERAPY TREATMENT NOTE - INPATIENT     Room Number: 444/444-A       Presenting Problem: T5 - T7 laminectomy, resection of tumor, T5 - T8 posterior fixation and fusion       Problem List  Principal Problem:    Pain from bone metastases (HCC)  Active Problems:    Hyponatremia    Gait disturbance    Acute midline thoracic back pain    Cord compression (HCC)    History of breast cancer    Carcinoma of right breast metastatic to bone (HCC)    Thoracic radiculopathy    Spinal instability of thoracic region    Thoracic myelopathy      PHYSICAL THERAPY ASSESSMENT     RN cleared pt to participate in PT f/u session this morning. Pt received in bed at start, agreeable to participate. Dtr present at bedside and participated throughout session. Reviewed spine precautions prior to mobilization. Pt demonstrated supine to sit EOB with CGA and use of bed rail. Good static sit balance. Pt notes no pain in back but c/o shoulder soreness. Pt completed STS from EOB and mat table during session with min A and use of RW. Vcs for hand placement and task sequence. Ambulated 100 ft x 2 with RW and min A. Pt required 1-2 standing rest breaks during gait. As pt fatigued, gait quality significantly declined. Pt with progressive sinking into bilateral knee flexion during 2nd gait interval. Increased assist provided. Gait pattern also ataxic.     Pt negotiated 4 stairs with 2 rails and min A. Step to pattern. Tolerated stair training well but patient very anxious prior. Reports feeling generally defeated by current deficits and frustrated that she can't just get up and walk like she normally does. Provided active listening and encouragement to patient. Extensive time spend discussing discharge options and therapy recommendations with patient and dtr. Both had many great questions and pt is very motivated to participate in any therapy program that will help her regain her independence. Strongly feel patient would benefit from a neuro day  rehab program in order to provide a more intensive therapy option for patient. Pt is agreeable to this recommendation. Message sent to case mgmt and physician and RN are in agreement as well.     The patient's Approx Degree of Impairment: 46.58% has been calculated based on documentation in the [facility] '6 clicks' Inpatient Basic Mobility Short Form.  Research supports that patients with this level of impairment may benefit from neuro day rehab program.     DISCHARGE RECOMMENDATIONS  PT Discharge Recommendations: Day Rehab     PLAN  PT Treatment Plan: Bed mobility;Body mechanics;Coordination;Endurance;Energy conservation;Patient education;Family education;Gait training;Neuromuscular re-educate;Range of motion;Strengthening;Stair training;Transfer training;Balance training  Frequency (Obs): Daily    SUBJECTIVE  \"It feels like I am walking on a trampoline.\"     OBJECTIVE  Precautions: Spine    WEIGHT BEARING RESTRICTION                PAIN ASSESSMENT   Rating: Other (Comment) (no rating provided)  Location: c/o L shoulder soreness  Management Techniques: Activity promotion    BALANCE  Static Sitting: Good  Dynamic Sitting: Fair  Static Standing: Fair -  Dynamic Standing: Poor +      AM-PAC '6-Clicks' INPATIENT SHORT FORM - BASIC MOBILITY  How much difficulty does the patient currently have...  Patient Difficulty: Turning over in bed (including adjusting bedclothes, sheets and blankets)?: A Little   Patient Difficulty: Sitting down on and standing up from a chair with arms (e.g., wheelchair, bedside commode, etc.): A Little   Patient Difficulty: Moving from lying on back to sitting on the side of the bed?: A Little   How much help from another person does the patient currently need...   Help from Another: Moving to and from a bed to a chair (including a wheelchair)?: A Little   Help from Another: Need to walk in hospital room?: A Little   Help from Another: Climbing 3-5 steps with a railing?: A Little     AM-PAC  Score:  Raw Score: 18   Approx Degree of Impairment: 46.58%   Standardized Score (AM-PAC Scale): 43.63   CMS Modifier (G-Code): CK    FUNCTIONAL ABILITY STATUS  Functional Mobility/Gait Assessment  Gait Assistance: Minimum assistance  Distance (ft): 100 ft x 2  Assistive Device: Rolling walker  Pattern: Ataxic (bilat knee flexion, heavy UE reliance, wide LINETTE)  Stairs: Stairs  How Many Stairs: 4  Device: 2 Rails  Assist: Minimal assist  Pattern: Ascend and Descend  Ascend and Descend : Step to      Patient End of Session: In bed;Needs met;Call light within reach;RN aware of session/findings;All patient questions and concerns addressed;Family present    CURRENT GOALS   Goals to be met by: 2/10/24  Patient Goal Patient's self-stated goal is: to go home   Goal #1 Patient is able to demonstrate supine - sit EOB @ level: supervision      Goal #1   Current Status Sup to sit: CGA with bed modifications  Sit to sup: min A   Goal #2 Patient is able to demonstrate transfers Sit to/from Stand at assistance level: supervision with walker - rolling      Goal #2  Current Status Min A x 1    Goal #3 Patient is able to ambulate 100 feet with assist device: walker - rolling at assistance level: supervision   Goal #3   Current Status 100ft with rolling walker min A x 1   Goal #4 Patient will negotiate 4 stairs/one curb w/ assistive device and supervision   Goal #4   Current Status 4 stairs with 2 rails and min A   Goal #5 Patient to demonstrate independence with home activity/exercise instructions provided to patient in preparation for discharge.   Goal #5   Current Status In progress     Gait Trainin minutes  Therapeutic Activity: 30 minutes

## 2024-02-03 NOTE — PROGRESS NOTES
[facility] Hospitalist Progress Note     CC: Hospital Follow up    PCP: BILL PARSONS MD       Assessment/Plan:     Ms. Han is a 68 year old male with history of Stage III ER+ [place]- HER2 - invasive lobular carcinoma of the breast, treated with with right mastectomy and adjuvant chemo, initially diagnosed 2018, who is here for evaluation of back pain and lower extremity parasthesias, with the pain starting 3 weeks ago.     Bone metastasis  Spinal cord compression, secondary to metastatic cancer, symptomatic on admission, s/p s/p Thoracic Laminectomies for decompression of spinal cord  Hx of Breast cancer, concern for recurrence   -concern for recurrence of breast cancer, needs tissue diagnosis. Pathology +malignant  -started on steroids with decadron 4mg Q6hr on 1/27/24, continue to wean per NSGY  -complete imaging, MRI cervical spine and brain done. CT c/a/p reviewed  -oncology consulted. Radiation onc referral    -continue neurochecks Q4hr  - will discontinue PCA, continue PO pain meds  - PT/OT- recommending day rehab    DVT prophylaxis: SCD  Code status: FULL    Dispo: possible home tomorrow if planning for outpatient PT    Angelica Yarbrough MD  [facility] Hospitalist        Subjective:     Doing well, pain controlled with tylenol. Did well with PT, was able to do stairs.     OBJECTIVE:    Blood pressure 111/68, pulse 70, temperature 98.2 °F (36.8 °C), temperature source Oral, resp. rate 18, weight 131 lb 6.3 oz (59.6 kg), SpO2 96%.    Temp:  [97.8 °F (36.6 °C)-98.4 °F (36.9 °C)] 98.2 °F (36.8 °C)  Pulse:  [] 70  Resp:  [18] 18  BP: (104-114)/(54-71) 111/68  SpO2:  [95 %-98 %] 96 %      Intake/Output:    Intake/Output Summary (Last 24 hours) at 2/3/2024 1106  Last data filed at 2/3/2024 0600  Gross per 24 hour   Intake --   Output 1065 ml   Net -1065 ml       Last 3 Weights   02/01/24 0600 131 lb 6.3 oz (59.6 kg)   01/27/24 0000 129 lb 3.2 oz (58.6 kg)   01/26/24 2009 132 lb (59.9  kg)   08/04/23 1053 128 lb (58.1 kg)   02/15/23 1032 129 lb (58.5 kg)       /68 (BP Location: Left arm)   Pulse 70   Temp 98.2 °F (36.8 °C) (Oral)   Resp 18   Wt 131 lb 6.3 oz (59.6 kg)   SpO2 96%   BMI 21.21 kg/m²       GEN: NAD  HEENT: EOMI  Pulm: CTAB, no crackles or wheezes  CV: RRR, no murmurs  ABD: Soft, non-tender, non-distended, +BS  SKIN: warm, dry  EXT: no edema      Data Review:       Labs:     Recent Labs   Lab 02/01/24  0724 02/02/24  0556 02/03/24  0530   RBC 2.97* 3.01* 2.89*   HGB 8.3* 8.6* 8.1*   HCT 24.9* 24.9* 24.0*   MCV 83.8 82.7 83.0   MCH 27.9 28.6 28.0   MCHC 33.3 34.5 33.8   RDW 12.3 12.5 12.9   NEPRELIM 8.43*  --   --    WBC 11.5* 14.3* 12.6*   .0 243.0 253.0         Recent Labs   Lab 02/01/24  0724 02/02/24  0557 02/03/24  0530   * 99 84   BUN 12 12 10   CREATSERUM 0.49* 0.48* 0.51*   EGFRCR 103 104 102   CA 8.2* 8.6* 8.6*    141 140   K 4.1 3.9 4.2    106 105   CO2 26.0 28.0 30.0       No results for input(s): \"ALT\", \"AST\", \"ALB\", \"AMYLASE\", \"LIPASE\", \"LDH\" in the last 168 hours.    Invalid input(s): \"ALPHOS\", \"TBIL\", \"DBIL\", \"TPROT\"        Imaging:  No results found.      Meds:      [START ON 2/4/2024] dexamethasone  2 mg Intravenous Once    polyethylene glycol (PEG 3350)  17 g Oral Daily    sennosides  17.2 mg Oral Nightly    docusate sodium  100 mg Oral BID    enoxaparin  40 mg Subcutaneous Daily    methocarbamol  500 mg Oral q6h    lidocaine-menthol  1 patch Transdermal Nightly    anastrozole  1 mg Oral Daily         HYDROcodone-acetaminophen **OR** HYDROcodone-acetaminophen, acetaminophen, magnesium hydroxide, bisacodyl, fleet enema, ondansetron, metoclopramide, diphenhydrAMINE **OR** diphenhydrAMINE, benzocaine-menthol, HYDROmorphone **OR** HYDROmorphone, melatonin, sennosides

## 2024-02-03 NOTE — PLAN OF CARE
Patient is alert and oriented. Room air. Vital signs stable. Remote tele in place. Per tele, pt had 9 beats of vtach, pt asymptomatic. Notified MD, no new orders. Q4 neuros with no acute changes. General diet. Voiding freely. X1 BM overnight. Up with SB and a walker. Scheduled robaxin and prn tylenol for pain. Surgical incision clean/dry/intact. Hemovac in place. Call light and personal belongings within reach. Safety precautions in place. Daughter at bedside.     Problem: Patient Centered Care  Goal: Patient preferences are identified and integrated in the patient's plan of care  Description: Interventions:  - What would you like us to know as we care for you? From home with   - Provide timely, complete, and accurate information to patient/family  - Incorporate patient and family knowledge, values, beliefs, and cultural backgrounds into the planning and delivery of care  - Encourage patient/family to participate in care and decision-making at the level they choose  - Honor patient and family perspectives and choices  Outcome: Progressing     Problem: PAIN - ADULT  Goal: Verbalizes/displays adequate comfort level or patient's stated pain goal  Description: INTERVENTIONS:  - Encourage pt to monitor pain and request assistance  - Assess pain using appropriate pain scale  - Administer analgesics based on type and severity of pain and evaluate response  - Implement non-pharmacological measures as appropriate and evaluate response  - Consider cultural and social influences on pain and pain management  - Manage/alleviate anxiety  - Utilize distraction and/or relaxation techniques  - Monitor for opioid side effects  - Notify MD/LIP if interventions unsuccessful or patient reports new pain  - Anticipate increased pain with activity and pre-medicate as appropriate  Outcome: Progressing     Problem: RISK FOR INFECTION - ADULT  Goal: Absence of fever/infection during anticipated neutropenic period  Description:  INTERVENTIONS  - Monitor WBC  - Administer growth factors as ordered  - Implement neutropenic guidelines  Outcome: Progressing     Problem: SAFETY ADULT - FALL  Goal: Free from fall injury  Description: INTERVENTIONS:  - Assess pt frequently for physical needs  - Identify cognitive and physical deficits and behaviors that affect risk of falls.  - [place] fall precautions as indicated by assessment.  - Educate pt/family on patient safety including physical limitations  - Instruct pt to call for assistance with activity based on assessment  - Modify environment to reduce risk of injury  - Provide assistive devices as appropriate  - Consider OT/PT consult to assist with strengthening/mobility  - Encourage toileting schedule  Outcome: Progressing     Problem: DISCHARGE PLANNING  Goal: Discharge to home or other facility with appropriate resources  Description: INTERVENTIONS:  - Identify barriers to discharge w/pt and caregiver  - Include patient/family/discharge partner in discharge planning  - Arrange for needed discharge resources and transportation as appropriate  - Identify discharge learning needs (meds, wound care, etc)  - Arrange for interpreters to assist at discharge as needed  - Consider post-discharge preferences of patient/family/discharge partner  - Complete POLST form as appropriate  - Assess patient's ability to be responsible for managing their own health  - Refer to Case Management Department for coordinating discharge planning if the patient needs post-hospital services based on physician/LIP order or complex needs related to functional status, cognitive ability or social support system  Outcome: Progressing     Problem: COPING  Goal: Pt/Family able to verbalize concerns and demonstrate effective coping strategies  Description: INTERVENTIONS:  - Assist patient/family to identify coping skills, available support systems and cultural and spiritual values  - Provide emotional support, including active  listening and acknowledgement of concerns of patient and caregivers  - Reduce environmental stimuli, as able  - Instruct patient/family in relaxation techniques, as appropriate  - Assess for spiritual and psychosocial needs and initiate Spiritual Care or Behavioral Health consult as needed  Outcome: Progressing     Problem: CARDIOVASCULAR - ADULT  Goal: Maintains optimal cardiac output and hemodynamic stability  Description: INTERVENTIONS:  - Monitor vital signs, rhythm, and trends  - Monitor for bleeding, hypotension and signs of decreased cardiac output  - Evaluate effectiveness of vasoactive medications to optimize hemodynamic stability  - Monitor arterial and/or venous puncture sites for bleeding and/or hematoma  - Assess quality of pulses, skin color and temperature  - Assess for signs of decreased coronary artery perfusion - ex. Angina  - Evaluate fluid balance, assess for edema, trend weights  Outcome: Progressing  Goal: Absence of cardiac arrhythmias or at baseline  Description: INTERVENTIONS:  - Continuous cardiac monitoring, monitor vital signs, obtain 12 lead EKG if indicated  - Evaluate effectiveness of antiarrhythmic and heart rate control medications as ordered  - Initiate emergency measures for life threatening arrhythmias  - Monitor electrolytes and administer replacement therapy as ordered  Outcome: Progressing     Problem: GASTROINTESTINAL - ADULT  Goal: Minimal or absence of nausea and vomiting  Description: INTERVENTIONS:  - Maintain adequate hydration with IV or PO as ordered and tolerated  - Nasogastric tube to low intermittent suction as ordered  - Evaluate effectiveness of ordered antiemetic medications  - Provide nonpharmacologic comfort measures as appropriate  - Advance diet as tolerated, if ordered  - Obtain nutritional consult as needed  - Evaluate fluid balance  Outcome: Progressing  Goal: Maintains or returns to baseline bowel function  Description: INTERVENTIONS:  - Assess bowel  function  - Maintain adequate hydration with IV or PO as ordered and tolerated  - Evaluate effectiveness of GI medications  - Encourage mobilization and activity  - Obtain nutritional consult as needed  - Establish a toileting routine/schedule  - Consider collaborating with pharmacy to review patient's medication profile  Outcome: Progressing     Problem: METABOLIC/FLUID AND ELECTROLYTES - ADULT  Goal: Electrolytes maintained within normal limits  Description: INTERVENTIONS:  - Monitor labs and rhythm and assess patient for signs and symptoms of electrolyte imbalances  - Administer electrolyte replacement as ordered  - Monitor response to electrolyte replacements, including rhythm and repeat lab results as appropriate  - Fluid restriction as ordered  - Instruct patient on fluid and nutrition restrictions as appropriate  Outcome: Progressing     Problem: HEMATOLOGIC - ADULT  Goal: Maintains hematologic stability  Description: INTERVENTIONS  - Assess for signs and symptoms of bleeding or hemorrhage  - Monitor labs and vital signs for trends  - Administer supportive blood products/factors, fluids and medications as ordered and appropriate  - Administer supportive blood products/factors as ordered and appropriate  Outcome: Progressing  Goal: Free from bleeding injury  Description: (Example usage: patient with low platelets)  INTERVENTIONS:  - Avoid intramuscular injections, enemas and rectal medication administration  - Ensure safe mobilization of patient  - Hold pressure on venipuncture sites to achieve adequate hemostasis  - Assess for signs and symptoms of internal bleeding  - Monitor lab trends  - Patient is to report abnormal signs of bleeding to staff  - Avoid use of toothpicks and dental floss  - Use electric shaver for shaving  - Use soft bristle tooth brush  - Limit straining and forceful nose blowing  Outcome: Progressing     Problem: MUSCULOSKELETAL - ADULT  Goal: Return mobility to safest level of  function  Description: INTERVENTIONS:  - Assess patient stability and activity tolerance for standing, transferring and ambulating w/ or w/o assistive devices  - Assist with transfers and ambulation using safe patient handling equipment as needed  - Ensure adequate protection for wounds/incisions during mobilization  - Obtain PT/OT consults as needed  - Advance activity as appropriate  - Communicate ordered activity level and limitations with patient/family  Outcome: Progressing  Goal: Maintain proper alignment of affected body part  Description: INTERVENTIONS:  - Support and protect limb and body alignment per provider's orders  - Instruct and reinforce with patient and family use of appropriate assistive device and precautions (e.g. spinal or hip dislocation precautions)  Outcome: Progressing

## 2024-02-03 NOTE — PROGRESS NOTES
[facility]  Neurosurgery Progress Note  2/3/2024    Cris Han Patient Status:  Inpatient    1955 MRN [id number]   Location [facility] 4W/SW/SE Attending Angelica Yarbrough MD   Hosp Day # 8 PCP BILL PARSONS MD     SUBJECTIVE:  Cris Han is a(n) 68 year old female status post thoracic decompression for tumor  She is doing well  Feels a little springy in her legs        OBJECTIVE / PHYSICAL EXAM:  Vital Signs:  Blood pressure 111/68, pulse 70, temperature 98.2 °F (36.8 °C), temperature source Oral, resp. rate 18, weight 131 lb 6.3 oz (59.6 kg), SpO2 96%.  Awake alert, x 3  Follows commands x 4  Incision clean dry intact  Drain removed      Lab Results (last 24 hours):  Recent Results (from the past 24 hour(s))   CBC, Platelet; No Differential    Collection Time: 24  5:30 AM   Result Value Ref Range    WBC 12.6 (H) 4.0 - 11.0 x10(3) uL    RBC 2.89 (L) 3.80 - 5.30 x10(6)uL    HGB 8.1 (L) 12.0 - 16.0 g/dL    HCT 24.0 (L) 35.0 - 48.0 %    MCV 83.0 80.0 - 100.0 fL    MCH 28.0 26.0 - 34.0 pg    MCHC 33.8 31.0 - 37.0 g/dL    RDW 12.9 11.0 - 15.0 %    RDW-SD 39.1 35.1 - 46.3 fL    .0 150.0 - 450.0 10(3)uL   Basic Metabolic Panel (8)    Collection Time: 24  5:30 AM   Result Value Ref Range    Glucose 84 70 - 99 mg/dL    Sodium 140 136 - 145 mmol/L    Potassium 4.2 3.5 - 5.1 mmol/L    Chloride 105 98 - 112 mmol/L    CO2 30.0 21.0 - 32.0 mmol/L    Anion Gap 5 0 - 18 mmol/L    BUN 10 9 - 23 mg/dL    Creatinine 0.51 (L) 0.55 - 1.02 mg/dL    BUN/CREA Ratio 19.6 10.0 - 20.0    Calcium, Total 8.6 (L) 8.7 - 10.4 mg/dL    Calculated Osmolality 288 275 - 295 mOsm/kg    eGFR-Cr 102 >=60 mL/min/1.73m2   Magnesium    Collection Time: 24  5:30 AM   Result Value Ref Range    Magnesium 2.0 1.6 - 2.6 mg/dL       Assessment/Plan:  62-year-old female 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

## 2024-02-04 ENCOUNTER — APPOINTMENT (OUTPATIENT)
Dept: GENERAL RADIOLOGY | Facility: HOSPITAL | Age: 69
End: 2024-02-04
Attending: HOSPITALIST
Payer: COMMERCIAL

## 2024-02-04 LAB
ANION GAP SERPL CALC-SCNC: 5 MMOL/L (ref 0–18)
BUN BLD-MCNC: 11 MG/DL (ref 9–23)
BUN/CREAT SERPL: 19 (ref 10–20)
CALCIUM BLD-MCNC: 8.9 MG/DL (ref 8.7–10.4)
CHLORIDE SERPL-SCNC: 104 MMOL/L (ref 98–112)
CO2 SERPL-SCNC: 32 MMOL/L (ref 21–32)
CREAT BLD-MCNC: 0.58 MG/DL
DEPRECATED RDW RBC AUTO: 39.8 FL (ref 35.1–46.3)
EGFRCR SERPLBLD CKD-EPI 2021: 99 ML/MIN/1.73M2 (ref 60–?)
ERYTHROCYTE [DISTWIDTH] IN BLOOD BY AUTOMATED COUNT: 13.2 % (ref 11–15)
GLUCOSE BLD-MCNC: 90 MG/DL (ref 70–99)
HCT VFR BLD AUTO: 26.3 %
HGB BLD-MCNC: 8.7 G/DL
MCH RBC QN AUTO: 28.3 PG (ref 26–34)
MCHC RBC AUTO-ENTMCNC: 33.1 G/DL (ref 31–37)
MCV RBC AUTO: 85.7 FL
OSMOLALITY SERPL CALC.SUM OF ELEC: 291 MOSM/KG (ref 275–295)
PLATELET # BLD AUTO: 311 10(3)UL (ref 150–450)
POTASSIUM SERPL-SCNC: 4.4 MMOL/L (ref 3.5–5.1)
RBC # BLD AUTO: 3.07 X10(6)UL
SODIUM SERPL-SCNC: 141 MMOL/L (ref 136–145)
WBC # BLD AUTO: 13 X10(3) UL (ref 4–11)

## 2024-02-04 PROCEDURE — 74018 RADEX ABDOMEN 1 VIEW: CPT | Performed by: HOSPITALIST

## 2024-02-04 RX ORDER — SIMETHICONE 80 MG
80 TABLET,CHEWABLE ORAL 4 TIMES DAILY PRN
Status: DISCONTINUED | OUTPATIENT
Start: 2024-02-04 | End: 2024-02-05

## 2024-02-04 NOTE — PROGRESS NOTES
02/04/24 1302 02/04/24 1308 02/04/24 1312   Vital Signs   Pulse 113 109 (!) 130   Heart Rate Source Monitor  --   --    Resp 20  --   --    Respiratory Quality Normal  --   --    /74 115/72 104/74   MAP (mmHg) 88 83 83   BP Location Left arm Left arm Left arm   BP Method Automatic Automatic Automatic   Patient Position Lying Sitting Standing

## 2024-02-04 NOTE — PLAN OF CARE
Patient is alert and oriented x4.  Room air.  Remote tele in place.  Voiding freely.  1 BM this AM.   Tolerating general diet, denies nausea.  Incision to mid upper back clean, dry, and intact. Pain managed with scheduled Robaxin, PRN Tylenol.  Ambulating with x1 assist and walker.  Patient complaining of dizziness with standing/ambulation, Orthostatic BPs checked.  Plan to discharge home pending medical clearance.    Problem: Patient Centered Care  Goal: Patient preferences are identified and integrated in the patient's plan of care  Description: Interventions:  - What would you like us to know as we care for you? From home with   - Provide timely, complete, and accurate information to patient/family  - Incorporate patient and family knowledge, values, beliefs, and cultural backgrounds into the planning and delivery of care  - Encourage patient/family to participate in care and decision-making at the level they choose  - Honor patient and family perspectives and choices  Outcome: Progressing     Problem: PAIN - ADULT  Goal: Verbalizes/displays adequate comfort level or patient's stated pain goal  Description: INTERVENTIONS:  - Encourage pt to monitor pain and request assistance  - Assess pain using appropriate pain scale  - Administer analgesics based on type and severity of pain and evaluate response  - Implement non-pharmacological measures as appropriate and evaluate response  - Consider cultural and social influences on pain and pain management  - Manage/alleviate anxiety  - Utilize distraction and/or relaxation techniques  - Monitor for opioid side effects  - Notify MD/LIP if interventions unsuccessful or patient reports new pain  - Anticipate increased pain with activity and pre-medicate as appropriate  Outcome: Progressing     Problem: RISK FOR INFECTION - ADULT  Goal: Absence of fever/infection during anticipated neutropenic period  Description: INTERVENTIONS  - Monitor WBC  - Administer growth  factors as ordered  - Implement neutropenic guidelines  Outcome: Progressing     Problem: SAFETY ADULT - FALL  Goal: Free from fall injury  Description: INTERVENTIONS:  - Assess pt frequently for physical needs  - Identify cognitive and physical deficits and behaviors that affect risk of falls.  - [place] fall precautions as indicated by assessment.  - Educate pt/family on patient safety including physical limitations  - Instruct pt to call for assistance with activity based on assessment  - Modify environment to reduce risk of injury  - Provide assistive devices as appropriate  - Consider OT/PT consult to assist with strengthening/mobility  - Encourage toileting schedule  Outcome: Progressing     Problem: DISCHARGE PLANNING  Goal: Discharge to home or other facility with appropriate resources  Description: INTERVENTIONS:  - Identify barriers to discharge w/pt and caregiver  - Include patient/family/discharge partner in discharge planning  - Arrange for needed discharge resources and transportation as appropriate  - Identify discharge learning needs (meds, wound care, etc)  - Arrange for interpreters to assist at discharge as needed  - Consider post-discharge preferences of patient/family/discharge partner  - Complete POLST form as appropriate  - Assess patient's ability to be responsible for managing their own health  - Refer to Case Management Department for coordinating discharge planning if the patient needs post-hospital services based on physician/LIP order or complex needs related to functional status, cognitive ability or social support system  Outcome: Progressing     Problem: COPING  Goal: Pt/Family able to verbalize concerns and demonstrate effective coping strategies  Description: INTERVENTIONS:  - Assist patient/family to identify coping skills, available support systems and cultural and spiritual values  - Provide emotional support, including active listening and acknowledgement of concerns of  patient and caregivers  - Reduce environmental stimuli, as able  - Instruct patient/family in relaxation techniques, as appropriate  - Assess for spiritual and psychosocial needs and initiate Spiritual Care or Behavioral Health consult as needed  Outcome: Progressing     Problem: CARDIOVASCULAR - ADULT  Goal: Maintains optimal cardiac output and hemodynamic stability  Description: INTERVENTIONS:  - Monitor vital signs, rhythm, and trends  - Monitor for bleeding, hypotension and signs of decreased cardiac output  - Evaluate effectiveness of vasoactive medications to optimize hemodynamic stability  - Monitor arterial and/or venous puncture sites for bleeding and/or hematoma  - Assess quality of pulses, skin color and temperature  - Assess for signs of decreased coronary artery perfusion - ex. Angina  - Evaluate fluid balance, assess for edema, trend weights  Outcome: Progressing  Goal: Absence of cardiac arrhythmias or at baseline  Description: INTERVENTIONS:  - Continuous cardiac monitoring, monitor vital signs, obtain 12 lead EKG if indicated  - Evaluate effectiveness of antiarrhythmic and heart rate control medications as ordered  - Initiate emergency measures for life threatening arrhythmias  - Monitor electrolytes and administer replacement therapy as ordered  Outcome: Progressing     Problem: GASTROINTESTINAL - ADULT  Goal: Minimal or absence of nausea and vomiting  Description: INTERVENTIONS:  - Maintain adequate hydration with IV or PO as ordered and tolerated  - Nasogastric tube to low intermittent suction as ordered  - Evaluate effectiveness of ordered antiemetic medications  - Provide nonpharmacologic comfort measures as appropriate  - Advance diet as tolerated, if ordered  - Obtain nutritional consult as needed  - Evaluate fluid balance  Outcome: Progressing  Goal: Maintains or returns to baseline bowel function  Description: INTERVENTIONS:  - Assess bowel function  - Maintain adequate hydration with IV or  PO as ordered and tolerated  - Evaluate effectiveness of GI medications  - Encourage mobilization and activity  - Obtain nutritional consult as needed  - Establish a toileting routine/schedule  - Consider collaborating with pharmacy to review patient's medication profile  Outcome: Progressing     Problem: METABOLIC/FLUID AND ELECTROLYTES - ADULT  Goal: Electrolytes maintained within normal limits  Description: INTERVENTIONS:  - Monitor labs and rhythm and assess patient for signs and symptoms of electrolyte imbalances  - Administer electrolyte replacement as ordered  - Monitor response to electrolyte replacements, including rhythm and repeat lab results as appropriate  - Fluid restriction as ordered  - Instruct patient on fluid and nutrition restrictions as appropriate  Outcome: Progressing     Problem: HEMATOLOGIC - ADULT  Goal: Maintains hematologic stability  Description: INTERVENTIONS  - Assess for signs and symptoms of bleeding or hemorrhage  - Monitor labs and vital signs for trends  - Administer supportive blood products/factors, fluids and medications as ordered and appropriate  - Administer supportive blood products/factors as ordered and appropriate  Outcome: Progressing  Goal: Free from bleeding injury  Description: (Example usage: patient with low platelets)  INTERVENTIONS:  - Avoid intramuscular injections, enemas and rectal medication administration  - Ensure safe mobilization of patient  - Hold pressure on venipuncture sites to achieve adequate hemostasis  - Assess for signs and symptoms of internal bleeding  - Monitor lab trends  - Patient is to report abnormal signs of bleeding to staff  - Avoid use of toothpicks and dental floss  - Use electric shaver for shaving  - Use soft bristle tooth brush  - Limit straining and forceful nose blowing  Outcome: Progressing     Problem: MUSCULOSKELETAL - ADULT  Goal: Return mobility to safest level of function  Description: INTERVENTIONS:  - Assess patient  stability and activity tolerance for standing, transferring and ambulating w/ or w/o assistive devices  - Assist with transfers and ambulation using safe patient handling equipment as needed  - Ensure adequate protection for wounds/incisions during mobilization  - Obtain PT/OT consults as needed  - Advance activity as appropriate  - Communicate ordered activity level and limitations with patient/family  Outcome: Progressing  Goal: Maintain proper alignment of affected body part  Description: INTERVENTIONS:  - Support and protect limb and body alignment per provider's orders  - Instruct and reinforce with patient and family use of appropriate assistive device and precautions (e.g. spinal or hip dislocation precautions)  Outcome: Progressing     Problem: NEUROLOGICAL - ADULT  Goal: Achieves stable or improved neurological status  Description: INTERVENTIONS  - Assess for and report changes in neurological status  - Initiate measures to prevent increased intracranial pressure  - Maintain blood pressure and fluid volume within ordered parameters to optimize cerebral perfusion and minimize risk of hemorrhage  - Monitor temperature, glucose, and sodium. Initiate appropriate interventions as ordered  Outcome: Progressing

## 2024-02-04 NOTE — PLAN OF CARE
Patient is alert and oriented x4.  Room air.  Remote tele in place.  Voiding freely.  Patient complaining of constipation, PRN suppository given with 2 BMs.  Tolerating general diet, denies nausea.  Incision to mid upper back clean, dry, and intact.  IV Decadron continued.  Pain managed with Tylenol.  Ambulating with x1 assist and walker.  Plan to discharge home pending medical clearance.      Problem: Patient Centered Care  Goal: Patient preferences are identified and integrated in the patient's plan of care  Description: Interventions:  - What would you like us to know as we care for you? From home with   - Provide timely, complete, and accurate information to patient/family  - Incorporate patient and family knowledge, values, beliefs, and cultural backgrounds into the planning and delivery of care  - Encourage patient/family to participate in care and decision-making at the level they choose  - Honor patient and family perspectives and choices  Outcome: Progressing       Problem: PAIN - ADULT  Goal: Verbalizes/displays adequate comfort level or patient's stated pain goal  Description: INTERVENTIONS:  - Encourage pt to monitor pain and request assistance  - Assess pain using appropriate pain scale  - Administer analgesics based on type and severity of pain and evaluate response  - Implement non-pharmacological measures as appropriate and evaluate response  - Consider cultural and social influences on pain and pain management  - Manage/alleviate anxiety  - Utilize distraction and/or relaxation techniques  - Monitor for opioid side effects  - Notify MD/LIP if interventions unsuccessful or patient reports new pain  - Anticipate increased pain with activity and pre-medicate as appropriate  Outcome: Progressing     Problem: RISK FOR INFECTION - ADULT  Goal: Absence of fever/infection during anticipated neutropenic period  Description: INTERVENTIONS  - Monitor WBC  - Administer growth factors as ordered  -  Implement neutropenic guidelines  Outcome: Progressing     Problem: SAFETY ADULT - FALL  Goal: Free from fall injury  Description: INTERVENTIONS:  - Assess pt frequently for physical needs  - Identify cognitive and physical deficits and behaviors that affect risk of falls.  - [place] fall precautions as indicated by assessment.  - Educate pt/family on patient safety including physical limitations  - Instruct pt to call for assistance with activity based on assessment  - Modify environment to reduce risk of injury  - Provide assistive devices as appropriate  - Consider OT/PT consult to assist with strengthening/mobility  - Encourage toileting schedule  Outcome: Progressing     Problem: DISCHARGE PLANNING  Goal: Discharge to home or other facility with appropriate resources  Description: INTERVENTIONS:  - Identify barriers to discharge w/pt and caregiver  - Include patient/family/discharge partner in discharge planning  - Arrange for needed discharge resources and transportation as appropriate  - Identify discharge learning needs (meds, wound care, etc)  - Arrange for interpreters to assist at discharge as needed  - Consider post-discharge preferences of patient/family/discharge partner  - Complete POLST form as appropriate  - Assess patient's ability to be responsible for managing their own health  - Refer to Case Management Department for coordinating discharge planning if the patient needs post-hospital services based on physician/LIP order or complex needs related to functional status, cognitive ability or social support system  Outcome: Progressing     Problem: COPING  Goal: Pt/Family able to verbalize concerns and demonstrate effective coping strategies  Description: INTERVENTIONS:  - Assist patient/family to identify coping skills, available support systems and cultural and spiritual values  - Provide emotional support, including active listening and acknowledgement of concerns of patient and caregivers  -  Reduce environmental stimuli, as able  - Instruct patient/family in relaxation techniques, as appropriate  - Assess for spiritual and psychosocial needs and initiate Spiritual Care or Behavioral Health consult as needed  Outcome: Progressing     Problem: CARDIOVASCULAR - ADULT  Goal: Maintains optimal cardiac output and hemodynamic stability  Description: INTERVENTIONS:  - Monitor vital signs, rhythm, and trends  - Monitor for bleeding, hypotension and signs of decreased cardiac output  - Evaluate effectiveness of vasoactive medications to optimize hemodynamic stability  - Monitor arterial and/or venous puncture sites for bleeding and/or hematoma  - Assess quality of pulses, skin color and temperature  - Assess for signs of decreased coronary artery perfusion - ex. Angina  - Evaluate fluid balance, assess for edema, trend weights  Outcome: Progressing  Goal: Absence of cardiac arrhythmias or at baseline  Description: INTERVENTIONS:  - Continuous cardiac monitoring, monitor vital signs, obtain 12 lead EKG if indicated  - Evaluate effectiveness of antiarrhythmic and heart rate control medications as ordered  - Initiate emergency measures for life threatening arrhythmias  - Monitor electrolytes and administer replacement therapy as ordered  Outcome: Progressing     Problem: GASTROINTESTINAL - ADULT  Goal: Minimal or absence of nausea and vomiting  Description: INTERVENTIONS:  - Maintain adequate hydration with IV or PO as ordered and tolerated  - Nasogastric tube to low intermittent suction as ordered  - Evaluate effectiveness of ordered antiemetic medications  - Provide nonpharmacologic comfort measures as appropriate  - Advance diet as tolerated, if ordered  - Obtain nutritional consult as needed  - Evaluate fluid balance  Outcome: Progressing  Goal: Maintains or returns to baseline bowel function  Description: INTERVENTIONS:  - Assess bowel function  - Maintain adequate hydration with IV or PO as ordered and  tolerated  - Evaluate effectiveness of GI medications  - Encourage mobilization and activity  - Obtain nutritional consult as needed  - Establish a toileting routine/schedule  - Consider collaborating with pharmacy to review patient's medication profile  Outcome: Progressing     Problem: METABOLIC/FLUID AND ELECTROLYTES - ADULT  Goal: Electrolytes maintained within normal limits  Description: INTERVENTIONS:  - Monitor labs and rhythm and assess patient for signs and symptoms of electrolyte imbalances  - Administer electrolyte replacement as ordered  - Monitor response to electrolyte replacements, including rhythm and repeat lab results as appropriate  - Fluid restriction as ordered  - Instruct patient on fluid and nutrition restrictions as appropriate  Outcome: Progressing     Problem: HEMATOLOGIC - ADULT  Goal: Maintains hematologic stability  Description: INTERVENTIONS  - Assess for signs and symptoms of bleeding or hemorrhage  - Monitor labs and vital signs for trends  - Administer supportive blood products/factors, fluids and medications as ordered and appropriate  - Administer supportive blood products/factors as ordered and appropriate  Outcome: Progressing  Goal: Free from bleeding injury  Description: (Example usage: patient with low platelets)  INTERVENTIONS:  - Avoid intramuscular injections, enemas and rectal medication administration  - Ensure safe mobilization of patient  - Hold pressure on venipuncture sites to achieve adequate hemostasis  - Assess for signs and symptoms of internal bleeding  - Monitor lab trends  - Patient is to report abnormal signs of bleeding to staff  - Avoid use of toothpicks and dental floss  - Use electric shaver for shaving  - Use soft bristle tooth brush  - Limit straining and forceful nose blowing  Outcome: Progressing     Problem: MUSCULOSKELETAL - ADULT  Goal: Return mobility to safest level of function  Description: INTERVENTIONS:  - Assess patient stability and activity  tolerance for standing, transferring and ambulating w/ or w/o assistive devices  - Assist with transfers and ambulation using safe patient handling equipment as needed  - Ensure adequate protection for wounds/incisions during mobilization  - Obtain PT/OT consults as needed  - Advance activity as appropriate  - Communicate ordered activity level and limitations with patient/family  Outcome: Progressing  Goal: Maintain proper alignment of affected body part  Description: INTERVENTIONS:  - Support and protect limb and body alignment per provider's orders  - Instruct and reinforce with patient and family use of appropriate assistive device and precautions (e.g. spinal or hip dislocation precautions)  Outcome: Progressing

## 2024-02-04 NOTE — PLAN OF CARE
Aphrodite, also goes by Yakelin, is alert and oriented x4, does have numbness and tingling to BLE, doing neuro checks Q4 hours per order, on remote tele at times tachy with activities, on RA. Getting lovenox. Voiding up to bathroom, 1x-assist/stand-by with walker. Incision from neck to mid back with sutures, where old drain was had gauze and tape, CDI. Pain in back/shoulder managed with lidocaine patch and tylenol, also have scheduled Robaxin.  at bedside for support. Repos as edgar and with pillow support. Plan pending course, call light within reach, safety measures in place.     Problem: Patient Centered Care  Goal: Patient preferences are identified and integrated in the patient's plan of care  Description: Interventions:  - What would you like us to know as we care for you? From home with   - Provide timely, complete, and accurate information to patient/family  - Incorporate patient and family knowledge, values, beliefs, and cultural backgrounds into the planning and delivery of care  - Encourage patient/family to participate in care and decision-making at the level they choose  - Honor patient and family perspectives and choices  Outcome: Progressing     Problem: Patient/Family Goals  Goal: Patient/Family Long Term Goal  Description: Patient's Long Term Goal:     Interventions:  -   - See additional Care Plan goals for specific interventions  Outcome: Progressing  Goal: Patient/Family Short Term Goal  Description: Patient's Short Term Goal:     Interventions:   -   - See additional Care Plan goals for specific interventions  Outcome: Progressing     Problem: PAIN - ADULT  Goal: Verbalizes/displays adequate comfort level or patient's stated pain goal  Description: INTERVENTIONS:  - Encourage pt to monitor pain and request assistance  - Assess pain using appropriate pain scale  - Administer analgesics based on type and severity of pain and evaluate response  - Implement non-pharmacological measures as  appropriate and evaluate response  - Consider cultural and social influences on pain and pain management  - Manage/alleviate anxiety  - Utilize distraction and/or relaxation techniques  - Monitor for opioid side effects  - Notify MD/LIP if interventions unsuccessful or patient reports new pain  - Anticipate increased pain with activity and pre-medicate as appropriate  Outcome: Progressing     Problem: RISK FOR INFECTION - ADULT  Goal: Absence of fever/infection during anticipated neutropenic period  Description: INTERVENTIONS  - Monitor WBC  - Administer growth factors as ordered  - Implement neutropenic guidelines  Outcome: Progressing     Problem: SAFETY ADULT - FALL  Goal: Free from fall injury  Description: INTERVENTIONS:  - Assess pt frequently for physical needs  - Identify cognitive and physical deficits and behaviors that affect risk of falls.  - [place] fall precautions as indicated by assessment.  - Educate pt/family on patient safety including physical limitations  - Instruct pt to call for assistance with activity based on assessment  - Modify environment to reduce risk of injury  - Provide assistive devices as appropriate  - Consider OT/PT consult to assist with strengthening/mobility  - Encourage toileting schedule  Outcome: Progressing     Problem: DISCHARGE PLANNING  Goal: Discharge to home or other facility with appropriate resources  Description: INTERVENTIONS:  - Identify barriers to discharge w/pt and caregiver  - Include patient/family/discharge partner in discharge planning  - Arrange for needed discharge resources and transportation as appropriate  - Identify discharge learning needs (meds, wound care, etc)  - Arrange for interpreters to assist at discharge as needed  - Consider post-discharge preferences of patient/family/discharge partner  - Complete POLST form as appropriate  - Assess patient's ability to be responsible for managing their own health  - Refer to Case Management Department  for coordinating discharge planning if the patient needs post-hospital services based on physician/LIP order or complex needs related to functional status, cognitive ability or social support system  Outcome: Progressing     Problem: COPING  Goal: Pt/Family able to verbalize concerns and demonstrate effective coping strategies  Description: INTERVENTIONS:  - Assist patient/family to identify coping skills, available support systems and cultural and spiritual values  - Provide emotional support, including active listening and acknowledgement of concerns of patient and caregivers  - Reduce environmental stimuli, as able  - Instruct patient/family in relaxation techniques, as appropriate  - Assess for spiritual and psychosocial needs and initiate Spiritual Care or Behavioral Health consult as needed  Outcome: Progressing     Problem: CARDIOVASCULAR - ADULT  Goal: Maintains optimal cardiac output and hemodynamic stability  Description: INTERVENTIONS:  - Monitor vital signs, rhythm, and trends  - Monitor for bleeding, hypotension and signs of decreased cardiac output  - Evaluate effectiveness of vasoactive medications to optimize hemodynamic stability  - Monitor arterial and/or venous puncture sites for bleeding and/or hematoma  - Assess quality of pulses, skin color and temperature  - Assess for signs of decreased coronary artery perfusion - ex. Angina  - Evaluate fluid balance, assess for edema, trend weights  Outcome: Progressing  Goal: Absence of cardiac arrhythmias or at baseline  Description: INTERVENTIONS:  - Continuous cardiac monitoring, monitor vital signs, obtain 12 lead EKG if indicated  - Evaluate effectiveness of antiarrhythmic and heart rate control medications as ordered  - Initiate emergency measures for life threatening arrhythmias  - Monitor electrolytes and administer replacement therapy as ordered  Outcome: Progressing     Problem: GASTROINTESTINAL - ADULT  Goal: Minimal or absence of nausea and  vomiting  Description: INTERVENTIONS:  - Maintain adequate hydration with IV or PO as ordered and tolerated  - Nasogastric tube to low intermittent suction as ordered  - Evaluate effectiveness of ordered antiemetic medications  - Provide nonpharmacologic comfort measures as appropriate  - Advance diet as tolerated, if ordered  - Obtain nutritional consult as needed  - Evaluate fluid balance  Outcome: Progressing  Goal: Maintains or returns to baseline bowel function  Description: INTERVENTIONS:  - Assess bowel function  - Maintain adequate hydration with IV or PO as ordered and tolerated  - Evaluate effectiveness of GI medications  - Encourage mobilization and activity  - Obtain nutritional consult as needed  - Establish a toileting routine/schedule  - Consider collaborating with pharmacy to review patient's medication profile  Outcome: Progressing     Problem: METABOLIC/FLUID AND ELECTROLYTES - ADULT  Goal: Electrolytes maintained within normal limits  Description: INTERVENTIONS:  - Monitor labs and rhythm and assess patient for signs and symptoms of electrolyte imbalances  - Administer electrolyte replacement as ordered  - Monitor response to electrolyte replacements, including rhythm and repeat lab results as appropriate  - Fluid restriction as ordered  - Instruct patient on fluid and nutrition restrictions as appropriate  Outcome: Progressing     Problem: HEMATOLOGIC - ADULT  Goal: Maintains hematologic stability  Description: INTERVENTIONS  - Assess for signs and symptoms of bleeding or hemorrhage  - Monitor labs and vital signs for trends  - Administer supportive blood products/factors, fluids and medications as ordered and appropriate  - Administer supportive blood products/factors as ordered and appropriate  Outcome: Progressing  Goal: Free from bleeding injury  Description: (Example usage: patient with low platelets)  INTERVENTIONS:  - Avoid intramuscular injections, enemas and rectal medication  administration  - Ensure safe mobilization of patient  - Hold pressure on venipuncture sites to achieve adequate hemostasis  - Assess for signs and symptoms of internal bleeding  - Monitor lab trends  - Patient is to report abnormal signs of bleeding to staff  - Avoid use of toothpicks and dental floss  - Use electric shaver for shaving  - Use soft bristle tooth brush  - Limit straining and forceful nose blowing  Outcome: Progressing     Problem: MUSCULOSKELETAL - ADULT  Goal: Return mobility to safest level of function  Description: INTERVENTIONS:  - Assess patient stability and activity tolerance for standing, transferring and ambulating w/ or w/o assistive devices  - Assist with transfers and ambulation using safe patient handling equipment as needed  - Ensure adequate protection for wounds/incisions during mobilization  - Obtain PT/OT consults as needed  - Advance activity as appropriate  - Communicate ordered activity level and limitations with patient/family  Outcome: Progressing  Goal: Maintain proper alignment of affected body part  Description: INTERVENTIONS:  - Support and protect limb and body alignment per provider's orders  - Instruct and reinforce with patient and family use of appropriate assistive device and precautions (e.g. spinal or hip dislocation precautions)  Outcome: Progressing     Problem: NEUROLOGICAL - ADULT  Goal: Achieves stable or improved neurological status  Description: INTERVENTIONS  - Assess for and report changes in neurological status  - Initiate measures to prevent increased intracranial pressure  - Maintain blood pressure and fluid volume within ordered parameters to optimize cerebral perfusion and minimize risk of hemorrhage  - Monitor temperature, glucose, and sodium. Initiate appropriate interventions as ordered  Outcome: Progressing

## 2024-02-04 NOTE — PROGRESS NOTES
[facility]  Neurosurgery Progress Note  2024    Cris Han Patient Status:  Inpatient    1955 MRN [id number]   Location [facility] 4W/SW/SE Attending Angelica Yarbrough MD   Hosp Day # 9 PCP BILL PARSONS MD     SUBJECTIVE:  Cris Han is a(n) 68 year old female status post thoracic tumor removal  She is doing well        OBJECTIVE / PHYSICAL EXAM:  Vital Signs:  Blood pressure 109/68, pulse 91, temperature 98.1 °F (36.7 °C), temperature source Oral, resp. rate 20, weight 131 lb 6.3 oz (59.6 kg), SpO2 100%.  Awake alert Portal x 3  Follows commands x 4  Incision clean dry intact      Lab Results (last 24 hours):  Recent Results (from the past 24 hour(s))   CBC, Platelet; No Differential    Collection Time: 24  6:09 AM   Result Value Ref Range    WBC 13.0 (H) 4.0 - 11.0 x10(3) uL    RBC 3.07 (L) 3.80 - 5.30 x10(6)uL    HGB 8.7 (L) 12.0 - 16.0 g/dL    HCT 26.3 (L) 35.0 - 48.0 %    MCV 85.7 80.0 - 100.0 fL    MCH 28.3 26.0 - 34.0 pg    MCHC 33.1 31.0 - 37.0 g/dL    RDW 13.2 11.0 - 15.0 %    RDW-SD 39.8 35.1 - 46.3 fL    .0 150.0 - 450.0 10(3)uL   Basic Metabolic Panel (8)    Collection Time: 24  6:10 AM   Result Value Ref Range    Glucose 90 70 - 99 mg/dL    Sodium 141 136 - 145 mmol/L    Potassium 4.4 3.5 - 5.1 mmol/L    Chloride 104 98 - 112 mmol/L    CO2 32.0 21.0 - 32.0 mmol/L    Anion Gap 5 0 - 18 mmol/L    BUN 11 9 - 23 mg/dL    Creatinine 0.58 0.55 - 1.02 mg/dL    BUN/CREA Ratio 19.0 10.0 - 20.0    Calcium, Total 8.9 8.7 - 10.4 mg/dL    Calculated Osmolality 291 275 - 295 mOsm/kg    eGFR-Cr 99 >=60 mL/min/1.73m2       Assessment/Plan:  68-year-old female status post thoracic resection of tumor with fusion  She is doing well  Multiple questions answered  Okay to discharge home  Plans for outpatient day therapy  Patient and family appreciative      Antonio Fallon MD   [facility]  2024  11:10 AM  Dictated but not proofread

## 2024-02-04 NOTE — PROGRESS NOTES
[facility] Hospitalist Progress Note     CC: Hospital Follow up    PCP: BILL PARSONS MD       Assessment/Plan:     Ms. Han is a 68 year old male with history of Stage III ER+ ND- HER2 - invasive lobular carcinoma of the breast, treated with with right mastectomy and adjuvant chemo, initially diagnosed 2018, who is here for evaluation of back pain and lower extremity parasthesias, with the pain starting 3 weeks ago.     Bone metastasis  Spinal cord compression, secondary to metastatic cancer, symptomatic on admission, s/p s/p Thoracic Laminectomies for decompression of spinal cord  Hx of Breast cancer, concern for recurrence   -concern for recurrence of breast cancer, needs tissue diagnosis. Pathology +malignant  -started on steroids with decadron 4mg Q6hr on 1/27/24, continue to wean per NSGY  -complete imaging, MRI cervical spine and brain done. CT c/a/p reviewed  -oncology consulted. Radiation onc referral    -continue neurochecks Q4hr  - will discontinue PCA, continue PO pain meds  - PT/OT- recommending day rehab    Dizziness with standing  - check orthostatics    DVT prophylaxis: SCD  Code status: FULL    Dispo: possible home tomorrow, ideally with day rehab vs.     Angelica Yarbrough MD  [facility] Hospitalist        Subjective:     Felt a little dizzy with standing today. No pain. Had a BM    OBJECTIVE:    Blood pressure 104/74, pulse 113, temperature 97.8 °F (36.6 °C), temperature source Oral, resp. rate 20, weight 131 lb 6.3 oz (59.6 kg), SpO2 100%.    Temp:  [97.7 °F (36.5 °C)-98.1 °F (36.7 °C)] 97.8 °F (36.6 °C)  Pulse:  [] 113  Resp:  [18-20] 20  BP: (104-124)/(68-74) 104/74  SpO2:  [100 %] 100 %      Intake/Output:    Intake/Output Summary (Last 24 hours) at 2/4/2024 1344  Last data filed at 2/4/2024 0900  Gross per 24 hour   Intake 720 ml   Output --   Net 720 ml       Last 3 Weights   02/01/24 0600 131 lb 6.3 oz (59.6 kg)   01/27/24 0000 129 lb 3.2 oz (58.6 kg)    01/26/24 2009 132 lb (59.9 kg)   08/04/23 1053 128 lb (58.1 kg)   02/15/23 1032 129 lb (58.5 kg)       /74 (BP Location: Left arm)   Pulse 113   Temp 97.8 °F (36.6 °C) (Oral)   Resp 20   Wt 131 lb 6.3 oz (59.6 kg)   SpO2 100%   BMI 21.21 kg/m²       GEN: NAD  HEENT: EOMI  Pulm: CTAB, no crackles or wheezes  CV: RRR, no murmurs  ABD: Soft, non-tender, non-distended, +BS  SKIN: warm, dry  EXT: no edema      Data Review:       Labs:     Recent Labs   Lab 02/01/24  0724 02/02/24  0556 02/03/24  0530 02/04/24  0609   RBC 2.97* 3.01* 2.89* 3.07*   HGB 8.3* 8.6* 8.1* 8.7*   HCT 24.9* 24.9* 24.0* 26.3*   MCV 83.8 82.7 83.0 85.7   MCH 27.9 28.6 28.0 28.3   MCHC 33.3 34.5 33.8 33.1   RDW 12.3 12.5 12.9 13.2   NEPRELIM 8.43*  --   --   --    WBC 11.5* 14.3* 12.6* 13.0*   .0 243.0 253.0 311.0         Recent Labs   Lab 02/02/24  0557 02/03/24  0530 02/04/24  0610   GLU 99 84 90   BUN 12 10 11   CREATSERUM 0.48* 0.51* 0.58   EGFRCR 104 102 99   CA 8.6* 8.6* 8.9    140 141   K 3.9 4.2 4.4    105 104   CO2 28.0 30.0 32.0       No results for input(s): \"ALT\", \"AST\", \"ALB\", \"AMYLASE\", \"LIPASE\", \"LDH\" in the last 168 hours.    Invalid input(s): \"ALPHOS\", \"TBIL\", \"DBIL\", \"TPROT\"        Imaging:  No results found.      Meds:      polyethylene glycol (PEG 3350)  17 g Oral Daily    sennosides  17.2 mg Oral Nightly    docusate sodium  100 mg Oral BID    enoxaparin  40 mg Subcutaneous Daily    methocarbamol  500 mg Oral q6h    lidocaine-menthol  1 patch Transdermal Nightly    anastrozole  1 mg Oral Daily         HYDROcodone-acetaminophen **OR** HYDROcodone-acetaminophen, acetaminophen, magnesium hydroxide, bisacodyl, fleet enema, ondansetron, metoclopramide, diphenhydrAMINE **OR** diphenhydrAMINE, benzocaine-menthol, HYDROmorphone **OR** HYDROmorphone, melatonin, sennosides

## 2024-02-04 NOTE — PHYSICAL THERAPY NOTE
PHYSICAL THERAPY TREATMENT NOTE - INPATIENT     Room Number: 444/444-A       Presenting Problem: T5 - T7 laminectomy, resection of tumor, T5 - T8 posterior fixation and fusion    Problem List  Principal Problem:    Pain from bone metastases (HCC)  Active Problems:    Hyponatremia    Gait disturbance    Acute midline thoracic back pain    Cord compression (HCC)    History of breast cancer    Carcinoma of right breast metastatic to bone (HCC)    Thoracic radiculopathy    Spinal instability of thoracic region    Thoracic myelopathy      PHYSICAL THERAPY ASSESSMENT   Chart reviewed. FRANCES Feldman approved participation in physical therapy. PPE worn by therapist: mask and gloves. Patient was not wearing a mask during session. Patient presented in bedside chair with did not rate pain. Patient with good  progress towards goals during this session. Education provided on Spine precautions, Physical therapy plan of care, physiological benefits of out of bed mobility, and Stair negotiation . Patient with good carryover. Pt is received in the chair with family present and was cleared for therapy session. Pt is CGA with sit<>stand transfers with the RW. Pt is cued for proper hand placement for safety. Pt denied any dizziness and light headedness. Pt was able to AMB about 200'x2 with the RW CGA/SBA for balance and safety.  Pt with decreased mark and scissoring gait and cued to slow pace a couple times for safety. Pt AMB to the therapy gym for stair training. Pt sat for a few minutes to rest before attempting stairs. Pt then was educated and able to negotiate 4 stairs with 2 HR's CGA for safety. Pt was cued for proper technique and sequencing. Pt was then able to AMB back to the room and to sitting in the chair with all needs within reach. Discussed dc planning with pt and was educated and encouraged to AMB with nursing staff throughout the day. Pt with good understanding. Pt is on track to dc to home once medically cleared.  Reported to the RN on the status of the pt.     Bed mobility:  NT  Transfers: Contact guard assist  Gait Assistance: Contact guard assist  Distance (ft): 200' x2  Assistive Device: Rolling walker  Pattern: Scissoring          . Patient was left in bedside chair at end of session with all needs in reach. The patient's Approx Degree of Impairment: 46.58% has been calculated based on documentation in the [facility] '6 clicks' Inpatient Basic Mobility Short Form.  Research supports that patients with this level of impairment may benefit from . Home with assist and Day rehab. RN aware of patient status post session.    DISCHARGE RECOMMENDATIONS  PT Discharge Recommendations: Home;Day Rehab     PLAN  PT Treatment Plan: Bed mobility;Body mechanics;Coordination;Endurance;Gait training;Family education;Strengthening;Stoop training;Stair training;Transfer training;Balance training;Energy conservation;Patient education;Range of motion    SUBJECTIVE  Pt was agreeable to therapy session.         OBJECTIVE  Precautions: Spine    WEIGHT BEARING RESTRICTION  Weight Bearing Restriction: None                PAIN ASSESSMENT   Rating:  (did not rate)  Location: c/o L shoulder soreness  Management Techniques: Activity promotion;Body mechanics;Relaxation;Repositioning    BALANCE                                                                                                                       Static Sitting: Fair +  Dynamic Sitting: Fair           Static Standing: Fair -  Dynamic Standing: Fair -    ACTIVITY TOLERANCE                         O2 WALK       AM-[facility] '6-Clicks' INPATIENT SHORT FORM - BASIC MOBILITY  How much difficulty does the patient currently have...  Patient Difficulty: Turning over in bed (including adjusting bedclothes, sheets and blankets)?: A Little   Patient Difficulty: Sitting down on and standing up from a chair with arms (e.g., wheelchair, bedside commode, etc.): A Little   Patient Difficulty: Moving from lying on  back to sitting on the side of the bed?: A Little   How much help from another person does the patient currently need...   Help from Another: Moving to and from a bed to a chair (including a wheelchair)?: A Little   Help from Another: Need to walk in hospital room?: A Little   Help from Another: Climbing 3-5 steps with a railing?: A Little     AM-PAC Score:  Raw Score: 18   Approx Degree of Impairment: 46.58%   Standardized Score (AM-PAC Scale): 43.63   CMS Modifier (G-Code): CK        Patient End of Session: Up in chair;Needs met;Call light within reach;RN aware of session/findings;All patient questions and concerns addressed;Family present    CURRENT GOALS   Goals to be met by: 2/10/24  Patient Goal Patient's self-stated goal is: to go home   Goal #1 Patient is able to demonstrate supine - sit EOB @ level: supervision      Goal #1   Current Status NT received in the chair   Goal #2 Patient is able to demonstrate transfers Sit to/from Stand at assistance level: supervision with walker - rolling      Goal #2  Current Status CGA with the RW    Goal #3 Patient is able to ambulate 100 feet with assist device: walker - rolling at assistance level: supervision   Goal #3   Current Status 200' with the RW CGA/SBA   Goal #4 Patient will negotiate 4 stairs/one curb w/ assistive device and supervision   Goal #4   Current Status 4 stairs with 2 rails and CGA   Goal #5 Patient to demonstrate independence with home activity/exercise instructions provided to patient in preparation for discharge.   Goal #5   Current Status In progress             Therapeutic Activity: 30 minutes

## 2024-02-05 ENCOUNTER — APPOINTMENT (OUTPATIENT)
Dept: GENERAL RADIOLOGY | Facility: HOSPITAL | Age: 69
End: 2024-02-05
Attending: STUDENT IN AN ORGANIZED HEALTH CARE EDUCATION/TRAINING PROGRAM
Payer: COMMERCIAL

## 2024-02-05 VITALS
OXYGEN SATURATION: 97 % | DIASTOLIC BLOOD PRESSURE: 65 MMHG | SYSTOLIC BLOOD PRESSURE: 113 MMHG | TEMPERATURE: 98 F | RESPIRATION RATE: 20 BRPM | WEIGHT: 131.38 LBS | HEART RATE: 86 BPM | BODY MASS INDEX: 21 KG/M2

## 2024-02-05 PROCEDURE — 72072 X-RAY EXAM THORAC SPINE 3VWS: CPT | Performed by: STUDENT IN AN ORGANIZED HEALTH CARE EDUCATION/TRAINING PROGRAM

## 2024-02-05 NOTE — DISCHARGE SUMMARY
General Medicine Discharge Summary     Patient ID:  Cris Han  68 year old  6/30/1955    Admit date: 1/26/2024    Discharge date and time: 02/05/24    Attending Physician: Angelica Yarbrough MD     Primary Care Physician: BILL PARSONS MD     Reason for admission: back pain    Discharge Diagnoses: Gait disturbance [R26.9]  Hyponatremia [E87.1]  Acute midline thoracic back pain [M54.6]    Discharged Condition: stable    Disposition: home    Consults:   Consultants         Provider   Role Specialty     Enrico Ruffin MD  Consulting Physician Rehabilitation     Unddavid, Mark Hopkins MD  Consulting Physician ONCOLOGY     Cleveland Liao MD  Consulting Physician NEUROSURGERY     Vincent Rivera MD  Consulting Physician NEUROSURGERY     Juan Jose Koehler DO  Consulting Physician Hematology and Oncology     Alfonso Quinteros MD  Consulting Physician NEUROLOGY              HPI: Per Dr. Hawthorne    68 year old male with history of Stage III ER+ VT- HER2 - invasive lobular carcinoma of the breast, treated with with right mastectomy and adjuvant chemo, initially diagnosed 2018, who is here for evaluation of back pain and lower extremity parasthesias, with the pain starting 3 weeks ago. She states the last 2-3 days she noted she felt more \"wobbly\" and may could not feel as much in her feet. Felt like she was walking \"on marshmellows\" she states. 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 She presumed it was due to this. She even saw a chiropractor as well recently. Due to her continued symptoms she presented yesterday to the ER. Given her symptom description there was indication for MRI imaging, which unfortunately shows diffuse metastatic lesions involving T5, T6, T11, T12, L1, L4, the pelvis, and the sixth rib.  Of most concern is a marrow replacing lesion at T6 resulting in severe canal stenosis with spinal cord compression.       Hospital Course:     Ms. Han is a 68 year old male with history of Stage III ER+ MO- HER2 - invasive lobular carcinoma of the breast, treated with with right mastectomy and adjuvant chemo, initially diagnosed 2018, who is here for evaluation of back pain and lower extremity parasthesias, with the pain starting 3 weeks ago. MRI with multiple bony mets, NSGY consulted, s/p T5-T7 laminectomy and decompression of spinal cord, T4-T8 posterior fixation. Hospital course with constipation, improving with bowel regimen. PT/OT recommending day rehab. Decadron weaned off. Plan for follow-up with NSGY, oncology, radiation oncology as outpatient.      Bone metastasis  Spinal cord compression, secondary to metastatic cancer, symptomatic on admission, s/p s/p Thoracic Laminectomies for decompression of spinal cord  Hx of Breast cancer, concern for recurrence   -concern for recurrence of breast cancer, needs tissue diagnosis. Pathology +malignant  -started on steroids with decadron 4mg Q6hr on 1/27/24, continue to wean per NSGY  -complete imaging, MRI cervical spine and brain done. CT c/a/p reviewed  -oncology consulted. Radiation onc referral    -continue neurochecks Q4hr  - will discontinue PCA, continue PO pain meds  - PT/OT- recommending day rehab     Constipation  - bowel regimen    Exam  GEN: NAD  HEENT: EOMI  Pulm: CTAB, no crackles or wheezes  CV: RRR, no murmurs  ABD: Soft, non-tender, non-distended, +BS  SKIN: warm, dry  EXT: no edema    Operative Procedures: Procedure(s) (LRB):  T5 - T7 laminectomy, resection of tumor, T5 - T8 posterior fixation and fusion (N/A)  THORACIC SPINAL FUSION 3 LEVEL (N/A)  SPINAL THORACIC TUMOR REMOVAL (N/A)     Imaging: XR ABDOMEN (1 VIEW) (CPT=74018)    Result Date: 2/4/2024  CONCLUSION: No small bowel obstruction.  Mild-to-moderate colonic stool burden.    Dictated by (CST): Kirt Chavez MD on 2/04/2024 at 8:59 PM     Finalized by (CST): Kirt Chavez MD on 2/04/2024 at 9:00 PM                Home Medication Changes:     I reconciled current and discharge medications on day of discharge. These medication changes have been made as below         Medication List        START taking these medications      HYDROcodone-acetaminophen 5-325 MG Tabs  Commonly known as: Norco  Take 1 tablet by mouth every 4 (four) hours as needed.     methocarbamol 500 MG Tabs  Commonly known as: Robaxin  Take 1 tablet (500 mg total) by mouth 3 (three) times daily as needed.            CONTINUE taking these medications      Alpha-Lipoic Acid 100 MG Caps     anastrozole 1 MG Tabs tab  Commonly known as: Arimidex  Take 1 tablet (1 mg total) by mouth daily.     B COMPLEX OR     CHLOROPHYLL OR     cholecalciferol 125 MCG (5000 UT) Caps  Commonly known as: Vitamin D3     FLAX SEEDS OR     Kelp 0.15 MG Tabs     MAGNESIA OR     Omega-3 1000 MG Caps     Turmeric 1053 MG Tabs     vitamin C 1000 MG Tabs     Zinc 50 MG Caps               Where to Get Your Medications        These medications were sent to [id number] - [place], [place] - [address] [phone], [phone] [address] [address]      Hours: 24-hours Phone: [phone]   HYDROcodone-acetaminophen 5-325 MG Tabs  methocarbamol 500 MG Tabs         Activity:  as instructed  Diet: regular diet  Wound Care: keep wound clean and dry  Code Status: Full Code  O2: n/a    Follow-up with:    PCP   Specialist NSGY, oncology, radiology       FU   Follow-up Information       Koby Glaser PA-C. Go on 2/19/2024.    Specialty: Physician Assistant  Why: 1015am  Contact information:  [address] [address] [phone]                             DC instructions:      Other Discharge Instructions:         Thoracic Fusion Discharge Instructions     Activity  Restrictions  No twisting, pulling, pushing or lifting > 10 lbs.  No lifting anything over your head.  No bending at the waist  - you can bend at the knees but keep your back  straight.    Sitting  Sit with your knees at about the same level as your hips; use a footstool if needed.  Do not sit in soft or overstuffed chairs. Firm chairs with straight backs give better support.   Recliners are OK but may need to add pillows in order to not sink into the chair.  Use a raised toilet seat if needed.  Change position every 20-30 minutes when sitting (example: after sitting, stand, then you can sit again for another 20-30 minutes).    Walking is your best exercise.  Listen to your body.  Walk several times a day  Smaller distances are better.  Your goal is to increase the distance you walk each day.  Remember to listen to your body    Sex  After 2 weeks, when comfortable and approved by your surgeon.  Stop if causing pain.    Driving  Usually allowed after 1-2 weeks;  check with physician.  Do Not drive while taking narcotics or muscle relaxants.  Adhere to sitting restrictions.    Stairs  Climb stairs as needed    Incision site care and dressing  Changes as directed by your surgeon    May leave open to air or apply and change dressing once a day using dry bandage. May prevent clothing from rubbing incision.   Watch incision for any redness, drainage, increased warmth or opening of the incision.   Call surgeon if you notice any of these.  No lotions or ointments on or near incision.     Always wash hands before and after dressing changes.  Suture will need to be removed               Bathing  No tub baths, pools, or saunas for 6 weeks and until cleared by surgeon.  When able to shower, you may remove gauze dressing beforehand.  After showering, pat incision dry and may apply new dry dressing. Leave the white strips on that are beneath the bandage. They will slowly fall off on their own. If they are still in place at your post operative visit, Dr. Sharpe will remove the remaining strips.   Do not apply any lotions or ointments to incision.       Return to work  When cleared by surgeon. Discuss  specific work activities with your surgeon at follow up visit.  Light to sedentary work may be possible 2-3 weeks post op  Heavy work may be possible 6 weeks post op.    Sleeping  Use a firm mattress.  Lay on side or back, not stomach.  May use pillow under knees, between legs or behind back if lying on your side.  Log roll to turn.    Diet and constipation prevention  Drink six to eight glasses liquid (water, juice) per day.  Eat a high fiber diet (bran, vegetables, fruit).  Use an over the counter stool softener such as Colace or senakot while taking narcotics to prevent constipation; use laxatives such as Miralax or Milk of Magnesia as needed.  An enema or suppository may be necessary if above measures do not work.        No smoking  Smoking will inhibit healing.  Even one cigarette a day will cause problems.  Chewing tobacco, nicotine gum or patches will also inhibit healing.      Pain Management  Relaxation techniques  A way to focus your attention other than on your pain. Your brain makes endorphins that are a natural body chemical that can decrease pain. Some examples of relaxation techniques are deep breathing, listening to music or meditating.  May use Cold therapy for 20 minutes multiple times per day.  Be sure there is a cloth barrier between skin and cold therapy.  Medication  No NSAIDS until OK with your surgeon. At least 6 months.   NSAIDS (non-steroidal anti- inflammatory) include: Motrin, ibuprofen, Advil, Aleve, Naprosyn, Indocin, Nuprin, Vioxx, Celebrex, Bextra.(COMPLETE LIST IN GUIDEBOOK APPENDIX)  Tylenol (acetaminophen) is okay. Caution if your pain med contains Tylenol (acetaminophen); maximum 3 gms of Tylenol (acetaminophen) in 24 hours.  A single aspirin for the heart is ok if ordered by your physician.  Take muscle relaxants and pain medications as prescribed allowing 30-45 minutes to take effect.  Do NOT take alcohol while on pain medication.  Monitor need for pain medication closely  Call  pharmacy or physician office at least five days before out of pain medication. If calling physician office after 3 pm, it will be handled on the next business day.    Post op office visit  Scheduled 3 weeks after surgery as with surgeon as directed at discharge  Schedule one week follow up with Primary Care Physician. Review all medications.      When to contact your surgeon  Temp > 101F; Take your temperature twice a day  Increased pain, swelling, redness, or any drainage to incision.  Separation of incision.  Sudden reappearance of pain that won’t go away with pain medication.  New numbness or weakness to arms or legs.  Difficulty urinating or having bowel movements  Headaches that worsen when standing and resolve when laying flat.    Go directly to the ER or CALL 911 if you:  become short of breath  have chest pain  cough up blood  have unexplained anxiety with breathing     DAY REHAB & HOME HEALTH:  Referrals have been sent for Day Rehab (intensive outpatient therapy) to the following providers:  Kat   Ph: [phone], Scheduling  Outpatient : [phone]  Fax: [phone]     Delores Christopher  Ph: [phone]  Fax:[phone]     Jose Angel Christopher  Ph: [phone]  Fax: [phone]     Aurora Christopher  Ph: [phone]  Fax: [phone]       The above will contact you directly if you qualify for services. While you wait for responses from these providers, you have been arranged / [facility] services.    Sometimes managing your health at home requires assistance.  The [facility] team has recognized your preference to use [facility].  They can be reached by phone at [phone].  The fax number for your reference is [phone].  A representative from the home health agency will contact you or your family to schedule your first visit.        Hold Tumeric for 1 week            Follow-up with labs: none    Total Time Coordinating  Care: 31 minutes    Patient had opportunity to ask questions and state understand and agree with therapeutic plan as outlined      Angelica Yarbrough MD  [facility] Hospitalist  '

## 2024-02-05 NOTE — PHYSICAL THERAPY NOTE
PHYSICAL THERAPY TREATMENT NOTE - INPATIENT     Room Number: 444/444-A       Presenting Problem: T5 - T7 laminectomy, resection of tumor, T5 - T8 posterior fixation and fusion    Problem List  Principal Problem:    Pain from bone metastases (HCC)  Active Problems:    Hyponatremia    Gait disturbance    Acute midline thoracic back pain    Cord compression (HCC)    History of breast cancer    Carcinoma of right breast metastatic to bone (HCC)    Thoracic radiculopathy    Spinal instability of thoracic region    Thoracic myelopathy      PHYSICAL THERAPY ASSESSMENT   Chart reviewed. FRANCES Feldman approved participation in physical therapy. PPE worn by therapist: mask and gloves. Patient was not wearing a mask during session. Patient presented in bed with did not rate pain. Patient with good  progress towards goals during this session. Education provided on Spine precautions, Physical therapy plan of care, physiological benefits of out of bed mobility, and standing balance  . Patient with good carryover. Pt is received in the bed with family member present and was cleared for therapy session. Worked on standing balance this session with the pt. RN requested to test pt to prepare pt for a standing xray. Pt declined AMB at this time but was agreeable for standing balance. Pt is min A with bed mobility and to tranfers in and out of the bed. Pt sat EOB for a few minutes and denied any dizziness and light headedness. Pt is CGA with sit<>stand transfers with the RW. Pt was able to stand without the RW CGA/SBA with standing balance for about 1 minute. Pt is cued for proper technique. Pt then returned back to sitting EOB. Pt then was min A back to supine in the bed. Pt is left in the bed with all needs within reach. Pt is on track to dc to home once medically cleared. Reported to the RN on the status of the pt.     Bed mobility: Min assist  Transfers: Contact guard assist  Gait Assistance: Contact guard assist;Supervision  Distance  (ft): stood only  Assistive Device: Rolling walker  Pattern: Scissoring          . Patient was left in bed at end of session with all needs in reach. The patient's Approx Degree of Impairment: 46.58% has been calculated based on documentation in the [facility] '6 clicks' Inpatient Basic Mobility Short Form.  Research supports that patients with this level of impairment may benefit from Home with home health PT. And with day rehab . RN aware of patient status post session.    DISCHARGE RECOMMENDATIONS  PT Discharge Recommendations: Home;Home with home health PT;Day Rehab     PLAN  PT Treatment Plan: Bed mobility;Body mechanics;Coordination;Endurance;Energy conservation;Patient education;Gait training;Neuromuscular re-educate;Strengthening;Stoop training;Stair training;Transfer training;Balance training    SUBJECTIVE  Pt was agreeable to therapy session.         OBJECTIVE  Precautions: Spine    WEIGHT BEARING RESTRICTION  Weight Bearing Restriction: None                PAIN ASSESSMENT   Rating:  (did not rate)  Location: back  Management Techniques: Activity promotion;Body mechanics;Relaxation;Repositioning    BALANCE                                                                                                                       Static Sitting: Good  Dynamic Sitting: Fair +           Static Standing: Fair  Dynamic Standing: Not tested    ACTIVITY TOLERANCE                         O2 WALK       AM-[facility] '6-Clicks' INPATIENT SHORT FORM - BASIC MOBILITY  How much difficulty does the patient currently have...  Patient Difficulty: Turning over in bed (including adjusting bedclothes, sheets and blankets)?: A Little   Patient Difficulty: Sitting down on and standing up from a chair with arms (e.g., wheelchair, bedside commode, etc.): A Little   Patient Difficulty: Moving from lying on back to sitting on the side of the bed?: A Little   How much help from another person does the patient currently need...   Help from Another:  Moving to and from a bed to a chair (including a wheelchair)?: A Little   Help from Another: Need to walk in hospital room?: A Little   Help from Another: Climbing 3-5 steps with a railing?: A Little     AM-PAC Score:  Raw Score: 18   Approx Degree of Impairment: 46.58%   Standardized Score (AM-PAC Scale): 43.63   CMS Modifier (G-Code): CK          Patient End of Session: In bed;Needs met;Call light within reach;RN aware of session/findings;All patient questions and concerns addressed;Family present    CURRENT GOALS   Goals to be met by: 2/10/24  Patient Goal Patient's self-stated goal is: to go home   Goal #1 Patient is able to demonstrate supine - sit EOB @ level: supervision      Goal #1   Current Status Min A    Goal #2 Patient is able to demonstrate transfers Sit to/from Stand at assistance level: supervision with walker - rolling      Goal #2  Current Status CGA with the RW    Goal #3 Patient is able to ambulate 100 feet with assist device: walker - rolling at assistance level: supervision   Goal #3   Current Status NT stood only pt declined AMB at this time   Goal #4 Patient will negotiate 4 stairs/one curb w/ assistive device and supervision   Goal #4   Current Status NT performed last therapy session.    Goal #5 Patient to demonstrate independence with home activity/exercise instructions provided to patient in preparation for discharge.   Goal #5   Current Status In progress                 Therapeutic Activity: 15 minutes

## 2024-02-05 NOTE — PAYOR COMM NOTE
--------------  CONTINUED STAY REVIEW    Payor: [employer] OUT OF STATE PPO  Subscriber #:  [id number]  Authorization Number: [id number]    Admit date: 1/26/24  Admit time: 11:41 PM    Admitting Physician:   Attending Physician:  Angelica Yarbrough MD  Primary Care Physician: Deanna Morris MD    REVIEW DOCUMENTATION:  2/3  Assessment/Plan:  62-year-old female status post resection of tumor and decompression of thoracic spinal cord  She is doing great  PT/OT       Assessment/Plan:      Ms. Han is a 68 year old male with history of Stage III ER+ IA- HER2 - invasive lobular carcinoma of the breast, treated with with right mastectomy and adjuvant chemo, initially diagnosed 2018, who is here for evaluation of back pain and lower extremity parasthesias, with the pain starting 3 weeks ago.     Bone metastasis  Spinal cord compression, secondary to metastatic cancer, symptomatic on admission, s/p s/p Thoracic Laminectomies for decompression of spinal cord  Hx of Breast cancer, concern for recurrence   -concern for recurrence of breast cancer, needs tissue diagnosis. Pathology +malignant  -started on steroids with decadron 4mg Q6hr on 1/27/24, continue to wean per NSGY  -complete imaging, MRI cervical spine and brain done. CT c/a/p reviewed  -oncology consulted. Radiation onc referral    -continue neurochecks Q4hr  - will discontinue PCA, continue PO pain meds  - PT/OT- recommending day rehab  2/4  Assessment/Plan:  68-year-old female status post thoracic resection of tumor with fusion  She is doing well  Multiple questions answered  Assessment/Plan:      Ms. Han is a 68 year old male with history of Stage III ER+ IA- HER2 - invasive lobular carcinoma of the breast, treated with with right mastectomy and adjuvant chemo, initially diagnosed 2018, who is here for evaluation of back pain and lower extremity parasthesias, with the pain starting 3 weeks ago.     Bone metastasis  Spinal cord compression, secondary to  metastatic cancer, symptomatic on admission, s/p s/p Thoracic Laminectomies for decompression of spinal cord  Hx of Breast cancer, concern for recurrence   -concern for recurrence of breast cancer, needs tissue diagnosis. Pathology +malignant  -started on steroids with decadron 4mg Q6hr on 1/27/24, continue to wean per NSGY  -complete imaging, MRI cervical spine and brain done. CT c/a/p reviewed  -oncology consulted. Radiation onc referral    -continue neurochecks Q4hr  - will discontinue PCA, continue PO pain meds  - PT/OT- recommending day rehab  2/5  Assessment:   Aphrodite N Emely in room 444/444-SHEBA, is a 68 year old female, Hospital Day ( LOS: 10 days ) hospitalized for Pain from bone metastases (HCC).      6 Days Post-Op s/p Procedure(s):  T5 - T7 laminectomy, resection of tumor, T4 - T8 posterior fixation and fusion  THORACIC SPINAL FUSION 3 LEVEL  SPINAL THORACIC TUMOR REMOVAL       The patient's other hospital problems include:   Active Hospital Problems    Carcinoma of right breast metastatic to bone (HCC)       Thoracic radiculopathy       Spinal instability of thoracic region       Thoracic myelopathy       Cord compression (HCC)       History of breast cancer       *Pain from bone metastases (HCC)       Hyponatremia       Gait disturbance       Acute midline thoracic back pain           Plan:   -q4h neurochecks  -Medical management per hospitalist team   -Continue p.o. pain control  -Aggressive bowel regimen  -PT/OT  -Oncology on board. Appreciate recommendations. Would recommend holding off on RT until at least 1 month to allow surgical incision to heal.   -Upright thoracic xrays for baseline imaging prior to discharge and when safe to stand  -Outpatient day rehab  -Outpatient neurosurgery follow up and discharge instructions documented      MEDICATIONS ADMINISTERED IN LAST 1 DAY:  acetaminophen (Tylenol Extra Strength) tab 500 mg       Date Action Dose Route User    2/4/2024 1816 Given 500 mg Oral  Francia Moss RN          anastrozole (Arimidex) tab 1 mg       Date Action Dose Route User    2/5/2024 0828 Given 1 mg Oral Francia Moss RN          docusate sodium (Colace) cap 100 mg       Date Action Dose Route User    2/5/2024 0829 Given 100 mg Oral Francia Moss RN          enoxaparin (Lovenox) 40 MG/0.4ML SUBQ injection 40 mg       Date Action Dose Route User    2/5/2024 0829 Given 40 mg Subcutaneous (Left Lower Abdomen) Francia Moss RN          HYDROcodone-acetaminophen (Norco)  MG per tab 1 tablet       Date Action Dose Route User    2/5/2024 0828 Given 1 tablet Oral Francia Moss RN    2/4/2024 2330 Given 1 tablet Oral Ria Jackson RN          lidocaine-menthol 4-1 % patch 1 patch       Date Action Dose Route User    2/4/2024 2016 Patch Applied 1 patch Transdermal (Mid Back) Ria Jackson RN          methocarbamol (Robaxin) tab 500 mg       Date Action Dose Route User    2/5/2024 0636 Given 500 mg Oral Ria Jackson RN    2/5/2024 0006 Given 500 mg Oral Ria Jackson RN    2/4/2024 1816 Given 500 mg Oral Francia Moss RN          polyethylene glycol (PEG 3350) (Miralax) 17 g oral packet 17 g       Date Action Dose Route User    2/5/2024 0829 Given 17 g Oral Francia Moss RN          simethicone (Mylicon) chewable tab 80 mg       Date Action Dose Route User    2/4/2024 2015 Given 80 mg Oral Ria Jackson RN            Vitals (last day)       Date/Time Temp Pulse Resp BP SpO2 Weight O2 Device O2 Flow Rate (L/min) [facility]    02/05/24 0500 98.3 °F (36.8 °C) -- 20 113/65 97 % -- None (Room air) -- AC    02/05/24 0500 -- 86 -- -- -- -- -- -- CN    02/04/24 1900 97.8 °F (36.6 °C) -- 20 115/66 97 % -- None (Room air) -- CN    02/04/24 1900 -- 110 -- -- -- -- -- -- CY    02/04/24 1312 -- 130 -- 104/74 -- -- -- --     02/04/24 1308 -- 109 -- 115/72 -- -- -- --     02/04/24 1302 97.8 °F (36.6 °C) 113 20 124/74 100 % -- None (Room air) --     02/04/24 0641 98.1 °F (36.7 °C) 91 20  109/68 100 % -- None (Room air) -- CN          CIWA Scores (since admission)       None              PLEASE FAX DAYS CERTIFIED AND NEXT REVIEW DATE [phone]

## 2024-02-05 NOTE — PROGRESS NOTES
Neurosurgery Progress Note    Assessment:   Cris Hna in room 444/444-A, is a 68 year old female, Hospital Day ( LOS: 10 days ) hospitalized for Pain from bone metastases (HCC).      6 Days Post-Op s/p Procedure(s):  T5 - T7 laminectomy, resection of tumor, T4 - T8 posterior fixation and fusion  THORACIC SPINAL FUSION 3 LEVEL  SPINAL THORACIC TUMOR REMOVAL      The patient's other hospital problems include:   Active Hospital Problems    Carcinoma of right breast metastatic to bone (HCC)      Thoracic radiculopathy      Spinal instability of thoracic region      Thoracic myelopathy      Cord compression (HCC)      History of breast cancer      *Pain from bone metastases (HCC)      Hyponatremia      Gait disturbance      Acute midline thoracic back pain        Plan:   -q4h neurochecks  -Medical management per hospitalist team   -Continue p.o. pain control  -Aggressive bowel regimen  -PT/OT  -Oncology on board. Appreciate recommendations. Would recommend holding off on RT until at least 1 month to allow surgical incision to heal.   -Upright thoracic xrays for baseline imaging prior to discharge and when safe to stand  -Outpatient day rehab  -Outpatient neurosurgery follow up and discharge instructions documented         Plan of care discussed with Dr. Liao  Subjective:   Patient reports that she is recovering well overall.  She continues to endorse upper left thoracic postoperative back pain that is fairly well-controlled with Tylenol, muscle relaxants, Norco.  She has no new neurologic complaints.  She feels that the sensation in her lower extremities bilaterally is improving.  She does, however, complain of constipation and abdominal discomfort.  She reports that she had a good bowel movement approximately 2 days ago following a suppository.  She had abdominal x-ray that ruled out an obstruction, but demonstrated mild to moderate stool burden.    Objective:   VITALS: /65 (BP Location: Left arm)    Pulse 86   Temp 98.3 °F (36.8 °C) (Oral)   Resp 20   Wt 131 lb 6.3 oz (59.6 kg)   SpO2 97%   BMI 21.21 kg/m²  Temp (24hrs), Av °F (36.7 °C), Min:97.8 °F (36.6 °C), Max:98.3 °F (36.8 °C)       General: Well developed, well nourished, in no acute distress.     Incision: Open to air.  Incision clean, dry, and intact. No signs of gross drainage, warmth, erythema, or hematoma formation.  Nylon sutures intact.  Drain site gauze removed.  Both drain sites intact without drainage.      Neurological / Musculoskeletal: Awake, alert, and interactive. Recent and remote memory appear intact. Attention span and concentration are appropriate. No dysarthria. Coordination and motor control grossly intact. Patient follows commands briskly and appropriately.     Cervical Spine:    Motor/ Extremities   Deltoid Biceps Triceps    Sensation   R 5/5 5/5 5/5 5/5 Intact to light touch   L 5/5 5/5 5/5 5/5 Intact to light touch       Lumbar Spine:    Motor / Extremities   Hip   flexion Knee   extension Dorsiflexion EHL Plantarflexion   Sensation   R 5/5 5/5 5/5 5/5 5/5 Diminished to light touch   L 5/5 5/5 5/5 5/5 5/5 Diminished to light touch         I&O: I/O last 3 completed shifts:  In: 815 [P.O.:815]  Out: -        Labs:   Recent Labs   Lab 24  0724 24  0556 24  0530 24  0609   RBC 2.97* 3.01* 2.89* 3.07*   HGB 8.3* 8.6* 8.1* 8.7*   HCT 24.9* 24.9* 24.0* 26.3*   MCV 83.8 82.7 83.0 85.7   MCH 27.9 28.6 28.0 28.3   MCHC 33.3 34.5 33.8 33.1   RDW 12.3 12.5 12.9 13.2   NEPRELIM 8.43*  --   --   --    WBC 11.5* 14.3* 12.6* 13.0*   .0 243.0 253.0 311.0     Recent Labs   Lab 24  0557 24  0530 24  0610   GLU 99 84 90   BUN 12 10 11   CREATSERUM 0.48* 0.51* 0.58   EGFRCR 104 102 99   CA 8.6* 8.6* 8.9    140 141   K 3.9 4.2 4.4    105 104   CO2 28.0 30.0 32.0      Recent Labs   Lab 24  1123   INR 0.93   PTT 25.6        Imaging:  XR ABDOMEN (1 VIEW) (CPT=74018)    Result  Date: 2/4/2024  CONCLUSION: No small bowel obstruction.  Mild-to-moderate colonic stool burden.    Dictated by (CST): Kirt Chavez MD on 2/04/2024 at 8:59 PM     Finalized by (CST): Kirt Chavez MD on 2/04/2024 at 9:00 PM           Results reviewed.  Results relayed to patient and daughter at bedside.  Awaiting upright thoracic x-ray for baseline imaging.    Is this a shared or split note between Advanced Practice Provider and Physician? Yes    Koby Glaser PA-C  Physician Assistant- Neurosurgery   [facility]  2/5/2024, 10:10 AM        This document was created using Dragon voice recognition software and transcription variances may occur. Please contact documenting provider for clarification of contents if needed.

## 2024-02-05 NOTE — PLAN OF CARE
Aphrodite, also goes by Yakelin, is alert and oriented x4, does have numbness and tingling to BLE, doing neuro checks Q4 hours per order, on remote tele, on RA. Dizziness improved per pt. Getting lovenox. Voiding up to bathroom, 1x-assist/stand-by with walker. Incision from neck to mid back with sutures, where old drain was had gauze and tape, CDI. Pain in back/shoulder managed with lidocaine patch and norco prn, also have scheduled Robaxin, using hot packs. Daughter at bedside for support. Repos as edgar and with pillow support. Plan pending course, call light within reach, safety measures in place.      Problem: Patient Centered Care  Goal: Patient preferences are identified and integrated in the patient's plan of care  Description: Interventions:  - What would you like us to know as we care for you? From home with   - Provide timely, complete, and accurate information to patient/family  - Incorporate patient and family knowledge, values, beliefs, and cultural backgrounds into the planning and delivery of care  - Encourage patient/family to participate in care and decision-making at the level they choose  - Honor patient and family perspectives and choices  Outcome: Progressing     Problem: Patient/Family Goals  Goal: Patient/Family Long Term Goal  Description: Patient's Long Term Goal:     Interventions:  -   - See additional Care Plan goals for specific interventions  Outcome: Progressing  Goal: Patient/Family Short Term Goal  Description: Patient's Short Term Goal:     Interventions:   -   - See additional Care Plan goals for specific interventions  Outcome: Progressing     Problem: PAIN - ADULT  Goal: Verbalizes/displays adequate comfort level or patient's stated pain goal  Description: INTERVENTIONS:  - Encourage pt to monitor pain and request assistance  - Assess pain using appropriate pain scale  - Administer analgesics based on type and severity of pain and evaluate response  - Implement  non-pharmacological measures as appropriate and evaluate response  - Consider cultural and social influences on pain and pain management  - Manage/alleviate anxiety  - Utilize distraction and/or relaxation techniques  - Monitor for opioid side effects  - Notify MD/LIP if interventions unsuccessful or patient reports new pain  - Anticipate increased pain with activity and pre-medicate as appropriate  Outcome: Progressing     Problem: RISK FOR INFECTION - ADULT  Goal: Absence of fever/infection during anticipated neutropenic period  Description: INTERVENTIONS  - Monitor WBC  - Administer growth factors as ordered  - Implement neutropenic guidelines  Outcome: Progressing     Problem: SAFETY ADULT - FALL  Goal: Free from fall injury  Description: INTERVENTIONS:  - Assess pt frequently for physical needs  - Identify cognitive and physical deficits and behaviors that affect risk of falls.  - [place] fall precautions as indicated by assessment.  - Educate pt/family on patient safety including physical limitations  - Instruct pt to call for assistance with activity based on assessment  - Modify environment to reduce risk of injury  - Provide assistive devices as appropriate  - Consider OT/PT consult to assist with strengthening/mobility  - Encourage toileting schedule  Outcome: Progressing     Problem: DISCHARGE PLANNING  Goal: Discharge to home or other facility with appropriate resources  Description: INTERVENTIONS:  - Identify barriers to discharge w/pt and caregiver  - Include patient/family/discharge partner in discharge planning  - Arrange for needed discharge resources and transportation as appropriate  - Identify discharge learning needs (meds, wound care, etc)  - Arrange for interpreters to assist at discharge as needed  - Consider post-discharge preferences of patient/family/discharge partner  - Complete POLST form as appropriate  - Assess patient's ability to be responsible for managing their own health  -  Refer to Case Management Department for coordinating discharge planning if the patient needs post-hospital services based on physician/LIP order or complex needs related to functional status, cognitive ability or social support system  Outcome: Progressing     Problem: COPING  Goal: Pt/Family able to verbalize concerns and demonstrate effective coping strategies  Description: INTERVENTIONS:  - Assist patient/family to identify coping skills, available support systems and cultural and spiritual values  - Provide emotional support, including active listening and acknowledgement of concerns of patient and caregivers  - Reduce environmental stimuli, as able  - Instruct patient/family in relaxation techniques, as appropriate  - Assess for spiritual and psychosocial needs and initiate Spiritual Care or Behavioral Health consult as needed  Outcome: Progressing     Problem: CARDIOVASCULAR - ADULT  Goal: Maintains optimal cardiac output and hemodynamic stability  Description: INTERVENTIONS:  - Monitor vital signs, rhythm, and trends  - Monitor for bleeding, hypotension and signs of decreased cardiac output  - Evaluate effectiveness of vasoactive medications to optimize hemodynamic stability  - Monitor arterial and/or venous puncture sites for bleeding and/or hematoma  - Assess quality of pulses, skin color and temperature  - Assess for signs of decreased coronary artery perfusion - ex. Angina  - Evaluate fluid balance, assess for edema, trend weights  Outcome: Progressing  Goal: Absence of cardiac arrhythmias or at baseline  Description: INTERVENTIONS:  - Continuous cardiac monitoring, monitor vital signs, obtain 12 lead EKG if indicated  - Evaluate effectiveness of antiarrhythmic and heart rate control medications as ordered  - Initiate emergency measures for life threatening arrhythmias  - Monitor electrolytes and administer replacement therapy as ordered  Outcome: Progressing     Problem: GASTROINTESTINAL - ADULT  Goal:  Minimal or absence of nausea and vomiting  Description: INTERVENTIONS:  - Maintain adequate hydration with IV or PO as ordered and tolerated  - Nasogastric tube to low intermittent suction as ordered  - Evaluate effectiveness of ordered antiemetic medications  - Provide nonpharmacologic comfort measures as appropriate  - Advance diet as tolerated, if ordered  - Obtain nutritional consult as needed  - Evaluate fluid balance  Outcome: Progressing  Goal: Maintains or returns to baseline bowel function  Description: INTERVENTIONS:  - Assess bowel function  - Maintain adequate hydration with IV or PO as ordered and tolerated  - Evaluate effectiveness of GI medications  - Encourage mobilization and activity  - Obtain nutritional consult as needed  - Establish a toileting routine/schedule  - Consider collaborating with pharmacy to review patient's medication profile  Outcome: Progressing     Problem: METABOLIC/FLUID AND ELECTROLYTES - ADULT  Goal: Electrolytes maintained within normal limits  Description: INTERVENTIONS:  - Monitor labs and rhythm and assess patient for signs and symptoms of electrolyte imbalances  - Administer electrolyte replacement as ordered  - Monitor response to electrolyte replacements, including rhythm and repeat lab results as appropriate  - Fluid restriction as ordered  - Instruct patient on fluid and nutrition restrictions as appropriate  Outcome: Progressing     Problem: HEMATOLOGIC - ADULT  Goal: Maintains hematologic stability  Description: INTERVENTIONS  - Assess for signs and symptoms of bleeding or hemorrhage  - Monitor labs and vital signs for trends  - Administer supportive blood products/factors, fluids and medications as ordered and appropriate  - Administer supportive blood products/factors as ordered and appropriate  Outcome: Progressing  Goal: Free from bleeding injury  Description: (Example usage: patient with low platelets)  INTERVENTIONS:  - Avoid intramuscular injections, enemas  and rectal medication administration  - Ensure safe mobilization of patient  - Hold pressure on venipuncture sites to achieve adequate hemostasis  - Assess for signs and symptoms of internal bleeding  - Monitor lab trends  - Patient is to report abnormal signs of bleeding to staff  - Avoid use of toothpicks and dental floss  - Use electric shaver for shaving  - Use soft bristle tooth brush  - Limit straining and forceful nose blowing  Outcome: Progressing     Problem: MUSCULOSKELETAL - ADULT  Goal: Return mobility to safest level of function  Description: INTERVENTIONS:  - Assess patient stability and activity tolerance for standing, transferring and ambulating w/ or w/o assistive devices  - Assist with transfers and ambulation using safe patient handling equipment as needed  - Ensure adequate protection for wounds/incisions during mobilization  - Obtain PT/OT consults as needed  - Advance activity as appropriate  - Communicate ordered activity level and limitations with patient/family  Outcome: Progressing  Goal: Maintain proper alignment of affected body part  Description: INTERVENTIONS:  - Support and protect limb and body alignment per provider's orders  - Instruct and reinforce with patient and family use of appropriate assistive device and precautions (e.g. spinal or hip dislocation precautions)  Outcome: Progressing     Problem: NEUROLOGICAL - ADULT  Goal: Achieves stable or improved neurological status  Description: INTERVENTIONS  - Assess for and report changes in neurological status  - Initiate measures to prevent increased intracranial pressure  - Maintain blood pressure and fluid volume within ordered parameters to optimize cerebral perfusion and minimize risk of hemorrhage  - Monitor temperature, glucose, and sodium. Initiate appropriate interventions as ordered  Outcome: Progressing

## 2024-02-05 NOTE — PLAN OF CARE
Patient is alert and oriented x4.  Q4 neuro checks continued.  Room air.  Remote tele in place.  Voiding freely.  No BM.  Pain managed with scheduled Robaxin and PRN Norco.  Ambulating x1 assist with walker.  Incision to mid upper back clean, dry, and intact.  Tolerating general diet, denies nausea.  Xray of thoracic spine completed.    Discharge order acknowledged.  Discharge paperwork discussed with and given to patient and her family.  All questions answered.  Patient discharged home with [facility], stable at time of discharge.     Problem: Patient Centered Care  Goal: Patient preferences are identified and integrated in the patient's plan of care  Description: Interventions:  - What would you like us to know as we care for you? From home with   - Provide timely, complete, and accurate information to patient/family  - Incorporate patient and family knowledge, values, beliefs, and cultural backgrounds into the planning and delivery of care  - Encourage patient/family to participate in care and decision-making at the level they choose  - Honor patient and family perspectives and choices  Outcome: Adequate for Discharge    Problem: PAIN - ADULT  Goal: Verbalizes/displays adequate comfort level or patient's stated pain goal  Description: INTERVENTIONS:  - Encourage pt to monitor pain and request assistance  - Assess pain using appropriate pain scale  - Administer analgesics based on type and severity of pain and evaluate response  - Implement non-pharmacological measures as appropriate and evaluate response  - Consider cultural and social influences on pain and pain management  - Manage/alleviate anxiety  - Utilize distraction and/or relaxation techniques  - Monitor for opioid side effects  - Notify MD/LIP if interventions unsuccessful or patient reports new pain  - Anticipate increased pain with activity and pre-medicate as appropriate  Outcome: Adequate for Discharge     Problem: RISK FOR INFECTION - ADULT  Goal:  Absence of fever/infection during anticipated neutropenic period  Description: INTERVENTIONS  - Monitor WBC  - Administer growth factors as ordered  - Implement neutropenic guidelines  Outcome: Adequate for Discharge     Problem: SAFETY ADULT - FALL  Goal: Free from fall injury  Description: INTERVENTIONS:  - Assess pt frequently for physical needs  - Identify cognitive and physical deficits and behaviors that affect risk of falls.  - [place] fall precautions as indicated by assessment.  - Educate pt/family on patient safety including physical limitations  - Instruct pt to call for assistance with activity based on assessment  - Modify environment to reduce risk of injury  - Provide assistive devices as appropriate  - Consider OT/PT consult to assist with strengthening/mobility  - Encourage toileting schedule  Outcome: Adequate for Discharge     Problem: DISCHARGE PLANNING  Goal: Discharge to home or other facility with appropriate resources  Description: INTERVENTIONS:  - Identify barriers to discharge w/pt and caregiver  - Include patient/family/discharge partner in discharge planning  - Arrange for needed discharge resources and transportation as appropriate  - Identify discharge learning needs (meds, wound care, etc)  - Arrange for interpreters to assist at discharge as needed  - Consider post-discharge preferences of patient/family/discharge partner  - Complete POLST form as appropriate  - Assess patient's ability to be responsible for managing their own health  - Refer to Case Management Department for coordinating discharge planning if the patient needs post-hospital services based on physician/LIP order or complex needs related to functional status, cognitive ability or social support system  Outcome: Adequate for Discharge     Problem: COPING  Goal: Pt/Family able to verbalize concerns and demonstrate effective coping strategies  Description: INTERVENTIONS:  - Assist patient/family to identify coping  skills, available support systems and cultural and spiritual values  - Provide emotional support, including active listening and acknowledgement of concerns of patient and caregivers  - Reduce environmental stimuli, as able  - Instruct patient/family in relaxation techniques, as appropriate  - Assess for spiritual and psychosocial needs and initiate Spiritual Care or Behavioral Health consult as needed  Outcome: Adequate for Discharge     Problem: CARDIOVASCULAR - ADULT  Goal: Maintains optimal cardiac output and hemodynamic stability  Description: INTERVENTIONS:  - Monitor vital signs, rhythm, and trends  - Monitor for bleeding, hypotension and signs of decreased cardiac output  - Evaluate effectiveness of vasoactive medications to optimize hemodynamic stability  - Monitor arterial and/or venous puncture sites for bleeding and/or hematoma  - Assess quality of pulses, skin color and temperature  - Assess for signs of decreased coronary artery perfusion - ex. Angina  - Evaluate fluid balance, assess for edema, trend weights  Outcome: Adequate for Discharge  Goal: Absence of cardiac arrhythmias or at baseline  Description: INTERVENTIONS:  - Continuous cardiac monitoring, monitor vital signs, obtain 12 lead EKG if indicated  - Evaluate effectiveness of antiarrhythmic and heart rate control medications as ordered  - Initiate emergency measures for life threatening arrhythmias  - Monitor electrolytes and administer replacement therapy as ordered  Outcome: Adequate for Discharge     Problem: GASTROINTESTINAL - ADULT  Goal: Minimal or absence of nausea and vomiting  Description: INTERVENTIONS:  - Maintain adequate hydration with IV or PO as ordered and tolerated  - Nasogastric tube to low intermittent suction as ordered  - Evaluate effectiveness of ordered antiemetic medications  - Provide nonpharmacologic comfort measures as appropriate  - Advance diet as tolerated, if ordered  - Obtain nutritional consult as needed  -  Evaluate fluid balance  Outcome: Adequate for Discharge  Goal: Maintains or returns to baseline bowel function  Description: INTERVENTIONS:  - Assess bowel function  - Maintain adequate hydration with IV or PO as ordered and tolerated  - Evaluate effectiveness of GI medications  - Encourage mobilization and activity  - Obtain nutritional consult as needed  - Establish a toileting routine/schedule  - Consider collaborating with pharmacy to review patient's medication profile  Outcome: Adequate for Discharge     Problem: METABOLIC/FLUID AND ELECTROLYTES - ADULT  Goal: Electrolytes maintained within normal limits  Description: INTERVENTIONS:  - Monitor labs and rhythm and assess patient for signs and symptoms of electrolyte imbalances  - Administer electrolyte replacement as ordered  - Monitor response to electrolyte replacements, including rhythm and repeat lab results as appropriate  - Fluid restriction as ordered  - Instruct patient on fluid and nutrition restrictions as appropriate  Outcome: Adequate for Discharge     Problem: HEMATOLOGIC - ADULT  Goal: Maintains hematologic stability  Description: INTERVENTIONS  - Assess for signs and symptoms of bleeding or hemorrhage  - Monitor labs and vital signs for trends  - Administer supportive blood products/factors, fluids and medications as ordered and appropriate  - Administer supportive blood products/factors as ordered and appropriate  Outcome: Adequate for Discharge  Goal: Free from bleeding injury  Description: (Example usage: patient with low platelets)  INTERVENTIONS:  - Avoid intramuscular injections, enemas and rectal medication administration  - Ensure safe mobilization of patient  - Hold pressure on venipuncture sites to achieve adequate hemostasis  - Assess for signs and symptoms of internal bleeding  - Monitor lab trends  - Patient is to report abnormal signs of bleeding to staff  - Avoid use of toothpicks and dental floss  - Use electric shaver for  shaving  - Use soft bristle tooth brush  - Limit straining and forceful nose blowing  Outcome: Adequate for Discharge     Problem: MUSCULOSKELETAL - ADULT  Goal: Return mobility to safest level of function  Description: INTERVENTIONS:  - Assess patient stability and activity tolerance for standing, transferring and ambulating w/ or w/o assistive devices  - Assist with transfers and ambulation using safe patient handling equipment as needed  - Ensure adequate protection for wounds/incisions during mobilization  - Obtain PT/OT consults as needed  - Advance activity as appropriate  - Communicate ordered activity level and limitations with patient/family  Outcome: Adequate for Discharge  Goal: Maintain proper alignment of affected body part  Description: INTERVENTIONS:  - Support and protect limb and body alignment per provider's orders  - Instruct and reinforce with patient and family use of appropriate assistive device and precautions (e.g. spinal or hip dislocation precautions)  Outcome: Adequate for Discharge     Problem: NEUROLOGICAL - ADULT  Goal: Achieves stable or improved neurological status  Description: INTERVENTIONS  - Assess for and report changes in neurological status  - Initiate measures to prevent increased intracranial pressure  - Maintain blood pressure and fluid volume within ordered parameters to optimize cerebral perfusion and minimize risk of hemorrhage  - Monitor temperature, glucose, and sodium. Initiate appropriate interventions as ordered  Outcome: Adequate for Discharge

## 2024-02-06 NOTE — PAYOR COMM NOTE
--------------  DISCHARGE REVIEW    Payor: [facility] OUT OF STATE PPO  Subscriber #:  [id number]  Authorization Number: [id number]    Admit date: 1/26/24  Admit time:  11:41 PM  Discharge Date: 2/5/2024  5:12 PM     Admitting Physician:   Attending Physician:  Constance att. providers found  Primary Care Physician: Bill Morris MD          Discharge Summary Notes        Discharge Summary signed by Angelica Yarbrough MD at 2/5/2024 12:40 PM       Author: Angelica Yarbrough MD Specialty: HOSPITALIST Author Type: Physician    Filed: 2/5/2024 12:40 PM Date of Service: 2/5/2024 12:23 PM Status: Signed    : Angelica Yarbrough MD (Physician)           General Medicine Discharge Summary     Patient ID:  Cris Han  68 year old  6/30/1955    Admit date: 1/26/2024    Discharge date and time: 02/05/24    Attending Physician: Angelica Yarbrough MD     Primary Care Physician: BILL MORRIS MD     Reason for admission: back pain    Discharge Diagnoses: Gait disturbance [R26.9]  Hyponatremia [E87.1]  Acute midline thoracic back pain [M54.6]    Discharged Condition: stable    Disposition: home    Consults:   Consultants         Provider   Role Specialty     Enrico Ruffin MD  Consulting Physician Rehabilitation     Mark Cid MD  Consulting Physician ONCOLOGY     Cleveland Liao MD  Consulting Physician NEUROSURGERY     Vincent Rivera MD  Consulting Physician NEUROSURGERY     Juan Jose Koehler DO  Consulting Physician Hematology and Oncology     Alfonso Quinteros MD  Consulting Physician NEUROLOGY              HPI: Per Dr. Hawthorne    68 year old male with history of Stage III ER+ MI- HER2 - invasive lobular carcinoma of the breast, treated with with right mastectomy and adjuvant chemo, initially diagnosed 2018, who is here for evaluation of back pain and lower extremity parasthesias, with the pain starting 3 weeks ago. She states the last 2-3 days she noted she felt more \"wobbly\" and may could  not feel as much in her feet. Felt like she was walking \"on [place] Dog Bowl\" she states. 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 She presumed it was due to this. She even saw a chiropractor as well recently. Due to her continued symptoms she presented yesterday to the ER. Given her symptom description there was indication for MRI imaging, which unfortunately shows diffuse metastatic lesions involving T5, T6, T11, T12, L1, L4, the pelvis, and the sixth rib.  Of most concern is a marrow replacing lesion at T6 resulting in severe canal stenosis with spinal cord compression.      Hospital Course:     Ms. Han is a 68 year old male with history of Stage III ER+ MO- HER2 - invasive lobular carcinoma of the breast, treated with with right mastectomy and adjuvant chemo, initially diagnosed 2018, who is here for evaluation of back pain and lower extremity parasthesias, with the pain starting 3 weeks ago. MRI with multiple bony mets, NSGY consulted, s/p T5-T7 laminectomy and decompression of spinal cord, T4-T8 posterior fixation. Hospital course with constipation, improving with bowel regimen. PT/OT recommending day rehab. Decadron weaned off. Plan for follow-up with NSGY, oncology, radiation oncology as outpatient.      Bone metastasis  Spinal cord compression, secondary to metastatic cancer, symptomatic on admission, s/p s/p Thoracic Laminectomies for decompression of spinal cord  Hx of Breast cancer, concern for recurrence   -concern for recurrence of breast cancer, needs tissue diagnosis. Pathology +malignant  -started on steroids with decadron 4mg Q6hr on 1/27/24, continue to wean per NSGY  -complete imaging, MRI cervical spine and brain done. CT c/a/p reviewed  -oncology consulted. Radiation onc referral    -continue neurochecks Q4hr  - will discontinue PCA, continue PO pain meds  - PT/OT- recommending day rehab     Constipation  - bowel  regimen    Exam  GEN: NAD  HEENT: EOMI  Pulm: CTAB, no crackles or wheezes  CV: RRR, no murmurs  ABD: Soft, non-tender, non-distended, +BS  SKIN: warm, dry  EXT: no edema    Operative Procedures: Procedure(s) (LRB):  T5 - T7 laminectomy, resection of tumor, T5 - T8 posterior fixation and fusion (N/A)  THORACIC SPINAL FUSION 3 LEVEL (N/A)  SPINAL THORACIC TUMOR REMOVAL (N/A)     Imaging: XR ABDOMEN (1 VIEW) (CPT=74018)    Result Date: 2/4/2024  CONCLUSION: No small bowel obstruction.  Mild-to-moderate colonic stool burden.    Dictated by (CST): Kirt Chavez MD on 2/04/2024 at 8:59 PM     Finalized by (CST): Kirt Chavez MD on 2/04/2024 at 9:00 PM               Home Medication Changes:     I reconciled current and discharge medications on day of discharge. These medication changes have been made as below         Medication List        START taking these medications      HYDROcodone-acetaminophen 5-325 MG Tabs  Commonly known as: Norco  Take 1 tablet by mouth every 4 (four) hours as needed.     methocarbamol 500 MG Tabs  Commonly known as: Robaxin  Take 1 tablet (500 mg total) by mouth 3 (three) times daily as needed.            CONTINUE taking these medications      Alpha-Lipoic Acid 100 MG Caps     anastrozole 1 MG Tabs tab  Commonly known as: Arimidex  Take 1 tablet (1 mg total) by mouth daily.     B COMPLEX OR     CHLOROPHYLL OR     cholecalciferol 125 MCG (5000 UT) Caps  Commonly known as: Vitamin D3     FLAX SEEDS OR     Kelp 0.15 MG Tabs     MAGNESIA OR     Omega-3 1000 MG Caps     Turmeric 1053 MG Tabs     vitamin C 1000 MG Tabs     Zinc 50 MG Caps               Where to Get Your Medications        These medications were sent to [id number] - [place], [place] - [address] [phone], [phone] [address] [facility] [address]      Hours: 24-hours Phone: [phone]   HYDROcodone-acetaminophen 5-325 MG Tabs  methocarbamol 500 MG Tabs         Activity:  as instructed  Diet: regular  diet  Wound Care: keep wound clean and dry  Code Status: Full Code  O2: n/a    Follow-up with:    PCP   Specialist NSGY, oncology, radiology       FU   Follow-up Information       Koby Glaser PA-C. Go on 2/19/2024.    Specialty: Physician Assistant  Why: 1015am  Contact information:  [address] [place] [address]  [facility] [address]  [phone]                             DC instructions:      Other Discharge Instructions:         Thoracic Fusion Discharge Instructions     Activity  Restrictions  No twisting, pulling, pushing or lifting > 10 lbs.  No lifting anything over your head.  No bending at the waist  - you can bend at the knees but keep your back straight.    Sitting  Sit with your knees at about the same level as your hips; use a footstool if needed.  Do not sit in soft or overstuffed chairs. Firm chairs with straight backs give better support.   Recliners are OK but may need to add pillows in order to not sink into the chair.  Use a raised toilet seat if needed.  Change position every 20-30 minutes when sitting (example: after sitting, stand, then you can sit again for another 20-30 minutes).    Walking is your best exercise.  Listen to your body.  Walk several times a day  Smaller distances are better.  Your goal is to increase the distance you walk each day.  Remember to listen to your body    Sex  After 2 weeks, when comfortable and approved by your surgeon.  Stop if causing pain.    Driving  Usually allowed after 1-2 weeks;  check with physician.  Do Not drive while taking narcotics or muscle relaxants.  Adhere to sitting restrictions.    Stairs  Climb stairs as needed    Incision site care and dressing  Changes as directed by your surgeon    May leave open to air or apply and change dressing once a day using dry bandage. May prevent clothing from rubbing incision.   Watch incision for any redness, drainage, increased warmth or opening of the incision.   Call surgeon if you notice any of these.  No  lotions or ointments on or near incision.     Always wash hands before and after dressing changes.  Suture will need to be removed               Bathing  No tub baths, pools, or saunas for 6 weeks and until cleared by surgeon.  When able to shower, you may remove gauze dressing beforehand.  After showering, pat incision dry and may apply new dry dressing. Leave the white strips on that are beneath the bandage. They will slowly fall off on their own. If they are still in place at your post operative visit, Dr. Sharpe will remove the remaining strips.   Do not apply any lotions or ointments to incision.       Return to work  When cleared by surgeon. Discuss specific work activities with your surgeon at follow up visit.  Light to sedentary work may be possible 2-3 weeks post op  Heavy work may be possible 6 weeks post op.    Sleeping  Use a firm mattress.  Lay on side or back, not stomach.  May use pillow under knees, between legs or behind back if lying on your side.  Log roll to turn.    Diet and constipation prevention  Drink six to eight glasses liquid (water, juice) per day.  Eat a high fiber diet (bran, vegetables, fruit).  Use an over the counter stool softener such as Colace or senakot while taking narcotics to prevent constipation; use laxatives such as Miralax or Milk of Magnesia as needed.  An enema or suppository may be necessary if above measures do not work.        No smoking  Smoking will inhibit healing.  Even one cigarette a day will cause problems.  Chewing tobacco, nicotine gum or patches will also inhibit healing.      Pain Management  Relaxation techniques  A way to focus your attention other than on your pain. Your brain makes endorphins that are a natural body chemical that can decrease pain. Some examples of relaxation techniques are deep breathing, listening to music or meditating.  May use Cold therapy for 20 minutes multiple times per day.  Be sure there is a cloth barrier between skin and  cold therapy.  Medication  No NSAIDS until OK with your surgeon. At least 6 months.   NSAIDS (non-steroidal anti- inflammatory) include: Motrin, ibuprofen, Advil, Aleve, Naprosyn, Indocin, Nuprin, Vioxx, Celebrex, Bextra.(COMPLETE LIST IN GUIDEBOOK APPENDIX)  Tylenol (acetaminophen) is okay. Caution if your pain med contains Tylenol (acetaminophen); maximum 3 gms of Tylenol (acetaminophen) in 24 hours.  A single aspirin for the heart is ok if ordered by your physician.  Take muscle relaxants and pain medications as prescribed allowing 30-45 minutes to take effect.  Do NOT take alcohol while on pain medication.  Monitor need for pain medication closely  Call pharmacy or physician office at least five days before out of pain medication. If calling physician office after 3 pm, it will be handled on the next business day.    Post op office visit  Scheduled 3 weeks after surgery as with surgeon as directed at discharge  Schedule one week follow up with Primary Care Physician. Review all medications.      When to contact your surgeon  Temp > 101F; Take your temperature twice a day  Increased pain, swelling, redness, or any drainage to incision.  Separation of incision.  Sudden reappearance of pain that won’t go away with pain medication.  New numbness or weakness to arms or legs.  Difficulty urinating or having bowel movements  Headaches that worsen when standing and resolve when laying flat.    Go directly to the ER or CALL 911 if you:  become short of breath  have chest pain  cough up blood  have unexplained anxiety with breathing     DAY REHAB & HOME HEALTH:  Referrals have been sent for Day Rehab (intensive outpatient therapy) to the following providers:  Kat   Ph: [phone], Scheduling  Outpatient : [phone]  Fax: [phone]     Delores Christopher  Ph: [phone]  Fax:[phone]     Jose Angel Christopher  Ph: [phone]  Fax: [phone]     Aurora Christopher  Ph:  [phone]  Fax: [phone]       The above will contact you directly if you qualify for services. While you wait for responses from these providers, you have been arranged / [facility] services.    Sometimes managing your health at home requires assistance.  The [facility] team has recognized your preference to use [facility].  They can be reached by phone at [phone].  The fax number for your reference is [phone].  A representative from the home health agency will contact you or your family to schedule your first visit.        Hold Tumeric for 1 week            Follow-up with labs: none    Total Time Coordinating Care: 31 minutes    Patient had opportunity to ask questions and state understand and agree with therapeutic plan as outlined      Angelica Yarbrough MD  [facility] Hospitalist  '    Electronically signed by Angelica Yarbrough MD on 2/5/2024 12:40 PM         REVIEWER COMMENTS

## 2024-02-07 ENCOUNTER — TELEPHONE (OUTPATIENT)
Dept: HEMATOLOGY/ONCOLOGY | Facility: HOSPITAL | Age: 69
End: 2024-02-07

## 2024-02-07 NOTE — TELEPHONE ENCOUNTER
Brandy Han is calling to cancel her Mother's appointment for 2/9/24, they have an appointment at the [facility], Dr. Mariely Chandler for her care.

## 2024-02-08 ENCOUNTER — OFFICE VISIT (OUTPATIENT)
Dept: RADIATION ONCOLOGY | Facility: HOSPITAL | Age: 69
End: 2024-02-08
Attending: INTERNAL MEDICINE
Payer: COMMERCIAL

## 2024-02-08 ENCOUNTER — PATIENT MESSAGE (OUTPATIENT)
Dept: RADIATION ONCOLOGY | Facility: HOSPITAL | Age: 69
End: 2024-02-08

## 2024-02-08 ENCOUNTER — OFFICE VISIT (OUTPATIENT)
Dept: HEMATOLOGY/ONCOLOGY | Facility: HOSPITAL | Age: 69
End: 2024-02-08
Attending: INTERNAL MEDICINE
Payer: COMMERCIAL

## 2024-02-08 VITALS
WEIGHT: 123.19 LBS | HEART RATE: 113 BPM | BODY MASS INDEX: 20 KG/M2 | DIASTOLIC BLOOD PRESSURE: 73 MMHG | TEMPERATURE: 98 F | OXYGEN SATURATION: 98 % | SYSTOLIC BLOOD PRESSURE: 119 MMHG | RESPIRATION RATE: 18 BRPM

## 2024-02-08 DIAGNOSIS — C79.51 CARCINOMA OF RIGHT BREAST METASTATIC TO BONE (HCC): Primary | ICD-10-CM

## 2024-02-08 DIAGNOSIS — C50.911 CARCINOMA OF RIGHT BREAST METASTATIC TO BONE (HCC): Primary | ICD-10-CM

## 2024-02-08 DIAGNOSIS — R11.2 NAUSEA & VOMITING: Primary | ICD-10-CM

## 2024-02-08 PROCEDURE — 99212 OFFICE O/P EST SF 10 MIN: CPT

## 2024-02-08 PROCEDURE — S0119 ONDANSETRON 4 MG: HCPCS

## 2024-02-08 RX ORDER — ONDANSETRON 4 MG/1
8 TABLET, ORALLY DISINTEGRATING ORAL ONCE
Status: COMPLETED | OUTPATIENT
Start: 2024-02-08 | End: 2024-02-08

## 2024-02-08 RX ORDER — HYDROCODONE BITARTRATE AND ACETAMINOPHEN 10; 325 MG/1; MG/1
TABLET ORAL
Qty: 90 TABLET | Refills: 0 | Status: SHIPPED | OUTPATIENT
Start: 2024-02-08

## 2024-02-08 RX ORDER — PROCHLORPERAZINE MALEATE 10 MG
10 TABLET ORAL EVERY 6 HOURS PRN
Qty: 60 TABLET | Refills: 3 | Status: SHIPPED | OUTPATIENT
Start: 2024-02-08

## 2024-02-08 RX ORDER — ONDANSETRON HYDROCHLORIDE 8 MG/1
8 TABLET, FILM COATED ORAL EVERY 8 HOURS PRN
Qty: 60 TABLET | Refills: 3 | Status: SHIPPED | OUTPATIENT
Start: 2024-02-08

## 2024-02-08 RX ORDER — ONDANSETRON 4 MG/1
TABLET, ORALLY DISINTEGRATING ORAL
Status: COMPLETED
Start: 2024-02-08 | End: 2024-02-08

## 2024-02-08 RX ADMIN — ONDANSETRON 8 MG: 4 TABLET, ORALLY DISINTEGRATING ORAL at 09:30:00

## 2024-02-08 NOTE — TELEPHONE ENCOUNTER
States she does not want to see Valentina.  States Jd is working if she needs you in the future she will let us know.  Explained your role doesn't think she needs this service yet.  Nausea is gone with zofran.  Her daughter to  all the other meds.  Provided information for Dr. Arevalo [phone].  She was very appreciative of for the call.

## 2024-02-08 NOTE — PROGRESS NOTES
Nursing Consultation Note  Patient: Cris Han  YOB: 1955  Age: 68 year old  Radiation Oncologist: Dr. Key Leon  Referring Physician: Mark Cid  Diagnosis:[unfilled]  Consult Date: 2/8/2024      Chemotherapy: yes  Labs: Reviewed  Imaging: Reviewed  Is the patient of child-bearing age?         No  Has the patient received radiation therapy in the past? yes in 2019 at [place].  Does the patient have an implantable device?No   Patient has/has had:     1. Assistive Devices: Walker    2. Flu Vaccination: no-referral to ask PCP    3. Pneumonia Vaccination:  no--referral to ask PCP    Vital Signs:   Vitals:    02/08/24 0828   BP: 119/73   Pulse: 113   Resp: 18   Temp: 97.5 °F (36.4 °C)   ,   Wt Readings from Last 6 Encounters:   02/08/24 55.9 kg (123 lb 3.2 oz)   02/01/24 59.6 kg (131 lb 6.3 oz)   08/04/23 58.1 kg (128 lb)   02/15/23 58.5 kg (129 lb)   08/17/22 57.2 kg (126 lb)   02/16/22 60.8 kg (134 lb)       Nursing Note:      Review of Systems   Constitutional: Negative.    Eyes: Negative.    Respiratory: Negative.     Cardiovascular: Negative.    Gastrointestinal:  Positive for constipation and nausea.   Endocrine: Negative.    Genitourinary: Negative.    Musculoskeletal:  Positive for back pain.        Left shoulder pain intermittent.    Skin:         Surgical wound intact.   Allergic/Immunologic: Negative.    Neurological:  Positive for light-headedness.   Hematological: Negative.    Psychiatric/Behavioral: Negative.            Allergies:  No Known Allergies    Current Outpatient Medications   Medication Sig Dispense Refill    methocarbamol 500 MG Oral Tab Take 1 tablet (500 mg total) by mouth 3 (three) times daily as needed. 60 tablet 0    HYDROcodone-acetaminophen 5-325 MG Oral Tab Take 1 tablet by mouth every 4 (four) hours as needed. 30 tablet 0    anastrozole 1 MG Oral Tab tab Take 1 tablet (1 mg total) by mouth daily. 90 tablet 2    Zinc 50 MG Oral Cap Take 50 mg by  mouth as needed.      Kelp 0.15 MG Oral Tab Take by mouth as needed.      CHLOROPHYLL OR Take by mouth as needed.      Magnesium Hydroxide (MAGNESIA OR) Take by mouth.      Ascorbic Acid (VITAMIN C) 1000 MG Oral Tab Take 1 tablet (1,000 mg total) by mouth daily.      Vitamin D3, Cholecalciferol, 125 MCG (5000 UT) Oral Cap Take 1 capsule (5,000 Units total) by mouth daily.      B Complex Vitamins (B COMPLEX OR) Take by mouth daily.      Omega-3 1000 MG Oral Cap Take by mouth.      Turmeric 1053 MG Oral Tab Take 1,250 mg by mouth daily.      Alpha-Lipoic Acid 100 MG Oral Cap Take 1 capsule (100 mg total) by mouth daily. (Patient not taking: Reported on 2/8/2024)      Flaxseed, Linseed, (FLAX SEEDS OR) Take by mouth. (Patient not taking: Reported on 1/27/2024)         Preferred Pharmacy:    [id number] [address] [facility] [phone], [phone]  [address] [place] [id number]  Phone: [phone] Fax: [phone]      Past Medical History:   Diagnosis Date    Breast cancer (HCC)     right breast    High blood pressure        Past Surgical History:   Procedure Laterality Date    MASTECTOMY RIGHT         Social History     Socioeconomic History    Marital status:      Spouse name: Not on file    Number of children: Not on file    Years of education: Not on file    Highest education level: Not on file   Occupational History    Not on file   Tobacco Use    Smoking status: Never    Smokeless tobacco: Never   Vaping Use    Vaping Use: Never used   Substance and Sexual Activity    Alcohol use: Yes     Alcohol/week: 1.0 standard drink of alcohol     Types: 1 Cans of beer per week    Drug use: No    Sexual activity: Not on file   Other Topics Concern    Caffeine Concern Yes     Comment: Coffee    Exercise Not Asked    Seat Belt Not Asked    Special Diet Not Asked    Stress Concern Not Asked    Weight Concern Not Asked     Service Not Asked    Blood Transfusions Not Asked     Occupational Exposure Not Asked    Hobby Hazards Not Asked    Sleep Concern Not Asked    Back Care Not Asked    Bike Helmet Not Asked    Self-Exams Not Asked   Social History Narrative    Not on file     Social Determinants of Health     Financial Resource Strain: Not on file   Food Insecurity: No Food Insecurity (1/27/2024)    Food Insecurity     Food Insecurity: Never true   Transportation Needs: No Transportation Needs (1/27/2024)    Transportation Needs     Lack of Transportation: No   Physical Activity: Not on file   Stress: Not on file   Social Connections: Not on file   Housing Stability: Low Risk  (1/27/2024)    Housing Stability     Housing Instability: No     Housing Instability Emergency: Not on file       ECOG:  Grade 2 - Ambulatory/capable of all self-care, unable to perform any work activities.  Up and about more than 50% of waking hours.    Education:  Yes    Are ADL's met?  Yes  Does patient feel safe in their environment?  Yes  Care decisions:  Patient and/or surrogate IS involved in care decisions.  Advanced directives:  Patient DOES NOT have advanced directives.  Transportation:  Adequate transportation available for expected visits    Pain:  Pain Loc: Back;Pain Score: 0   ;    ;     Safety Plan Of Care:    Safety Problem:  Risk of injury  Risk of fall    Related to:    Diminished physical activity  Disease process    General Safety Interventions:  Provide safe environment while in department  Provide escort while in department  Discuss safety measures at home with patient family members    Expected Outcomes:  No injury, trauma or fall  Knowledge of care plan    Progress Toward Outcome:  Making progress    Pamphlets/Handouts Given to Patient:  Home safety sheet.        Neurological Plan Of Care:    Problem:    Risk of injury  Impaired mobility  Self care deficit    Problems related to:    Surgery  Disease process    Interventions:  Assess safety risks  PT/OT/ST as ordered  Involve patient in care  plan  Involve family in care plan    Expected Outcomes:  Free of injury  Safe in environment  Patient involved in care plan  Family involved in care plan  Improved activity/mobility    Progress Toward Outcome:  Making progress    Pamphlets/Handouts Given to Patient:  Site specific manager provided      Knowledge Deficit Plan Of Care:    Problem:  Knowledge Deficit    Problems related to:    Radiation therapy  Disease process    Interventions:  Instruct on treatment planning  Instruct on radiation therapy appointment scheduling  Instruct on basic skin care  Instruct on purpose of radiation therapy  Instruct on side effects of radiation therapy    Expected Outcomes:  Knowledge of radiation therapy    Progress Toward Outcome:  Making progress    Pamphlets/Handouts Given to Patient:  Radiation process.        Primary language:  English  Language line required?  no  Comprehension Ability:  excellent  Able to read?  yes  Able to write?  yes  Communication tools:   None  Patient's ability to learn:  excellent  Readiness to learn:  Motivated  Learning preferences:  Discussion, Written, and Handout  Barriers to learning:  None  Interventions to reduce barriers:  Consult  Visual aids:  yes  Hearing disability:  no  Dentures:  no    Pt came in for  spine mets  consult.  Pt accompanied by daughter and .  Medical history reviewed.  Medications reviewed.  Unsteady gait noted.  Vital signs stable. Denies any pain at this time intermittent to left shoulder.  Eating and drinking well.  Radiation information provided and explained.  I explained to the patient that today she would meet Dr. Leon but while being treated there was a possibility that she might also encounter the physicians who cover for Dr. Leon which are Dr. Mclean, Dr. Calderon, and Dr. Abraham Carrillo.  Dr. Leon updated on pt status.  Pt will be consulting with Dr. Chandler at [facility] on 2/19/2024.  She will be going to physical therapy M,W, F from 1200-300.

## 2024-02-08 NOTE — CONSULTS
[facility]  Radiation Oncology New Consultation      Patient Name: Cris Han   MRN: [id number]  PCP: BILL PARSONS MD  Referring Physician: Mark Cid MD; Cleveland Liao MD    Diagnosis: metastatic ER+ breast cancer    Reason for Consultation: spine met with cord compression     HPI: The patient is a 68 year old female who was seen today for consultation regarding the above diagnosis.     The patient was diagnosed with R breast cancer in late 2018. She was initially treated with mastectomy and ALND showing invasive lobular carcinoma, grade 2, pT2 (3 cm) N3 (13/22) M0 disease, ER+, DE/HER2-. This was followed by adjuvant ddAC-T chemotherapy, AI, and PMRT (completed July 2019 at [facility]).     Following PMRT she continued on endocrine therapy with AI.     On 01/26/2024 patient presented to the ED with a 2 week history of mid back pain. She also reported a 2-3 day history of unsteadiness in her gait along with tingling in her legs. MRI T/L spine w/wo contrast was performed which showed multiple bony metastases including a large marrow replacing metastatic lesions involving the posterior elements of T6 causing cord compression. CT chest, abdomen, pelvis on 01/27/2024 showed no visceral metastases. MRI brain wnl.      On 01/30/2024 patient underwent T5-7 laminectomy and T4-8 fixation and fusion. Pathology showed adenocarcinoma consistent with breast primary. Disease was ER+, DE/HER2-, Ki-67 25%.    Medical oncology plans are pending.   Surgery recommends waiting 1 month before starting adjuvant RT.     Patient currently taking Norco 5, 1 tab every 4 hours  Pain not well controlled, most bothersome near L shoulder musculature  She is taking a muscle relaxant  Today feels very nauseated, no nausea meds at home  Had a BM ystd   Had [employer] appt at [facility] on 2/19 for second opinion     Past Medical History  Past Medical History:   Diagnosis Date    Breast cancer (HCC)     right breast    High blood  pressure        Past Surgical History  Past Surgical History:   Procedure Laterality Date    MASTECTOMY RIGHT         Medications  Current Outpatient Medications   Medication Sig Dispense Refill    methocarbamol 500 MG Oral Tab Take 1 tablet (500 mg total) by mouth 3 (three) times daily as needed. 60 tablet 0    HYDROcodone-acetaminophen 5-325 MG Oral Tab Take 1 tablet by mouth every 4 (four) hours as needed. 30 tablet 0    anastrozole 1 MG Oral Tab tab Take 1 tablet (1 mg total) by mouth daily. 90 tablet 2    Zinc 50 MG Oral Cap Take 50 mg by mouth as needed.      Kelp 0.15 MG Oral Tab Take by mouth as needed.      CHLOROPHYLL OR Take by mouth as needed.      Magnesium Hydroxide (MAGNESIA OR) Take by mouth.      Ascorbic Acid (VITAMIN C) 1000 MG Oral Tab Take 1 tablet (1,000 mg total) by mouth daily.      Vitamin D3, Cholecalciferol, 125 MCG (5000 UT) Oral Cap Take 1 capsule (5,000 Units total) by mouth daily.      B Complex Vitamins (B COMPLEX OR) Take by mouth daily.      Omega-3 1000 MG Oral Cap Take by mouth.      Turmeric 1053 MG Oral Tab Take 1,250 mg by mouth daily.      Alpha-Lipoic Acid 100 MG Oral Cap Take 1 capsule (100 mg total) by mouth daily. (Patient not taking: Reported on 2024)      Flaxseed, Linseed, (FLAX SEEDS OR) Take by mouth. (Patient not taking: Reported on 2024)         Allergies  No Known Allergies    Social History  -Living situation: , lives in [place]   -Tobacco: none    Family History  Family History   Problem Relation Age of Onset    Breast Cancer Sister         ductal       Review of Systems  Otherwise negative except as noted in HPI.     No history of lupus, collagen-vascular disease, or inflammatory bowel disease.     Physical Exam  Vitals:    24 0828   BP: 119/73   Pulse: 113   Resp: 18   Temp: 97.5 °F (36.4 °C)     ECO    Gen: nauseated, uncomfortable  HEENT: NCAT, EOMI  Neck: no LAD  CV: RRR  Lungs: CTAB  Abd: distended  Ext: wwp  Neuro: CN II-XII  grossly intact, pt able to ambulate with a walker  Skin: thoracic incision c/d/i    Imaging: personally reviewed     Assessment: 69yo F with initial stage pT2N3 ER+ ILC of the R breast diagnosed in late 2018 treated with mastectomy/ALND, chemo, PMRT, and ongoing endocrine therapy. She was found to have bony metastatic disease with cord compression at T6 s/p decompressive surgery. She is neurologically stable.    Plan:     I discussed the above clinical situation with the patient and her family at the time of consultation.     I recommended a course of adjuvant external beam radiotherapy to her T-spine over 10 fractions. The purpose of treatment is for improved local control of disease.    We discussed the potential short-term and long-term side effects of radiotherapy.     All questions were answered, and no guarantee of safety or efficacy was made. Alternatives to radiotherapy were discussed with the patient.    The patient is agreeable to proceed with radiotherapy.   They will return to our department for CT simulation later this month. Will not start RT until we have surgical clearance.     Pain  -stop taking Norco 5 tablets  -instead take one Norco 10 tablet every 4 hours as needed for pain   -can add a lidocaine patch (over-the-counter) to the left shoulder area     Nausea  -can alternate ondansetron (zofran) and prochlorperazine (compazine)  -constipation may be contributing  -purchase an over-the-counter stool softener (docusate-senna), start with 1 tab before bed (max dose is 4 tabs per day)  *if nausea not improving we can consider the scopolamine patch or gabapentin (nerve pain targeted medicine)     Other  -we will call you to schedule the radiation planning scan the week of 2/19, radiation will start around the 1-month justice post-surgery (once we get surgery approval)  -Dr. Leon will reach out to [facility] to update them on the plan  -referral to a new PCP    Key Leon MD  Radiation  Oncology    MDM high including chart review, personal review of imaging, and time spent with the patient in counseling.

## 2024-02-09 ENCOUNTER — APPOINTMENT (OUTPATIENT)
Dept: HEMATOLOGY/ONCOLOGY | Facility: HOSPITAL | Age: 69
End: 2024-02-09
Attending: SPECIALIST
Payer: COMMERCIAL

## 2024-02-12 ENCOUNTER — TELEPHONE (OUTPATIENT)
Dept: PHYSICAL THERAPY | Facility: HOSPITAL | Age: 69
End: 2024-02-12

## 2024-02-19 ENCOUNTER — OFFICE VISIT (OUTPATIENT)
Dept: SURGERY | Facility: CLINIC | Age: 69
End: 2024-02-19
Payer: COMMERCIAL

## 2024-02-19 VITALS
WEIGHT: 130 LBS | HEIGHT: 66 IN | BODY MASS INDEX: 20.89 KG/M2 | SYSTOLIC BLOOD PRESSURE: 119 MMHG | HEART RATE: 111 BPM | DIASTOLIC BLOOD PRESSURE: 78 MMHG

## 2024-02-19 DIAGNOSIS — R20.2 NUMBNESS AND TINGLING OF LOWER EXTREMITY: ICD-10-CM

## 2024-02-19 DIAGNOSIS — Z98.890 POSTOPERATIVE STATE: ICD-10-CM

## 2024-02-19 DIAGNOSIS — Z76.89 ENCOUNTER TO ESTABLISH CARE: Primary | ICD-10-CM

## 2024-02-19 DIAGNOSIS — Z48.89 ENCOUNTER FOR POSTOPERATIVE WOUND CHECK: ICD-10-CM

## 2024-02-19 DIAGNOSIS — Z98.1 S/P FUSION OF THORACIC SPINE: ICD-10-CM

## 2024-02-19 DIAGNOSIS — R26.89 IMBALANCE: ICD-10-CM

## 2024-02-19 DIAGNOSIS — R20.0 NUMBNESS AND TINGLING OF LOWER EXTREMITY: ICD-10-CM

## 2024-02-19 DIAGNOSIS — Z48.02 ENCOUNTER FOR REMOVAL OF SUTURES: ICD-10-CM

## 2024-02-19 NOTE — PROGRESS NOTES
Established Neurosurgery Patient    Patient: Cris Han  Medical Record Number: [id number]  YOB: 1955  PCP: BILL PARSONS MD    Reason for visit: 3-week postoperative visit    HISTORY OF PRESENTING ILLNESS:  Cris Han is a pleasant 68 year old female who returns to the neurosurgery clinic today approximately 3 weeks s/p T5-T7 laminectomies for decompression of spinal cord, right transpedicular T6-T7 approaches for resection of tumor nerve root decompression, and T4-T8 posterior fixation and fusion with Dr. Liao on 1/30/2024.  The patient reports that she is doing well overall since surgery.  She denies any thoracic radiculopathy or pain overall.  She describes a tightness or \"corset\" sensation around her thorax at approximately the T6-T7 level.  Denies any thoracic or abdominal sensation changes.  She feels that the sensation in her lower extremities has improved.  She feels that her balance is also gotten better.  [facility] and [facility].  She states that she is receiving therapy twice a week for 3 hours at a time and feels that she is being pushed appropriately to improve.  She has upcoming appointments with radiation oncology to discuss treatment planning.  She denies any issues with her incision site.  No signs constitutional symptoms or symptoms concerning for sepsis.  She inquired about establishing care with a new PCP and is interested in seeing Dr. Chahal, who her  is currently established with.  She presents to the office today with her sister and her .    Past Medical History:   Diagnosis Date    Breast cancer (HCC)     right breast    High blood pressure       Past Surgical History:   Procedure Laterality Date    MASTECTOMY RIGHT        Family History   Problem Relation Age of Onset    Breast Cancer Sister         ductal      Social History     Socioeconomic History    Marital status:    Tobacco Use     Smoking status: Never    Smokeless tobacco: Never   Vaping Use    Vaping Use: Never used   Substance and Sexual Activity    Alcohol use: Yes     Alcohol/week: 1.0 standard drink of alcohol     Types: 1 Cans of beer per week    Drug use: No   Other Topics Concern    Caffeine Concern Yes     Comment: Coffee      No Known Allergies   Current Medications:  Current Outpatient Medications   Medication Sig Dispense Refill    HYDROcodone-acetaminophen  MG Oral Tab Take 1 tablet every 4 hours as needed for cancer related pain. 90 tablet 0    ondansetron (ZOFRAN) 8 MG tablet Take 1 tablet (8 mg total) by mouth every 8 (eight) hours as needed for Nausea. 60 tablet 3    prochlorperazine (COMPAZINE) 10 mg tablet Take 1 tablet (10 mg total) by mouth every 6 (six) hours as needed for Nausea. 60 tablet 3    methocarbamol 500 MG Oral Tab Take 1 tablet (500 mg total) by mouth 3 (three) times daily as needed. 60 tablet 0    HYDROcodone-acetaminophen 5-325 MG Oral Tab Take 1 tablet by mouth every 4 (four) hours as needed. 30 tablet 0    anastrozole 1 MG Oral Tab tab Take 1 tablet (1 mg total) by mouth daily. 90 tablet 2    Alpha-Lipoic Acid 100 MG Oral Cap Take 1 capsule (100 mg total) by mouth daily. (Patient not taking: Reported on 2/8/2024)      Zinc 50 MG Oral Cap Take 50 mg by mouth as needed.      Kelp 0.15 MG Oral Tab Take by mouth as needed.      CHLOROPHYLL OR Take by mouth as needed.      Magnesium Hydroxide (MAGNESIA OR) Take by mouth.      Ascorbic Acid (VITAMIN C) 1000 MG Oral Tab Take 1 tablet (1,000 mg total) by mouth daily.      Vitamin D3, Cholecalciferol, 125 MCG (5000 UT) Oral Cap Take 1 capsule (5,000 Units total) by mouth daily.      B Complex Vitamins (B COMPLEX OR) Take by mouth daily.      Omega-3 1000 MG Oral Cap Take by mouth.      Turmeric 1053 MG Oral Tab Take 1,250 mg by mouth daily.      Flaxseed, Linseed, (FLAX SEEDS OR) Take by mouth. (Patient not taking: Reported on 1/27/2024)          REVIEW OF  SYSTEMS:  Comprehensive review of systems completed and negative with the exception of aforementioned information in the HPI.     PHYSICAL EXAM:    2/19/2024 10:02 AM    /78   BP Location Left arm   Patient Position Sitting   Cuff Size adult   Pulse 111   Weight 130 lb (59 kg)   Height 66\"   Pain Score 0 - (None)    Other Vitals   BMI 20.98 kg/m2   BSA 1.67 m2   Menstrual status Postmenopausal   OB/Gyn status reviewed 1/26/2024   Tobacco   Smoking status Never   Smokeless status Never   Reviewed 2/8/2024        Wt Readings from Last 6 Encounters:   02/08/24 123 lb 3.2 oz (55.9 kg)   02/01/24 131 lb 6.3 oz (59.6 kg)   08/04/23 128 lb (58.1 kg)   02/15/23 129 lb (58.5 kg)   08/17/22 126 lb (57.2 kg)   02/16/22 134 lb (60.8 kg)        General: Well developed, well nourished, in no acute distress. Ambulates with a walker.    Incision: Healing appropriately.  Nylon sutures intact.  No erythema, warmth, swelling, or gross drainage.  Incision intact.    HEENT: Normocephalic, atraumatic.    Respirations: Non-labored     Neurologic / Musculoskeletal: Awake, alert, and interactive. Recent and remote memory appear intact. Attention span and concentration are appropriate. No dysarthria. Appropriately names objects. Coordination and motor control grossly intact. Patient follows commands briskly and appropriately.     Cervical Spine:    Motor/ Extremities   Deltoid Biceps Triceps    R 5/5 5/5 5/5 5/5   L 5/5 5/5 5/5 5/5       Lumbar Spine:    Motor / Extremities   Hip   flexion Knee   extension Dorsiflexion EHL Plantarflexion   R 5/5 5/5 5/5 5/5 5/5   L 5/5 5/5 5/5 5/5 5/5     Reflexes:  -Clonus: Negative    IMAGING:  No new neurosurgical imaging to review at this time.  Reviewed postoperative thoracic x-rays in detail with the patient and her family.      ASSESSMENT / PLAN:    ICD-10-CM   1. Encounter to establish care  Z76.89      2. Postoperative state  Z98.890      3. S/P fusion of thoracic spine  Z98.1      4.  Encounter for postoperative wound check  Z48.89      5. Encounter for removal of sutures  Z48.02      6. Imbalance  R26.89      7. Numbness and tingling of lower extremity  R20.0    R20.2        Aphrodite N Emely returns to the clinic today approximately 3 weeks s/p T5-T7 laminectomies for decompression of spinal cord, right transpedicular T6-T7 approaches for resection of tumor nerve root decompression, and T4-T8 posterior fixation and fusion with Dr. Liao on 1/30/2024. The patient is doing well overall from a surgical standpoint. She has improvement in her preoperative symptoms.  No signs/symptoms concerning for infection, locally or systemically.  Her incision is healing well. Sutures were removed in office today without complication. Patient tolerated removal well. We will plan to see her back in 1 week to evaluate the incision prior to clearing her for RT. she is on a 10 pound lifting restriction for the next week or so.  After 4 weeks postop, she may reduce her lifting restrictions to nothing heavier than 20 pounds.  When she is 2 months out from surgery, she can reduce her lifting restrictions to nothing heavier than 30 pounds.  Advised to continue to avoid excessive bending and twisting.  We will have her obtain x-rays prior to next office visit for comparison to her baseline imaging that was completed in the hospital.    -Medications Prescribed: None  -Imaging Ordered: X-ray thoracic spine to be completed prior to next appointment  -Referrals Placed: Dr. Kevin Chahal for primary care establishment  -Follow up: 1 week for incision check and clearance for radiation therapy  -Follow-up with radiation oncology per their recommendations    Plan was formulated in collaboration with Dr. Liao, who also looked at patient's incision and spoke with her and her family. Plan was reviewed and discussed in detail with the patient. Patient encouraged to call the office with any questions or concerns of  new/worsening neurologic symptoms. Patient demonstrated good understanding and was agreeable with the plan.     Visit time: 35 minutes   Over 50% of that time was spent providing patient education and discussing care plan.    Koby Glaser PA-C  Physician Assistant- Neurosurgery   [facility]  2/19/2024, 7:36 AM

## 2024-02-21 ENCOUNTER — TELEPHONE (OUTPATIENT)
Dept: OTHER | Age: 69
End: 2024-02-21

## 2024-02-21 ENCOUNTER — TELEPHONE (OUTPATIENT)
Facility: CLINIC | Age: 69
End: 2024-02-21

## 2024-02-21 NOTE — TELEPHONE ENCOUNTER
Patient called, verified Name and . She would like to establish care with Dr. Kevin Chahal, who is her spouse's PCP, and is requesting an appointment.     States that she will be going through 10 rounds of radiation at [facility] on 3/1 for metastatic breast cancer. Final mapping will done this Friday. She wants to make sure her chart, records and recent lab work are reviewed to make sure she is fortified and set to go through the treatment. She is requesting for Dr. Chahal to oversee this.     Best callback [phone] or spouse's mobile [phone].    Dr. Kevin Chahal are you able to accommodate the patient for this on a 15-minute OV? Res 24 available on  at 2:00 and 2:30, please advise.

## 2024-02-21 NOTE — TELEPHONE ENCOUNTER
Pt was called and inform that she has a appt for 2/29 at 2pm at the [address] location.    Future Appointments   Date Time Provider Department Center                 2/29/2024  2:00 PM Kevin Chahal MD [facility]

## 2024-02-21 NOTE — TELEPHONE ENCOUNTER
Spoke with patient about her XR order the was placed. Patient stated she will have that completed before her appointment on  2.28.24.      Patient was understanding

## 2024-02-23 ENCOUNTER — APPOINTMENT (OUTPATIENT)
Dept: RADIATION ONCOLOGY | Facility: HOSPITAL | Age: 69
End: 2024-02-23
Attending: INTERNAL MEDICINE
Payer: COMMERCIAL

## 2024-02-27 ENCOUNTER — HOSPITAL ENCOUNTER (OUTPATIENT)
Dept: GENERAL RADIOLOGY | Facility: HOSPITAL | Age: 69
Discharge: HOME | End: 2024-02-27
Attending: STUDENT IN AN ORGANIZED HEALTH CARE EDUCATION/TRAINING PROGRAM
Payer: COMMERCIAL

## 2024-02-27 DIAGNOSIS — Z98.890 POSTOPERATIVE STATE: ICD-10-CM

## 2024-02-27 DIAGNOSIS — Z98.1 S/P FUSION OF THORACIC SPINE: ICD-10-CM

## 2024-02-27 PROCEDURE — 72070 X-RAY EXAM THORAC SPINE 2VWS: CPT | Performed by: STUDENT IN AN ORGANIZED HEALTH CARE EDUCATION/TRAINING PROGRAM

## 2024-02-28 ENCOUNTER — OFFICE VISIT (OUTPATIENT)
Dept: SURGERY | Facility: CLINIC | Age: 69
End: 2024-02-28
Payer: COMMERCIAL

## 2024-02-28 VITALS
BODY MASS INDEX: 20.89 KG/M2 | WEIGHT: 130 LBS | SYSTOLIC BLOOD PRESSURE: 135 MMHG | HEIGHT: 66 IN | DIASTOLIC BLOOD PRESSURE: 86 MMHG | HEART RATE: 90 BPM

## 2024-02-28 DIAGNOSIS — Z48.89 ENCOUNTER FOR POSTOPERATIVE WOUND CHECK: ICD-10-CM

## 2024-02-28 DIAGNOSIS — R20.0 NUMBNESS AND TINGLING OF LOWER EXTREMITY: ICD-10-CM

## 2024-02-28 DIAGNOSIS — R26.89 IMBALANCE: ICD-10-CM

## 2024-02-28 DIAGNOSIS — Z98.1 S/P FUSION OF THORACIC SPINE: Primary | ICD-10-CM

## 2024-02-28 DIAGNOSIS — Z98.890 POSTOPERATIVE STATE: ICD-10-CM

## 2024-02-28 DIAGNOSIS — R20.2 NUMBNESS AND TINGLING OF LOWER EXTREMITY: ICD-10-CM

## 2024-02-28 PROCEDURE — 3075F SYST BP GE 130 - 139MM HG: CPT | Performed by: STUDENT IN AN ORGANIZED HEALTH CARE EDUCATION/TRAINING PROGRAM

## 2024-02-28 PROCEDURE — 3008F BODY MASS INDEX DOCD: CPT | Performed by: STUDENT IN AN ORGANIZED HEALTH CARE EDUCATION/TRAINING PROGRAM

## 2024-02-28 PROCEDURE — 99024 POSTOP FOLLOW-UP VISIT: CPT | Performed by: STUDENT IN AN ORGANIZED HEALTH CARE EDUCATION/TRAINING PROGRAM

## 2024-02-28 PROCEDURE — 1111F DSCHRG MED/CURRENT MED MERGE: CPT | Performed by: STUDENT IN AN ORGANIZED HEALTH CARE EDUCATION/TRAINING PROGRAM

## 2024-02-28 PROCEDURE — 3079F DIAST BP 80-89 MM HG: CPT | Performed by: STUDENT IN AN ORGANIZED HEALTH CARE EDUCATION/TRAINING PROGRAM

## 2024-02-28 NOTE — PROGRESS NOTES
Established Neurosurgery Patient    Patient: Cris Han  Medical Record Number: [id number]  YOB: 1955  PCP: BILL PARSONS MD    Reason for visit: 4-week postoperative visit    HISTORY OF PRESENTING ILLNESS:  Cris Han is a pleasant 68 year old female who returns to the neurosurgery clinic today approximately 4 weeks s/p T5-T7 laminectomies for decompression of spinal cord, right transpedicular T6/T7 approaches for resection of tumor and nerve root decompression, and T4-T8 posterior fixation and fusion with Dr. Liao on 1/30/2024.  She presents to the office today with her daughter.  She has continued to do quite well from a surgical standpoint.  She endorses the \"corset\" sensation at T6-7, proximally, which has remained stable.  She denies return of her thoracic radiculopathy.  She continues to have sensation changes in her lower extremities.  No worsening numbness.  She notices some improvement in that regard.  She also feels that her balance is improving.  She has been working diligently with [facility] re[facility] in [place].  She notes some muscle soreness in her bilateral thighs, but feels that this may be due to the increased activity that she is doing at therapy.  She states that she was on a treadmill as part of her therapy and states that she has \"never been on a treadmill in my life\".  She has not had any constitutional symptoms or symptoms concerning for sepsis.  No bowel/bladder incontinence.  She has not had any issues with her incision.  She continues to endorse significant support from her family at home.  She is interested in having physical therapy target her upper extremities to help loosen up her trapezius and parascapular muscles.  Her goal is to ambulate into clinic at the next office visit without any support.    Past Medical History:   Diagnosis Date    Breast cancer (HCC)     right breast    High blood pressure       Past Surgical History:   Procedure  Laterality Date    LAMINECTOMY,THORACIC  01/30/2024    T5 - T7 laminectomy, resection of tumor, T5 - T8 posterior fixation and fusion    MASTECTOMY RIGHT        Family History   Problem Relation Age of Onset    Breast Cancer Sister         ductal      Social History     Socioeconomic History    Marital status:    Tobacco Use    Smoking status: Never    Smokeless tobacco: Never   Vaping Use    Vaping Use: Never used   Substance and Sexual Activity    Alcohol use: Yes     Alcohol/week: 1.0 standard drink of alcohol     Types: 1 Cans of beer per week    Drug use: No   Other Topics Concern    Caffeine Concern Yes     Comment: Coffee      No Known Allergies   Current Medications:  Current Outpatient Medications   Medication Sig Dispense Refill    HYDROcodone-acetaminophen  MG Oral Tab Take 1 tablet every 4 hours as needed for cancer related pain. 90 tablet 0    ondansetron (ZOFRAN) 8 MG tablet Take 1 tablet (8 mg total) by mouth every 8 (eight) hours as needed for Nausea. 60 tablet 3    prochlorperazine (COMPAZINE) 10 mg tablet Take 1 tablet (10 mg total) by mouth every 6 (six) hours as needed for Nausea. 60 tablet 3    methocarbamol 500 MG Oral Tab Take 1 tablet (500 mg total) by mouth 3 (three) times daily as needed. (Patient not taking: Reported on 2/19/2024) 60 tablet 0    HYDROcodone-acetaminophen 5-325 MG Oral Tab Take 1 tablet by mouth every 4 (four) hours as needed. (Patient not taking: Reported on 2/19/2024) 30 tablet 0    anastrozole 1 MG Oral Tab tab Take 1 tablet (1 mg total) by mouth daily. 90 tablet 2    Alpha-Lipoic Acid 100 MG Oral Cap Take 1 capsule (100 mg total) by mouth daily. (Patient not taking: Reported on 2/8/2024)      Zinc 50 MG Oral Cap Take 50 mg by mouth as needed.      Kelp 0.15 MG Oral Tab Take by mouth as needed.      CHLOROPHYLL OR Take by mouth as needed.      Magnesium Hydroxide (MAGNESIA OR) Take by mouth.      Ascorbic Acid (VITAMIN C) 1000 MG Oral Tab Take 1 tablet  (1,000 mg total) by mouth daily.      Vitamin D3, Cholecalciferol, 125 MCG (5000 UT) Oral Cap Take 1 capsule (5,000 Units total) by mouth daily.      B Complex Vitamins (B COMPLEX OR) Take by mouth daily.      Omega-3 1000 MG Oral Cap Take by mouth.      Turmeric 1053 MG Oral Tab Take 1,250 mg by mouth daily.      Flaxseed, Linseed, (FLAX SEEDS OR) Take by mouth. (Patient not taking: Reported on 1/27/2024)          REVIEW OF SYSTEMS:  Comprehensive review of systems completed and negative with the exception of aforementioned information in the HPI.     PHYSICAL EXAM:  /86 (BP Location: Left arm, Patient Position: Sitting, Cuff Size: adult)   Pulse 90   Ht 66\"   Wt 130 lb (59 kg)   BMI 20.98 kg/m²   Body mass index is 20.98 kg/m².  Wt Readings from Last 6 Encounters:   02/28/24 130 lb (59 kg)   02/19/24 130 lb (59 kg)   02/08/24 123 lb 3.2 oz (55.9 kg)   02/01/24 131 lb 6.3 oz (59.6 kg)   08/04/23 128 lb (58.1 kg)   02/15/23 129 lb (58.5 kg)        General: Well developed, well nourished, in no acute distress. Ambulates with assistance from a walker    Incision: Clean, dry, and intact.  No erythema, warmth, swelling, or gross drainage appreciated.  Intermittent scabbing noted throughout.  Healing well.    HEENT: Normocephalic, atraumatic.    Respirations: Non-labored     Neurologic / Musculoskeletal: Awake, alert, and interactive. Recent and remote memory appear intact. Attention span and concentration are appropriate. No dysarthria. Appropriately names objects. Coordination and motor control grossly intact. Patient follows commands briskly and appropriately.  Full strength in upper and lower extremities.    IMAGING:  XR THORACIC SPINE (2 VIEWS) (CPT=72070)    Result Date: 2/27/2024  CONCLUSION:  1. Posterior thoracic spinal fusion hardware, radiographically uncomplicated.  2. No radiographically visible acute osseous injury of the thoracic spine    elm-remote.   Dictated by (CST): Otilio Pratt MD on  2/27/2024 at 11:49 PM     Finalized by (CST): Otilio Pratt MD on 2/27/2024 at 11:52 PM            Imaging reviewed. Images shown to patient and her daughter in the room today.     ASSESSMENT / PLAN:    ICD-10-CM   1. S/P fusion of thoracic spine  Z98.1      2. Postoperative state  Z98.890      3. Encounter for postoperative wound check  Z48.89      4. Imbalance  R26.89      5. Numbness and tingling of lower extremity  R20.0    R20.2        Aphrodite N Emely returns to the clinic today approximately 4 weeks  s/p T5-T7 laminectomies for decompression of spinal cord, right transpedicular T6-T7 approaches for resection of tumor nerve root decompression, and T4-T8 posterior fixation and fusion with Dr. Liao on 1/30/2024.  She is recovering well from a surgical standpoint.  She has seen improvement in symptoms since discharge from the hospital.  She is neurologically stable on examination.  Her incision is healing well.  No signs/symptoms concerning for infection, locally or systemically.  She is cleared to proceed with radiation therapy from a neurosurgical standpoint.  She should closely monitor her incision for any breakdown or poor wound healing and immediately notify the neurosurgery team once identified.  She was advised to continue physical therapy.  She was provided a letter today that cleared her to perform upper body exercises.  Will plan to see her back in approximately 2 months for her 3-month postoperative visit.  We would like her to obtain an x-ray day or 2 prior to that office visit for continued surveillance of her hardware.    -Medications Prescribed: None  -Imaging Ordered: X-ray thoracic spine to be completed a day or 2 prior to next office visit  -Referrals Placed: None  -Follow up: In 2 months with Dr. Liao  -Patient is cleared from a neurosurgical standpoint to proceed with radiation therapy per radiation oncology.  Please alert neurosurgery of any wound issues as soon as it is recognized.      Plan was reviewed and discussed in detail with the patient. Patient encouraged to call the office with any questions or concerns of new/worsening neurologic symptoms. Patient demonstrated good understanding and was agreeable with the plan.     Visit time: 30 minutes   Over 50% of that time was spent providing patient education and discussing care plan.    Koby Glaser PA-C  Physician Assistant- Neurosurgery   [facility]  2/28/2024, 12:30 PM

## 2024-02-29 ENCOUNTER — TELEPHONE (OUTPATIENT)
Dept: RADIATION ONCOLOGY | Facility: HOSPITAL | Age: 69
End: 2024-02-29

## 2024-02-29 ENCOUNTER — OFFICE VISIT (OUTPATIENT)
Facility: CLINIC | Age: 69
End: 2024-02-29

## 2024-02-29 VITALS
SYSTOLIC BLOOD PRESSURE: 115 MMHG | DIASTOLIC BLOOD PRESSURE: 70 MMHG | BODY MASS INDEX: 20.25 KG/M2 | WEIGHT: 126 LBS | HEIGHT: 66 IN | OXYGEN SATURATION: 98 % | HEART RATE: 98 BPM

## 2024-02-29 DIAGNOSIS — D64.9 ANEMIA, UNSPECIFIED TYPE: ICD-10-CM

## 2024-02-29 DIAGNOSIS — C50.919 STAGE IV BREAST CANCER IN FEMALE (HCC): Primary | ICD-10-CM

## 2024-02-29 DIAGNOSIS — Z12.11 SCREEN FOR COLON CANCER: ICD-10-CM

## 2024-02-29 DIAGNOSIS — G95.20 CORD COMPRESSION (HCC): ICD-10-CM

## 2024-02-29 PROCEDURE — 1111F DSCHRG MED/CURRENT MED MERGE: CPT | Performed by: INTERNAL MEDICINE

## 2024-02-29 PROCEDURE — 3074F SYST BP LT 130 MM HG: CPT | Performed by: INTERNAL MEDICINE

## 2024-02-29 PROCEDURE — 3078F DIAST BP <80 MM HG: CPT | Performed by: INTERNAL MEDICINE

## 2024-02-29 PROCEDURE — 99204 OFFICE O/P NEW MOD 45 MIN: CPT | Performed by: INTERNAL MEDICINE

## 2024-02-29 PROCEDURE — 3008F BODY MASS INDEX DOCD: CPT | Performed by: INTERNAL MEDICINE

## 2024-02-29 RX ORDER — ACETAMINOPHEN 500 MG
500 TABLET ORAL EVERY 6 HOURS PRN
COMMUNITY

## 2024-02-29 RX ORDER — MELATONIN
325
COMMUNITY

## 2024-02-29 NOTE — TELEPHONE ENCOUNTER
Updated pt on plan status.  Pt had a question about fatigue.  States nausea resolved on its own no meds needed.  Pt had some scheduling concerns due to therapy, made her aware when the therapist call she can ask them to work around her schedule.

## 2024-02-29 NOTE — PROGRESS NOTES
Cris Han is a 68 year old female.  Chief Complaint   Patient presents with    Establish Care     HPI:   68-year-old lady here to establish care.  She got diagnosed with breast cancer in December 2018.  She got treated under the care of oncologist.  In January 2024, she started to have mid back pain and developed unsteady gait.  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  She underwent surgery and currently undergoing physical therapy and radiation treatment.  She already established care with [facility] for further cancer treatment.    Apart from weakness in the legs, she has no other complaints.  Denies any chest pain, shortness of breath, abdominal pain, nausea vomiting.  Appetite is good.      Current Outpatient Medications   Medication Sig Dispense Refill    acetaminophen 500 MG Oral Tab Take 1 tablet (500 mg total) by mouth every 6 (six) hours as needed for Pain.      ferrous sulfate 325 (65 FE) MG Oral Tab EC Take 1 tablet (325 mg total) by mouth daily with breakfast.      Zinc 50 MG Oral Cap Take 50 mg by mouth as needed.      Kelp 0.15 MG Oral Tab Take by mouth as needed.      CHLOROPHYLL OR Take by mouth as needed.      Magnesium Hydroxide (MAGNESIA OR) Take by mouth.      Ascorbic Acid (VITAMIN C) 1000 MG Oral Tab Take 1 tablet (1,000 mg total) by mouth daily.      Vitamin D3, Cholecalciferol, 125 MCG (5000 UT) Oral Cap Take 1 capsule (5,000 Units total) by mouth daily.      B Complex Vitamins (B COMPLEX OR) Take by mouth daily.      Omega-3 1000 MG Oral Cap Take by mouth.      HYDROcodone-acetaminophen 5-325 MG Oral Tab Take 1 tablet by mouth every 4 (four) hours as needed. (Patient not taking: Reported on 2/19/2024) 30 tablet 0      Past Medical History:   Diagnosis Date    Breast cancer (HCC)     right breast    High blood pressure       Past Surgical History:   Procedure Laterality Date    LAMINECTOMY,THORACIC  01/30/2024    T5 - T7 laminectomy, resection  of tumor, T5 - T8 posterior fixation and fusion    MASTECTOMY RIGHT        Social History:  Social History     Socioeconomic History    Marital status:    Tobacco Use    Smoking status: Never    Smokeless tobacco: Never   Vaping Use    Vaping Use: Never used   Substance and Sexual Activity    Alcohol use: Yes     Alcohol/week: 1.0 standard drink of alcohol     Types: 1 Cans of beer per week    Drug use: No   Other Topics Concern    Caffeine Concern Yes     Comment: Coffee     Social Determinants of Health     Food Insecurity: No Food Insecurity (1/27/2024)    Food Insecurity     Food Insecurity: Never true   Transportation Needs: No Transportation Needs (1/27/2024)    Transportation Needs     Lack of Transportation: No   Housing Stability: Low Risk  (1/27/2024)    Housing Stability     Housing Instability: No      Family History   Problem Relation Age of Onset    Breast Cancer Sister         ductal      No Known Allergies     REVIEW OF SYSTEMS:   Review of Systems   Review of Systems   Constitutional: Negative for activity change, appetite change and fever.   HENT: Negative for congestion and voice change.    Respiratory: Negative for cough and shortness of breath.    Cardiovascular: Negative for chest pain.   Gastrointestinal: Negative for abdominal distention, abdominal pain and vomiting.   Genitourinary: Negative for hematuria.   Skin: Negative for wound.   Psychiatric/Behavioral: Negative for behavioral problems.   Wt Readings from Last 5 Encounters:   02/29/24 126 lb (57.2 kg)   02/28/24 130 lb (59 kg)   02/19/24 130 lb (59 kg)   02/08/24 123 lb 3.2 oz (55.9 kg)   02/01/24 131 lb 6.3 oz (59.6 kg)     Body mass index is 20.34 kg/m².      EXAM:   /70 (BP Location: Left arm, Patient Position: Sitting, Cuff Size: adult)   Pulse 98   Ht 5' 6\" (1.676 m)   Wt 126 lb (57.2 kg)   SpO2 98%   BMI 20.34 kg/m²   Physical Exam   Constitutional:       Appearance: Normal appearance.   HENT:      Head:  Normocephalic.   Eyes:      Conjunctiva/sclera: Conjunctivae normal.   Cardiovascular:      Rate and Rhythm: Normal rate and regular rhythm.      Heart sounds: Normal heart sounds. No murmur heard.  Pulmonary:      Effort: Pulmonary effort is normal.      Breath sounds: Normal breath sounds. No rhonchi or rales.   Abdominal:      General: Bowel sounds are normal.      Palpations: Abdomen is soft.      Tenderness: There is no abdominal tenderness.   Musculoskeletal:      Cervical back: Neck supple.      Right lower leg: No edema.      Left lower leg: No edema.   Skin:     General: Skin is warm and dry.   Neurological:      General: No focal deficit present.      Mental Status: He is alert and oriented to person, place, and time. Mental status is at baseline.   Psychiatric:         Mood and Affect: Mood normal.         Behavior: Behavior normal.       ASSESSMENT AND PLAN:   1. Stage IV breast cancer in female (HCC)  I have reviewed the hematology oncology consultation note in Care Everywhere.  Plan to have an appointment with Dr. Chandler in April 2024 and then may consider further treatments.    2. Cord compression (HCC)  Status post laminectomy, fixation and fusion-currently undergoing radiation treatment and rehabilitation at [facility].    3. Anemia, unspecified type  Anemia of chronic disease from surgery and cancer treatment.  Will monitor.    4. Screen for colon cancer  Because of her medical illness, I am not sure about the timing of colonoscopy.  I have discussed this with her.  We have decided to do stool testing now.  - Occult Blood, Fecal, FIT Immunoassay; Future    Plan: As above.      The patient indicates understanding of these issues and agrees to the plan.  No follow-ups on file.    This note was prepared using Dragon Medical voice recognition dictation software. As a result errors may occur. When identified these errors have been corrected. While every attempt is made to correct errors during  dictation discrepancies may still exist.

## 2024-03-11 ENCOUNTER — APPOINTMENT (OUTPATIENT)
Dept: RADIATION ONCOLOGY | Facility: HOSPITAL | Age: 69
End: 2024-03-11
Attending: INTERNAL MEDICINE
Payer: COMMERCIAL

## 2024-03-11 PROCEDURE — 77412 RADIATION TX DELIVERY LVL 3: CPT | Performed by: INTERNAL MEDICINE

## 2024-03-11 PROCEDURE — 77280 THER RAD SIMULAJ FIELD SMPL: CPT | Performed by: INTERNAL MEDICINE

## 2024-03-12 PROCEDURE — 77412 RADIATION TX DELIVERY LVL 3: CPT | Performed by: INTERNAL MEDICINE

## 2024-03-12 PROCEDURE — 77387 GUIDANCE FOR RADJ TX DLVR: CPT | Performed by: INTERNAL MEDICINE

## 2024-03-13 PROCEDURE — 77412 RADIATION TX DELIVERY LVL 3: CPT | Performed by: INTERNAL MEDICINE

## 2024-03-13 PROCEDURE — 77387 GUIDANCE FOR RADJ TX DLVR: CPT | Performed by: INTERNAL MEDICINE

## 2024-03-13 NOTE — PROGRESS NOTES
[facility] Radiation Treatment Management Note 1-5    Patient:  Cris Han  Age:  68 year old  Visit Diagnosis:    1. 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 Key Leon    Site Delivered Dose (cGy) Prescribed Dose (cGy) Fraction #   T4-T8 1200 3000 4/10           First treatment date:  3/11/2024  Concurrent chemotherapy:  None        2/28/2024    10:15 AM 2/29/2024     1:52 PM 3/14/2024    10:30 AM   Oncology Vitals   Height 5' 6\" 5' 6\"    Height 168 cm 168 cm    Weight 130 lb 126 lb 127 lb 9.6 oz   Weight 58.968 kg 57.153 kg 57.879 kg   BSA (m2) 1.67 m2 1.64 m2 1.65 m2   BMI 20.98 kg/m2 20.34 kg/m2 20.6 kg/m2   /86 115/70 123/76   Pulse 90 98 97   Resp   18   Temp   98.4 °F (36.9 °C)   SpO2  98 % 97 %   Pain Score   0        Toxicities:  Fatigue Grade 1= Fatigue relieved by rest  Bone pain Grade 1= Mild pain  Gait disturbance Grade 2= Moderate change in gait; assistive device indicated; limiting instrumental ADL- using walker PRN  Muscle weakness Grade 2= Symptomatic; evident on physical exam; limiting instrumental ADL- using walker PRN      Nursing Note:  Pt seen for OTV with Dr. Leon.   Using 2 tablets of tylenol in the morning and before bed at night, occasionally in the afternoon if needed.  Working with PT 2/week, 3 hours each time.  Pt had nausea, vomiting, and diarrhea on Tuesday. Feels she ate something that didn't agree with her. No issues today.     Wt Readings from Last 6 Encounters:   03/14/24 57.9 kg (127 lb 9.6 oz)   02/29/24 57.2 kg (126 lb)   02/28/24 59 kg (130 lb)   02/19/24 59 kg (130 lb)   02/08/24 55.9 kg (123 lb 3.2 oz)   02/01/24 59.6 kg (131 lb 6.3 oz)     Mariola MARSHALL RN    Physician Note:  Subjective:  -doing well, had some muscle cramps after fulvestrant  -doing PT 2x per week at [facility], using Tylenol extrastrength about 2x per day for pain  -feels she is getting stronger      Objective:  Vitals noted  ECOG  1-2  NAD  Incision intact, minimal erythema      Treatment setup imaging have been reviewed:  Yes    Assessment/Plan:  -cont RT per plan  -moisturizer to back bid  -monitor for esophagitis    We discussed expected future toxicity. All questions answered.  Next OTV 1 week    Key Leon MD

## 2024-03-14 ENCOUNTER — OFFICE VISIT (OUTPATIENT)
Dept: RADIATION ONCOLOGY | Facility: HOSPITAL | Age: 69
End: 2024-03-14
Attending: INTERNAL MEDICINE
Payer: COMMERCIAL

## 2024-03-14 VITALS
SYSTOLIC BLOOD PRESSURE: 123 MMHG | BODY MASS INDEX: 21 KG/M2 | TEMPERATURE: 98 F | HEART RATE: 97 BPM | WEIGHT: 127.63 LBS | RESPIRATION RATE: 18 BRPM | OXYGEN SATURATION: 97 % | DIASTOLIC BLOOD PRESSURE: 76 MMHG

## 2024-03-14 DIAGNOSIS — C79.51 CARCINOMA OF RIGHT BREAST METASTATIC TO BONE (HCC): Primary | ICD-10-CM

## 2024-03-14 DIAGNOSIS — C50.911 CARCINOMA OF RIGHT BREAST METASTATIC TO BONE (HCC): Primary | ICD-10-CM

## 2024-03-14 PROCEDURE — 77387 GUIDANCE FOR RADJ TX DLVR: CPT | Performed by: INTERNAL MEDICINE

## 2024-03-14 PROCEDURE — 77412 RADIATION TX DELIVERY LVL 3: CPT | Performed by: INTERNAL MEDICINE

## 2024-03-15 PROCEDURE — 77412 RADIATION TX DELIVERY LVL 3: CPT | Performed by: INTERNAL MEDICINE

## 2024-03-15 PROCEDURE — 77387 GUIDANCE FOR RADJ TX DLVR: CPT | Performed by: INTERNAL MEDICINE

## 2024-03-15 PROCEDURE — 77336 RADIATION PHYSICS CONSULT: CPT | Performed by: INTERNAL MEDICINE

## 2024-03-18 PROCEDURE — 77412 RADIATION TX DELIVERY LVL 3: CPT | Performed by: INTERNAL MEDICINE

## 2024-03-18 PROCEDURE — 77387 GUIDANCE FOR RADJ TX DLVR: CPT | Performed by: INTERNAL MEDICINE

## 2024-03-19 PROCEDURE — 77387 GUIDANCE FOR RADJ TX DLVR: CPT | Performed by: INTERNAL MEDICINE

## 2024-03-19 PROCEDURE — 77412 RADIATION TX DELIVERY LVL 3: CPT | Performed by: INTERNAL MEDICINE

## 2024-03-20 PROCEDURE — 77412 RADIATION TX DELIVERY LVL 3: CPT | Performed by: INTERNAL MEDICINE

## 2024-03-20 PROCEDURE — 77387 GUIDANCE FOR RADJ TX DLVR: CPT | Performed by: INTERNAL MEDICINE

## 2024-03-20 NOTE — PROGRESS NOTES
[facility] Radiation Treatment Management Note 6-10    Patient:  Cris Han  Age:  68 year old  Visit Diagnosis:    1. 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 Key Leon    Site Delivered Dose (cGy) Prescribed Dose (cGy) Fraction #   T4-T8 2700 3000 9/10           First treatment date:   3/11/2024  Concurrent chemotherapy:  None        2/29/2024     1:52 PM 3/14/2024    10:30 AM 3/21/2024    10:40 AM   Oncology Vitals   Height 5' 6\"     Height 168 cm     Weight 126 lb 127 lb 9.6 oz    Weight 57.153 kg 57.879 kg    BSA (m2) 1.64 m2 1.65 m2    BMI 20.34 kg/m2 20.6 kg/m2    /70 123/76 144/72   Pulse 98 97 82   Resp  18 18   Temp  98.4 °F (36.9 °C) 97.6 °F (36.4 °C)   SpO2 98 % 97 % 100 %   Pain Score  0 1        Toxicities:  Fatigue Grade 1= Fatigue relieved by rest  Bone pain Grade 1= Mild pain  Gait disturbance Grade 2= Moderate change in gait; assistive device indicated; limiting instrumental ADL  Muscle weakness Grade 2= Symptomatic; evident on physical exam; limiting instrumental ADL    Nursing Note:  Pt seen for OTV with Dr. Leon.   Pt reports her appetite is good, no dysphagia.  Using 2 tablets of tylenol in the morning + before bed at night, occasionally in the afternoon if needed.  Some generalized joint discomfort from IM Faslodex which was completed on 3/19.  Dr. Chandler, medical oncologist, appointment on 4/1/2024.   Continues working with PT. Didn't go on Wednesday after IM Faslodex. Next PT appointment on Monday 3/25/2024.   AVS given to patient.     Wt Readings from Last 6 Encounters:   03/14/24 57.9 kg (127 lb 9.6 oz)   02/29/24 57.2 kg (126 lb)   02/28/24 59 kg (130 lb)   02/19/24 59 kg (130 lb)   02/08/24 55.9 kg (123 lb 3.2 oz)   02/01/24 59.6 kg (131 lb 6.3 oz)     Mariola MARSHALL, RN    Physician Note:  Subjective:  -ambulating well, takes Tylenol BID, no esophagitis symptoms  -using lotion over back 1x daily        Objective:  Vitals noted  ECOG 1  NAD  Mild erythema around scar      Treatment setup imaging have been reviewed:  Yes    Assessment/Plan:  -finishes RT tomorrow  -1 week follow-up, monitor for esophagitis  -increase lotion to bid    We discussed expected future toxicity. All questions answered.  Next OTV 1 week    Key Leon MD

## 2024-03-21 ENCOUNTER — OFFICE VISIT (OUTPATIENT)
Dept: RADIATION ONCOLOGY | Facility: HOSPITAL | Age: 69
End: 2024-03-21
Attending: INTERNAL MEDICINE
Payer: COMMERCIAL

## 2024-03-21 VITALS
HEART RATE: 82 BPM | RESPIRATION RATE: 18 BRPM | SYSTOLIC BLOOD PRESSURE: 144 MMHG | TEMPERATURE: 98 F | OXYGEN SATURATION: 100 % | DIASTOLIC BLOOD PRESSURE: 72 MMHG

## 2024-03-21 DIAGNOSIS — C50.911 CARCINOMA OF RIGHT BREAST METASTATIC TO BONE (HCC): Primary | ICD-10-CM

## 2024-03-21 DIAGNOSIS — C79.51 CARCINOMA OF RIGHT BREAST METASTATIC TO BONE (HCC): Primary | ICD-10-CM

## 2024-03-21 PROCEDURE — 77387 GUIDANCE FOR RADJ TX DLVR: CPT | Performed by: INTERNAL MEDICINE

## 2024-03-21 PROCEDURE — 77412 RADIATION TX DELIVERY LVL 3: CPT | Performed by: INTERNAL MEDICINE

## 2024-03-22 ENCOUNTER — DOCUMENTATION ONLY (OUTPATIENT)
Dept: RADIATION ONCOLOGY | Facility: HOSPITAL | Age: 69
End: 2024-03-22

## 2024-03-22 DIAGNOSIS — C79.51 CARCINOMA OF BREAST METASTATIC TO BONE, UNSPECIFIED LATERALITY (HCC): Primary | ICD-10-CM

## 2024-03-22 DIAGNOSIS — C50.919 CARCINOMA OF BREAST METASTATIC TO BONE, UNSPECIFIED LATERALITY (HCC): Primary | ICD-10-CM

## 2024-03-22 PROCEDURE — 77336 RADIATION PHYSICS CONSULT: CPT | Performed by: INTERNAL MEDICINE

## 2024-03-22 PROCEDURE — 77387 GUIDANCE FOR RADJ TX DLVR: CPT | Performed by: INTERNAL MEDICINE

## 2024-03-22 PROCEDURE — 77412 RADIATION TX DELIVERY LVL 3: CPT | Performed by: INTERNAL MEDICINE

## 2024-03-28 ENCOUNTER — APPOINTMENT (OUTPATIENT)
Dept: RADIATION ONCOLOGY | Facility: HOSPITAL | Age: 69
End: 2024-03-28
Attending: INTERNAL MEDICINE
Payer: COMMERCIAL

## 2024-04-05 ENCOUNTER — EKG ENCOUNTER (OUTPATIENT)
Dept: LAB | Facility: HOSPITAL | Age: 69
End: 2024-04-05
Attending: INTERNAL MEDICINE
Payer: COMMERCIAL

## 2024-04-05 DIAGNOSIS — C50.919: ICD-10-CM

## 2024-04-05 DIAGNOSIS — Z85.3 PERSONAL HISTORY OF MALIGNANT NEOPLASM OF BREAST: Primary | ICD-10-CM

## 2024-04-05 DIAGNOSIS — C79.70: ICD-10-CM

## 2024-04-05 LAB
ATRIAL RATE: 82 BPM
P AXIS: 73 DEGREES
P-R INTERVAL: 172 MS
Q-T INTERVAL: 366 MS
QRS DURATION: 60 MS
QTC CALCULATION (BEZET): 427 MS
R AXIS: 46 DEGREES
T AXIS: 54 DEGREES
VENTRICULAR RATE: 82 BPM

## 2024-04-05 PROCEDURE — 93005 ELECTROCARDIOGRAM TRACING: CPT

## 2024-04-05 PROCEDURE — 93010 ELECTROCARDIOGRAM REPORT: CPT | Performed by: INTERNAL MEDICINE

## 2024-04-18 ENCOUNTER — PATIENT MESSAGE (OUTPATIENT)
Dept: SURGERY | Facility: CLINIC | Age: 69
End: 2024-04-18

## 2024-04-18 NOTE — TELEPHONE ENCOUNTER
From: Cris Han  To: Koby Glaser  Sent: 4/18/2024 2:30 PM CDT  Subject: X ray prior to next visit    Hi Jourdan…I’m trying to schedule an appointment on line for the thoracic spine x ray..the only appointments they are giving me are on the Monday of the appointment. Can I call and make an appointment if they have the order under my name? Is this considered a walk in or do I need to make an appointment?   Thank you

## 2024-04-18 NOTE — TELEPHONE ENCOUNTER
Msg from pt below noted.    Call placed to pt.    Pt stated one concern is side effects from her FASLODEX, and is concerned if some of the \"bouncy, wobble\" in her knees is due to the \"joint pain\" side effect of this medication.     Stated she now walks without a walker, and has been walking more and more the last 4 weeks. Stated she is having new sensations and feelings than before.    Informed pt this was an expected effect of her surgery, informed of the nerves and surgery type that she had, went over red flags, and that continue to take her medication.    Informed pt that we will route TE to the provider.    Pt also informed she may get her Xray by walking in as preferred, pt appreciative of info.      Routed to provider to review and advise.

## 2024-04-19 NOTE — TELEPHONE ENCOUNTER
Thank you for the follow-up.  X-rays can be done on a walk-in basis.  She can also schedule an appointment if she would like.  It can be done on the same day as her appointment.  In regard to her oncology medication questions, these should be directed towards the prescriber.  I am happy to hear that she is progressing!

## 2024-04-26 ENCOUNTER — HOSPITAL ENCOUNTER (OUTPATIENT)
Dept: GENERAL RADIOLOGY | Facility: HOSPITAL | Age: 69
Discharge: HOME | End: 2024-04-26
Attending: STUDENT IN AN ORGANIZED HEALTH CARE EDUCATION/TRAINING PROGRAM
Payer: COMMERCIAL

## 2024-04-26 DIAGNOSIS — Z98.1 S/P FUSION OF THORACIC SPINE: ICD-10-CM

## 2024-04-26 PROCEDURE — 72072 X-RAY EXAM THORAC SPINE 3VWS: CPT | Performed by: STUDENT IN AN ORGANIZED HEALTH CARE EDUCATION/TRAINING PROGRAM

## 2024-04-29 ENCOUNTER — OFFICE VISIT (OUTPATIENT)
Dept: SURGERY | Facility: CLINIC | Age: 69
End: 2024-04-29
Payer: COMMERCIAL

## 2024-04-29 VITALS
HEIGHT: 66 IN | SYSTOLIC BLOOD PRESSURE: 144 MMHG | WEIGHT: 129 LBS | DIASTOLIC BLOOD PRESSURE: 84 MMHG | BODY MASS INDEX: 20.73 KG/M2 | HEART RATE: 86 BPM

## 2024-04-29 DIAGNOSIS — Z98.1 S/P FUSION OF THORACIC SPINE: Primary | ICD-10-CM

## 2024-04-29 DIAGNOSIS — R20.2 NUMBNESS AND TINGLING OF LOWER EXTREMITY: ICD-10-CM

## 2024-04-29 DIAGNOSIS — R20.0 NUMBNESS AND TINGLING OF LOWER EXTREMITY: ICD-10-CM

## 2024-04-29 DIAGNOSIS — R26.89 IMBALANCE: ICD-10-CM

## 2024-04-29 DIAGNOSIS — Z48.89 ENCOUNTER FOR POSTOPERATIVE WOUND CHECK: ICD-10-CM

## 2024-04-29 DIAGNOSIS — Z98.890 POSTOPERATIVE STATE: ICD-10-CM

## 2024-04-29 PROCEDURE — 3079F DIAST BP 80-89 MM HG: CPT | Performed by: STUDENT IN AN ORGANIZED HEALTH CARE EDUCATION/TRAINING PROGRAM

## 2024-04-29 PROCEDURE — 99024 POSTOP FOLLOW-UP VISIT: CPT | Performed by: STUDENT IN AN ORGANIZED HEALTH CARE EDUCATION/TRAINING PROGRAM

## 2024-04-29 PROCEDURE — 3077F SYST BP >= 140 MM HG: CPT | Performed by: STUDENT IN AN ORGANIZED HEALTH CARE EDUCATION/TRAINING PROGRAM

## 2024-04-29 PROCEDURE — 3008F BODY MASS INDEX DOCD: CPT | Performed by: STUDENT IN AN ORGANIZED HEALTH CARE EDUCATION/TRAINING PROGRAM

## 2024-04-29 NOTE — PATIENT INSTRUCTIONS
Refill policies:    Allow 2-3 business days for refills; controlled substances may take longer.  Contact your pharmacy at least 5 days prior to running out of medication and have them send an electronic request or submit request through the “request refill” option in your [employer] account.  Refills are not addressed on weekends; covering physicians do not authorize routine medications on weekends.  No narcotics or controlled substances are refilled after noon on Fridays or by on call physicians.  By law, narcotics must be electronically prescribed.  A 30 day supply with no refills is the maximum allowed.  If your prescription is due for a refill, you may be due for a follow up appointment.  To best provide you care, patients receiving routine medications need to be seen at least once a year.  Patients receiving narcotic/controlled substance medications need to be seen at least once every 3 months.  In the event that your preferred pharmacy does not have the requested medication in stock (e.g. Backordered), it is your responsibility to find another pharmacy that has the requested medication available.  We will gladly send a new prescription to that pharmacy at your request.    Scheduling Tests:    If your physician has ordered radiology tests such as MRI or CT scans, please contact Central Scheduling at [phone] right away to schedule the test.  Once scheduled, the [facility] Centralized Referral Team will work with your insurance carrier to obtain pre-certification or prior authorization.  Depending on your insurance carrier, approval may take 3-10 days.  It is highly recommended patients assure they have received an authorization before having a test performed.  If test is done without insurance authorization, patient may be responsible for the entire amount billed.      Precertification and Prior Authorizations:  If your physician has recommended that you have a procedure or additional testing performed the [facility]  Centralized Referral Team will contact your insurance carrier to obtain pre-certification or prior authorization.    You are strongly encouraged to contact your insurance carrier to verify that your procedure/test has been approved and is a COVERED benefit.  Although the [facility] Centralized Referral Team does its due diligence, the insurance carrier gives the disclaimer that \"Although the procedure is authorized, this does not guarantee payment.\"    Ultimately the patient is responsible for payment.   Thank you for your understanding in this matter.  Paperwork Completion:  If you require FMLA or disability paperwork for your recovery, please make sure to either drop it off or have it faxed to our office at [phone]. Be sure the form has your name and date of birth on it.  The form will be faxed to our Forms Department and they will complete it for you.  There is a 25$ fee for all forms that need to be filled out.  Please be aware there is a 10-14 day turnaround time.  You will need to sign a release of information (AIDEN) form if your paperwork does not come with one.  You may call the Forms Department with any questions at [phone].  Their fax number is [phone].

## 2024-04-29 NOTE — PROGRESS NOTES
Patient is in to follow up and to discuss most recent X-Ray results.   Last office visit: 2/28/24  Last procedure: 1/30/24  Most recent imaging dated on: 4/26/24  Physical Therapy / Injections: Patient has been compliant with Physical Therapy.  Numbness / Tingling: Patient reports numbness and tingling on both feet.  Pain Level: 0/10.

## 2024-04-30 NOTE — PROGRESS NOTES
Established Neurosurgery Patient    Patient: Cris Han  Medical Record Number: [id number]  YOB: 1955  PCP: Kevin Chahal MD    Reason for visit: 3-month postoperative visit    HISTORY OF PRESENTING ILLNESS:  Cris Han is a pleasant 68 year old female who returns to the neurosurgery clinic today approximately 3 months s/p T5-T7 laminectomies for decompression of spinal cord, right transpedicular T6-T7 approaches for resection of tumor and nerve root decompression, and T4-T8 posterior fixation and fusion with Dr. Liao on 1/30/2024.  The patient continues to progress well from a neurosurgical standpoint.  She denies any thoracic radiculopathy.  She feels like she continues to gain back sensation in her lower extremities.  She has been more active at home, which she feels may contribute to some soreness, but this is overall tolerable.  She has no new neurologic complaints.  Denies any issues with her incision. She has not had any constitutional symptoms or symptoms concerning for sepsis. She would like to decrease some of her restrictions in order to be more active and in order to progress in her recovery.  She is working with physical therapy.  She has new x-rays today.    Past Medical History:    Breast cancer (HCC)    right breast    High blood pressure      Past Surgical History:   Procedure Laterality Date    Laminectomy,thoracic  01/30/2024    T5 - T7 laminectomy, resection of tumor, T5 - T8 posterior fixation and fusion    Mastectomy right        Family History   Problem Relation Age of Onset    Breast Cancer Sister         ductal      Social History     Socioeconomic History    Marital status:    Tobacco Use    Smoking status: Never    Smokeless tobacco: Never   Vaping Use    Vaping status: Never Used   Substance and Sexual Activity    Alcohol use: Yes     Alcohol/week: 1.0 standard drink of alcohol     Types: 1 Cans of beer per week    Drug use: No   Other Topics  Concern    Caffeine Concern Yes     Comment: Coffee      No Known Allergies   Current Medications:  Current Outpatient Medications   Medication Sig Dispense Refill    acetaminophen 500 MG Oral Tab Take 1 tablet (500 mg total) by mouth every 6 (six) hours as needed for Pain.      ferrous sulfate 325 (65 FE) MG Oral Tab EC Take 1 tablet (325 mg total) by mouth daily with breakfast.      HYDROcodone-acetaminophen 5-325 MG Oral Tab Take 1 tablet by mouth every 4 (four) hours as needed. (Patient not taking: Reported on 2/19/2024) 30 tablet 0    Zinc 50 MG Oral Cap Take 50 mg by mouth as needed.      Kelp 0.15 MG Oral Tab Take by mouth as needed.      CHLOROPHYLL OR Take by mouth as needed.      Magnesium Hydroxide (MAGNESIA OR) Take by mouth.      Ascorbic Acid (VITAMIN C) 1000 MG Oral Tab Take 1 tablet (1,000 mg total) by mouth daily.      Vitamin D3, Cholecalciferol, 125 MCG (5000 UT) Oral Cap Take 1 capsule (5,000 Units total) by mouth daily.      B Complex Vitamins (B COMPLEX OR) Take by mouth daily.      Omega-3 1000 MG Oral Cap Take by mouth.          REVIEW OF SYSTEMS:  Comprehensive review of systems completed and negative with the exception of aforementioned information in the HPI.     PHYSICAL EXAM:  /84   Pulse 86   Ht 66\"   Wt 129 lb (58.5 kg)   BMI 20.82 kg/m²   Body mass index is 20.82 kg/m².  Wt Readings from Last 6 Encounters:   04/29/24 129 lb (58.5 kg)   03/14/24 127 lb 9.6 oz (57.9 kg)   02/29/24 126 lb (57.2 kg)   02/28/24 130 lb (59 kg)   02/19/24 130 lb (59 kg)   02/08/24 123 lb 3.2 oz (55.9 kg)        General: Well developed, well nourished, in no acute distress. Ambulates without assistance.    Incision: Well-healed.  No erythema, warmth, swelling, or gross drainage appreciated.    HEENT: Normocephalic, atraumatic.    Respirations: Non-labored     Neurologic / Musculoskeletal: Awake, alert, and interactive. Recent and remote memory appear intact. Attention span and concentration are  appropriate. No dysarthria. Appropriately names objects. Coordination and motor control grossly intact. Patient follows commands briskly and appropriately.     Cervical Spine:    Motor/ Extremities   Deltoid Biceps Triceps    Sensation   R 5/5 5/5 5/5 5/5 Intact to light touch   L 5/5 5/5 5/5 5/5 Intact to light touch       Lumbar Spine:    Motor / Extremities   Hip   flexion Knee   extension Dorsiflexion Plantarflexion   Sensation   R 5/5 5/5 5/5 5/5 Diminished to light touch   L 5/5 5/5 5/5 5/5 Diminished to light touch       IMAGING:  XR THORACIC SPINE (3 VIEWS) (CPT=72072)    Result Date: 4/26/2024  CONCLUSION:   Redemonstrated postoperative changes from prior posterior instrumentation and fusion of the thoracic spine.  No evidence of hardware related complication.  No acute fracture or traumatic listhesis of the thoracic spine.  Previously described lesions in the thoracic spine are better demonstrated on prior cross-sectional imaging.  Lesser incidental findings described above.    Dictated by (CST): Larry Tom MD on 4/26/2024 at 10:09 AM     Finalized by (CST): Larry Tom MD on 4/26/2024 at 10:15 AM            Imaging reviewed.  Images shown to patient in the room today.    ASSESSMENT / PLAN:    ICD-10-CM   1. S/P fusion of thoracic spine  Z98.1      2. Postoperative state  Z98.890      3. Encounter for postoperative wound check  Z48.89      4. Numbness and tingling of lower extremity  R20.0    R20.2      5. Imbalance  R26.89        Aphrodite SOO Han returns to the clinic today approximately 3 months s/p T5-T7 laminectomies for decompression of spinal cord, right transpedicular T6-T7 approaches for resection of tumor and nerve root decompression, and T4-T8 posterior fixation and fusion with Dr. Liao on 1/30/2024.  She continues to progress well from a neurosurgical standpoint.  She is neurologically stable on exam.  Her incision is healing well.  No signs or symptoms concerning for  infection, locally or systemically.  Her most recent x-rays demonstrate intact hardware.  We will plan to have the patient follow-up in 3 months with Dr. Liao for a 6-month postop visit.  X-rays to be obtained same day as the visit, prior to clinic, for review in the office.  Her activity restrictions are lifted now that she is 3 months postop.  Encouraged her to return to activities and lifting gradually in order to prevent injury.  A letter was provided to help guide therapy services without recommendations.    -Medications Prescribed: None  -Imaging Ordered: X-ray thoracic spine to be completed morning of next appointment  -Referrals Placed: None  -Follow up: 3 months with Dr. Liao  -Letter lifting restrictions given to patient to guide therapy services    Plan was reviewed and discussed in detail with the patient. Patient encouraged to call the office with any questions or concerns of new/worsening neurologic symptoms. Patient demonstrated good understanding and was agreeable with the plan.     Visit time: 25 minutes   Over 50% of that time was spent providing patient education and discussing care plan.    Koby Glaser PA-C  Physician Assistant- Neurosurgery   [facility]  4/30/2024, 9:14 AM

## 2024-05-08 ENCOUNTER — LAB ENCOUNTER (OUTPATIENT)
Dept: LAB | Facility: HOSPITAL | Age: 69
End: 2024-05-08
Attending: INTERNAL MEDICINE

## 2024-05-08 DIAGNOSIS — Z85.3 HX: BREAST CANCER: ICD-10-CM

## 2024-05-08 DIAGNOSIS — C79.51 METASTASIS TO BONE (HCC): ICD-10-CM

## 2024-05-08 DIAGNOSIS — C50.911 CARCINOMA OF RIGHT BREAST (HCC): ICD-10-CM

## 2024-05-08 DIAGNOSIS — Z85.3 HX: BREAST CANCER: Primary | ICD-10-CM

## 2024-05-08 LAB
ALBUMIN SERPL-MCNC: 4.6 G/DL (ref 3.2–4.8)
ALBUMIN/GLOB SERPL: 1.6 {RATIO} (ref 1–2)
ALP LIVER SERPL-CCNC: 142 U/L
ALT SERPL-CCNC: 14 U/L
ANION GAP SERPL CALC-SCNC: 6 MMOL/L (ref 0–18)
AST SERPL-CCNC: 27 U/L (ref ?–34)
ATRIAL RATE: 78 BPM
BASOPHILS # BLD AUTO: 0.08 X10(3) UL (ref 0–0.2)
BASOPHILS NFR BLD AUTO: 2.7 %
BILIRUB SERPL-MCNC: 0.4 MG/DL (ref 0.2–1.1)
BUN BLD-MCNC: 14 MG/DL (ref 9–23)
BUN/CREAT SERPL: 16.7 (ref 10–20)
CALCIUM BLD-MCNC: 9.7 MG/DL (ref 8.7–10.4)
CHLORIDE SERPL-SCNC: 107 MMOL/L (ref 98–112)
CO2 SERPL-SCNC: 28 MMOL/L (ref 21–32)
CREAT BLD-MCNC: 0.84 MG/DL
DEPRECATED RDW RBC AUTO: 52.9 FL (ref 35.1–46.3)
EGFRCR SERPLBLD CKD-EPI 2021: 76 ML/MIN/1.73M2 (ref 60–?)
EOSINOPHIL # BLD AUTO: 0.04 X10(3) UL (ref 0–0.7)
EOSINOPHIL NFR BLD AUTO: 1.4 %
ERYTHROCYTE [DISTWIDTH] IN BLOOD BY AUTOMATED COUNT: 16.8 % (ref 11–15)
FASTING STATUS PATIENT QL REPORTED: NO
GLOBULIN PLAS-MCNC: 2.8 G/DL (ref 2–3.5)
GLUCOSE BLD-MCNC: 99 MG/DL (ref 70–99)
HCT VFR BLD AUTO: 40.6 %
HGB BLD-MCNC: 12.7 G/DL
IMM GRANULOCYTES # BLD AUTO: 0.01 X10(3) UL (ref 0–1)
IMM GRANULOCYTES NFR BLD: 0.3 %
LYMPHOCYTES # BLD AUTO: 1 X10(3) UL (ref 1–4)
LYMPHOCYTES NFR BLD AUTO: 33.8 %
MCH RBC QN AUTO: 27.3 PG (ref 26–34)
MCHC RBC AUTO-ENTMCNC: 31.3 G/DL (ref 31–37)
MCV RBC AUTO: 87.1 FL
MONOCYTES # BLD AUTO: 0.56 X10(3) UL (ref 0.1–1)
MONOCYTES NFR BLD AUTO: 18.9 %
NEUTROPHILS # BLD AUTO: 1.27 X10 (3) UL (ref 1.5–7.7)
NEUTROPHILS # BLD AUTO: 1.27 X10(3) UL (ref 1.5–7.7)
NEUTROPHILS NFR BLD AUTO: 42.9 %
OSMOLALITY SERPL CALC.SUM OF ELEC: 293 MOSM/KG (ref 275–295)
P AXIS: 76 DEGREES
P-R INTERVAL: 180 MS
PLATELET # BLD AUTO: 275 10(3)UL (ref 150–450)
POTASSIUM SERPL-SCNC: 4.8 MMOL/L (ref 3.5–5.1)
PROT SERPL-MCNC: 7.4 G/DL (ref 5.7–8.2)
Q-T INTERVAL: 388 MS
QRS DURATION: 68 MS
QTC CALCULATION (BEZET): 442 MS
R AXIS: 48 DEGREES
RBC # BLD AUTO: 4.66 X10(6)UL
SODIUM SERPL-SCNC: 141 MMOL/L (ref 136–145)
T AXIS: 51 DEGREES
VENTRICULAR RATE: 78 BPM
WBC # BLD AUTO: 3 X10(3) UL (ref 4–11)

## 2024-05-08 PROCEDURE — 93005 ELECTROCARDIOGRAM TRACING: CPT

## 2024-05-08 PROCEDURE — 85025 COMPLETE CBC W/AUTO DIFF WBC: CPT

## 2024-05-08 PROCEDURE — 93010 ELECTROCARDIOGRAM REPORT: CPT | Performed by: INTERNAL MEDICINE

## 2024-05-08 PROCEDURE — 36415 COLL VENOUS BLD VENIPUNCTURE: CPT

## 2024-05-08 PROCEDURE — 80053 COMPREHEN METABOLIC PANEL: CPT

## 2024-05-09 NOTE — PROGRESS NOTES
Radiation Oncology Treatment Summary    Diagnosis: metastatic breast cancer, post-op RT        IGRT: yes    Clinical Course: The treatment course was completed as prescribed. She tolerated RT well.    Follow-up: Return to clinic in 1 week. or sooner if needed. Continue follow-up with other physicians as planned.     For more information, or to request a detailed radiation treatment summary, please call [facility] Radiation Oncology at our [place] location, [phone].

## 2024-05-14 DIAGNOSIS — C50.919 CARCINOMA OF BREAST METASTATIC TO BONE, UNSPECIFIED LATERALITY (HCC): Primary | ICD-10-CM

## 2024-05-14 DIAGNOSIS — C79.51 CARCINOMA OF BREAST METASTATIC TO BONE, UNSPECIFIED LATERALITY (HCC): Primary | ICD-10-CM

## 2024-05-16 ENCOUNTER — PATIENT MESSAGE (OUTPATIENT)
Dept: SURGERY | Facility: CLINIC | Age: 69
End: 2024-05-16

## 2024-05-17 NOTE — TELEPHONE ENCOUNTER
From: Cris Han  To: Koby Glaser  Sent: 5/16/2024 4:44 PM CDT  Subject: Guess what?    Jourdan, i wanted to let you know that i graduated from rehab yesterday…the 3 hour sessions twice a week….and the timing could not have been better…I went back to work today. I’m only doing mornings but I’m feeling some normalcy. I’ll follow up with one hour rehab sessions, twice a week, in another 2 weeks. The only issue is balance. It’s not real bad but not where it needs to be.   I thought you would want to know….I’m pretty happy and thankful, to say the very least.  Thank you

## 2024-05-22 ENCOUNTER — LAB ENCOUNTER (OUTPATIENT)
Dept: LAB | Facility: HOSPITAL | Age: 69
End: 2024-05-22
Attending: INTERNAL MEDICINE

## 2024-05-22 DIAGNOSIS — Z85.3 PERSONAL HISTORY OF MALIGNANT NEOPLASM OF BREAST: ICD-10-CM

## 2024-05-22 DIAGNOSIS — C50.919 CARCINOMA OF BREAST METASTATIC TO BONE, UNSPECIFIED LATERALITY (HCC): Primary | ICD-10-CM

## 2024-05-22 DIAGNOSIS — C79.51 CARCINOMA OF BREAST METASTATIC TO BONE, UNSPECIFIED LATERALITY (HCC): Primary | ICD-10-CM

## 2024-05-22 DIAGNOSIS — Z85.3 PERSONAL HISTORY OF BREAST CANCER: ICD-10-CM

## 2024-05-22 LAB
ALBUMIN SERPL-MCNC: 4.7 G/DL (ref 3.2–4.8)
ALBUMIN/GLOB SERPL: 1.5 {RATIO} (ref 1–2)
ALP LIVER SERPL-CCNC: 160 U/L
ALT SERPL-CCNC: 19 U/L
ANION GAP SERPL CALC-SCNC: 8 MMOL/L (ref 0–18)
AST SERPL-CCNC: 26 U/L (ref ?–34)
BASOPHILS # BLD AUTO: 0.11 X10(3) UL (ref 0–0.2)
BASOPHILS NFR BLD AUTO: 3 %
BILIRUB SERPL-MCNC: 0.7 MG/DL (ref 0.2–1.1)
BUN BLD-MCNC: 13 MG/DL (ref 9–23)
BUN/CREAT SERPL: 13.1 (ref 10–20)
CALCIUM BLD-MCNC: 9.7 MG/DL (ref 8.7–10.4)
CHLORIDE SERPL-SCNC: 105 MMOL/L (ref 98–112)
CO2 SERPL-SCNC: 28 MMOL/L (ref 21–32)
CREAT BLD-MCNC: 0.99 MG/DL
DEPRECATED RDW RBC AUTO: 53.7 FL (ref 35.1–46.3)
EGFRCR SERPLBLD CKD-EPI 2021: 62 ML/MIN/1.73M2 (ref 60–?)
EOSINOPHIL # BLD AUTO: 0.13 X10(3) UL (ref 0–0.7)
EOSINOPHIL NFR BLD AUTO: 3.6 %
ERYTHROCYTE [DISTWIDTH] IN BLOOD BY AUTOMATED COUNT: 17.6 % (ref 11–15)
FASTING STATUS PATIENT QL REPORTED: NO
GLOBULIN PLAS-MCNC: 3.1 G/DL (ref 2–3.5)
GLUCOSE BLD-MCNC: 93 MG/DL (ref 70–99)
HCT VFR BLD AUTO: 36.3 %
HGB BLD-MCNC: 12 G/DL
IMM GRANULOCYTES # BLD AUTO: 0.02 X10(3) UL (ref 0–1)
IMM GRANULOCYTES NFR BLD: 0.5 %
LYMPHOCYTES # BLD AUTO: 0.88 X10(3) UL (ref 1–4)
LYMPHOCYTES NFR BLD AUTO: 24 %
MCH RBC QN AUTO: 28 PG (ref 26–34)
MCHC RBC AUTO-ENTMCNC: 33.1 G/DL (ref 31–37)
MCV RBC AUTO: 84.8 FL
MONOCYTES # BLD AUTO: 0.4 X10(3) UL (ref 0.1–1)
MONOCYTES NFR BLD AUTO: 10.9 %
NEUTROPHILS # BLD AUTO: 2.12 X10 (3) UL (ref 1.5–7.7)
NEUTROPHILS # BLD AUTO: 2.12 X10(3) UL (ref 1.5–7.7)
NEUTROPHILS NFR BLD AUTO: 58 %
OSMOLALITY SERPL CALC.SUM OF ELEC: 292 MOSM/KG (ref 275–295)
PLATELET # BLD AUTO: 316 10(3)UL (ref 150–450)
POTASSIUM SERPL-SCNC: 4.9 MMOL/L (ref 3.5–5.1)
PROT SERPL-MCNC: 7.8 G/DL (ref 5.7–8.2)
RBC # BLD AUTO: 4.28 X10(6)UL
SODIUM SERPL-SCNC: 141 MMOL/L (ref 136–145)
VIT D+METAB SERPL-MCNC: 66.7 NG/ML (ref 30–100)
WBC # BLD AUTO: 3.7 X10(3) UL (ref 4–11)

## 2024-05-22 PROCEDURE — 85025 COMPLETE CBC W/AUTO DIFF WBC: CPT

## 2024-05-22 PROCEDURE — 36415 COLL VENOUS BLD VENIPUNCTURE: CPT

## 2024-05-22 PROCEDURE — 82306 VITAMIN D 25 HYDROXY: CPT

## 2024-05-22 PROCEDURE — 80053 COMPREHEN METABOLIC PANEL: CPT

## 2024-05-23 ENCOUNTER — TELEPHONE (OUTPATIENT)
Dept: RADIATION ONCOLOGY | Facility: HOSPITAL | Age: 69
End: 2024-05-23

## 2024-06-26 ENCOUNTER — TELEPHONE (OUTPATIENT)
Dept: SURGERY | Facility: CLINIC | Age: 69
End: 2024-06-26

## 2024-06-26 NOTE — TELEPHONE ENCOUNTER
Pt states she is completing pt appeal form as provider appeal was denied for some days of hospital stay. Please call pt to discuss.

## 2024-06-28 NOTE — TELEPHONE ENCOUNTER
Surgery for patient was not a planned surgery, patient came in through emergency room and was scheduled for surgery through impatient.     Attempted to return patient's call, no answer: LMTCB      After we speak to pt we can reach out to Maria Luisa Larson from [facility] billing to see if there is anything we can do to help pt.

## 2024-07-02 NOTE — TELEPHONE ENCOUNTER
Patient calling because she had a denial for 2 days of in-hospital stay from having surgery with Dr. Liao. Denial came in March. Per patient there are no more appeals the doctor can do according to insurance.    Patient had surgery done on 1/30/24 for spinal tumor resection.     I have e-mailed Maria Luisa Larson with billing and am awaiting a reply. I will contact patient once I know more.

## 2024-07-03 ENCOUNTER — LAB ENCOUNTER (OUTPATIENT)
Dept: LAB | Facility: HOSPITAL | Age: 69
End: 2024-07-03
Attending: INTERNAL MEDICINE
Payer: MEDICARE

## 2024-07-03 DIAGNOSIS — Z85.3 PERSONAL HISTORY OF MALIGNANT NEOPLASM OF BREAST: Primary | ICD-10-CM

## 2024-07-03 LAB
ALBUMIN SERPL-MCNC: 4.5 G/DL (ref 3.2–4.8)
ALBUMIN/GLOB SERPL: 1.5 {RATIO} (ref 1–2)
ALP LIVER SERPL-CCNC: 143 U/L
ALT SERPL-CCNC: 16 U/L
ANION GAP SERPL CALC-SCNC: 5 MMOL/L (ref 0–18)
AST SERPL-CCNC: 26 U/L (ref ?–34)
BASOPHILS # BLD AUTO: 0.09 X10(3) UL (ref 0–0.2)
BASOPHILS NFR BLD AUTO: 1.7 %
BILIRUB SERPL-MCNC: 0.8 MG/DL (ref 0.2–1.1)
BUN BLD-MCNC: 13 MG/DL (ref 9–23)
BUN/CREAT SERPL: 14.4 (ref 10–20)
CALCIUM BLD-MCNC: 9.2 MG/DL (ref 8.7–10.4)
CHLORIDE SERPL-SCNC: 105 MMOL/L (ref 98–112)
CO2 SERPL-SCNC: 28 MMOL/L (ref 21–32)
CREAT BLD-MCNC: 0.9 MG/DL
DEPRECATED RDW RBC AUTO: 59.5 FL (ref 35.1–46.3)
EGFRCR SERPLBLD CKD-EPI 2021: 70 ML/MIN/1.73M2 (ref 60–?)
EOSINOPHIL # BLD AUTO: 0.08 X10(3) UL (ref 0–0.7)
EOSINOPHIL NFR BLD AUTO: 1.5 %
ERYTHROCYTE [DISTWIDTH] IN BLOOD BY AUTOMATED COUNT: 17.5 % (ref 11–15)
FASTING STATUS PATIENT QL REPORTED: NO
GLOBULIN PLAS-MCNC: 3 G/DL (ref 2–3.5)
GLUCOSE BLD-MCNC: 92 MG/DL (ref 70–99)
HCT VFR BLD AUTO: 37.1 %
HGB BLD-MCNC: 12.5 G/DL
IMM GRANULOCYTES # BLD AUTO: 0.02 X10(3) UL (ref 0–1)
IMM GRANULOCYTES NFR BLD: 0.4 %
LYMPHOCYTES # BLD AUTO: 1.53 X10(3) UL (ref 1–4)
LYMPHOCYTES NFR BLD AUTO: 28.2 %
MCH RBC QN AUTO: 31.1 PG (ref 26–34)
MCHC RBC AUTO-ENTMCNC: 33.7 G/DL (ref 31–37)
MCV RBC AUTO: 92.3 FL
MONOCYTES # BLD AUTO: 0.91 X10(3) UL (ref 0.1–1)
MONOCYTES NFR BLD AUTO: 16.8 %
NEUTROPHILS # BLD AUTO: 2.8 X10 (3) UL (ref 1.5–7.7)
NEUTROPHILS # BLD AUTO: 2.8 X10(3) UL (ref 1.5–7.7)
NEUTROPHILS NFR BLD AUTO: 51.4 %
OSMOLALITY SERPL CALC.SUM OF ELEC: 286 MOSM/KG (ref 275–295)
PLATELET # BLD AUTO: 211 10(3)UL (ref 150–450)
POTASSIUM SERPL-SCNC: 3.8 MMOL/L (ref 3.5–5.1)
PROT SERPL-MCNC: 7.5 G/DL (ref 5.7–8.2)
RBC # BLD AUTO: 4.02 X10(6)UL
SODIUM SERPL-SCNC: 138 MMOL/L (ref 136–145)
WBC # BLD AUTO: 5.4 X10(3) UL (ref 4–11)

## 2024-07-03 PROCEDURE — 85025 COMPLETE CBC W/AUTO DIFF WBC: CPT

## 2024-07-03 PROCEDURE — 80053 COMPREHEN METABOLIC PANEL: CPT

## 2024-07-03 PROCEDURE — 36415 COLL VENOUS BLD VENIPUNCTURE: CPT

## 2024-07-08 ENCOUNTER — HOSPITAL ENCOUNTER (OUTPATIENT)
Dept: NUCLEAR MEDICINE | Facility: HOSPITAL | Age: 69
Discharge: HOME | End: 2024-07-08
Attending: INTERNAL MEDICINE
Payer: MEDICARE

## 2024-07-08 DIAGNOSIS — C50.919 CARCINOMA OF BREAST METASTATIC TO BONE, UNSPECIFIED LATERALITY (HCC): ICD-10-CM

## 2024-07-08 DIAGNOSIS — C79.51 CARCINOMA OF BREAST METASTATIC TO BONE, UNSPECIFIED LATERALITY (HCC): ICD-10-CM

## 2024-07-08 LAB — GLUCOSE BLDC GLUCOMTR-MCNC: 80 MG/DL (ref 70–99)

## 2024-07-08 PROCEDURE — 82962 GLUCOSE BLOOD TEST: CPT

## 2024-07-08 PROCEDURE — 78815 PET IMAGE W/CT SKULL-THIGH: CPT | Performed by: INTERNAL MEDICINE

## 2024-07-15 NOTE — TELEPHONE ENCOUNTER
Patient would like a status update on further information from billing department to complete her appeal. Please advise

## 2024-07-16 ENCOUNTER — HOSPITAL ENCOUNTER (OUTPATIENT)
Dept: MRI IMAGING | Facility: HOSPITAL | Age: 69
Discharge: HOME | End: 2024-07-16
Attending: INTERNAL MEDICINE
Payer: MEDICARE

## 2024-07-16 DIAGNOSIS — C50.919 CARCINOMA OF BREAST METASTATIC TO BONE, UNSPECIFIED LATERALITY (HCC): ICD-10-CM

## 2024-07-16 DIAGNOSIS — C79.51 CARCINOMA OF BREAST METASTATIC TO BONE, UNSPECIFIED LATERALITY (HCC): ICD-10-CM

## 2024-07-16 PROCEDURE — A9575 INJ GADOTERATE MEGLUMI 0.1ML: HCPCS | Performed by: INTERNAL MEDICINE

## 2024-07-16 PROCEDURE — 72157 MRI CHEST SPINE W/O & W/DYE: CPT | Performed by: INTERNAL MEDICINE

## 2024-07-16 RX ORDER — GADOTERATE MEGLUMINE 376.9 MG/ML
15 INJECTION INTRAVENOUS
Status: COMPLETED | OUTPATIENT
Start: 2024-07-16 | End: 2024-07-16

## 2024-07-16 RX ADMIN — GADOTERATE MEGLUMINE 11 ML: 376.9 INJECTION INTRAVENOUS at 15:53:00

## 2024-07-17 NOTE — TELEPHONE ENCOUNTER
Spoke to patient and advised that billing office will be reaching out to her to discuss this further. Her account is on hold right now, while the billing office communicates with [employer]. Patient will await the call.

## 2024-07-18 DIAGNOSIS — C79.51 CARCINOMA OF BREAST METASTATIC TO BONE, UNSPECIFIED LATERALITY (HCC): Primary | ICD-10-CM

## 2024-07-18 DIAGNOSIS — C50.919 CARCINOMA OF BREAST METASTATIC TO BONE, UNSPECIFIED LATERALITY (HCC): Primary | ICD-10-CM

## 2024-07-23 ENCOUNTER — HOSPITAL ENCOUNTER (OUTPATIENT)
Dept: GENERAL RADIOLOGY | Facility: HOSPITAL | Age: 69
Discharge: HOME | End: 2024-07-23
Attending: STUDENT IN AN ORGANIZED HEALTH CARE EDUCATION/TRAINING PROGRAM
Payer: MEDICARE

## 2024-07-23 DIAGNOSIS — Z98.1 S/P FUSION OF THORACIC SPINE: ICD-10-CM

## 2024-07-23 PROCEDURE — 72072 X-RAY EXAM THORAC SPINE 3VWS: CPT | Performed by: STUDENT IN AN ORGANIZED HEALTH CARE EDUCATION/TRAINING PROGRAM

## 2024-07-25 ENCOUNTER — HOSPITAL ENCOUNTER (OUTPATIENT)
Dept: MRI IMAGING | Age: 69
Discharge: HOME | End: 2024-07-25
Attending: INTERNAL MEDICINE
Payer: MEDICARE

## 2024-07-25 DIAGNOSIS — C79.51 CARCINOMA OF BREAST METASTATIC TO BONE, UNSPECIFIED LATERALITY (HCC): ICD-10-CM

## 2024-07-25 DIAGNOSIS — C50.919 CARCINOMA OF BREAST METASTATIC TO BONE, UNSPECIFIED LATERALITY (HCC): ICD-10-CM

## 2024-07-25 PROCEDURE — A9575 INJ GADOTERATE MEGLUMI 0.1ML: HCPCS | Performed by: INTERNAL MEDICINE

## 2024-07-25 PROCEDURE — 72156 MRI NECK SPINE W/O & W/DYE: CPT | Performed by: INTERNAL MEDICINE

## 2024-07-25 RX ORDER — GADOTERATE MEGLUMINE 376.9 MG/ML
15 INJECTION INTRAVENOUS
Status: COMPLETED | OUTPATIENT
Start: 2024-07-25 | End: 2024-07-25

## 2024-07-25 RX ADMIN — GADOTERATE MEGLUMINE 13 ML: 376.9 INJECTION INTRAVENOUS at 19:51:00

## 2024-07-26 NOTE — PROGRESS NOTES
Established Neurosurgery Patient    Patient: Cris Han  Medical Record Number: [id number]  YOB: 1955  PCP: Kevin Chahal MD    Reason for visit: 6 month postoperative visit     HISTORY OF PRESENTING ILLNESS:  Cris Han is a pleasant 69 year old female who returns to the neurosurgery clinic today approximately 6 months s/p T5-T7 laminectomies for decompression of spinal cord, right transpedicular T6-T7 approaches for resection of tumor and nerve root decompression, and T4-T8 posterior fixation and fusion with Dr. Liao on 1/30/2024.  The patient has continued to do well since surgery.  She recently finished outpatient therapy and has seen improvements in her gait and balance.  She feels that the numbness/tingling is still slowly getting better, but she notices it more in her feet bilaterally.  She has some occasional thoracic back stiffness/tightness, but no thoracic radicular pain.  She has no new neurologic complaints.  Denies any issues with her incision site.  States that it is well-healed.  She has been following with oncology and radiation oncology.  She recently underwent a cervical and thoracic MRI per radiation oncology's recommendations.  There is no cervical spinal cord compression and there is mention of good response to treatment.  On her thoracic MRI, however, there is documentation of increased size of lesions at T10, T11, and T12.  Of note, this was in comparison to a MRI from January and there is no updated imaging post surgery or immediately pre-/post RT. She reports she is recommended for further thoracic spine RT. She is interested in an acupuncture referral for her back tightness and lower extremity numbness/tingling.    Past Medical History:    Breast cancer (HCC)    right breast    High blood pressure      Past Surgical History:   Procedure Laterality Date    Laminectomy,thoracic  01/30/2024    T5 - T7 laminectomy, resection of tumor, T5 - T8 posterior  fixation and fusion    Mastectomy right        Family History   Problem Relation Age of Onset    Breast Cancer Sister         ductal      Social History     Socioeconomic History    Marital status:    Tobacco Use    Smoking status: Never    Smokeless tobacco: Never   Vaping Use    Vaping status: Never Used   Substance and Sexual Activity    Alcohol use: Yes     Alcohol/week: 1.0 standard drink of alcohol     Types: 1 Cans of beer per week    Drug use: No   Other Topics Concern    Caffeine Concern Yes     Comment: Coffee      No Known Allergies   Current Medications:  Current Outpatient Medications   Medication Sig Dispense Refill    acetaminophen 500 MG Oral Tab Take 1 tablet (500 mg total) by mouth every 6 (six) hours as needed for Pain.      ferrous sulfate 325 (65 FE) MG Oral Tab EC Take 1 tablet (325 mg total) by mouth daily with breakfast.      HYDROcodone-acetaminophen 5-325 MG Oral Tab Take 1 tablet by mouth every 4 (four) hours as needed. (Patient not taking: Reported on 2/19/2024) 30 tablet 0    Zinc 50 MG Oral Cap Take 50 mg by mouth as needed.      Kelp 0.15 MG Oral Tab Take by mouth as needed.      CHLOROPHYLL OR Take by mouth as needed.      Magnesium Hydroxide (MAGNESIA OR) Take by mouth.      Ascorbic Acid (VITAMIN C) 1000 MG Oral Tab Take 1 tablet (1,000 mg total) by mouth daily.      Vitamin D3, Cholecalciferol, 125 MCG (5000 UT) Oral Cap Take 1 capsule (5,000 Units total) by mouth daily.      B Complex Vitamins (B COMPLEX OR) Take by mouth daily.      Omega-3 1000 MG Oral Cap Take by mouth.          REVIEW OF SYSTEMS:  Comprehensive review of systems completed and negative with the exception of aforementioned information in the HPI.     PHYSICAL EXAM:    7/29/2024 10:23 AM    /78   BP Location Left arm   Patient Position Sitting   Cuff Size adult   Pulse 68   Weight 128 lb (58.1 kg)   Height 66\"   Pain Score 0 - (None)    Other Vitals   BMI 20.66 kg/m2   BSA 1.66 m2   Menstrual status  Postmenopausal   OB/Gyn status reviewed 2/29/2024   Tobacco   Smoking status Never   Smokeless status Never   Reviewed 7/29/2024        Wt Readings from Last 6 Encounters:   04/29/24 129 lb (58.5 kg)   03/14/24 127 lb 9.6 oz (57.9 kg)   02/29/24 126 lb (57.2 kg)   02/28/24 130 lb (59 kg)   02/19/24 130 lb (59 kg)   02/08/24 123 lb 3.2 oz (55.9 kg)        General: Well developed, well nourished, in no acute distress. Ambulates without assistance.     Incision: Well-healed    HEENT: Normocephalic, atraumatic.    Respirations: Non-labored     Neurologic / Musculoskeletal: Awake, alert, and interactive. Recent and remote memory appear intact. Attention span and concentration are appropriate. No dysarthria. Appropriately names objects. Coordination and motor control grossly intact. Patient follows commands briskly and appropriately.    Cervical Spine:    Motor/ Extremities   Deltoid Biceps Triceps    Sensation   R 5/5 5/5 5/5 5/5 Intact to light touch   L 5/5 5/5 5/5 5/5 Intact to light touch       Lumbar Spine:    Motor / Extremities   Hip   flexion Knee   extension Dorsiflexion Plantarflexion   Sensation   R 5/5 5/5 5/5 5/5 Diminished to light touch   L 5/5 5/5 5/5 5/5 Diminished to light touch     IMAGING:  MRI SPINE CERVICAL (W+WO) (CPT=72156)    Result Date: 7/26/2024  CONCLUSION:  1. Approximate 2 cm marrow replacing lesion centered at the right pedicle, facet, and lamina of the C4 vertebral body.  The lesion is overall relatively similar in size as compared with January, 2024 cervical spine MR, but demonstrates less pronounced corresponding edema and enhancement.  Findings suggest some degree of favorable interval treatment response. 2. No definite new or worsening osseous metastases throughout the cervical spine.  No pathologic cervical spine compression fractures.  No MR findings of cervical cord compression. 3. Partially imaged posterior thoracic fusion, which extends inferiorly from T4. 4. Mild-to-moderate  multilevel cervical spine degenerative changes as detailed.  Probable mild endplate edematous degenerative changes at C4-C5. 5. Mild levoscoliosis of the cervical spine.   elm-remote  Dictated by (CST): Scout Brown MD on 7/26/2024 at 8:11 AM     Finalized by (CST): Scout Brown MD on 7/26/2024 at 8:24 AM          XR THORACIC SPINE (3 VIEWS) (CPT=72072)    Result Date: 7/23/2024  CONCLUSION: T4-T8 posterior spine fusion.  Mild degenerative change within the spine.  No acute osseous abnormality.    Dictated by (CST): Alvaro Holt MD on 7/23/2024 at 9:54 AM     Finalized by (CST): Alvaro Holt MD on 7/23/2024 at 9:56 AM          MRI SPINE THORACIC (W+WO) (CPT=72157)    Result Date: 7/18/2024  CONCLUSION:   Post operative changes of posterior decompression and pedicle screw and posterior fusion of T4 through T8.  Large osseous metastasis of T6 is unchanged.  Osseous metastases of T10, T11 and T12 have increased in size since 1/26/2024.  No pathologic fracture or vertebral body height loss.  Osseous metastases also seen involving the bilateral sacral ala and bilateral iliac bones.  Mild degenerative disc disease throughout the thoracic spine without high-grade canal or foraminal narrowing.  Multiple other incidental findings as described in the body of the report which are unchanged.    Dictated by (CST): Valentín Pierre MD on 7/18/2024 at 9:46 AM     Finalized by (CST): Valentín Pierre MD on 7/18/2024 at 10:00 AM            Imaging reviewed.     ASSESSMENT / PLAN:    ICD-10-CM   1. S/P fusion of thoracic spine  Z98.1          2. Lower extremity numbness  R20.0          3. Thoracic myelopathy  M47.14          4. Back stiffness  M25.69          5. Metastatic adenocarcinoma (HCC)  C79.9          6. Postoperative state  Z98.890      7. Encounter for postoperative wound check  Z48.89        Aphrodite N Pantos returns to the clinic today approximately 6 months s/p T5-T7 laminectomies for decompression of spinal cord, right  transpedicular T6-T7 approaches for resection of tumor and nerve root decompression, and T4-T8 posterior fixation and fusion with Dr. Liao on 1/30/2024.  The patient continues to progress remarkably well postsurgery.  She remains neurologically stable on examination.  Her incision is well-healed.  She demonstrates improvement in her balance and gait after diligent work with therapy.  Her most recent thoracic x-rays demonstrate intact hardware.  She has no activity restrictions from a neurosurgical standpoint.  Advised to gradually return to exercise as tolerated.  She is following up with oncology and radiation oncology.  We will plan to see her back in 6 months for a 1 year postoperative visit.    -Medications Prescribed: None  -Imaging Ordered: Xr thoracic spine closer to next appointment   -Referrals Placed: Acupuncture referral  -Follow up: 6 months with Dr. Liao      Plan was reviewed and discussed in detail with the patient. Patient encouraged to call the office with any questions or concerns of new/worsening neurologic symptoms. Patient demonstrated good understanding and was agreeable with the plan.     Visit time: 25 minutes   Over 50% of that time was spent providing patient education and discussing care plan.    Koby Glaser PA-C  Physician Assistant- Neurosurgery   [facility]  7/26/2024, 1:30 PM

## 2024-07-29 ENCOUNTER — OFFICE VISIT (OUTPATIENT)
Dept: SURGERY | Facility: CLINIC | Age: 69
End: 2024-07-29
Payer: MEDICARE

## 2024-07-29 ENCOUNTER — LAB ENCOUNTER (OUTPATIENT)
Dept: LAB | Facility: HOSPITAL | Age: 69
End: 2024-07-29
Attending: INTERNAL MEDICINE
Payer: MEDICARE

## 2024-07-29 VITALS
SYSTOLIC BLOOD PRESSURE: 132 MMHG | HEART RATE: 68 BPM | DIASTOLIC BLOOD PRESSURE: 78 MMHG | WEIGHT: 128 LBS | HEIGHT: 66 IN | BODY MASS INDEX: 20.57 KG/M2

## 2024-07-29 DIAGNOSIS — R20.0 LOWER EXTREMITY NUMBNESS: ICD-10-CM

## 2024-07-29 DIAGNOSIS — Z85.3 PERSONAL HISTORY OF BREAST CANCER: ICD-10-CM

## 2024-07-29 DIAGNOSIS — C79.9 METASTATIC ADENOCARCINOMA (HCC): ICD-10-CM

## 2024-07-29 DIAGNOSIS — Z98.1 S/P FUSION OF THORACIC SPINE: Primary | ICD-10-CM

## 2024-07-29 DIAGNOSIS — Z98.890 POSTOPERATIVE STATE: ICD-10-CM

## 2024-07-29 DIAGNOSIS — Z48.89 ENCOUNTER FOR POSTOPERATIVE WOUND CHECK: ICD-10-CM

## 2024-07-29 DIAGNOSIS — M25.69 BACK STIFFNESS: ICD-10-CM

## 2024-07-29 DIAGNOSIS — C50.919 CARCINOMA OF BREAST METASTATIC TO BONE, UNSPECIFIED LATERALITY (HCC): Primary | ICD-10-CM

## 2024-07-29 DIAGNOSIS — M47.14 THORACIC MYELOPATHY: ICD-10-CM

## 2024-07-29 DIAGNOSIS — C79.51 CARCINOMA OF BREAST METASTATIC TO BONE, UNSPECIFIED LATERALITY (HCC): Primary | ICD-10-CM

## 2024-07-29 LAB
ALBUMIN SERPL-MCNC: 4.9 G/DL (ref 3.2–4.8)
ALBUMIN/GLOB SERPL: 1.7 {RATIO} (ref 1–2)
ALP LIVER SERPL-CCNC: 122 U/L
ALT SERPL-CCNC: 17 U/L
ANION GAP SERPL CALC-SCNC: 5 MMOL/L (ref 0–18)
AST SERPL-CCNC: 27 U/L (ref ?–34)
BASOPHILS # BLD AUTO: 0.1 X10(3) UL (ref 0–0.2)
BASOPHILS NFR BLD AUTO: 3.5 %
BILIRUB SERPL-MCNC: 0.5 MG/DL (ref 0.2–1.1)
BUN BLD-MCNC: 13 MG/DL (ref 9–23)
BUN/CREAT SERPL: 13.4 (ref 10–20)
CALCIUM BLD-MCNC: 10 MG/DL (ref 8.7–10.4)
CHLORIDE SERPL-SCNC: 107 MMOL/L (ref 98–112)
CO2 SERPL-SCNC: 30 MMOL/L (ref 21–32)
CREAT BLD-MCNC: 0.97 MG/DL
DEPRECATED RDW RBC AUTO: 48.1 FL (ref 35.1–46.3)
EGFRCR SERPLBLD CKD-EPI 2021: 64 ML/MIN/1.73M2 (ref 60–?)
EOSINOPHIL # BLD AUTO: 0.08 X10(3) UL (ref 0–0.7)
EOSINOPHIL NFR BLD AUTO: 2.8 %
ERYTHROCYTE [DISTWIDTH] IN BLOOD BY AUTOMATED COUNT: 14.3 % (ref 11–15)
FASTING STATUS PATIENT QL REPORTED: NO
GLOBULIN PLAS-MCNC: 2.9 G/DL (ref 2–3.5)
GLUCOSE BLD-MCNC: 96 MG/DL (ref 70–99)
HCT VFR BLD AUTO: 40.6 %
HGB BLD-MCNC: 14.1 G/DL
IMM GRANULOCYTES # BLD AUTO: 0.02 X10(3) UL (ref 0–1)
IMM GRANULOCYTES NFR BLD: 0.7 %
LYMPHOCYTES # BLD AUTO: 0.92 X10(3) UL (ref 1–4)
LYMPHOCYTES NFR BLD AUTO: 32.2 %
MCH RBC QN AUTO: 32.2 PG (ref 26–34)
MCHC RBC AUTO-ENTMCNC: 34.7 G/DL (ref 31–37)
MCV RBC AUTO: 92.7 FL
MONOCYTES # BLD AUTO: 0.56 X10(3) UL (ref 0.1–1)
MONOCYTES NFR BLD AUTO: 19.6 %
NEUTROPHILS # BLD AUTO: 1.18 X10 (3) UL (ref 1.5–7.7)
NEUTROPHILS # BLD AUTO: 1.18 X10(3) UL (ref 1.5–7.7)
NEUTROPHILS NFR BLD AUTO: 41.2 %
OSMOLALITY SERPL CALC.SUM OF ELEC: 294 MOSM/KG (ref 275–295)
PLATELET # BLD AUTO: 177 10(3)UL (ref 150–450)
POTASSIUM SERPL-SCNC: 5.5 MMOL/L (ref 3.5–5.1)
PROT SERPL-MCNC: 7.8 G/DL (ref 5.7–8.2)
RBC # BLD AUTO: 4.38 X10(6)UL
SODIUM SERPL-SCNC: 142 MMOL/L (ref 136–145)
WBC # BLD AUTO: 2.9 X10(3) UL (ref 4–11)

## 2024-07-29 PROCEDURE — 85025 COMPLETE CBC W/AUTO DIFF WBC: CPT

## 2024-07-29 PROCEDURE — 80053 COMPREHEN METABOLIC PANEL: CPT

## 2024-07-29 PROCEDURE — 99213 OFFICE O/P EST LOW 20 MIN: CPT | Performed by: STUDENT IN AN ORGANIZED HEALTH CARE EDUCATION/TRAINING PROGRAM

## 2024-07-29 PROCEDURE — 36415 COLL VENOUS BLD VENIPUNCTURE: CPT

## 2024-07-29 RX ORDER — MELATONIN
1000 DAILY
COMMUNITY

## 2024-07-31 ENCOUNTER — APPOINTMENT (OUTPATIENT)
Dept: RADIATION ONCOLOGY | Facility: HOSPITAL | Age: 69
End: 2024-07-31
Attending: INTERNAL MEDICINE
Payer: MEDICARE

## 2024-07-31 VITALS
OXYGEN SATURATION: 99 % | SYSTOLIC BLOOD PRESSURE: 140 MMHG | TEMPERATURE: 98 F | HEART RATE: 88 BPM | WEIGHT: 123.81 LBS | RESPIRATION RATE: 18 BRPM | DIASTOLIC BLOOD PRESSURE: 76 MMHG | BODY MASS INDEX: 20 KG/M2

## 2024-07-31 DIAGNOSIS — C50.919 CARCINOMA OF BREAST METASTATIC TO BONE, UNSPECIFIED LATERALITY (HCC): Primary | ICD-10-CM

## 2024-07-31 DIAGNOSIS — C79.51 CARCINOMA OF BREAST METASTATIC TO BONE, UNSPECIFIED LATERALITY (HCC): Primary | ICD-10-CM

## 2024-07-31 PROCEDURE — 99212 OFFICE O/P EST SF 10 MIN: CPT

## 2024-07-31 NOTE — PATIENT INSTRUCTIONS
Follow up with Dr. Leon in 2 months after your PET scan.  Dali will call you to schedule your follow up appointment.   Please call [phone] with any radiation questions.   Schedule your PET SCAN prior to your follow up. Call [phone] to schedule.

## 2024-07-31 NOTE — PROGRESS NOTES
Nursing Re- Consult Note    Patient: Cris Han  YOB: 1955  Age: 69 year old  Radiation Oncologist:Key Leon MD  Referring Physician: Mark Cid  Diagnosis:No diagnosis found.  Consult Date: 7/31/2024    Glasses, dentures, HA: none  Pacemaker / defibrillator: none    History of Present Illness:   Chief Complaint   Patient presents with    Radiation       Vital Signs: /76 (BP Location: Left arm, Patient Position: Sitting, Cuff Size: adult)   Pulse 88   Temp 97.8 °F (36.6 °C) (Temporal)   Resp 18   Wt 56.2 kg (123 lb 12.8 oz)   SpO2 99%   BMI 19.98 kg/m²     Wt Readings from Last 6 Encounters:   07/31/24 56.2 kg (123 lb 12.8 oz)   07/29/24 58.1 kg (128 lb)   04/29/24 58.5 kg (129 lb)   03/14/24 57.9 kg (127 lb 9.6 oz)   02/29/24 57.2 kg (126 lb)   02/28/24 59 kg (130 lb)       ROS: Review of Systems   Constitutional:  Positive for malaise/fatigue and weight loss.        Purposeful weight loss- dietary changes    At the end of the day feels fatigued   HENT: Negative.     Eyes: Negative.    Respiratory: Negative.     Cardiovascular: Negative.    Gastrointestinal: Negative.    Genitourinary: Negative.    Musculoskeletal:  Positive for back pain.        Joint pain- knees     Feels stiff when first stands up     Back pain- mid / left back     Occasionally takes tylenol PRN    Skin: Negative.    Neurological: Negative.    Endo/Heme/Allergies: Negative.    Psychiatric/Behavioral:  The patient is nervous/anxious and has insomnia.         Due to diagnosis        Medications and Allergies review by RN: yes    Patient seen for re-consultation with Dr. Leon. Patient arrives with her . Vitals stable. Has intentionally lost weight due to dietary changes. Denies pain currently. Does not that at the end of the day she will get some left mid back pain. Takes tylenol very infrequently. Finished with T4-T10 radiation treatment 3/22/24. Patient had PET scan done  7/8/24. MRI thoracic done 7/16/24 and MRI cervical done 7/25/24. I explained to the patient that today she would meet Dr. Leon but while being treated there was a possibility that she might also encounter the physicians who cover for Dr. Leon which are Dr. Mclean, Dr. Calderon, and Dr. Abraham Carrillo. Patient's history and medications reviewed. Educated on the radiation process and briefly on side effects. Patient states her understanding. Plan is to have PET scan done in 8 weeks, then follow up with Dr. Leon.

## 2024-08-01 ENCOUNTER — APPOINTMENT (OUTPATIENT)
Dept: RADIATION ONCOLOGY | Facility: HOSPITAL | Age: 69
End: 2024-08-01
Attending: INTERNAL MEDICINE
Payer: MEDICARE

## 2024-08-02 ENCOUNTER — HOSPITAL ENCOUNTER (OUTPATIENT)
Dept: CV DIAGNOSTICS | Facility: HOSPITAL | Age: 69
Discharge: HOME | End: 2024-08-02
Attending: INTERNAL MEDICINE
Payer: MEDICARE

## 2024-08-02 DIAGNOSIS — Z51.81 ENCOUNTER FOR MONITORING CARDIOTOXIC DRUG THERAPY: ICD-10-CM

## 2024-08-02 DIAGNOSIS — Z79.899 ENCOUNTER FOR MONITORING CARDIOTOXIC DRUG THERAPY: ICD-10-CM

## 2024-08-02 DIAGNOSIS — C79.51: ICD-10-CM

## 2024-08-02 DIAGNOSIS — C50.919 BREAST CANCER (HCC): ICD-10-CM

## 2024-08-02 DIAGNOSIS — C50.919: ICD-10-CM

## 2024-08-02 PROCEDURE — 93306 TTE W/DOPPLER COMPLETE: CPT | Performed by: INTERNAL MEDICINE

## 2024-08-08 ENCOUNTER — LAB ENCOUNTER (OUTPATIENT)
Dept: LAB | Facility: HOSPITAL | Age: 69
End: 2024-08-08
Attending: INTERNAL MEDICINE
Payer: MEDICARE

## 2024-08-08 DIAGNOSIS — C79.51 CARCINOMA OF BREAST METASTATIC TO BONE, UNSPECIFIED LATERALITY (HCC): ICD-10-CM

## 2024-08-08 DIAGNOSIS — Z85.3 PERSONAL HISTORY OF BREAST CANCER: Primary | ICD-10-CM

## 2024-08-08 DIAGNOSIS — C50.919 CARCINOMA OF BREAST METASTATIC TO BONE, UNSPECIFIED LATERALITY (HCC): ICD-10-CM

## 2024-08-08 LAB
ALBUMIN SERPL-MCNC: 4.8 G/DL (ref 3.2–4.8)
ALBUMIN/GLOB SERPL: 1.7 {RATIO} (ref 1–2)
ALP LIVER SERPL-CCNC: 114 U/L
ALT SERPL-CCNC: 19 U/L
ANION GAP SERPL CALC-SCNC: 6 MMOL/L (ref 0–18)
AST SERPL-CCNC: 29 U/L (ref ?–34)
BASOPHILS # BLD AUTO: 0.15 X10(3) UL (ref 0–0.2)
BASOPHILS NFR BLD AUTO: 2.7 %
BILIRUB SERPL-MCNC: 0.7 MG/DL (ref 0.2–1.1)
BUN BLD-MCNC: 14 MG/DL (ref 9–23)
BUN/CREAT SERPL: 16.3 (ref 10–20)
CALCIUM BLD-MCNC: 10.1 MG/DL (ref 8.7–10.4)
CHLORIDE SERPL-SCNC: 108 MMOL/L (ref 98–112)
CO2 SERPL-SCNC: 26 MMOL/L (ref 21–32)
CREAT BLD-MCNC: 0.86 MG/DL
DEPRECATED RDW RBC AUTO: 46.5 FL (ref 35.1–46.3)
EGFRCR SERPLBLD CKD-EPI 2021: 74 ML/MIN/1.73M2 (ref 60–?)
EOSINOPHIL # BLD AUTO: 0.16 X10(3) UL (ref 0–0.7)
EOSINOPHIL NFR BLD AUTO: 2.9 %
ERYTHROCYTE [DISTWIDTH] IN BLOOD BY AUTOMATED COUNT: 13.5 % (ref 11–15)
FASTING STATUS PATIENT QL REPORTED: NO
GLOBULIN PLAS-MCNC: 2.9 G/DL (ref 2–3.5)
GLUCOSE BLD-MCNC: 90 MG/DL (ref 70–99)
HCT VFR BLD AUTO: 37.6 %
HGB BLD-MCNC: 13 G/DL
IMM GRANULOCYTES # BLD AUTO: 0.03 X10(3) UL (ref 0–1)
IMM GRANULOCYTES NFR BLD: 0.5 %
LYMPHOCYTES # BLD AUTO: 1.47 X10(3) UL (ref 1–4)
LYMPHOCYTES NFR BLD AUTO: 26.9 %
MCH RBC QN AUTO: 32.4 PG (ref 26–34)
MCHC RBC AUTO-ENTMCNC: 34.6 G/DL (ref 31–37)
MCV RBC AUTO: 93.8 FL
MONOCYTES # BLD AUTO: 0.75 X10(3) UL (ref 0.1–1)
MONOCYTES NFR BLD AUTO: 13.7 %
NEUTROPHILS # BLD AUTO: 2.9 X10 (3) UL (ref 1.5–7.7)
NEUTROPHILS # BLD AUTO: 2.9 X10(3) UL (ref 1.5–7.7)
NEUTROPHILS NFR BLD AUTO: 53.3 %
OSMOLALITY SERPL CALC.SUM OF ELEC: 290 MOSM/KG (ref 275–295)
PLATELET # BLD AUTO: 384 10(3)UL (ref 150–450)
POTASSIUM SERPL-SCNC: 5 MMOL/L (ref 3.5–5.1)
PROT SERPL-MCNC: 7.7 G/DL (ref 5.7–8.2)
RBC # BLD AUTO: 4.01 X10(6)UL
SODIUM SERPL-SCNC: 140 MMOL/L (ref 136–145)
WBC # BLD AUTO: 5.5 X10(3) UL (ref 4–11)

## 2024-08-08 PROCEDURE — 36415 COLL VENOUS BLD VENIPUNCTURE: CPT

## 2024-08-08 PROCEDURE — 80053 COMPREHEN METABOLIC PANEL: CPT

## 2024-08-08 PROCEDURE — 85025 COMPLETE CBC W/AUTO DIFF WBC: CPT

## 2024-09-13 NOTE — PROGRESS NOTES
[facility]  Radiation Oncology Follow-up    Patient Name: Cris Han   MRN: [id number]  Referring Physician: Susan Ennis MD    Diagnosis: metastatic ER+ breast cancer    Oncologic History  The patient was diagnosed with R breast cancer in late 2018. She was initially treated with mastectomy and ALND showing invasive lobular carcinoma, grade 2, pT2 (3 cm) N3 (13/22) M0 disease, ER+, LA/HER2-. This was followed by adjuvant ddAC-T chemotherapy, AI, and PMRT (completed July 2019 at [facility]). After PMRT she was on AI.  Jan 2024: presented to ED with 2 week history of back pain. On 1/30/24 underwent T5-T7 laminectomy and T4-T8 fixation and fusion. Path showed adenocarcinoma c/w breast primary, disease ER+, LA/HER2-.   3/11/24-3/22/24: completed palliative RT from T4-T8, 3000 cGy in 10 fractions.   3/6/24: started fulvestrant.  4/1/24: started ribociclib, zometa.     Interval History:  The patient is a 69 year old female with above diagnosis and treatment rendered who presents today for follow-up.    Here today for follow-up  Overall doing very well, has made some dietary changes  At the end of the day she will get some very mild mid back pain     New imaging with possibly mixed response to ribociclib and her markers are slightly up  [facility] plans to change therapy to neratinib, fulvestrant, herceptin  Plan is for PET 8 weeks post initiation of therapy    Medications  Current Outpatient Medications   Medication Sig Dispense Refill    cyanocobalamin 1000 MCG Oral Tab Take 1 tablet (1,000 mcg total) by mouth daily.      acetaminophen 500 MG Oral Tab Take 1 tablet (500 mg total) by mouth every 6 (six) hours as needed for Pain.      ferrous sulfate 325 (65 FE) MG Oral Tab EC Take 1 tablet (325 mg total) by mouth daily with breakfast.      Zinc 50 MG Oral Cap Take 50 mg by mouth as needed.      Kelp 0.15 MG Oral Tab Take by mouth as needed.      CHLOROPHYLL OR Take by mouth as needed.      Magnesium  Hydroxide (MAGNESIA OR) Take by mouth.      Ascorbic Acid (VITAMIN C) 1000 MG Oral Tab Take 1 tablet (1,000 mg total) by mouth daily.      Vitamin D3, Cholecalciferol, 125 MCG (5000 UT) Oral Cap Take 1 capsule (5,000 Units total) by mouth daily.      B Complex Vitamins (B COMPLEX OR) Take by mouth daily.      Neratinib Maleate 40 MG Oral Tab Take 240 mg (6 tablets) once daily. Take with food. (Patient not taking: Reported on 2024)      Ribociclib Succ, 600 MG Dose, 200 MG Oral Tablet Therapy Pack Take 600 mg by mouth daily. (Patient not taking: Reported on 2024)      Omega-3 1000 MG Oral Cap Take by mouth. (Patient not taking: Reported on 2024)         Physical Exam  Vitals:    24 1300   BP: 140/76   Pulse: 88   Resp: 18   Temp: 97.8 °F (36.6 °C)       ECO    Gen: NAD  HEENT: NCAT, EOMI  Neck: no LAD  CV: RRR  Lungs: CTAB  Abd: soft, NTND  Ext: wwp, no cyanosis or edema  Neuro: CN II-XII grossly intact  Strength 5/5 throughout    Imaging: personally reviewed     24: PET  -hypermetabolic focus in the L hepatic lobe  -multiple sites of osseous metastatic disease    24: MRI Tspine  -stable disease at T6  -? Increase in size of disease at T10, T11, T12 compared to 24 (pre-RT scan)    24: MRI c-spine  -stable or slightly better disease at C4    Assessment: 69 year old female with metastatic ER+ breast cancer s/p palliative RT to the Tspine after decompressive surgery. She has been on therapy with fulvestrant, ribo, and zometa with slight increase in markers and mixed imaging response. However she did not have a baseline PET, and there was considerable gap between her January MRIs and initiation of RT in early March. Medical oncology plans to change therapy.     Plan:     -Given her good PS we discussed consideration of prophylactic RT now vs waiting to see how her disease responds to the new therapy. Given that she is currently asymptomatic, I think it would be reasonable to  wait on additional RT. Instructed patient to contact us urgently if she develops any new pain or other neurologic symptoms.   -Plan discussed with [facility]  -RTC after PET scan     Key Leon MD  Radiation Oncology    [facility] [place] high including chart review, personal review of imaging, exam, and time spent with the patient in counseling.

## 2024-10-07 ENCOUNTER — HOSPITAL ENCOUNTER (OUTPATIENT)
Dept: NUCLEAR MEDICINE | Facility: HOSPITAL | Age: 69
Discharge: HOME | End: 2024-10-07
Attending: INTERNAL MEDICINE
Payer: MEDICARE

## 2024-10-07 DIAGNOSIS — C50.919 CARCINOMA OF BREAST METASTATIC TO BONE, UNSPECIFIED LATERALITY (HCC): ICD-10-CM

## 2024-10-07 DIAGNOSIS — C79.51 CARCINOMA OF BREAST METASTATIC TO BONE, UNSPECIFIED LATERALITY (HCC): ICD-10-CM

## 2024-10-07 LAB — GLUCOSE BLDC GLUCOMTR-MCNC: 100 MG/DL (ref 70–99)

## 2024-10-07 PROCEDURE — 82962 GLUCOSE BLOOD TEST: CPT

## 2024-10-07 PROCEDURE — 78815 PET IMAGE W/CT SKULL-THIGH: CPT | Performed by: INTERNAL MEDICINE

## 2024-10-09 ENCOUNTER — OFFICE VISIT (OUTPATIENT)
Dept: RADIATION ONCOLOGY | Facility: HOSPITAL | Age: 69
End: 2024-10-09
Attending: INTERNAL MEDICINE
Payer: MEDICARE

## 2024-10-09 VITALS
WEIGHT: 115.81 LBS | TEMPERATURE: 98 F | SYSTOLIC BLOOD PRESSURE: 155 MMHG | HEART RATE: 94 BPM | BODY MASS INDEX: 19 KG/M2 | DIASTOLIC BLOOD PRESSURE: 85 MMHG | RESPIRATION RATE: 18 BRPM | OXYGEN SATURATION: 98 %

## 2024-10-09 DIAGNOSIS — C79.51 CARCINOMA OF BREAST METASTATIC TO BONE, UNSPECIFIED LATERALITY (HCC): Primary | ICD-10-CM

## 2024-10-09 DIAGNOSIS — C50.919 CARCINOMA OF BREAST METASTATIC TO BONE, UNSPECIFIED LATERALITY (HCC): Primary | ICD-10-CM

## 2024-10-09 PROCEDURE — 99211 OFF/OP EST MAY X REQ PHY/QHP: CPT

## 2024-10-09 RX ORDER — LOPERAMIDE HCL 2 MG
2 CAPSULE ORAL EVERY 2 HOUR PRN
COMMUNITY
Start: 2024-09-27

## 2024-10-09 NOTE — PATIENT INSTRUCTIONS
Follow up with Dr. Leon in 3-4 months.  Dali will call you to schedule your follow up appointment.   Please call [phone] with any radiation questions.

## 2024-10-09 NOTE — PROGRESS NOTES
[facility]  Radiation Oncology Follow-up    Patient Name: Cris Han   MRN: [id number]  Referring Physician: Susan Ennis MD    Diagnosis: metastatic ER+ breast cancer    Oncologic History  The patient was diagnosed with R breast cancer in late 2018. She was initially treated with mastectomy and ALND showing invasive lobular carcinoma, grade 2, pT2 (3 cm) N3 (13/22) M0 disease, ER+, OK/HER2-. This was followed by adjuvant ddAC-T chemotherapy, AI, and PMRT (completed July 2019 at [facility]). After PMRT she was on AI.  Jan 2024: presented to ED with 2 week history of back pain. On 1/30/24 underwent T5-T7 laminectomy and T4-T8 fixation and fusion. Path showed adenocarcinoma c/w breast primary, disease ER+, OK/HER2-.   3/11/24-3/22/24: completed palliative RT from T4-T8, 3000 cGy in 10 fractions.   3/6/24: started fulvestrant.  4/1/24: started ribociclib, zometa.     Interval History:  The patient is a 69 year old female with above diagnosis and treatment rendered who presents today for follow-up.    Last seen 7/31/24  Since then started neratinib  Initially had a lot of diarrhea but this has improved, tolerating much better  Occ leg cramping  Good energy and appetite, denies back pain or other sites of pain  Here to discuss PET        Medications  Current Outpatient Medications   Medication Sig Dispense Refill    loperamide 2 MG Oral Cap Take 1 capsule (2 mg total) by mouth every 2 (two) hours as needed.      Neratinib Maleate 40 MG Oral Tab       Ribociclib Succ, 600 MG Dose, 200 MG Oral Tablet Therapy Pack Take 600 mg by mouth daily. (Patient not taking: Reported on 7/31/2024)      cyanocobalamin 1000 MCG Oral Tab Take 1 tablet (1,000 mcg total) by mouth daily.      acetaminophen 500 MG Oral Tab Take 1 tablet (500 mg total) by mouth every 6 (six) hours as needed for Pain.      ferrous sulfate 325 (65 FE) MG Oral Tab EC Take 1 tablet (325 mg total) by mouth daily with breakfast.      Zinc 50 MG Oral  Cap Take 50 mg by mouth as needed.      Kelp 0.15 MG Oral Tab Take by mouth as needed.      CHLOROPHYLL OR Take by mouth as needed.      Magnesium Hydroxide (MAGNESIA OR) Take by mouth.      Ascorbic Acid (VITAMIN C) 1000 MG Oral Tab Take 1 tablet (1,000 mg total) by mouth daily.      Vitamin D3, Cholecalciferol, 125 MCG (5000 UT) Oral Cap Take 1 capsule (5,000 Units total) by mouth daily.      B Complex Vitamins (B COMPLEX OR) Take by mouth daily.      Omega-3 1000 MG Oral Cap Take by mouth. (Patient not taking: Reported on 2024)         Physical Exam  Vitals:    10/09/24 1131   BP: 155/85   Pulse: 94   Resp: 18   Temp: 98.3 °F (36.8 °C)       ECO    Gen: NAD  HEENT: NCAT, EOMI  Lungs: breathing comfortably on RA  Ext: wwp, no cyanosis or edema  Neuro: CN II-XII grossly intact  Strength 5/5 throughout    Imaging: personally reviewed     10/7/24: PET  -L hepatic lobe focus has resolved  -all other areas of bone uptake smaller/less avid or resolved    24: PET  -hypermetabolic focus in the L hepatic lobe  -multiple sites of osseous metastatic disease    24: MRI Tspine  -stable disease at T6  -? Increase in size of disease at T10, T11, T12 compared to 24 (pre-RT scan)    24: MRI c-spine  -stable or slightly better disease at C4    Assessment: 69 year old female with metastatic ER+ breast cancer s/p palliative RT to the Tspine after decompressive surgery. She has been on therapy with neratinib and recent PET shows excellent response.    Plan:     -to see [facility] on Monday for labs, will likely repeat imaging in 3-6 months  -RTC 3-4 months or sooner if needed    Key Leon MD  Radiation Oncology    [facility] high including chart review, personal review of imaging, exam, and time spent with the patient in counseling.

## 2024-10-09 NOTE — PROGRESS NOTES
Nursing Follow-Up Note    Patient: Cris Han  YOB: 1955  Age: 69 year old  Radiation Oncologist: Dr. Key Leon  Referring Physician: Mark Cid  Chief Complaint: No chief complaint on file.    Date: 10/9/2024    Toxicities: Fatigue Grade 0= None  Bone pain Grade 0= None  Gait disturbance Grade 0= None  Muscle weakness Grade 0= None      Vital Signs: There were no vitals taken for this visit.,   Wt Readings from Last 6 Encounters:   07/31/24 56.2 kg (123 lb 12.8 oz)   07/29/24 58.1 kg (128 lb)   04/29/24 58.5 kg (129 lb)   03/14/24 57.9 kg (127 lb 9.6 oz)   02/29/24 57.2 kg (126 lb)   02/28/24 59 kg (130 lb)       Allergies:  Allergies[1]    Nursing Note: Pt seen for follow up with Dr Leon. Pt completed T4-T8 RT on 3/22/2024. Pt completed Pet scan 10/7/2024, Dr. Leon to review with patient today. Pt reports bilateral feet neuropathy. Pt started on Neratinib in August. Had diarrhea, used Imodium PRN. Pt reports that has improved. Pt no longer using tylenol PRN for pain. Pt to follow up with Dr. Leon in 3-4 months.  Given RN number in case of radiation questions/concerns.          [1] No Known Allergies

## 2024-11-25 ENCOUNTER — TELEPHONE (OUTPATIENT)
Dept: RADIATION ONCOLOGY | Facility: HOSPITAL | Age: 69
End: 2024-11-25

## 2024-11-25 NOTE — TELEPHONE ENCOUNTER
Spoke to pt and gave her CS phn # to call and schedule her PET scan. Ordering md is dr guerrero. Pt is aware if any issues getting bert at [facility] for her pet to message dr aguero via [email or web address].

## 2025-01-03 ENCOUNTER — HOSPITAL ENCOUNTER (OUTPATIENT)
Dept: CV DIAGNOSTICS | Facility: HOSPITAL | Age: 70
Discharge: HOME | End: 2025-01-03
Attending: INTERNAL MEDICINE
Payer: MEDICARE

## 2025-01-03 ENCOUNTER — HOSPITAL ENCOUNTER (OUTPATIENT)
Dept: GENERAL RADIOLOGY | Facility: HOSPITAL | Age: 70
Discharge: HOME | End: 2025-01-03
Attending: STUDENT IN AN ORGANIZED HEALTH CARE EDUCATION/TRAINING PROGRAM
Payer: MEDICARE

## 2025-01-03 DIAGNOSIS — Z85.3 PERSONAL HISTORY OF BREAST CANCER: ICD-10-CM

## 2025-01-03 DIAGNOSIS — Z98.1 S/P FUSION OF THORACIC SPINE: ICD-10-CM

## 2025-01-03 DIAGNOSIS — Z51.81 ENCOUNTER FOR MONITORING AMIODARONE THERAPY: ICD-10-CM

## 2025-01-03 DIAGNOSIS — M25.69 BACK STIFFNESS: ICD-10-CM

## 2025-01-03 DIAGNOSIS — Z79.899 ENCOUNTER FOR MONITORING AMIODARONE THERAPY: ICD-10-CM

## 2025-01-03 DIAGNOSIS — M47.14 THORACIC MYELOPATHY: ICD-10-CM

## 2025-01-03 DIAGNOSIS — C79.9 METASTATIC ADENOCARCINOMA (HCC): ICD-10-CM

## 2025-01-03 DIAGNOSIS — C50.919 CARCINOMA OF BREAST METASTATIC TO BONE, UNSPECIFIED LATERALITY (HCC): ICD-10-CM

## 2025-01-03 DIAGNOSIS — R20.0 LOWER EXTREMITY NUMBNESS: ICD-10-CM

## 2025-01-03 DIAGNOSIS — C79.51 CARCINOMA OF BREAST METASTATIC TO BONE, UNSPECIFIED LATERALITY (HCC): ICD-10-CM

## 2025-01-03 PROCEDURE — 93306 TTE W/DOPPLER COMPLETE: CPT | Performed by: INTERNAL MEDICINE

## 2025-01-03 PROCEDURE — 93356 MYOCRD STRAIN IMG SPCKL TRCK: CPT | Performed by: INTERNAL MEDICINE

## 2025-01-03 PROCEDURE — 72072 X-RAY EXAM THORAC SPINE 3VWS: CPT | Performed by: STUDENT IN AN ORGANIZED HEALTH CARE EDUCATION/TRAINING PROGRAM

## 2025-01-07 ENCOUNTER — HOSPITAL ENCOUNTER (OUTPATIENT)
Dept: NUCLEAR MEDICINE | Facility: HOSPITAL | Age: 70
Discharge: HOME | End: 2025-01-07
Attending: INTERNAL MEDICINE
Payer: MEDICARE

## 2025-01-07 DIAGNOSIS — C50.919 CARCINOMA OF BREAST METASTATIC TO BONE, UNSPECIFIED LATERALITY (HCC): ICD-10-CM

## 2025-01-07 DIAGNOSIS — Z85.3 PERSONAL HISTORY OF BREAST CANCER: ICD-10-CM

## 2025-01-07 DIAGNOSIS — C79.51 CARCINOMA OF BREAST METASTATIC TO BONE, UNSPECIFIED LATERALITY (HCC): ICD-10-CM

## 2025-01-07 LAB — GLUCOSE BLDC GLUCOMTR-MCNC: 87 MG/DL (ref 70–99)

## 2025-01-07 PROCEDURE — 82962 GLUCOSE BLOOD TEST: CPT

## 2025-01-07 PROCEDURE — 78815 PET IMAGE W/CT SKULL-THIGH: CPT | Performed by: INTERNAL MEDICINE

## 2025-01-09 ENCOUNTER — TELEPHONE (OUTPATIENT)
Age: 70
End: 2025-01-09

## 2025-01-09 ENCOUNTER — OFFICE VISIT (OUTPATIENT)
Dept: SURGERY | Facility: CLINIC | Age: 70
End: 2025-01-09
Payer: MEDICARE

## 2025-01-09 VITALS — DIASTOLIC BLOOD PRESSURE: 79 MMHG | HEART RATE: 84 BPM | SYSTOLIC BLOOD PRESSURE: 144 MMHG

## 2025-01-09 DIAGNOSIS — Z98.890 POSTOPERATIVE STATE: Primary | ICD-10-CM

## 2025-01-09 DIAGNOSIS — T45.1X5A CHEMOTHERAPY-INDUCED DIARRHEA: ICD-10-CM

## 2025-01-09 DIAGNOSIS — Z98.1 S/P FUSION OF THORACIC SPINE: ICD-10-CM

## 2025-01-09 DIAGNOSIS — Z48.89 ENCOUNTER FOR POSTOPERATIVE WOUND CHECK: ICD-10-CM

## 2025-01-09 DIAGNOSIS — K52.1 CHEMOTHERAPY-INDUCED DIARRHEA: ICD-10-CM

## 2025-01-09 DIAGNOSIS — G89.29 CHRONIC LEFT-SIDED THORACIC BACK PAIN: ICD-10-CM

## 2025-01-09 DIAGNOSIS — C79.51 CARCINOMA OF BREAST METASTATIC TO BONE, UNSPECIFIED LATERALITY (HCC): ICD-10-CM

## 2025-01-09 DIAGNOSIS — C50.919 CARCINOMA OF BREAST METASTATIC TO BONE, UNSPECIFIED LATERALITY (HCC): ICD-10-CM

## 2025-01-09 DIAGNOSIS — M54.6 CHRONIC LEFT-SIDED THORACIC BACK PAIN: ICD-10-CM

## 2025-01-09 PROCEDURE — 99213 OFFICE O/P EST LOW 20 MIN: CPT | Performed by: STUDENT IN AN ORGANIZED HEALTH CARE EDUCATION/TRAINING PROGRAM

## 2025-01-09 NOTE — PATIENT INSTRUCTIONS
Refill policies:    Allow 2-3 business days for refills; controlled substances may take longer.  Contact your pharmacy at least 5 days prior to running out of medication and have them send an electronic request or submit request through the “request refill” option in your [employer] account.  Refills are not addressed on weekends; covering physicians do not authorize routine medications on weekends.  No narcotics or controlled substances are refilled after noon on Fridays or by on call physicians.  By law, narcotics must be electronically prescribed.  A 30 day supply with no refills is the maximum allowed.  If your prescription is due for a refill, you may be due for a follow up appointment.  To best provide you care, patients receiving routine medications need to be seen at least once a year.  Patients receiving narcotic/controlled substance medications need to be seen at least once every 3 months.  In the event that your preferred pharmacy does not have the requested medication in stock (e.g. Backordered), it is your responsibility to find another pharmacy that has the requested medication available.  We will gladly send a new prescription to that pharmacy at your request.    Scheduling Tests:    If your physician has ordered radiology tests such as MRI or CT scans, please contact Central Scheduling at [phone] right away to schedule the test.  Once scheduled, the [facility] Centralized Referral Team will work with your insurance carrier to obtain pre-certification or prior authorization.  Depending on your insurance carrier, approval may take 3-10 days.  It is highly recommended patients assure they have received an authorization before having a test performed.  If test is done without insurance authorization, patient may be responsible for the entire amount billed.      Precertification and Prior Authorizations:  If your physician has recommended that you have a procedure or additional testing performed the [facility]  Centralized Referral Team will contact your insurance carrier to obtain pre-certification or prior authorization.    You are strongly encouraged to contact your insurance carrier to verify that your procedure/test has been approved and is a COVERED benefit.  Although the [facility] Centralized Referral Team does its due diligence, the insurance carrier gives the disclaimer that \"Although the procedure is authorized, this does not guarantee payment.\"    Ultimately the patient is responsible for payment.   Thank you for your understanding in this matter.  Paperwork Completion:  If you require FMLA or disability paperwork for your recovery, please make sure to either drop it off or have it faxed to our office at [phone]. Be sure the form has your name and date of birth on it.  The form will be faxed to our Forms Department and they will complete it for you.  There is a 25$ fee for all forms that need to be filled out.  Please be aware there is a 10-14 day turnaround time.  You will need to sign a release of information (AIDEN) form if your paperwork does not come with one.  You may call the Forms Department with any questions at [phone].  Their fax number is [phone].

## 2025-01-09 NOTE — PROGRESS NOTES
Established Neurosurgery Patient    Patient: Cris Han  Medical Record Number: [id number]  YOB: 1955  PCP: Kevin Chahal MD    Reason for visit: 1 year post op     HISTORY OF PRESENTING ILLNESS:  Cris Han is a pleasant 69 year old female who returns to the neurosurgery clinic today approximately 1 year s/p T5-T7 laminectomies for decompression of spinal cord, right transpedicular T6-T7 approaches for resection of tumor (metastatic breast cancer) and nerve root decompression, and T4-T8 posterior fixation and fusion with Dr. Liao on 1/30/2024. From a surgery standpoint, she has been recovering well.  She notes intermittent left paraspinal thoracic back pain that is tolerable in nature.  She notes significant improvement in her balance and lower extremity strength.  She has no new neurologic complaints.  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 She follows with oncology and radiation oncology.  She recently underwent a PET scan which demonstrated a new lesion at T9 with increased activity in the known lesion at T11, but decreased activity in the rest of the known bony lesions.  She reports that she is transitioning from one chemotherapy to another given concern for resistance with the presence of the new lesion and persistence of the other.  She has been tolerating chemo well overall.  She did have some issues initially with diarrhea.  She is concerned about the overall effects of the chemotherapy and is interested in finding a functional medicine provider to help address some of these concerns from a holistic approach.  She has new x-rays for review today.    Past Medical History:    Breast cancer (HCC)    right breast    High blood pressure      Past Surgical History:   Procedure Laterality Date    Laminectomy,thoracic  01/30/2024    T5 - T7 laminectomy, resection of tumor, T5 - T8 posterior fixation and fusion    Mastectomy right        Family  History   Problem Relation Age of Onset    Breast Cancer Sister         ductal      Social History     Socioeconomic History    Marital status:    Tobacco Use    Smoking status: Never    Smokeless tobacco: Never   Vaping Use    Vaping status: Never Used   Substance and Sexual Activity    Alcohol use: Yes     Alcohol/week: 1.0 standard drink of alcohol     Types: 1 Cans of beer per week    Drug use: No   Other Topics Concern    Caffeine Concern Yes     Comment: Coffee      Allergies[1]   Current Medications:  Current Outpatient Medications   Medication Sig Dispense Refill    loperamide 2 MG Oral Cap Take 1 capsule (2 mg total) by mouth every 2 (two) hours as needed.      Neratinib Maleate 40 MG Oral Tab       Ribociclib Succ, 600 MG Dose, 200 MG Oral Tablet Therapy Pack Take 600 mg by mouth daily. (Patient not taking: Reported on 7/31/2024)      cyanocobalamin 1000 MCG Oral Tab Take 1 tablet (1,000 mcg total) by mouth daily.      acetaminophen 500 MG Oral Tab Take 1 tablet (500 mg total) by mouth every 6 (six) hours as needed for Pain.      ferrous sulfate 325 (65 FE) MG Oral Tab EC Take 1 tablet (325 mg total) by mouth daily with breakfast.      Zinc 50 MG Oral Cap Take 50 mg by mouth as needed.      Kelp 0.15 MG Oral Tab Take by mouth as needed.      CHLOROPHYLL OR Take by mouth as needed.      Magnesium Hydroxide (MAGNESIA OR) Take by mouth.      Ascorbic Acid (VITAMIN C) 1000 MG Oral Tab Take 1 tablet (1,000 mg total) by mouth daily.      Vitamin D3, Cholecalciferol, 125 MCG (5000 UT) Oral Cap Take 1 capsule (5,000 Units total) by mouth daily.      B Complex Vitamins (B COMPLEX OR) Take by mouth daily.      Omega-3 1000 MG Oral Cap Take by mouth. (Patient not taking: Reported on 7/31/2024)          REVIEW OF SYSTEMS:  Comprehensive review of systems completed and negative with the exception of aforementioned information in the HPI.     PHYSICAL EXAM:  1/9/2025     11:43 AM      /79   BP Location Left  arm   Patient Position Sitting   Cuff Size adult   Pulse 84   Pain Score 0 - (None)     Wt Readings from Last 6 Encounters:   10/09/24 115 lb 12.8 oz (52.5 kg)   07/31/24 123 lb 12.8 oz (56.2 kg)   07/29/24 128 lb (58.1 kg)   04/29/24 129 lb (58.5 kg)   03/14/24 127 lb 9.6 oz (57.9 kg)   02/29/24 126 lb (57.2 kg)        General: Well developed, well nourished, in no acute distress.     Incision: Healed    HEENT: Normocephalic, atraumatic.    Respirations: Non-labored     Neurologic / Musculoskeletal: Awake, alert, and interactive. Recent and remote memory appear intact. Attention span and concentration are appropriate. No dysarthria. Appropriately names objects. Coordination and motor control grossly intact. Patient follows commands briskly and appropriately.     Cervical Spine:      Motor/ Extremities   Deltoid Biceps Triceps    Sensation   R 5/5 5/5 5/5 5/5 Intact to light touch   L 5/5 5/5 5/5 5/5 Intact to light touch       Lumbar Spine:    Motor / Extremities   Hip   flexion Knee   extension Dorsiflexion Plantarflexion   Sensation   R 5/5 5/5 5/5 5/5 Intact to light touch   L 5/5 5/5 5/5 5/5 Intact to light touch       IMAGING:  XR thoracic spine AP/lateral 1/3/2025  FINDINGS:  ALIGNMENT: Normal alignment.    VERTEBRAL BODIES:   Mild degenerative endplate changes seen throughout the thoracic spine. There is no acute fracture or dislocation.  Postoperative changes of pedicle screw and posterior fusion of the upper thoracic spine.  Surgical hardware is intact.  DISC SPACES: Mild widespread disc height narrowing.    OTHER: Negative.                 Impression   CONCLUSION:     Intact upper thoracic fusion hardware.     Mild degenerative disc disease throughout the thoracic spine without acute fracture or vertebral body  height loss           Dictated by (CST): Valentín Pierre MD on 1/07/2025 at 11:39 AM      Finalized by (CST): Valentín Pierre MD on 1/07/2025 at 11:40 AM           Imaging reviewed.  Images shown to  patient in the room today.    ASSESSMENT / PLAN:    ICD-10-CM   1. Postoperative state  Z98.890      2. S/P fusion of thoracic spine  Z98.1      3. Carcinoma of breast metastatic to bone, unspecified laterality (HCC)  C50.919    C79.51      4. Chemotherapy-induced diarrhea  K52.1    T45.1X5A      5. Chronic left-sided thoracic back pain  M54.6    G89.29      6. Encounter for postoperative wound check  Z48.89        Aphrodite SOO Han returns to the clinic today approximately 1 year s/p T5-T7 laminectomies for decompression of spinal cord, right transpedicular T6-T7 approaches for resection of tumor and nerve root decompression, and T4-T8 posterior fixation and fusion with Dr. Liao on 1/30/2024.  The patient is recovering well from a surgical standpoint.  She is neurologically intact.  X-rays demonstrate intact hardware without any complications.  There is no further role for neurosurgical follow-up at this time, but we are available for any questions or concerns that she may have in the future.  Provided an integrative medicine referral to help her manage her chronic disease and side effects from her treatments.  Further management of her metastatic breast cancer per oncology and radiation oncology.    -Medications Prescribed: None   -Imaging Ordered: None  -Referrals Placed: integrative medicine   -Follow up: as needed     Plan was reviewed and discussed in detail with the patient. Patient encouraged to call the office with any questions or concerns of new/worsening neurologic symptoms. Patient demonstrated good understanding and was agreeable with the plan.     Visit time: 25 minutes   Over 50% of that time was spent providing patient education and discussing care plan.    Koby Glaser PA-C  Physician Assistant- Neurosurgery   [facility]  1/9/2025, 9:11 AM               [1] No Known Allergies

## 2025-01-09 NOTE — PROGRESS NOTES
Patient is here for 1 year post op.  She is s.p  T5-T7 laminectomies for decompression of spinal cord, right transpedicular T6-T7 approaches for resection of tumor and nerve root decompression, and T4-T8 posterior fixation and fusion with Dr. Liao on 1/30/2024.  She completed a thoracic xray on 1-3-25.  She states she is doing very well.  She does feel a pulling in her left trap but otherwise denies pain.  She had a PET scan a few days ago that showed some new spots that may require treatment.  She would like to discuss any restrictions she may have for lifting.

## 2025-01-14 ENCOUNTER — LAB ENCOUNTER (OUTPATIENT)
Dept: LAB | Facility: HOSPITAL | Age: 70
End: 2025-01-14
Attending: INTERNAL MEDICINE
Payer: MEDICARE

## 2025-01-14 DIAGNOSIS — Z85.3 PERSONAL HISTORY OF MALIGNANT NEOPLASM OF BREAST: Primary | ICD-10-CM

## 2025-01-14 DIAGNOSIS — Z85.3 PERSONAL HISTORY OF BREAST CANCER: ICD-10-CM

## 2025-01-14 LAB
ALBUMIN SERPL-MCNC: 4.7 G/DL (ref 3.2–4.8)
ALBUMIN/GLOB SERPL: 1.6 {RATIO} (ref 1–2)
ALP LIVER SERPL-CCNC: 79 U/L
ALT SERPL-CCNC: 16 U/L
ANION GAP SERPL CALC-SCNC: 6 MMOL/L (ref 0–18)
AST SERPL-CCNC: 19 U/L (ref ?–34)
BASOPHILS # BLD AUTO: 0.06 X10(3) UL (ref 0–0.2)
BASOPHILS NFR BLD AUTO: 0.8 %
BILIRUB SERPL-MCNC: 0.6 MG/DL (ref 0.2–1.1)
BUN BLD-MCNC: 19 MG/DL (ref 9–23)
BUN/CREAT SERPL: 24.4 (ref 10–20)
CALCIUM BLD-MCNC: 10.1 MG/DL (ref 8.7–10.4)
CHLORIDE SERPL-SCNC: 102 MMOL/L (ref 98–112)
CO2 SERPL-SCNC: 31 MMOL/L (ref 21–32)
CREAT BLD-MCNC: 0.78 MG/DL
DEPRECATED RDW RBC AUTO: 43.2 FL (ref 35.1–46.3)
EGFRCR SERPLBLD CKD-EPI 2021: 83 ML/MIN/1.73M2 (ref 60–?)
EOSINOPHIL # BLD AUTO: 0.19 X10(3) UL (ref 0–0.7)
EOSINOPHIL NFR BLD AUTO: 2.5 %
ERYTHROCYTE [DISTWIDTH] IN BLOOD BY AUTOMATED COUNT: 13.8 % (ref 11–15)
FASTING STATUS PATIENT QL REPORTED: NO
GLOBULIN PLAS-MCNC: 2.9 G/DL (ref 2–3.5)
GLUCOSE BLD-MCNC: 100 MG/DL (ref 70–99)
HCT VFR BLD AUTO: 36.6 %
HGB BLD-MCNC: 12.5 G/DL
IMM GRANULOCYTES # BLD AUTO: 0.03 X10(3) UL (ref 0–1)
IMM GRANULOCYTES NFR BLD: 0.4 %
LYMPHOCYTES # BLD AUTO: 1.27 X10(3) UL (ref 1–4)
LYMPHOCYTES NFR BLD AUTO: 16.5 %
MCH RBC QN AUTO: 29.2 PG (ref 26–34)
MCHC RBC AUTO-ENTMCNC: 34.2 G/DL (ref 31–37)
MCV RBC AUTO: 85.5 FL
MONOCYTES # BLD AUTO: 0.96 X10(3) UL (ref 0.1–1)
MONOCYTES NFR BLD AUTO: 12.5 %
NEUTROPHILS # BLD AUTO: 5.2 X10 (3) UL (ref 1.5–7.7)
NEUTROPHILS # BLD AUTO: 5.2 X10(3) UL (ref 1.5–7.7)
NEUTROPHILS NFR BLD AUTO: 67.3 %
OSMOLALITY SERPL CALC.SUM OF ELEC: 290 MOSM/KG (ref 275–295)
PLATELET # BLD AUTO: 283 10(3)UL (ref 150–450)
POTASSIUM SERPL-SCNC: 5.1 MMOL/L (ref 3.5–5.1)
PROT SERPL-MCNC: 7.6 G/DL (ref 5.7–8.2)
RBC # BLD AUTO: 4.28 X10(6)UL
SODIUM SERPL-SCNC: 139 MMOL/L (ref 136–145)
WBC # BLD AUTO: 7.7 X10(3) UL (ref 4–11)

## 2025-01-14 PROCEDURE — 80053 COMPREHEN METABOLIC PANEL: CPT

## 2025-01-14 PROCEDURE — 36415 COLL VENOUS BLD VENIPUNCTURE: CPT

## 2025-01-14 PROCEDURE — 85025 COMPLETE CBC W/AUTO DIFF WBC: CPT

## 2025-01-15 ENCOUNTER — APPOINTMENT (OUTPATIENT)
Dept: RADIATION ONCOLOGY | Facility: HOSPITAL | Age: 70
End: 2025-01-15
Attending: INTERNAL MEDICINE
Payer: MEDICARE

## 2025-01-22 ENCOUNTER — OFFICE VISIT (OUTPATIENT)
Dept: RADIATION ONCOLOGY | Facility: HOSPITAL | Age: 70
End: 2025-01-22
Attending: INTERNAL MEDICINE
Payer: MEDICARE

## 2025-01-22 VITALS
HEART RATE: 92 BPM | WEIGHT: 111.63 LBS | DIASTOLIC BLOOD PRESSURE: 89 MMHG | OXYGEN SATURATION: 100 % | TEMPERATURE: 98 F | RESPIRATION RATE: 16 BRPM | BODY MASS INDEX: 18 KG/M2 | SYSTOLIC BLOOD PRESSURE: 111 MMHG

## 2025-01-22 DIAGNOSIS — C79.51 CARCINOMA OF RIGHT BREAST METASTATIC TO BONE (HCC): Primary | ICD-10-CM

## 2025-01-22 DIAGNOSIS — C50.911 CARCINOMA OF RIGHT BREAST METASTATIC TO BONE (HCC): Primary | ICD-10-CM

## 2025-01-22 PROCEDURE — 99211 OFF/OP EST MAY X REQ PHY/QHP: CPT

## 2025-01-22 NOTE — PROGRESS NOTES
Nursing Follow-Up Note    Patient: Cris Han  YOB: 1955  Age: 69 year old  Radiation Oncologist: Dr. Key Leon  Referring Physician: Mark Cid  Chief Complaint:   Chief Complaint   Patient presents with    Follow - Up     RT     Date: 1/22/2025    Toxicities:   N/A    Vital Signs: /89 (BP Location: Left arm, Patient Position: Sitting, Cuff Size: adult)   Pulse 92   Temp 97.6 °F (36.4 °C) (Temporal)   Resp 16   Wt 50.6 kg (111 lb 9.6 oz)   SpO2 100%   BMI 18.01 kg/m² ,   Wt Readings from Last 6 Encounters:   01/22/25 50.6 kg (111 lb 9.6 oz)   10/09/24 52.5 kg (115 lb 12.8 oz)   07/31/24 56.2 kg (123 lb 12.8 oz)   07/29/24 58.1 kg (128 lb)   04/29/24 58.5 kg (129 lb)   03/14/24 57.9 kg (127 lb 9.6 oz)       Allergies:  Allergies[1]    Nursing Note:   Patient completed T4-T8 RT on 3/22/24. Last seen 10/9/24. Patient had PET scan on 1/7/25, Dr. Leon to review with patient. Saw Dr. Ennis virtually on 1/9. Patient feeling better after switching chemotherapy- had first infusion 1/15/25. Has had weight loss due to diarrhea. Diarrhea has resolved with infusion medication change. Eating and drinking well. Drinking 1 protein shake per day. Denies pain. Does have decreased sensation to bilateral feet, but denies falls. Next infusion scheduled for 2/4/25. Plan to have MRI done, Dr. Leon will call with the results. Patient will have virtual follow up with Dr. Leon after PET scan.            [1] No Known Allergies

## 2025-01-22 NOTE — PATIENT INSTRUCTIONS
Follow up with Dr. Leon in about 2 months for a virtual visit after your PET scan.  Dali will call you to schedule your follow up appointment.     Schedule your CT scan, MRI, and PET SCAN prior to your follow up. Call [phone] to schedule.    If you cannot get the MRI scheduled within the next 2 weeks, let us know and we will help move that up.     Dr. Leon will call you with the MRI results.       Please call [phone] with any radiation questions.

## 2025-01-22 NOTE — PROGRESS NOTES
[facility]  Radiation Oncology Follow-up    Patient Name: Cris Han   MRN: [id number]  Referring Physician: Susan Ennis MD    Diagnosis: metastatic ER+ breast cancer    Oncologic History  The patient was diagnosed with R breast cancer in late 2018. She was initially treated with mastectomy and ALND showing invasive lobular carcinoma, grade 2, pT2 (3 cm) N3 (13/22) M0 disease, ER+, AR/HER2-. This was followed by adjuvant ddAC-T chemotherapy, AI, and PMRT (completed July 2019 at [facility]). After PMRT she was on AI.  Jan 2024: presented to ED with 2 week history of back pain. On 1/30/24 underwent T5-T7 laminectomy and T4-T8 fixation and fusion. Path showed adenocarcinoma c/w breast primary, disease ER+, AR/HER2-.   3/11/24-3/22/24: completed palliative RT from T4-T8, 3000 cGy in 10 fractions.   3/6/24: started fulvestrant.  4/1/24: started ribociclib, zometa.     Interval History:  The patient is a 69 year old female with above diagnosis and treatment rendered who presents today for follow-up.    Stopped neratinib (Diarrhea has resolved)  Continuing fulvestrant and Zometa  Will be starting Enhertu     Feels well  No back pain   Trying to regain some weight     Will have PET the week of 3/10 as well as CT chest        Medications  Current Outpatient Medications   Medication Sig Dispense Refill    cyanocobalamin 1000 MCG Oral Tab Take 1 tablet (1,000 mcg total) by mouth daily.      ferrous sulfate 325 (65 FE) MG Oral Tab EC Take 1 tablet (325 mg total) by mouth daily with breakfast.      Zinc 50 MG Oral Cap Take 50 mg by mouth as needed.      Kelp 0.15 MG Oral Tab Take by mouth as needed.      CHLOROPHYLL OR Take by mouth as needed.      Magnesium Hydroxide (MAGNESIA OR) Take by mouth.      Ascorbic Acid (VITAMIN C) 1000 MG Oral Tab Take 1 tablet (1,000 mg total) by mouth daily.      Vitamin D3, Cholecalciferol, 125 MCG (5000 UT) Oral Cap Take 1 capsule (5,000 Units total) by mouth daily.      B  Complex Vitamins (B COMPLEX OR) Take by mouth daily.         Physical Exam  Vitals:    25 1258   BP: 111/89   Pulse: 92   Resp: 16   Temp: 97.6 °F (36.4 °C)       ECO    Gen: NAD  HEENT: NCAT, EOMI  Lungs: breathing comfortably on RA  Ext: wwp, no cyanosis or edema  Neuro: CN II-XII grossly intact  Strength 5/5 throughout    Imaging: personally reviewed     10/7/24: PET  -L hepatic lobe focus has resolved  -all other areas of bone uptake smaller/less avid or resolved    24: PET  -hypermetabolic focus in the L hepatic lobe  -multiple sites of osseous metastatic disease    24: MRI Tspine  -stable disease at T6  -? Increase in size of disease at T10, T11, T12 compared to 24 (pre-RT scan)    24: MRI c-spine  -stable or slightly better disease at C4    Assessment: 69 year old female with metastatic ER+ breast cancer s/p palliative RT to the Tspine after decompressive surgery. She has been on therapy with neratinib and recent PET shows excellent response.    Plan:     -to see [facility] on Monday for labs, will likely repeat imaging in 3-6 months  -RTC 3-4 months or sooner if needed    Key Leon MD  Radiation Oncology    [facility] high including chart review, personal review of imaging, exam, and time spent with the patient in counseling.

## 2025-01-29 ENCOUNTER — HOSPITAL ENCOUNTER (OUTPATIENT)
Dept: MRI IMAGING | Facility: HOSPITAL | Age: 70
Discharge: HOME | End: 2025-01-29
Attending: INTERNAL MEDICINE
Payer: MEDICARE

## 2025-01-29 DIAGNOSIS — C50.911 CARCINOMA OF RIGHT BREAST METASTATIC TO BONE (HCC): ICD-10-CM

## 2025-01-29 DIAGNOSIS — C79.51 CARCINOMA OF RIGHT BREAST METASTATIC TO BONE (HCC): ICD-10-CM

## 2025-01-29 PROCEDURE — A9575 INJ GADOTERATE MEGLUMI 0.1ML: HCPCS | Performed by: INTERNAL MEDICINE

## 2025-01-29 PROCEDURE — 72157 MRI CHEST SPINE W/O & W/DYE: CPT | Performed by: INTERNAL MEDICINE

## 2025-01-29 RX ORDER — DIPHENHYDRAMINE HYDROCHLORIDE 50 MG/ML
10 INJECTION, SOLUTION INTRAMUSCULAR; INTRAVENOUS
Status: COMPLETED | OUTPATIENT
Start: 2025-01-29 | End: 2025-01-29

## 2025-01-29 RX ADMIN — DIPHENHYDRAMINE HYDROCHLORIDE 10 ML: 50 INJECTION, SOLUTION INTRAMUSCULAR; INTRAVENOUS at 08:48:00

## 2025-02-03 ENCOUNTER — LAB ENCOUNTER (OUTPATIENT)
Dept: LAB | Facility: HOSPITAL | Age: 70
End: 2025-02-03
Attending: INTERNAL MEDICINE
Payer: MEDICARE

## 2025-02-03 DIAGNOSIS — Z85.3 PERSONAL HISTORY OF BREAST CANCER: ICD-10-CM

## 2025-02-03 LAB
ALBUMIN SERPL-MCNC: 4.2 G/DL (ref 3.2–4.8)
ALBUMIN/GLOB SERPL: 1.6 {RATIO} (ref 1–2)
ALP LIVER SERPL-CCNC: 71 U/L
ALT SERPL-CCNC: 23 U/L
ANION GAP SERPL CALC-SCNC: 7 MMOL/L (ref 0–18)
AST SERPL-CCNC: 24 U/L (ref ?–34)
BASOPHILS # BLD AUTO: 0.08 X10(3) UL (ref 0–0.2)
BASOPHILS NFR BLD AUTO: 1.8 %
BILIRUB SERPL-MCNC: 0.5 MG/DL (ref 0.2–1.1)
BUN BLD-MCNC: 19 MG/DL (ref 9–23)
BUN/CREAT SERPL: 25.7 (ref 10–20)
CALCIUM BLD-MCNC: 9.2 MG/DL (ref 8.7–10.4)
CHLORIDE SERPL-SCNC: 105 MMOL/L (ref 98–112)
CO2 SERPL-SCNC: 29 MMOL/L (ref 21–32)
CREAT BLD-MCNC: 0.74 MG/DL
DEPRECATED RDW RBC AUTO: 47.3 FL (ref 35.1–46.3)
EGFRCR SERPLBLD CKD-EPI 2021: 88 ML/MIN/1.73M2 (ref 60–?)
EOSINOPHIL # BLD AUTO: 0.25 X10(3) UL (ref 0–0.7)
EOSINOPHIL NFR BLD AUTO: 5.7 %
ERYTHROCYTE [DISTWIDTH] IN BLOOD BY AUTOMATED COUNT: 14.9 % (ref 11–15)
FASTING STATUS PATIENT QL REPORTED: NO
GLOBULIN PLAS-MCNC: 2.7 G/DL (ref 2–3.5)
GLUCOSE BLD-MCNC: 82 MG/DL (ref 70–99)
HCT VFR BLD AUTO: 37.5 %
HGB BLD-MCNC: 12.4 G/DL
IMM GRANULOCYTES # BLD AUTO: 0.02 X10(3) UL (ref 0–1)
IMM GRANULOCYTES NFR BLD: 0.5 %
LYMPHOCYTES # BLD AUTO: 1.18 X10(3) UL (ref 1–4)
LYMPHOCYTES NFR BLD AUTO: 27.1 %
MCH RBC QN AUTO: 29.3 PG (ref 26–34)
MCHC RBC AUTO-ENTMCNC: 33.1 G/DL (ref 31–37)
MCV RBC AUTO: 88.7 FL
MONOCYTES # BLD AUTO: 0.66 X10(3) UL (ref 0.1–1)
MONOCYTES NFR BLD AUTO: 15.2 %
NEUTROPHILS # BLD AUTO: 2.16 X10 (3) UL (ref 1.5–7.7)
NEUTROPHILS # BLD AUTO: 2.16 X10(3) UL (ref 1.5–7.7)
NEUTROPHILS NFR BLD AUTO: 49.7 %
OSMOLALITY SERPL CALC.SUM OF ELEC: 293 MOSM/KG (ref 275–295)
PLATELET # BLD AUTO: 325 10(3)UL (ref 150–450)
POTASSIUM SERPL-SCNC: 4.5 MMOL/L (ref 3.5–5.1)
PROT SERPL-MCNC: 6.9 G/DL (ref 5.7–8.2)
RBC # BLD AUTO: 4.23 X10(6)UL
SODIUM SERPL-SCNC: 141 MMOL/L (ref 136–145)
WBC # BLD AUTO: 4.4 X10(3) UL (ref 4–11)

## 2025-02-03 PROCEDURE — 80053 COMPREHEN METABOLIC PANEL: CPT

## 2025-02-03 PROCEDURE — 85025 COMPLETE CBC W/AUTO DIFF WBC: CPT

## 2025-02-03 PROCEDURE — 36415 COLL VENOUS BLD VENIPUNCTURE: CPT

## 2025-02-07 ENCOUNTER — TELEPHONE (OUTPATIENT)
Dept: SURGERY | Facility: CLINIC | Age: 70
End: 2025-02-07

## 2025-02-07 NOTE — TELEPHONE ENCOUNTER
Plan of care notes dated 02/06/2025 received 2/7/2025 from [facility] PT and [facility]. Initial PT eval completed. Pt will attend therapy 2x/week for 8 weeks. Endorsed to provider for review and signature. Will send to scanning once signed.

## 2025-02-24 ENCOUNTER — LAB ENCOUNTER (OUTPATIENT)
Dept: LAB | Facility: HOSPITAL | Age: 70
End: 2025-02-24
Attending: INTERNAL MEDICINE
Payer: MEDICARE

## 2025-02-24 DIAGNOSIS — Z85.3 PERSONAL HISTORY OF BREAST CANCER: ICD-10-CM

## 2025-02-24 LAB
ALBUMIN SERPL-MCNC: 4.7 G/DL (ref 3.2–4.8)
ALBUMIN/GLOB SERPL: 2 {RATIO} (ref 1–2)
ALP LIVER SERPL-CCNC: 88 U/L
ALT SERPL-CCNC: 30 U/L
ANION GAP SERPL CALC-SCNC: 8 MMOL/L (ref 0–18)
AST SERPL-CCNC: 33 U/L (ref ?–34)
BASOPHILS # BLD AUTO: 0.08 X10(3) UL (ref 0–0.2)
BASOPHILS NFR BLD AUTO: 1.2 %
BILIRUB SERPL-MCNC: 0.5 MG/DL (ref 0.2–1.1)
BUN BLD-MCNC: 17 MG/DL (ref 9–23)
BUN/CREAT SERPL: 21.3 (ref 10–20)
CALCIUM BLD-MCNC: 9.7 MG/DL (ref 8.7–10.4)
CHLORIDE SERPL-SCNC: 103 MMOL/L (ref 98–112)
CO2 SERPL-SCNC: 29 MMOL/L (ref 21–32)
CREAT BLD-MCNC: 0.8 MG/DL
DEPRECATED RDW RBC AUTO: 53.3 FL (ref 35.1–46.3)
EGFRCR SERPLBLD CKD-EPI 2021: 80 ML/MIN/1.73M2 (ref 60–?)
EOSINOPHIL # BLD AUTO: 0.46 X10(3) UL (ref 0–0.7)
EOSINOPHIL NFR BLD AUTO: 7.2 %
ERYTHROCYTE [DISTWIDTH] IN BLOOD BY AUTOMATED COUNT: 16.8 % (ref 11–15)
FASTING STATUS PATIENT QL REPORTED: NO
GLOBULIN PLAS-MCNC: 2.4 G/DL (ref 2–3.5)
GLUCOSE BLD-MCNC: 87 MG/DL (ref 70–99)
HCT VFR BLD AUTO: 39.1 %
HGB BLD-MCNC: 12.7 G/DL
IMM GRANULOCYTES # BLD AUTO: 0.02 X10(3) UL (ref 0–1)
IMM GRANULOCYTES NFR BLD: 0.3 %
LYMPHOCYTES # BLD AUTO: 1.89 X10(3) UL (ref 1–4)
LYMPHOCYTES NFR BLD AUTO: 29.5 %
MCH RBC QN AUTO: 28.5 PG (ref 26–34)
MCHC RBC AUTO-ENTMCNC: 32.5 G/DL (ref 31–37)
MCV RBC AUTO: 87.7 FL
MONOCYTES # BLD AUTO: 0.99 X10(3) UL (ref 0.1–1)
MONOCYTES NFR BLD AUTO: 15.4 %
NEUTROPHILS # BLD AUTO: 2.97 X10 (3) UL (ref 1.5–7.7)
NEUTROPHILS # BLD AUTO: 2.97 X10(3) UL (ref 1.5–7.7)
NEUTROPHILS NFR BLD AUTO: 46.4 %
OSMOLALITY SERPL CALC.SUM OF ELEC: 291 MOSM/KG (ref 275–295)
PLATELET # BLD AUTO: 348 10(3)UL (ref 150–450)
POTASSIUM SERPL-SCNC: 5.1 MMOL/L (ref 3.5–5.1)
PROT SERPL-MCNC: 7.1 G/DL (ref 5.7–8.2)
RBC # BLD AUTO: 4.46 X10(6)UL
SODIUM SERPL-SCNC: 140 MMOL/L (ref 136–145)
WBC # BLD AUTO: 6.4 X10(3) UL (ref 4–11)

## 2025-02-24 PROCEDURE — 36415 COLL VENOUS BLD VENIPUNCTURE: CPT

## 2025-02-24 PROCEDURE — 85025 COMPLETE CBC W/AUTO DIFF WBC: CPT

## 2025-02-24 PROCEDURE — 80053 COMPREHEN METABOLIC PANEL: CPT

## 2025-03-04 NOTE — PROGRESS NOTES
Pt here for C8D8 Taxol. Arrives Ambulating independently, by self          Modifications in dose or schedule: No     Aphrodite reports feeling well today. Her port was accessed by lab this morning and noted with positive blood return.  She is feeling well [facility]

## 2025-03-05 NOTE — TELEPHONE ENCOUNTER
Provider has signed the POC. Faxed back to PT in [place], confirmation received. Sent to scanning.

## 2025-03-12 ENCOUNTER — HOSPITAL ENCOUNTER (OUTPATIENT)
Dept: NUCLEAR MEDICINE | Facility: HOSPITAL | Age: 70
Discharge: HOME | End: 2025-03-12
Attending: INTERNAL MEDICINE
Payer: MEDICARE

## 2025-03-12 ENCOUNTER — HOSPITAL ENCOUNTER (OUTPATIENT)
Dept: CT IMAGING | Facility: HOSPITAL | Age: 70
Discharge: HOME | End: 2025-03-12
Attending: INTERNAL MEDICINE
Payer: MEDICARE

## 2025-03-12 DIAGNOSIS — C50.919: ICD-10-CM

## 2025-03-12 DIAGNOSIS — C79.51 CARCINOMA OF BREAST METASTATIC TO BONE, UNSPECIFIED LATERALITY (HCC): ICD-10-CM

## 2025-03-12 DIAGNOSIS — Z85.3 PERSONAL HISTORY OF BREAST CANCER: ICD-10-CM

## 2025-03-12 DIAGNOSIS — C79.51: ICD-10-CM

## 2025-03-12 DIAGNOSIS — C50.919 CARCINOMA OF BREAST METASTATIC TO BONE, UNSPECIFIED LATERALITY (HCC): ICD-10-CM

## 2025-03-12 LAB — GLUCOSE BLDC GLUCOMTR-MCNC: 91 MG/DL (ref 70–99)

## 2025-03-12 PROCEDURE — 78815 PET IMAGE W/CT SKULL-THIGH: CPT | Performed by: INTERNAL MEDICINE

## 2025-03-12 PROCEDURE — 82962 GLUCOSE BLOOD TEST: CPT

## 2025-03-12 PROCEDURE — 71250 CT THORAX DX C-: CPT | Performed by: INTERNAL MEDICINE

## 2025-03-17 ENCOUNTER — TELEPHONE (OUTPATIENT)
Dept: RADIATION ONCOLOGY | Facility: HOSPITAL | Age: 70
End: 2025-03-17

## 2025-03-17 ENCOUNTER — LAB ENCOUNTER (OUTPATIENT)
Dept: LAB | Facility: HOSPITAL | Age: 70
End: 2025-03-17
Attending: INTERNAL MEDICINE
Payer: MEDICARE

## 2025-03-17 DIAGNOSIS — Z85.3 PERSONAL HISTORY OF BREAST CANCER: ICD-10-CM

## 2025-03-17 LAB
ALBUMIN SERPL-MCNC: 4.3 G/DL (ref 3.2–4.8)
ALBUMIN/GLOB SERPL: 1.7 {RATIO} (ref 1–2)
ALP LIVER SERPL-CCNC: 92 U/L
ALT SERPL-CCNC: 46 U/L
ANION GAP SERPL CALC-SCNC: 6 MMOL/L (ref 0–18)
AST SERPL-CCNC: 43 U/L (ref ?–34)
BASOPHILS # BLD AUTO: 0.08 X10(3) UL (ref 0–0.2)
BASOPHILS NFR BLD AUTO: 1.8 %
BILIRUB SERPL-MCNC: 0.6 MG/DL (ref 0.2–1.1)
BUN BLD-MCNC: 14 MG/DL (ref 9–23)
BUN/CREAT SERPL: 17.9 (ref 10–20)
CALCIUM BLD-MCNC: 9.5 MG/DL (ref 8.7–10.4)
CHLORIDE SERPL-SCNC: 108 MMOL/L (ref 98–112)
CO2 SERPL-SCNC: 29 MMOL/L (ref 21–32)
CREAT BLD-MCNC: 0.78 MG/DL
DEPRECATED RDW RBC AUTO: 52.9 FL (ref 35.1–46.3)
EGFRCR SERPLBLD CKD-EPI 2021: 83 ML/MIN/1.73M2 (ref 60–?)
EOSINOPHIL # BLD AUTO: 0.36 X10(3) UL (ref 0–0.7)
EOSINOPHIL NFR BLD AUTO: 8 %
ERYTHROCYTE [DISTWIDTH] IN BLOOD BY AUTOMATED COUNT: 16 % (ref 11–15)
FASTING STATUS PATIENT QL REPORTED: NO
GLOBULIN PLAS-MCNC: 2.5 G/DL (ref 2–3.5)
GLUCOSE BLD-MCNC: 89 MG/DL (ref 70–99)
HCT VFR BLD AUTO: 38.9 %
HGB BLD-MCNC: 13.1 G/DL
IMM GRANULOCYTES # BLD AUTO: 0.02 X10(3) UL (ref 0–1)
IMM GRANULOCYTES NFR BLD: 0.4 %
LYMPHOCYTES # BLD AUTO: 1.32 X10(3) UL (ref 1–4)
LYMPHOCYTES NFR BLD AUTO: 29.2 %
MCH RBC QN AUTO: 29.9 PG (ref 26–34)
MCHC RBC AUTO-ENTMCNC: 33.7 G/DL (ref 31–37)
MCV RBC AUTO: 88.8 FL
MONOCYTES # BLD AUTO: 0.68 X10(3) UL (ref 0.1–1)
MONOCYTES NFR BLD AUTO: 15 %
NEUTROPHILS # BLD AUTO: 2.06 X10 (3) UL (ref 1.5–7.7)
NEUTROPHILS # BLD AUTO: 2.06 X10(3) UL (ref 1.5–7.7)
NEUTROPHILS NFR BLD AUTO: 45.6 %
OSMOLALITY SERPL CALC.SUM OF ELEC: 296 MOSM/KG (ref 275–295)
PLATELET # BLD AUTO: 288 10(3)UL (ref 150–450)
POTASSIUM SERPL-SCNC: 4.8 MMOL/L (ref 3.5–5.1)
PROT SERPL-MCNC: 6.8 G/DL (ref 5.7–8.2)
RBC # BLD AUTO: 4.38 X10(6)UL
SODIUM SERPL-SCNC: 143 MMOL/L (ref 136–145)
WBC # BLD AUTO: 4.5 X10(3) UL (ref 4–11)

## 2025-03-17 PROCEDURE — 36415 COLL VENOUS BLD VENIPUNCTURE: CPT

## 2025-03-17 PROCEDURE — 85025 COMPLETE CBC W/AUTO DIFF WBC: CPT

## 2025-03-17 PROCEDURE — 80053 COMPREHEN METABOLIC PANEL: CPT

## 2025-03-17 NOTE — TELEPHONE ENCOUNTER
Received message asking for CT scan report to be faxed to [facility]. Report not resulted yet. Called Darlene at [facility], spoke to another Darlene (not Dr. Ennis's RN Darlene) to see if they need the actual disc, or if they were just waiting for the report to be posted. Per Darlene, [facility] patient coordinator will request the disc, I just need to ask radiology here to post the results.     RN then called radiology here to see if the results can be posted for patient's appointment this afternoon. They are working on it. Patient updated on plan and is agreeable.

## 2025-03-17 NOTE — TELEPHONE ENCOUNTER
Darlene from [facility] is calling to request results of patient's chest CT be sent over to her attention.     Fax is [phone].

## 2025-03-24 ENCOUNTER — HOSPITAL ENCOUNTER (OUTPATIENT)
Dept: RADIATION ONCOLOGY | Facility: HOSPITAL | Age: 70
Discharge: HOME | End: 2025-03-24
Attending: INTERNAL MEDICINE
Payer: MEDICARE

## 2025-03-24 DIAGNOSIS — Z17.0 MALIGNANT NEOPLASM OF OVERLAPPING SITES OF LEFT BREAST IN FEMALE, ESTROGEN RECEPTOR POSITIVE (HCC): ICD-10-CM

## 2025-03-24 DIAGNOSIS — C50.919 STAGE IV BREAST CANCER IN FEMALE (HCC): Primary | ICD-10-CM

## 2025-03-24 DIAGNOSIS — C79.51 CARCINOMA OF BREAST METASTATIC TO BONE, UNSPECIFIED LATERALITY (HCC): ICD-10-CM

## 2025-03-24 DIAGNOSIS — C50.811 MALIGNANT NEOPLASM OF OVERLAPPING SITES OF RIGHT BREAST IN FEMALE, ESTROGEN RECEPTOR POSITIVE (HCC): ICD-10-CM

## 2025-03-24 DIAGNOSIS — C50.812 MALIGNANT NEOPLASM OF OVERLAPPING SITES OF LEFT BREAST IN FEMALE, ESTROGEN RECEPTOR POSITIVE (HCC): ICD-10-CM

## 2025-03-24 DIAGNOSIS — Z17.0 MALIGNANT NEOPLASM OF OVERLAPPING SITES OF RIGHT BREAST IN FEMALE, ESTROGEN RECEPTOR POSITIVE (HCC): ICD-10-CM

## 2025-03-24 DIAGNOSIS — C50.919 CARCINOMA OF BREAST METASTATIC TO BONE, UNSPECIFIED LATERALITY (HCC): ICD-10-CM

## 2025-03-27 NOTE — PROGRESS NOTES
Virtual check-in after her recent PET on 3/12 which showed stable to improved disease  Continues to feel well  Denies any back pain  Started PT and is much more active  Has been able to gain weight  Remains on Trastuzumab deruxtecan since 1/15/25    Dr. Ennis wanted short interval follow-up imaging in mid May 2025    I have placed orders for PET, CT chest, and MRI T-spine for mid May    RTC after imaging complete  Pt agreeable to plan  No charge

## 2025-04-07 ENCOUNTER — LAB ENCOUNTER (OUTPATIENT)
Dept: LAB | Facility: HOSPITAL | Age: 70
End: 2025-04-07
Attending: INTERNAL MEDICINE
Payer: MEDICARE

## 2025-04-07 DIAGNOSIS — Z85.3 PERSONAL HISTORY OF BREAST CANCER: ICD-10-CM

## 2025-04-07 LAB
ALBUMIN SERPL-MCNC: 4.4 G/DL (ref 3.2–4.8)
ALBUMIN/GLOB SERPL: 1.5 {RATIO} (ref 1–2)
ALP LIVER SERPL-CCNC: 79 U/L
ALT SERPL-CCNC: 21 U/L
ANION GAP SERPL CALC-SCNC: 10 MMOL/L (ref 0–18)
AST SERPL-CCNC: 49 U/L (ref ?–34)
BASOPHILS # BLD AUTO: 0.16 X10(3) UL (ref 0–0.2)
BASOPHILS NFR BLD AUTO: 2.1 %
BILIRUB SERPL-MCNC: 0.3 MG/DL (ref 0.2–1.1)
BUN BLD-MCNC: 10 MG/DL (ref 9–23)
BUN/CREAT SERPL: 12.8 (ref 10–20)
CALCIUM BLD-MCNC: 9.5 MG/DL (ref 8.7–10.4)
CHLORIDE SERPL-SCNC: 105 MMOL/L (ref 98–112)
CO2 SERPL-SCNC: 23 MMOL/L (ref 21–32)
CREAT BLD-MCNC: 0.78 MG/DL
DEPRECATED RDW RBC AUTO: 50.1 FL (ref 35.1–46.3)
EGFRCR SERPLBLD CKD-EPI 2021: 83 ML/MIN/1.73M2 (ref 60–?)
EOSINOPHIL # BLD AUTO: 0.38 X10(3) UL (ref 0–0.7)
EOSINOPHIL NFR BLD AUTO: 4.9 %
ERYTHROCYTE [DISTWIDTH] IN BLOOD BY AUTOMATED COUNT: 14.7 % (ref 11–15)
FASTING STATUS PATIENT QL REPORTED: NO
GLOBULIN PLAS-MCNC: 2.9 G/DL (ref 2–3.5)
GLUCOSE BLD-MCNC: 84 MG/DL (ref 70–99)
HCT VFR BLD AUTO: 41.8 %
HGB BLD-MCNC: 13.6 G/DL
IMM GRANULOCYTES # BLD AUTO: 0.07 X10(3) UL (ref 0–1)
IMM GRANULOCYTES NFR BLD: 0.9 %
LYMPHOCYTES # BLD AUTO: 1.81 X10(3) UL (ref 1–4)
LYMPHOCYTES NFR BLD AUTO: 23.4 %
MCH RBC QN AUTO: 30.3 PG (ref 26–34)
MCHC RBC AUTO-ENTMCNC: 32.5 G/DL (ref 31–37)
MCV RBC AUTO: 93.1 FL
MONOCYTES # BLD AUTO: 0.99 X10(3) UL (ref 0.1–1)
MONOCYTES NFR BLD AUTO: 12.8 %
NEUTROPHILS # BLD AUTO: 4.31 X10 (3) UL (ref 1.5–7.7)
NEUTROPHILS # BLD AUTO: 4.31 X10(3) UL (ref 1.5–7.7)
NEUTROPHILS NFR BLD AUTO: 55.9 %
OSMOLALITY SERPL CALC.SUM OF ELEC: 284 MOSM/KG (ref 275–295)
PLATELET # BLD AUTO: 310 10(3)UL (ref 150–450)
POTASSIUM SERPL-SCNC: 5.3 MMOL/L (ref 3.5–5.1)
PROT SERPL-MCNC: 7.3 G/DL (ref 5.7–8.2)
RBC # BLD AUTO: 4.49 X10(6)UL
SODIUM SERPL-SCNC: 138 MMOL/L (ref 136–145)
WBC # BLD AUTO: 7.7 X10(3) UL (ref 4–11)

## 2025-04-07 PROCEDURE — 36415 COLL VENOUS BLD VENIPUNCTURE: CPT

## 2025-04-07 PROCEDURE — 80053 COMPREHEN METABOLIC PANEL: CPT

## 2025-04-07 PROCEDURE — 85025 COMPLETE CBC W/AUTO DIFF WBC: CPT

## 2025-04-09 ENCOUNTER — TELEPHONE (OUTPATIENT)
Dept: INTERNAL MEDICINE CLINIC | Facility: CLINIC | Age: 70
End: 2025-04-09

## 2025-04-17 ENCOUNTER — TELEPHONE (OUTPATIENT)
Dept: RADIATION ONCOLOGY | Facility: HOSPITAL | Age: 70
End: 2025-04-17

## 2025-04-28 ENCOUNTER — LAB ENCOUNTER (OUTPATIENT)
Dept: LAB | Facility: HOSPITAL | Age: 70
End: 2025-04-28
Attending: INTERNAL MEDICINE
Payer: MEDICARE

## 2025-04-28 DIAGNOSIS — Z85.3 PERSONAL HISTORY OF BREAST CANCER: ICD-10-CM

## 2025-04-28 LAB
ALBUMIN SERPL-MCNC: 4.4 G/DL (ref 3.2–4.8)
ALBUMIN/GLOB SERPL: 1.6 {RATIO} (ref 1–2)
ALP LIVER SERPL-CCNC: 80 U/L (ref 55–142)
ALT SERPL-CCNC: 25 U/L (ref 10–49)
ANION GAP SERPL CALC-SCNC: 6 MMOL/L (ref 0–18)
AST SERPL-CCNC: 29 U/L (ref ?–34)
BASOPHILS # BLD AUTO: 0.08 X10(3) UL (ref 0–0.2)
BASOPHILS NFR BLD AUTO: 1.6 %
BILIRUB SERPL-MCNC: 0.5 MG/DL (ref 0.2–1.1)
BUN BLD-MCNC: 15 MG/DL (ref 9–23)
BUN/CREAT SERPL: 17.9 (ref 10–20)
CALCIUM BLD-MCNC: 9.4 MG/DL (ref 8.7–10.4)
CHLORIDE SERPL-SCNC: 106 MMOL/L (ref 98–112)
CO2 SERPL-SCNC: 29 MMOL/L (ref 21–32)
CREAT BLD-MCNC: 0.84 MG/DL (ref 0.55–1.02)
DEPRECATED RDW RBC AUTO: 49 FL (ref 35.1–46.3)
EGFRCR SERPLBLD CKD-EPI 2021: 76 ML/MIN/1.73M2 (ref 60–?)
EOSINOPHIL # BLD AUTO: 0.34 X10(3) UL (ref 0–0.7)
EOSINOPHIL NFR BLD AUTO: 6.8 %
ERYTHROCYTE [DISTWIDTH] IN BLOOD BY AUTOMATED COUNT: 14.6 % (ref 11–15)
FASTING STATUS PATIENT QL REPORTED: NO
GLOBULIN PLAS-MCNC: 2.7 G/DL (ref 2–3.5)
GLUCOSE BLD-MCNC: 99 MG/DL (ref 70–99)
HCT VFR BLD AUTO: 39.4 % (ref 35–48)
HGB BLD-MCNC: 13.1 G/DL (ref 12–16)
IMM GRANULOCYTES # BLD AUTO: 0.01 X10(3) UL (ref 0–1)
IMM GRANULOCYTES NFR BLD: 0.2 %
LYMPHOCYTES # BLD AUTO: 1.35 X10(3) UL (ref 1–4)
LYMPHOCYTES NFR BLD AUTO: 27.2 %
MCH RBC QN AUTO: 30.2 PG (ref 26–34)
MCHC RBC AUTO-ENTMCNC: 33.2 G/DL (ref 31–37)
MCV RBC AUTO: 90.8 FL (ref 80–100)
MONOCYTES # BLD AUTO: 0.78 X10(3) UL (ref 0.1–1)
MONOCYTES NFR BLD AUTO: 15.7 %
NEUTROPHILS # BLD AUTO: 2.41 X10 (3) UL (ref 1.5–7.7)
NEUTROPHILS # BLD AUTO: 2.41 X10(3) UL (ref 1.5–7.7)
NEUTROPHILS NFR BLD AUTO: 48.5 %
OSMOLALITY SERPL CALC.SUM OF ELEC: 293 MOSM/KG (ref 275–295)
PLATELET # BLD AUTO: 286 10(3)UL (ref 150–450)
POTASSIUM SERPL-SCNC: 4.6 MMOL/L (ref 3.5–5.1)
PROT SERPL-MCNC: 7.1 G/DL (ref 5.7–8.2)
RBC # BLD AUTO: 4.34 X10(6)UL (ref 3.8–5.3)
SODIUM SERPL-SCNC: 141 MMOL/L (ref 136–145)
WBC # BLD AUTO: 5 X10(3) UL (ref 4–11)

## 2025-04-28 PROCEDURE — 80053 COMPREHEN METABOLIC PANEL: CPT

## 2025-04-28 PROCEDURE — 36415 COLL VENOUS BLD VENIPUNCTURE: CPT

## 2025-04-28 PROCEDURE — 85025 COMPLETE CBC W/AUTO DIFF WBC: CPT

## 2025-05-13 ENCOUNTER — HOSPITAL ENCOUNTER (OUTPATIENT)
Dept: CT IMAGING | Facility: HOSPITAL | Age: 70
Discharge: HOME | End: 2025-05-13
Attending: INTERNAL MEDICINE
Payer: MEDICARE

## 2025-05-13 DIAGNOSIS — C50.919 STAGE IV BREAST CANCER IN FEMALE (HCC): ICD-10-CM

## 2025-05-13 PROCEDURE — 71250 CT THORAX DX C-: CPT | Performed by: INTERNAL MEDICINE

## 2025-05-14 ENCOUNTER — HOSPITAL ENCOUNTER (OUTPATIENT)
Dept: NUCLEAR MEDICINE | Facility: HOSPITAL | Age: 70
Discharge: HOME | End: 2025-05-14
Attending: INTERNAL MEDICINE
Payer: MEDICARE

## 2025-05-14 ENCOUNTER — HOSPITAL ENCOUNTER (OUTPATIENT)
Dept: CV DIAGNOSTICS | Facility: HOSPITAL | Age: 70
Discharge: HOME | End: 2025-05-14
Attending: INTERNAL MEDICINE
Payer: MEDICARE

## 2025-05-14 DIAGNOSIS — Z79.899 ENCOUNTER FOR MONITORING CARDIOTOXIC DRUG THERAPY: ICD-10-CM

## 2025-05-14 DIAGNOSIS — Z51.81 ENCOUNTER FOR MONITORING CARDIOTOXIC DRUG THERAPY: ICD-10-CM

## 2025-05-14 DIAGNOSIS — Z85.3 PERSONAL HISTORY OF BREAST CANCER: ICD-10-CM

## 2025-05-14 DIAGNOSIS — C50.811 MALIGNANT NEOPLASM OF OVERLAPPING SITES OF RIGHT BREAST IN FEMALE, ESTROGEN RECEPTOR POSITIVE (HCC): ICD-10-CM

## 2025-05-14 DIAGNOSIS — C50.919 STAGE IV BREAST CANCER IN FEMALE (HCC): ICD-10-CM

## 2025-05-14 DIAGNOSIS — C79.51 CARCINOMA OF BREAST METASTATIC TO BONE, UNSPECIFIED LATERALITY (HCC): ICD-10-CM

## 2025-05-14 DIAGNOSIS — C50.919 CARCINOMA OF BREAST METASTATIC TO BONE, UNSPECIFIED LATERALITY (HCC): ICD-10-CM

## 2025-05-14 DIAGNOSIS — Z17.0 MALIGNANT NEOPLASM OF OVERLAPPING SITES OF RIGHT BREAST IN FEMALE, ESTROGEN RECEPTOR POSITIVE (HCC): ICD-10-CM

## 2025-05-14 LAB — GLUCOSE BLDC GLUCOMTR-MCNC: 82 MG/DL (ref 70–99)

## 2025-05-14 PROCEDURE — 93308 TTE F-UP OR LMTD: CPT | Performed by: INTERNAL MEDICINE

## 2025-05-14 PROCEDURE — 93356 MYOCRD STRAIN IMG SPCKL TRCK: CPT | Performed by: INTERNAL MEDICINE

## 2025-05-14 PROCEDURE — 82962 GLUCOSE BLOOD TEST: CPT

## 2025-05-14 PROCEDURE — 78815 PET IMAGE W/CT SKULL-THIGH: CPT | Performed by: INTERNAL MEDICINE

## 2025-05-14 PROCEDURE — 76376 3D RENDER W/INTRP POSTPROCES: CPT | Performed by: INTERNAL MEDICINE

## 2025-05-19 ENCOUNTER — HOSPITAL ENCOUNTER (OUTPATIENT)
Dept: MRI IMAGING | Facility: HOSPITAL | Age: 70
Discharge: HOME | End: 2025-05-19
Attending: INTERNAL MEDICINE
Payer: MEDICARE

## 2025-05-19 ENCOUNTER — LAB ENCOUNTER (OUTPATIENT)
Dept: LAB | Facility: HOSPITAL | Age: 70
End: 2025-05-19
Attending: INTERNAL MEDICINE
Payer: MEDICARE

## 2025-05-19 DIAGNOSIS — C79.51 CARCINOMA OF BREAST METASTATIC TO BONE, UNSPECIFIED LATERALITY (HCC): ICD-10-CM

## 2025-05-19 DIAGNOSIS — C50.919 CARCINOMA OF BREAST METASTATIC TO BONE, UNSPECIFIED LATERALITY (HCC): ICD-10-CM

## 2025-05-19 DIAGNOSIS — C50.919 STAGE IV BREAST CANCER IN FEMALE (HCC): ICD-10-CM

## 2025-05-19 DIAGNOSIS — Z85.3 PERSONAL HISTORY OF BREAST CANCER: ICD-10-CM

## 2025-05-19 LAB
ALBUMIN SERPL-MCNC: 4.8 G/DL (ref 3.2–4.8)
ALBUMIN/GLOB SERPL: 1.8 {RATIO} (ref 1–2)
ALP LIVER SERPL-CCNC: 83 U/L (ref 55–142)
ALT SERPL-CCNC: 32 U/L (ref 10–49)
ANION GAP SERPL CALC-SCNC: 7 MMOL/L (ref 0–18)
AST SERPL-CCNC: 34 U/L (ref ?–34)
BASOPHILS # BLD AUTO: 0.09 X10(3) UL (ref 0–0.2)
BASOPHILS NFR BLD AUTO: 2 %
BILIRUB SERPL-MCNC: 0.5 MG/DL (ref 0.2–1.1)
BUN BLD-MCNC: 16 MG/DL (ref 9–23)
BUN/CREAT SERPL: 19.3 (ref 10–20)
CALCIUM BLD-MCNC: 9.7 MG/DL (ref 8.7–10.4)
CHLORIDE SERPL-SCNC: 103 MMOL/L (ref 98–112)
CO2 SERPL-SCNC: 30 MMOL/L (ref 21–32)
CREAT BLD-MCNC: 0.83 MG/DL (ref 0.55–1.02)
DEPRECATED RDW RBC AUTO: 46.4 FL (ref 35.1–46.3)
EGFRCR SERPLBLD CKD-EPI 2021: 77 ML/MIN/1.73M2 (ref 60–?)
EOSINOPHIL # BLD AUTO: 0.36 X10(3) UL (ref 0–0.7)
EOSINOPHIL NFR BLD AUTO: 7.9 %
ERYTHROCYTE [DISTWIDTH] IN BLOOD BY AUTOMATED COUNT: 14 % (ref 11–15)
FASTING STATUS PATIENT QL REPORTED: NO
GLOBULIN PLAS-MCNC: 2.6 G/DL (ref 2–3.5)
GLUCOSE BLD-MCNC: 82 MG/DL (ref 70–99)
HCT VFR BLD AUTO: 41 % (ref 35–48)
HGB BLD-MCNC: 13.5 G/DL (ref 12–16)
IMM GRANULOCYTES # BLD AUTO: 0.01 X10(3) UL (ref 0–1)
IMM GRANULOCYTES NFR BLD: 0.2 %
LYMPHOCYTES # BLD AUTO: 1.27 X10(3) UL (ref 1–4)
LYMPHOCYTES NFR BLD AUTO: 27.9 %
MCH RBC QN AUTO: 29.9 PG (ref 26–34)
MCHC RBC AUTO-ENTMCNC: 32.9 G/DL (ref 31–37)
MCV RBC AUTO: 90.7 FL (ref 80–100)
MONOCYTES # BLD AUTO: 0.69 X10(3) UL (ref 0.1–1)
MONOCYTES NFR BLD AUTO: 15.2 %
NEUTROPHILS # BLD AUTO: 2.13 X10 (3) UL (ref 1.5–7.7)
NEUTROPHILS # BLD AUTO: 2.13 X10(3) UL (ref 1.5–7.7)
NEUTROPHILS NFR BLD AUTO: 46.8 %
OSMOLALITY SERPL CALC.SUM OF ELEC: 290 MOSM/KG (ref 275–295)
PLATELET # BLD AUTO: 301 10(3)UL (ref 150–450)
POTASSIUM SERPL-SCNC: 4.5 MMOL/L (ref 3.5–5.1)
PROT SERPL-MCNC: 7.4 G/DL (ref 5.7–8.2)
RBC # BLD AUTO: 4.52 X10(6)UL (ref 3.8–5.3)
SODIUM SERPL-SCNC: 140 MMOL/L (ref 136–145)
WBC # BLD AUTO: 4.6 X10(3) UL (ref 4–11)

## 2025-05-19 PROCEDURE — A9575 INJ GADOTERATE MEGLUMI 0.1ML: HCPCS | Performed by: INTERNAL MEDICINE

## 2025-05-19 PROCEDURE — 80053 COMPREHEN METABOLIC PANEL: CPT

## 2025-05-19 PROCEDURE — 36415 COLL VENOUS BLD VENIPUNCTURE: CPT

## 2025-05-19 PROCEDURE — 72157 MRI CHEST SPINE W/O & W/DYE: CPT | Performed by: INTERNAL MEDICINE

## 2025-05-19 PROCEDURE — 85025 COMPLETE CBC W/AUTO DIFF WBC: CPT

## 2025-05-19 RX ORDER — GADOTERATE MEGLUMINE 376.9 MG/ML
15 INJECTION INTRAVENOUS
Status: COMPLETED | OUTPATIENT
Start: 2025-05-19 | End: 2025-05-19

## 2025-05-19 RX ADMIN — GADOTERATE MEGLUMINE 11 ML: 376.9 INJECTION INTRAVENOUS at 16:49:00

## 2025-05-20 ENCOUNTER — MED REC SCAN ONLY (OUTPATIENT)
Dept: SURGERY | Facility: CLINIC | Age: 70
End: 2025-05-20

## 2025-05-20 ENCOUNTER — TELEPHONE (OUTPATIENT)
Dept: SURGERY | Facility: CLINIC | Age: 70
End: 2025-05-20

## 2025-05-20 NOTE — TELEPHONE ENCOUNTER
Plan of care received from [facility] in [place], [place]. DOS 5/15/25. Patient to attend 1x week for 5 weeks. Endorsed to provider for review.

## 2025-05-20 NOTE — PROGRESS NOTES
Nursing Follow-Up Note    Patient: Cris Han  YOB: 1955  Age: 69 year old  Radiation Oncologist: Dr. Key Loen  Referring Physician: Mark Cid  Chief Complaint:   Chief Complaint   Patient presents with    Follow - Up     RT T4-T8 spine     Date: 5/20/2025    Toxicities: NA    Vital Signs: /69 (BP Location: Left arm, Patient Position: Sitting, Cuff Size: adult)   Pulse 85   Temp 98.1 °F (36.7 °C) (Temporal)   Resp 18   Wt 53.7 kg (118 lb 6.4 oz)   SpO2 98%   BMI 19.11 kg/m² ,   Wt Readings from Last 6 Encounters:   05/21/25 53.7 kg (118 lb 6.4 oz)   01/22/25 50.6 kg (111 lb 9.6 oz)   10/09/24 52.5 kg (115 lb 12.8 oz)   07/31/24 56.2 kg (123 lb 12.8 oz)   07/29/24 58.1 kg (128 lb)   04/29/24 58.5 kg (129 lb)       Allergies:  Allergies[1]    Nursing Note: Hx of metastatic R breast Ca. Completed RT to T4-T8 spine on 3/22/24. CT chest done 5/13, PET 5/14, and MRI T spine 5/19. Here for f/u after imaging. Accompanied by . Doing well. Denies pain or loss of sensation down LE, balance improving. Staying active with gardening, has been seeing PT once a week and keeping with HEP. Appetite good, eating and drinking well. Tolerating targeted therapy and zometa.          [1] No Known Allergies

## 2025-05-21 ENCOUNTER — OFFICE VISIT (OUTPATIENT)
Dept: RADIATION ONCOLOGY | Facility: HOSPITAL | Age: 70
End: 2025-05-21
Attending: INTERNAL MEDICINE
Payer: MEDICARE

## 2025-05-21 VITALS
WEIGHT: 118.38 LBS | BODY MASS INDEX: 19 KG/M2 | SYSTOLIC BLOOD PRESSURE: 107 MMHG | HEART RATE: 85 BPM | TEMPERATURE: 98 F | DIASTOLIC BLOOD PRESSURE: 69 MMHG | RESPIRATION RATE: 18 BRPM | OXYGEN SATURATION: 98 %

## 2025-05-21 DIAGNOSIS — C50.919 CARCINOMA OF BREAST METASTATIC TO BONE, UNSPECIFIED LATERALITY (HCC): Primary | ICD-10-CM

## 2025-05-21 DIAGNOSIS — C79.51 CARCINOMA OF BREAST METASTATIC TO BONE, UNSPECIFIED LATERALITY (HCC): Primary | ICD-10-CM

## 2025-05-21 PROCEDURE — 99211 OFF/OP EST MAY X REQ PHY/QHP: CPT

## 2025-05-21 NOTE — PATIENT INSTRUCTIONS
Follow up with Dr. Leon after repeat CT Chest in mid July (2 months)  Dali will call you to schedule your follow up appointment.     Schedule your CT prior to your follow up. Call [phone] to schedule.    Please call [phone] with any radiation questions.     Likely repeat PET in mid August, to confirm plans with Dr. Ennis

## 2025-05-29 ENCOUNTER — TELEPHONE (OUTPATIENT)
Dept: INTERNAL MEDICINE CLINIC | Facility: CLINIC | Age: 70
End: 2025-05-29

## 2025-06-16 NOTE — TELEPHONE ENCOUNTER
Lv-8/14/23  Nv-11/13/23   POC has been reviewed and signed by Jourdan. Faxed back to facility, confirmation received. Sent to scanning.

## 2025-06-16 NOTE — PROGRESS NOTES
[facility]  Radiation Oncology Follow-up    Patient Name: Cris Han   MRN: [id number]  Referring Physician: Susan Ennis MD    Diagnosis: metastatic ER+ breast cancer    Oncologic History  The patient was diagnosed with R breast cancer in late 2018. She was initially treated with mastectomy and ALND showing invasive lobular carcinoma, grade 2, pT2 (3 cm) N3 (13/22) M0 disease, ER+, MN/HER2-. This was followed by adjuvant ddAC-T chemotherapy, AI, and PMRT (completed July 2019 at [facility]). After PMRT she was on AI.  Jan 2024: presented to ED with 2 week history of back pain. On 1/30/24 underwent T5-T7 laminectomy and T4-T8 fixation and fusion. Path showed adenocarcinoma c/w breast primary, disease ER+, MN/HER2-.   3/11/24-3/22/24: completed palliative RT from T4-T8, 3000 cGy in 10 fractions.   3/6/24: started fulvestrant.  4/1/24: started ribociclib, zometa.     Interval History:  The patient is a 69 year old female with above diagnosis and treatment rendered who presents today for follow-up.    Feels great overall, working with physical therapy  Denies any areas of pain or respiratory issues  Continues on Enhertu     Medications  Current Outpatient Medications   Medication Sig Dispense Refill    cyanocobalamin 1000 MCG Oral Tab Take 1 tablet (1,000 mcg total) by mouth in the morning.      ferrous sulfate 325 (65 FE) MG Oral Tab EC Take 1 tablet (325 mg total) by mouth daily with breakfast.      Zinc 50 MG Oral Cap Take 50 mg by mouth as needed.      Kelp 0.15 MG Oral Tab Take by mouth as needed.      CHLOROPHYLL OR Take by mouth as needed.      Magnesium Hydroxide (MAGNESIA OR) Take by mouth.      Ascorbic Acid (VITAMIN C) 1000 MG Oral Tab Take 1 tablet (1,000 mg total) by mouth in the morning.      Vitamin D3, Cholecalciferol, 125 MCG (5000 UT) Oral Cap Take 1 capsule (5,000 Units total) by mouth in the morning.      B Complex Vitamins (B COMPLEX OR) Take by mouth in the morning.          Physical Exam  Vitals:    25 0903   BP: 107/69   Pulse: 85   Resp: 18   Temp: 98.1 °F (36.7 °C)       ECO    Gen: NAD  HEENT: NCAT, EOMI  CV: RRR  Lungs: CTAB  Ext: wwp, no cyanosis or edema  Neuro: CN II-XII grossly intact  Strength 5/5 throughout    Imaging: personally reviewed     25: CT chest with new 1.3 amd 1.5 cm FFO in the LLL with associated faint metabolic activity   25: PET with no evidence of bone progression  25: T spine MRI stable     Assessment: 69 year old female with metastatic ER+ breast cancer s/p palliative RT to the Tspine after decompressive surgery. She has been on therapy Enhertu and recent imaging is largely stable.     Plan:     -[facility] to order short interval repeat chest CT to evaluate for pneumonitis  -likely PET imaging in Aug and I can see her then     Key Leon MD  Radiation Oncology    [facility] high including chart review, personal review of imaging, exam, and time spent with the patient in counseling.

## 2025-06-23 ENCOUNTER — LAB ENCOUNTER (OUTPATIENT)
Dept: LAB | Facility: HOSPITAL | Age: 70
End: 2025-06-23
Attending: INTERNAL MEDICINE
Payer: MEDICARE

## 2025-06-23 DIAGNOSIS — Z85.3 PERSONAL HISTORY OF BREAST CANCER: ICD-10-CM

## 2025-06-23 LAB
ALBUMIN SERPL-MCNC: 4.5 G/DL (ref 3.2–4.8)
ALBUMIN/GLOB SERPL: 2.1 {RATIO} (ref 1–2)
ALP LIVER SERPL-CCNC: 71 U/L (ref 55–142)
ALT SERPL-CCNC: 27 U/L (ref 10–49)
ANION GAP SERPL CALC-SCNC: 5 MMOL/L (ref 0–18)
AST SERPL-CCNC: 28 U/L (ref ?–34)
BASOPHILS # BLD AUTO: 0.08 X10(3) UL (ref 0–0.2)
BASOPHILS NFR BLD AUTO: 1.2 %
BILIRUB SERPL-MCNC: 0.7 MG/DL (ref 0.2–1.1)
BUN BLD-MCNC: 13 MG/DL (ref 9–23)
BUN/CREAT SERPL: 16.7 (ref 10–20)
CALCIUM BLD-MCNC: 8.8 MG/DL (ref 8.7–10.4)
CHLORIDE SERPL-SCNC: 105 MMOL/L (ref 98–112)
CO2 SERPL-SCNC: 28 MMOL/L (ref 21–32)
CREAT BLD-MCNC: 0.78 MG/DL (ref 0.55–1.02)
DEPRECATED RDW RBC AUTO: 46.7 FL (ref 35.1–46.3)
EGFRCR SERPLBLD CKD-EPI 2021: 83 ML/MIN/1.73M2 (ref 60–?)
EOSINOPHIL # BLD AUTO: 0.37 X10(3) UL (ref 0–0.7)
EOSINOPHIL NFR BLD AUTO: 5.8 %
ERYTHROCYTE [DISTWIDTH] IN BLOOD BY AUTOMATED COUNT: 13.8 % (ref 11–15)
FASTING STATUS PATIENT QL REPORTED: NO
GLOBULIN PLAS-MCNC: 2.1 G/DL (ref 2–3.5)
GLUCOSE BLD-MCNC: 83 MG/DL (ref 70–99)
HCT VFR BLD AUTO: 40.9 % (ref 35–48)
HGB BLD-MCNC: 13.2 G/DL (ref 12–16)
IMM GRANULOCYTES # BLD AUTO: 0.04 X10(3) UL (ref 0–1)
IMM GRANULOCYTES NFR BLD: 0.6 %
LYMPHOCYTES # BLD AUTO: 1.18 X10(3) UL (ref 1–4)
LYMPHOCYTES NFR BLD AUTO: 18.4 %
MCH RBC QN AUTO: 29.5 PG (ref 26–34)
MCHC RBC AUTO-ENTMCNC: 32.3 G/DL (ref 31–37)
MCV RBC AUTO: 91.5 FL (ref 80–100)
MONOCYTES # BLD AUTO: 0.89 X10(3) UL (ref 0.1–1)
MONOCYTES NFR BLD AUTO: 13.9 %
NEUTROPHILS # BLD AUTO: 3.85 X10 (3) UL (ref 1.5–7.7)
NEUTROPHILS # BLD AUTO: 3.85 X10(3) UL (ref 1.5–7.7)
NEUTROPHILS NFR BLD AUTO: 60.1 %
OSMOLALITY SERPL CALC.SUM OF ELEC: 285 MOSM/KG (ref 275–295)
PLATELET # BLD AUTO: 252 10(3)UL (ref 150–450)
POTASSIUM SERPL-SCNC: 3.9 MMOL/L (ref 3.5–5.1)
PROT SERPL-MCNC: 6.6 G/DL (ref 5.7–8.2)
RBC # BLD AUTO: 4.47 X10(6)UL (ref 3.8–5.3)
SODIUM SERPL-SCNC: 138 MMOL/L (ref 136–145)
WBC # BLD AUTO: 6.4 X10(3) UL (ref 4–11)

## 2025-06-23 PROCEDURE — 36415 COLL VENOUS BLD VENIPUNCTURE: CPT

## 2025-06-23 PROCEDURE — 80053 COMPREHEN METABOLIC PANEL: CPT

## 2025-06-23 PROCEDURE — 85025 COMPLETE CBC W/AUTO DIFF WBC: CPT

## 2025-07-08 ENCOUNTER — TELEPHONE (OUTPATIENT)
Age: 70
End: 2025-07-08

## 2025-07-08 NOTE — TELEPHONE ENCOUNTER
Rudi patient calling back she's looking for looking for a internist a general practitioner and holistic . T/y tamia

## 2025-07-09 ENCOUNTER — TELEPHONE (OUTPATIENT)
Age: 70
End: 2025-07-09

## 2025-07-09 NOTE — TELEPHONE ENCOUNTER
I lvm for pt regarding PCP doctors Rudi sent message back to me Katty Medeiros or Kali Watkins for PCP. . No holistic dr's. T/y tamia

## 2025-07-14 ENCOUNTER — LAB ENCOUNTER (OUTPATIENT)
Dept: LAB | Facility: HOSPITAL | Age: 70
End: 2025-07-14
Attending: INTERNAL MEDICINE
Payer: MEDICARE

## 2025-07-14 DIAGNOSIS — Z85.3 PERSONAL HISTORY OF BREAST CANCER: ICD-10-CM

## 2025-07-14 LAB
ALBUMIN SERPL-MCNC: 4.6 G/DL (ref 3.2–4.8)
ALBUMIN/GLOB SERPL: 1.9 {RATIO} (ref 1–2)
ALP LIVER SERPL-CCNC: 80 U/L (ref 55–142)
ALT SERPL-CCNC: 53 U/L (ref 10–49)
ANION GAP SERPL CALC-SCNC: 6 MMOL/L (ref 0–18)
AST SERPL-CCNC: 41 U/L (ref ?–34)
BASOPHILS # BLD AUTO: 0.07 X10(3) UL (ref 0–0.2)
BASOPHILS NFR BLD AUTO: 1.6 %
BILIRUB SERPL-MCNC: 0.5 MG/DL (ref 0.2–1.1)
BUN BLD-MCNC: 17 MG/DL (ref 9–23)
BUN/CREAT SERPL: 21.5 (ref 10–20)
CALCIUM BLD-MCNC: 9.5 MG/DL (ref 8.7–10.4)
CHLORIDE SERPL-SCNC: 104 MMOL/L (ref 98–112)
CO2 SERPL-SCNC: 29 MMOL/L (ref 21–32)
CREAT BLD-MCNC: 0.79 MG/DL (ref 0.55–1.02)
DEPRECATED RDW RBC AUTO: 45.7 FL (ref 35.1–46.3)
EGFRCR SERPLBLD CKD-EPI 2021: 81 ML/MIN/1.73M2 (ref 60–?)
EOSINOPHIL # BLD AUTO: 0.2 X10(3) UL (ref 0–0.7)
EOSINOPHIL NFR BLD AUTO: 4.5 %
ERYTHROCYTE [DISTWIDTH] IN BLOOD BY AUTOMATED COUNT: 14.1 % (ref 11–15)
FASTING STATUS PATIENT QL REPORTED: NO
GLOBULIN PLAS-MCNC: 2.4 G/DL (ref 2–3.5)
GLUCOSE BLD-MCNC: 82 MG/DL (ref 70–99)
HCT VFR BLD AUTO: 41.2 % (ref 35–48)
HGB BLD-MCNC: 13.5 G/DL (ref 12–16)
IMM GRANULOCYTES # BLD AUTO: 0.01 X10(3) UL (ref 0–1)
IMM GRANULOCYTES NFR BLD: 0.2 %
LYMPHOCYTES # BLD AUTO: 1.32 X10(3) UL (ref 1–4)
LYMPHOCYTES NFR BLD AUTO: 29.5 %
MCH RBC QN AUTO: 29.3 PG (ref 26–34)
MCHC RBC AUTO-ENTMCNC: 32.8 G/DL (ref 31–37)
MCV RBC AUTO: 89.4 FL (ref 80–100)
MONOCYTES # BLD AUTO: 0.67 X10(3) UL (ref 0.1–1)
MONOCYTES NFR BLD AUTO: 15 %
NEUTROPHILS # BLD AUTO: 2.21 X10 (3) UL (ref 1.5–7.7)
NEUTROPHILS # BLD AUTO: 2.21 X10(3) UL (ref 1.5–7.7)
NEUTROPHILS NFR BLD AUTO: 49.2 %
OSMOLALITY SERPL CALC.SUM OF ELEC: 289 MOSM/KG (ref 275–295)
PLATELET # BLD AUTO: 293 10(3)UL (ref 150–450)
POTASSIUM SERPL-SCNC: 4.6 MMOL/L (ref 3.5–5.1)
PROT SERPL-MCNC: 7 G/DL (ref 5.7–8.2)
RBC # BLD AUTO: 4.61 X10(6)UL (ref 3.8–5.3)
SODIUM SERPL-SCNC: 139 MMOL/L (ref 136–145)
WBC # BLD AUTO: 4.5 X10(3) UL (ref 4–11)

## 2025-07-14 PROCEDURE — 85025 COMPLETE CBC W/AUTO DIFF WBC: CPT

## 2025-07-14 PROCEDURE — 80053 COMPREHEN METABOLIC PANEL: CPT

## 2025-07-14 PROCEDURE — 36415 COLL VENOUS BLD VENIPUNCTURE: CPT

## 2025-07-28 ENCOUNTER — HOSPITAL ENCOUNTER (OUTPATIENT)
Dept: NUCLEAR MEDICINE | Facility: HOSPITAL | Age: 70
Discharge: HOME | End: 2025-07-28
Attending: INTERNAL MEDICINE

## 2025-07-28 ENCOUNTER — HOSPITAL ENCOUNTER (OUTPATIENT)
Dept: CT IMAGING | Facility: HOSPITAL | Age: 70
Discharge: HOME | End: 2025-07-28
Attending: INTERNAL MEDICINE

## 2025-07-28 DIAGNOSIS — C79.51 CARCINOMA OF BREAST METASTATIC TO BONE, UNSPECIFIED LATERALITY (HCC): ICD-10-CM

## 2025-07-28 DIAGNOSIS — C50.919 CARCINOMA OF BREAST METASTATIC TO BONE, UNSPECIFIED LATERALITY (HCC): ICD-10-CM

## 2025-07-28 DIAGNOSIS — C79.51 METASTASIS TO BONE (HCC): ICD-10-CM

## 2025-07-28 DIAGNOSIS — C50.919: ICD-10-CM

## 2025-07-28 DIAGNOSIS — C79.51: ICD-10-CM

## 2025-07-28 DIAGNOSIS — Z85.3 PERSONAL HISTORY OF BREAST CANCER: ICD-10-CM

## 2025-07-28 LAB — GLUCOSE BLDC GLUCOMTR-MCNC: 77 MG/DL (ref 70–99)

## 2025-07-28 PROCEDURE — 78815 PET IMAGE W/CT SKULL-THIGH: CPT | Performed by: INTERNAL MEDICINE

## 2025-07-28 PROCEDURE — 71250 CT THORAX DX C-: CPT | Performed by: INTERNAL MEDICINE

## 2025-07-28 PROCEDURE — 82962 GLUCOSE BLOOD TEST: CPT

## 2025-08-04 ENCOUNTER — LAB ENCOUNTER (OUTPATIENT)
Dept: LAB | Facility: HOSPITAL | Age: 70
End: 2025-08-04
Attending: INTERNAL MEDICINE

## 2025-08-04 ENCOUNTER — HOSPITAL ENCOUNTER (OUTPATIENT)
Dept: RADIATION ONCOLOGY | Facility: HOSPITAL | Age: 70
Discharge: HOME | End: 2025-08-04
Attending: INTERNAL MEDICINE

## 2025-08-04 DIAGNOSIS — Z85.3 PERSONAL HISTORY OF BREAST CANCER: ICD-10-CM

## 2025-08-04 LAB
ALBUMIN SERPL-MCNC: 4.5 G/DL (ref 3.2–4.8)
ALBUMIN/GLOB SERPL: 1.7 (ref 1–2)
ALP LIVER SERPL-CCNC: 71 U/L (ref 55–142)
ALT SERPL-CCNC: 36 U/L (ref 10–49)
ANION GAP SERPL CALC-SCNC: 8 MMOL/L (ref 0–18)
AST SERPL-CCNC: 39 U/L (ref ?–34)
BASOPHILS # BLD AUTO: 0.07 X10(3) UL (ref 0–0.2)
BASOPHILS NFR BLD AUTO: 1.4 %
BILIRUB SERPL-MCNC: 0.6 MG/DL (ref 0.2–1.1)
BUN BLD-MCNC: 9 MG/DL (ref 9–23)
BUN/CREAT SERPL: 10.5 (ref 10–20)
CALCIUM BLD-MCNC: 9 MG/DL (ref 8.7–10.4)
CHLORIDE SERPL-SCNC: 105 MMOL/L (ref 98–112)
CO2 SERPL-SCNC: 28 MMOL/L (ref 21–32)
CREAT BLD-MCNC: 0.86 MG/DL (ref 0.55–1.02)
DEPRECATED RDW RBC AUTO: 45.6 FL (ref 35.1–46.3)
EGFRCR SERPLBLD CKD-EPI 2021: 73 ML/MIN/1.73M2 (ref 60–?)
EOSINOPHIL # BLD AUTO: 0.32 X10(3) UL (ref 0–0.7)
EOSINOPHIL NFR BLD AUTO: 6.5 %
ERYTHROCYTE [DISTWIDTH] IN BLOOD BY AUTOMATED COUNT: 13.6 % (ref 11–15)
FASTING STATUS PATIENT QL REPORTED: NO
GLOBULIN PLAS-MCNC: 2.6 G/DL (ref 2–3.5)
GLUCOSE BLD-MCNC: 80 MG/DL (ref 70–99)
HCT VFR BLD AUTO: 41.2 % (ref 35–48)
HGB BLD-MCNC: 13.4 G/DL (ref 12–16)
IMM GRANULOCYTES # BLD AUTO: 0.02 X10(3) UL (ref 0–1)
IMM GRANULOCYTES NFR BLD: 0.4 %
LYMPHOCYTES # BLD AUTO: 1.53 X10(3) UL (ref 1–4)
LYMPHOCYTES NFR BLD AUTO: 31.1 %
MCH RBC QN AUTO: 29.8 PG (ref 26–34)
MCHC RBC AUTO-ENTMCNC: 32.5 G/DL (ref 31–37)
MCV RBC AUTO: 91.6 FL (ref 80–100)
MONOCYTES # BLD AUTO: 0.67 X10(3) UL (ref 0.1–1)
MONOCYTES NFR BLD AUTO: 13.6 %
NEUTROPHILS # BLD AUTO: 2.31 X10 (3) UL (ref 1.5–7.7)
NEUTROPHILS # BLD AUTO: 2.31 X10(3) UL (ref 1.5–7.7)
NEUTROPHILS NFR BLD AUTO: 47 %
OSMOLALITY SERPL CALC.SUM OF ELEC: 290 MOSM/KG (ref 275–295)
PLATELET # BLD AUTO: 270 10(3)UL (ref 150–450)
POTASSIUM SERPL-SCNC: 4 MMOL/L (ref 3.5–5.1)
PROT SERPL-MCNC: 7.1 G/DL (ref 5.7–8.2)
RBC # BLD AUTO: 4.5 X10(6)UL (ref 3.8–5.3)
SODIUM SERPL-SCNC: 141 MMOL/L (ref 136–145)
WBC # BLD AUTO: 4.9 X10(3) UL (ref 4–11)

## 2025-08-04 PROCEDURE — 85025 COMPLETE CBC W/AUTO DIFF WBC: CPT

## 2025-08-04 PROCEDURE — 36415 COLL VENOUS BLD VENIPUNCTURE: CPT

## 2025-08-04 PROCEDURE — 80053 COMPREHEN METABOLIC PANEL: CPT

## 2025-08-06 ENCOUNTER — APPOINTMENT (OUTPATIENT)
Dept: RADIATION ONCOLOGY | Facility: HOSPITAL | Age: 70
End: 2025-08-06
Attending: INTERNAL MEDICINE

## 2025-08-25 ENCOUNTER — LAB ENCOUNTER (OUTPATIENT)
Dept: LAB | Facility: HOSPITAL | Age: 70
End: 2025-08-25
Attending: INTERNAL MEDICINE

## 2025-08-25 DIAGNOSIS — Z85.3 PERSONAL HISTORY OF BREAST CANCER: ICD-10-CM

## 2025-08-25 LAB
ALBUMIN SERPL-MCNC: 4.5 G/DL (ref 3.2–4.8)
ALBUMIN/GLOB SERPL: 1.8 (ref 1–2)
ALP LIVER SERPL-CCNC: 68 U/L (ref 55–142)
ALT SERPL-CCNC: 46 U/L (ref 10–49)
ANION GAP SERPL CALC-SCNC: 5 MMOL/L (ref 0–18)
AST SERPL-CCNC: 41 U/L (ref ?–34)
BASOPHILS # BLD AUTO: 0.04 X10(3) UL (ref 0–0.2)
BASOPHILS NFR BLD AUTO: 1 %
BILIRUB SERPL-MCNC: 0.7 MG/DL (ref 0.2–1.1)
BUN BLD-MCNC: 13 MG/DL (ref 9–23)
BUN/CREAT SERPL: 16 (ref 10–20)
CALCIUM BLD-MCNC: 9.2 MG/DL (ref 8.7–10.4)
CHLORIDE SERPL-SCNC: 107 MMOL/L (ref 98–112)
CO2 SERPL-SCNC: 28 MMOL/L (ref 21–32)
CREAT BLD-MCNC: 0.81 MG/DL (ref 0.55–1.02)
DEPRECATED RDW RBC AUTO: 44.6 FL (ref 35.1–46.3)
EGFRCR SERPLBLD CKD-EPI 2021: 79 ML/MIN/1.73M2 (ref 60–?)
EOSINOPHIL # BLD AUTO: 0.21 X10(3) UL (ref 0–0.7)
EOSINOPHIL NFR BLD AUTO: 5.5 %
ERYTHROCYTE [DISTWIDTH] IN BLOOD BY AUTOMATED COUNT: 13.7 % (ref 11–15)
FASTING STATUS PATIENT QL REPORTED: NO
GLOBULIN PLAS-MCNC: 2.5 G/DL (ref 2–3.5)
GLUCOSE BLD-MCNC: 83 MG/DL (ref 70–99)
HCT VFR BLD AUTO: 39.1 % (ref 35–48)
HGB BLD-MCNC: 12.9 G/DL (ref 12–16)
IMM GRANULOCYTES # BLD AUTO: 0.01 X10(3) UL (ref 0–1)
IMM GRANULOCYTES NFR BLD: 0.3 %
LYMPHOCYTES # BLD AUTO: 1.34 X10(3) UL (ref 1–4)
LYMPHOCYTES NFR BLD AUTO: 35.2 %
MCH RBC QN AUTO: 29.5 PG (ref 26–34)
MCHC RBC AUTO-ENTMCNC: 33 G/DL (ref 31–37)
MCV RBC AUTO: 89.3 FL (ref 80–100)
MONOCYTES # BLD AUTO: 0.58 X10(3) UL (ref 0.1–1)
MONOCYTES NFR BLD AUTO: 15.2 %
NEUTROPHILS # BLD AUTO: 1.63 X10 (3) UL (ref 1.5–7.7)
NEUTROPHILS # BLD AUTO: 1.63 X10(3) UL (ref 1.5–7.7)
NEUTROPHILS NFR BLD AUTO: 42.8 %
OSMOLALITY SERPL CALC.SUM OF ELEC: 289 MOSM/KG (ref 275–295)
PLATELET # BLD AUTO: 285 10(3)UL (ref 150–450)
POTASSIUM SERPL-SCNC: 4 MMOL/L (ref 3.5–5.1)
PROT SERPL-MCNC: 7 G/DL (ref 5.7–8.2)
RBC # BLD AUTO: 4.38 X10(6)UL (ref 3.8–5.3)
SODIUM SERPL-SCNC: 140 MMOL/L (ref 136–145)
WBC # BLD AUTO: 3.8 X10(3) UL (ref 4–11)

## 2025-08-25 PROCEDURE — 85025 COMPLETE CBC W/AUTO DIFF WBC: CPT

## 2025-08-25 PROCEDURE — 80053 COMPREHEN METABOLIC PANEL: CPT

## 2025-08-25 PROCEDURE — 36415 COLL VENOUS BLD VENIPUNCTURE: CPT

## (undated) DEVICE — DRESSING GERM DIA1IN CNTR 4MM CHG PROTCT DISK

## (undated) DEVICE — DRAPE SUR W53XL77IN STD POLYPR SMS 3 QTR SHT

## (undated) DEVICE — Device

## (undated) DEVICE — DRAPE SHEET LG

## (undated) DEVICE — NEEDLE SPINL CLR HUB 18GX3 1/2

## (undated) DEVICE — SPONGE GZ 4XL4IN 100% COT 12 PLY TYP VII WVN

## (undated) DEVICE — PACK CDS LAMINECTOMY

## (undated) DEVICE — MARKER SUR SKIN ST GENTIAN VLT STD REG TIP

## (undated) DEVICE — MARKER SKIN XR REFLCT LF SPHR DISP

## (undated) DEVICE — BAG EQP 36X36IN BAND

## (undated) DEVICE — SOLUTION IRRIG 1000ML 0.9% NACL USP BTL

## (undated) DEVICE — SUTURE ETHLN SZ 2-0 L18IN NONABSORB BLK

## (undated) DEVICE — ELECTRODE ES L2.75IN XLN STD BLDE MOD E-Z CLN

## (undated) DEVICE — GLOVE GAMMEX PI HYBRID LF 7.5

## (undated) DEVICE — PACK DRP UNIV W/ BK TBL MAYO STD BTM TOP SIDE

## (undated) DEVICE — DRAPE C ARM TRNSPAR POLY PROTCT BARR FOR UNIV

## (undated) DEVICE — CONTAINER SPEC 4OZ POLYPR GRAD SCR LID LEAK

## (undated) DEVICE — DRAPE SRG 90X60IN BCK TBL CVR

## (undated) DEVICE — KIT EVAC 400CC DIA1/8IN H PAT 12.5IN 3 SPR

## (undated) DEVICE — SUTURE VCRL SZ 2-0 L18IN ABSRB VLT L26MM CT-2

## (undated) DEVICE — PENCIL ES BTTN SWCH W/ TIP HOLSTER E-Z CLN

## (undated) DEVICE — SOLUTION PREP 4OZ 3% H2O2 1ST AID ANTISEP

## (undated) DEVICE — FORCEP BIPOLAR SPETZLER 9X1.0

## (undated) DEVICE — SUTURE VCRL SZ 0 L18IN ABSRB VLT L36MM CT-1

## (undated) DEVICE — NEEDLE SPNL 18GA L3.5IN PNK HUB SS RW FIT

## (undated) DEVICE — TRAY PREP WET SKIN SPNG STK WNG SPNG ABSRB

## (undated) DEVICE — GOWN SURG XL DISP LEV 3 AERO BLU RAGLAN SL

## (undated) DEVICE — BUR SUR DIA3MM PRECIS NEURO 5 STP NOTCH

## (undated) DEVICE — STAPLER SKIN ETHICON GUN PXW35

## (undated) DEVICE — FEE RENTAL MD SURG USE

## (undated) DEVICE — PAD N ADH W3XL4IN POLY COT SFT PERF FLM ABSRB

## (undated) DEVICE — MONITORING NEUROPHYSIOLOGICAL

## (undated) DEVICE — DRESSING TRNSPAR W4XL4.75IN FRME STYL

## (undated) DEVICE — KIT HEMSTAT MTRX 8ML PORCINE GEL HUM THROM

## (undated) NOTE — LETTER
[place] [address]  Authorization for Invasive Procedures  Date: 01/29/24          Time: 9:00AM    I hereby authorize Cleveland Liao MD , my physician and his/her assistants (if applicable), which may include medical students, residents, and/or fellows, to perform the following surgical operation/ procedure and administer such anesthesia as may be determined necessary by my physician: Thoracic 5 to 7 laminectomies for decompression of spinal cord, right transpedicular thoracic 6 and 7 approaches for resection of tumor and nerve root decompression, thoracic 4 to 8 posterior fixation and fusion, and any other indicated procedures  on Aphrodite N Pantos  2.   I recognize that during the surgical operation/procedure, unforeseen conditions may necessitate additional or different procedures than those listed above.  I, therefore, further authorize and request that the above-named surgeon, assistants, or designees perform such procedures as are, in their judgment, necessary and desirable.    3.   My surgeon/physician has discussed prior to my surgery the potential benefits, risks and side effects of this procedure; the likelihood of achieving goals; and potential problems that might occur during recuperation.  They also discussed reasonable alternatives to the procedure, including risks, benefits, and side effects related to the alternatives and risks related to not receiving this procedure.  I have had all my questions answered and I acknowledge that no guarantee has been made as to the result that may be obtained.    4.   Should the need arise during my operation/procedure, which includes change of level of care prior to discharge, I also consent to the administration of blood and/or blood products.  Further, I understand that despite careful testing and screening of blood or blood products by collecting agencies, I may still be subject to ill effects as a result of receiving a blood  transfusion and/or blood products.  The following are some, but not all, of the potential risks that can occur: fever and allergic reactions, hemolytic reactions, transmission of diseases such as Hepatitis, AIDS and Cytomegalovirus (CMV) and fluid overload.  In the event that I wish to have an autologous transfusion of my own blood, or a directed donor transfusion, I will discuss this with my physician.   Check only if Refusing Blood or Blood Products  I understand refusal of blood or blood products as deemed necessary by my physician may have serious consequences to my condition to include possible death. I hereby assume responsibility for my refusal and release the hospital, its personnel, and my physicians from any responsibility for the consequences of my refusal.         o  Refuse         5.   I authorize the use of any specimen, organs, tissues, body parts or foreign objects that may be removed from my body during the operation/procedure for diagnosis, research or teaching purposes and their subsequent disposal by hospital authorities.  I also authorize the release of specimen test results and/or written reports to my treating physician on the hospital medical staff or other referring or consulting physicians involved in my care, at the discretion of the Pathologist or my treating physician.    6.   I consent to the photographing or videotaping of the operations or procedures to be performed, including appropriate portions of my body for medical, scientific, or educational purposes, provided my identity is not revealed by the pictures or by descriptive texts accompanying them.  If the procedure has been photographed/videotaped, the surgeon will obtain the original picture, image, videotape or CD.  The hospital will not be responsible for storage, release or maintenance of the picture, image, tape or CD.    7.   I consent to the presence of a  or observers in the operating room as deemed  necessary by my physician or their designees.    8.   I recognize that in the event my procedure results in extended X-Ray/fluoroscopy time, I may develop a skin reaction.    9. If I have a Do Not Attempt Resuscitation (DNAR) order in place, that status will be suspended while in the operating room, procedural suite, and during the recovery period unless otherwise explicitly stated by me (or a person authorized to consent on my behalf). The surgeon or my attending physician will determine when the applicable recovery period ends for purposes of reinstating the DNAR order.  10. Patients having a sterilization procedure: I understand that if the procedure is successful the results will be permanent and it will therefore be impossible for me to inseminate, conceive, or bear children.  I also understand that the procedure is intended to result in sterility, although the result has not been guaranteed.   11. I acknowledge that my physician has explained sedation/analgesia administration to me including the risk and benefits I consent to the administration of sedation/analgesia as may be necessary or desirable in the judgment of my physician.    I CERTIFY THAT I HAVE READ AND FULLY UNDERSTAND THE ABOVE CONSENT TO OPERATION and/or OTHER PROCEDURE.        ____________________________________       _________________________________      ______________________________  Signature of Patient         Signature of Responsible Person        Printed Name of Responsible Person        ____________________________________      _________________________________      ______________________________       Signature of Witness          Relationship to Patient                       Date                                       Time    Patient Name: Cris Han     : 1955                 Printed: 2024      Medical Record #: [id number]                      Page 1 of 2          STATEMENT OF PHYSICIAN My signature below  affirms that prior to the time of the procedure; I have explained to the patient and/or his/her legal representative, the risks and benefits involved in the proposed treatment and any reasonable alternative to the proposed treatment. I have also explained the risks and benefits involved in refusal of the proposed treatment and alternatives to the proposed treatment and have answered the patient's questions. If I have a significant financial interest in a co-management agreement or a significant financial interest in any product or implant, or other significant relationship used in this procedure/surgery, I have disclosed this and had a discussion with my patient.     _______________________________________________________________ _____________________________  (Signature of Physician)                                                                                         (Date)                                   (Time)    Patient Name: Cris Han     : 1955                 Printed: 2024      Medical Record #: [id number]                      Page 2 of 2

## (undated) NOTE — LETTER
Date: 2/28/2024    Patient Name: Cris Han          To Whom It May Concern:    This letter has been written at the patient's request. The above patient was seen at the [facility] for treatment of a medical condition.    In regard to physical therapy, the patient is cleared from a neurosurgical standpoint to target her upper body. She may reduce her lifting restrictions to nothing heavier than 20 lbs for 1-2 months after surgery, followed by nothing heavier than 30 lbs for 2-3 months after surgery. She can gently twist her low back as needed, but would try to avoid any exercises that isolate range of motion at the incision site.          Sincerely,      Koby Glaser PA-C  Physician Assistant- Neurosurgery   [facility]  2/28/2024, 10:50 AM

## (undated) NOTE — LETTER
[address], [place]  Authorization for Invasive Procedures  1.  I hereby authorize  Dr. Leticia Stokes , my physician and whomever may be designated as the doctor's assistant, to perform the following operation and/or procedure [place] In performed for the purposes of advancing medicine, science, and/or education, provided my identity is not revealed. If the procedure has been videotaped, the physician/surgeon will obtain the original videotape.  The hospital will not be responsible for stor My signature below affirms that prior to the time of the procedure, I have explained to the patient and/or her legal representative, the risks and benefits involved in the proposed treatment and any reasonable alternative to the proposed treatment.  I have

## (undated) NOTE — LETTER
[address], [place], [place]  Authorization for Surgical Operation and Procedure                                                                                           I hereby authorize Cleveland Liao MD, my physician and his/her assistants (if applicable), which may include medical students, residents, and/or fellows, to perform the following surgical operation/ procedure and administer such anesthesia as may be determined necessary by my physician: Operation/Procedure name (s) Thoracic 5 to 7 laminectomies for decompression of spinal cord, right transpedicular thoracic 6 and 7 approaches for resection of tumor and nerve root decompression, thoracic 4 to 8 posterior fixation and fusion, and any other indicated procedures on Aphrodite [address] Pantos.   I recognize that during the surgical operation/procedure, unforeseen conditions may necessitate additional or different procedures than those listed above.  I, therefore, further authorize and request that the above-named surgeon, assistants, or designees perform such procedures as are, in their judgment, necessary and desirable.    3.   My surgeon/physician has discussed prior to my surgery the potential benefits, risks and side effects of this procedure; the likelihood of achieving goals; and potential problems that might occur during recuperation.  They also discussed reasonable alternatives to the procedure, including risks, benefits, and side effects related to the alternatives and risks related to not receiving this procedure.  I have had all my questions answered and I acknowledge that no guarantee has been made as to the result that may be obtained.    4.   Should the need arise during my operation/procedure, which includes change of level of care prior to discharge, I also consent to the administration of blood and/or blood products.  Further, I understand that despite careful testing and screening of blood or blood products  by collecting agencies, I may still be subject to ill effects as a result of receiving a blood transfusion and/or blood products.  The following are some, but not all, of the potential risks that can occur: fever and allergic reactions, hemolytic reactions, transmission of diseases such as Hepatitis, AIDS and Cytomegalovirus (CMV) and fluid overload.  In the event that I wish to have an autologous transfusion of my own blood, or a directed donor transfusion, I will discuss this with my physician.  Check only if Refusing Blood or Blood Products  I understand refusal of blood or blood products as deemed necessary by my physician may have serious consequences to my condition to include possible death. I hereby assume responsibility for my refusal and release the hospital, its personnel, and my physicians from any responsibility for the consequences of my refusal.    o  Refuse   5.   I authorize the use of any specimen, organs, tissues, body parts or foreign objects that may be removed from my body during the operation/procedure for diagnosis, research or teaching purposes and their subsequent disposal by hospital authorities.  I also authorize the release of specimen test results and/or written reports to my treating physician on the hospital medical staff or other referring or consulting physicians involved in my care, at the discretion of the Pathologist or my treating physician.    6.   I consent to the photographing or videotaping of the operations or procedures to be performed, including appropriate portions of my body for medical, scientific, or educational purposes, provided my identity is not revealed by the pictures or by descriptive texts accompanying them.  If the procedure has been photographed/videotaped, the surgeon will obtain the original picture, image, videotape or CD.  The hospital will not be responsible for storage, release or maintenance of the picture, image, tape or CD.    7.   I consent to the  presence of a  or observers in the operating room as deemed necessary by my physician or their designees.    8.   I recognize that in the event my procedure results in extended X-Ray/fluoroscopy time, I may develop a skin reaction.    9. If I have a Do Not Attempt Resuscitation (DNAR) order in place, that status will be suspended while in the operating room, procedural suite, and during the recovery period unless otherwise explicitly stated by me (or a person authorized to consent on my behalf). The surgeon or my attending physician will determine when the applicable recovery period ends for purposes of reinstating the DNAR order.  10. Patients having a sterilization procedure: I understand that if the procedure is successful the results will be permanent and it will therefore be impossible for me to inseminate, conceive, or bear children.  I also understand that the procedure is intended to result in sterility, although the result has not been guaranteed.   11. I acknowledge that my physician has explained sedation/analgesia administration to me including the risk and benefits I consent to the administration of sedation/analgesia as may be necessary or desirable in the judgment of my physician.    I CERTIFY THAT I HAVE READ AND FULLY UNDERSTAND THE ABOVE CONSENT TO OPERATION and/or OTHER PROCEDURE.     _________________________________________ _________________________________     ___________________________________  Signature of Patient     Signature of Responsible Person                   Printed Name of Responsible Person                              _________________________________________ ______________________________        ___________________________________  Signature of Witness         Date  Time         Relationship to Patient    STATEMENT OF PHYSICIAN My signature below affirms that prior to the time of the procedure; I have explained to the patient and/or his/her legal  representative, the risks and benefits involved in the proposed treatment and any reasonable alternative to the proposed treatment. I have also explained the risks and benefits involved in refusal of the proposed treatment and alternatives to the proposed treatment and have answered the patient's questions. If I have a significant financial interest in a co-management agreement or a significant financial interest in any product or implant, or other significant relationship used in this procedure/surgery, I have disclosed this and had a discussion with my patient.     _______________________________________________________________ _____________________________  (Signature of Physician)                                                                                         (Date)                                   (Time)  Patient Name: Cris Han    : 1955   Printed: 2024      Medical Record #: [id number]                                              Page 1 of 1

## (undated) NOTE — LETTER
[facility] 2W/SW  [address] [facility] [address]  [phone]    Blood Transfusion Consent    In the course of your treatment, it may become necessary to administer a transfusion of blood or blood components. This form provides basic information concerning this procedure and, if signed by you, authorizes its administration. By signing this form, you agree that all of your questions about the administration of blood or blood products have been answered by the ordering medical professional or designee.    Description of Procedure  Blood is introduced into one of your veins, commonly in the arm, using a sterilized disposable needle. The amount of blood transfused, and whether the transfusion will be of blood or blood components is a judgement the physician will make based on your particular needs.    Risks  The transfusion is a common procedure of low risk.  MINOR AND TEMPORARY REACTIONS ARE NOT UNCOMMON, including a slight bruise, swelling or local reaction in the area where the needle pierces your skin, or a nonserious reaction to the transfused material itself, including headache, fever or mild skin reaction, such as rash.  Serious reactions are possible, though very unlikely, and include severe allergic reaction (shock) and destruction (hemolysis) of transfused blood cells.  Infectious diseases which are known to be transmitted by blood transfusion include certain types of viral Hepatitis(liver infection from a virus), Human Immunodeficiency Virus (HIV-1,2) infection, a viral infection known to cause Acquired Immunodeficiency Syndrome (AIDS), as well as certain other bacterial, viral, and parasitic diseases. While a minimal risk of acquiring an infectious disease from transfused blood exists, in accordance with the Federal and State law, all due care has been taken in donor selection and testing to avoid transmission of disease.    Alternatives  If loss of blood poses serious threats during your  treatment, THERE IS NO EFFECTIVE ALTERNATIVE TO BLOOD TRANSFUSION. However, if you have any further questions on this matter, your provider will fully explain the alternatives to you if it has not already been done.    I, ______________________________, have read/had read to me the above. I understand the matters bearing on the decision whether or not to authorize a transfusion of blood or blood components. I have no questions which have not been answered to my full satisfaction. I hereby consent to such transfusion as my physician may deem necessary or advisable in the course of my treatment.    ______________________________________________                    ___________________________  (Signature of Patient or Responsible party in case of minor,                 (Printed Name of Patient or incompetent, or unconscious patient)              Responsible Party)    ___________________________               _____________________  (Relationship to Patient if not self)                                    (Date and Time)    __________________________                                                           ______________________              (Signature of Witness)               (Printed Name of Witness)     Language line ()    Telephone/Verbal/Video Consent    __________________________                     ____________________  (Signature of 2nd Witness           (Printed Name of 2nd  Telephone/Verbal/Video Consent)           Witness)    Patient Name: Cris Han     : 1955                 Printed: 2024     Medical Record #: [id number]      Rev: 2023

## (undated) NOTE — LETTER
Patient Name: Frantz Heard  YOB: 1955          MRN number:  [id number]  Date:  1/21/2019  Referring Physician: Ofelia Shrestha     PHYSICAL THERAPY UE LYMPHEDEMA EVALUATION:    Referring Physician: Dr. Paloom Rank of ov Decongestive Therapy, ROM, stretching, strengthening to improve functional mobility and decrease risk of infection. Clinical presentation:    stable (low eval)      Lymphedema Life Impact Scale: Score: 16%.  (0% is no impairment, 100% total impairment)

## (undated) NOTE — LETTER
Date: 4/29/2024    Patient Name: Cris Han          To Whom It May Concern:    This letter has been written at the patient's request. The above patient was seen at [facility] for treatment of a medical condition.    The patient is now 3 months out from surgery.  We will discontinue her postoperative restrictions.  She may return to activities gradually and as tolerated.  We will defer to therapy's expertise regarding recommended exercises that will aid in her continued recovery.    If there are any questions, please reach out to the neurosurgery team for further clarification.      Sincerely,      Koby Glaser PA-C  Physician Assistant- Neurosurgery   4/29/2024, 9:45 AM

## (undated) NOTE — LETTER
[address]  [address]    Consent for Anesthesia    ICris agree to be cared for by a physician anesthesiologist alone and/or with a nurse anesthetist, who is specially trained to monitor me and give me medicine to put me to sleep or keep me comfortable during my procedure    I understand that my anesthesiologist and/or anesthetist is not an employee or agent of [facility] or [employer]. He or she works for [employer].    As the patient asking for anesthesia services, I agree to:  Allow the anesthesiologist (anesthesia doctor) to give me medicine and do additional procedures as necessary. Some examples are: Starting or using an “IV” to give me medicine, fluids or blood during my procedure, and having a breathing tube placed to help me breathe when I’m asleep (intubation). In the event that my heart stops working properly, I understand that my anesthesiologist will make every effort to sustain my life, unless otherwise directed by [facility] Do Not Resuscitate documents.  Tell my anesthesia doctor before my procedure:   If I am pregnant.   The last time that I ate or drank.  iii. All of the medicines I take (including prescriptions, herbal supplements, and pills I can buy without a prescription (including street drugs/illegal medications). Failure to inform my anesthesiologist about these medicines may increase my risk of anesthetic complications.  Iv.If I am allergic to anything or have had a reaction to anesthesia before.  I understand how the anesthesia medicine will help me (benefits).  I understand that with any type of anesthesia medicine there are risks:  The most common risks are: nausea, vomiting, sore throat, muscle soreness, damage to my eyes, mouth, or teeth (from breathing tube placement).  Rare risks include: remembering what happened during my procedure, allergic reactions to medications, injury to my airway,  heart, lungs, vision, nerves, or muscles and in extremely rare instances death.  My doctor has explained to me other choices available to me for my care (alternatives).  Pregnant Patients (“epidural”):  I understand that the risks of having an epidural (medicine given into my back to help control pain during labor), include itching, low blood pressure, difficulty urinating, headache or slowing of the baby’s heart. Very rare risks include infection, bleeding, seizure, irregular heart rhythms and nerve injury.  Regional Anesthesia (“spinal”, “epidural”, & “nerve blocks”):  I understand that rare but potential complications include headache, bleeding, infection, seizure, irregular heart rhythms, and nerve injury.    _____________________________________________________________________________  Patient (or Representative) Signature/Relationship to Patient  Date   Time    _____________________________________________________________________________   Name (if used)    Language/Organization   Time    _____________________________________________________________________________  Nurse Anesthetist Signature     Date   Time    ______________________________________________________________________________  Anesthesiologist Signature     Date   Time  I have discussed the procedure and information above with the patient (or patient’s representative) and answered their questions. The patient or their representative has agreed to have anesthesia services.    _____________________________________________________________________________  Witness       Date   Time  I have verified that the signature is that of the patient or patient’s representative, and that it was signed before the procedure    Patient Name: Cris Han     : 1955                 Printed: 2024 at 8:52 AM    Medical Record #: [id number]                                            Page 1 of 1

## (undated) NOTE — LETTER
Hello,      This is the [facility], office of Dr. Kevin Chahal     Thank you for putting your trust in [facility]. Our goal is to deliver the highest quality healthcare and an exceptional patient experience. Upon reviewing of your medical record shows you are due for the following:     Annual Physical   Colonoscopy  FIT Test   Mammogram       Please call [phone] to schedule your appointment or schedule online via [employer].     If you changed to a new provider at another facility, please notify the clinic to update your records.     If you had any recent testing at another facility, please have your results faxed to our office at [phone].      Thank you and have a great day! 04/09/25